# Patient Record
Sex: MALE | Race: WHITE | NOT HISPANIC OR LATINO | Employment: OTHER | URBAN - METROPOLITAN AREA
[De-identification: names, ages, dates, MRNs, and addresses within clinical notes are randomized per-mention and may not be internally consistent; named-entity substitution may affect disease eponyms.]

---

## 2017-01-03 ENCOUNTER — TELEPHONE (OUTPATIENT)
Dept: NEUROSURGERY | Facility: CLINIC | Age: 47
End: 2017-01-03

## 2017-01-03 NOTE — TELEPHONE ENCOUNTER
----- Message from [place] sent at 12/30/2016  2:13 PM EST -----  12-30-16 @ 210 pm -- spoke with patient and let him know that Amberly said with him being this far out from surgery there is no reason to be excused from jury duty

## 2017-01-23 ENCOUNTER — TELEPHONE (OUTPATIENT)
Dept: NEUROSURGERY | Facility: CLINIC | Age: 47
End: 2017-01-23

## 2017-01-23 DIAGNOSIS — M54.2 NECK PAIN: Primary | ICD-10-CM

## 2017-01-23 NOTE — TELEPHONE ENCOUNTER
Patient wife called said pt had a fall and wanted to know if he needed xrays before appt .     Per brynn AP lateral x ray order in chart.

## 2017-01-24 ENCOUNTER — OFFICE VISIT (OUTPATIENT)
Dept: NEUROSURGERY | Facility: CLINIC | Age: 47
End: 2017-01-24

## 2017-01-24 ENCOUNTER — HOSPITAL ENCOUNTER (OUTPATIENT)
Dept: GENERAL RADIOLOGY | Facility: HOSPITAL | Age: 47
Discharge: HOME | End: 2017-01-24
Admitting: NURSE PRACTITIONER

## 2017-01-24 VITALS
HEIGHT: 69 IN | BODY MASS INDEX: 31.81 KG/M2 | WEIGHT: 214.8 LBS | SYSTOLIC BLOOD PRESSURE: 125 MMHG | HEART RATE: 85 BPM | DIASTOLIC BLOOD PRESSURE: 85 MMHG

## 2017-01-24 DIAGNOSIS — M51.37 DEGENERATION OF LUMBAR OR LUMBOSACRAL INTERVERTEBRAL DISC: Primary | ICD-10-CM

## 2017-01-24 DIAGNOSIS — M50.120 CERVICAL DISC DISORDER WITH RADICULOPATHY OF MID-CERVICAL REGION: ICD-10-CM

## 2017-01-24 DIAGNOSIS — M54.2 NECK PAIN: ICD-10-CM

## 2017-01-24 PROBLEM — M51.379 DEGENERATION OF LUMBAR OR LUMBOSACRAL INTERVERTEBRAL DISC: Status: ACTIVE | Noted: 2017-01-24

## 2017-01-24 PROCEDURE — 99213 OFFICE O/P EST LOW 20 MIN: CPT | Performed by: PHYSICIAN ASSISTANT

## 2017-01-24 PROCEDURE — 72040 X-RAY EXAM NECK SPINE 2-3 VW: CPT

## 2017-01-24 NOTE — MR AVS SNAPSHOT
Rafael Kat   1/24/2017 10:30 AM   Office Visit    Dept Phone:  [phone]   Encounter #:  [id number]    Provider:  Amberly Camarillo PA-C   Department:  [facility] NEUROSURGERY                Your Full Care Plan              Today's Medication Changes          These changes are accurate as of: 1/24/17 11:16 AM.  If you have any questions, ask your nurse or doctor.               Medication(s)that have changed:     cyclobenzaprine 10 MG tablet   Commonly known as:  FLEXERIL   Take 1 tablet by mouth 3 (three) times a day as needed for muscle spasms.   What changed:  Another medication with the same name was removed. Continue taking this medication, and follow the directions you see here.   Changed by:  Bert Collado MD         Stop taking medication(s)listed here:     HYDROcodone-acetaminophen 7.5-325 MG per tablet   Commonly known as:  NORCO   Stopped by:  Amberly Camarillo PA-C                      Your Updated Medication List          This list is accurate as of: 1/24/17 11:16 AM.  Always use your most recent med list.                cyclobenzaprine 10 MG tablet   Commonly known as:  FLEXERIL   Take 1 tablet by mouth 3 (three) times a day as needed for muscle spasms.       gabapentin 300 MG capsule   Commonly known as:  NEURONTIN       naproxen 500 MG tablet   Commonly known as:  NAPROSYN   Take 1 tablet by mouth 2 (two) times a day with meals.       pantoprazole 40 MG EC tablet   Commonly known as:  PROTONIX       zolpidem 10 MG tablet   Commonly known as:  AMBIEN               You Were Diagnosed With        Codes Comments    Degeneration of lumbar or lumbosacral intervertebral disc    -  Primary ICD-10-CM: M51.37  ICD-9-CM: 722.52     Cervical disc disorder with radiculopathy of mid-cervical region     ICD-10-CM: M50.120  ICD-9-CM: 723.4       Instructions    Steps to Quit Smoking   Smoking tobacco can be harmful to your health and can affect almost every organ  in your body. Smoking puts you, and those around you, at risk for developing many serious chronic diseases. Quitting smoking is difficult, but it is one of the best things that you can do for your health. It is never too late to quit.  WHAT ARE THE BENEFITS OF QUITTING SMOKING?  When you quit smoking, you lower your risk of developing serious diseases and conditions, such as:  · Lung cancer or lung disease, such as COPD.  · Heart disease.  · Stroke.  · Heart attack.  · Infertility.  · Osteoporosis and bone fractures.  Additionally, symptoms such as coughing, wheezing, and shortness of breath may get better when you quit. You may also find that you get sick less often because your body is stronger at fighting off colds and infections. If you are pregnant, quitting smoking can help to reduce your chances of having a baby of low birth weight.  HOW DO I GET READY TO QUIT?  When you decide to quit smoking, create a plan to make sure that you are successful. Before you quit:  · Pick a date to quit. Set a date within the next two weeks to give you time to prepare.  · Write down the reasons why you are quitting. Keep this list in places where you will see it often, such as on your bathroom mirror or in your car or wallet.  · Identify the people, places, things, and activities that make you want to smoke (triggers) and avoid them. Make sure to take these actions:    Throw away all cigarettes at home, at work, and in your car.    Throw away smoking accessories, such as ashtrays and lighters.    Clean your car and make sure to empty the ashtray.    Clean your home, including curtains and carpets.  · Tell your family, friends, and coworkers that you are quitting. Support from your loved ones can make quitting easier.  · Talk with your health care provider about your options for quitting smoking.  · Find out what treatment options are covered by your health insurance.  WHAT STRATEGIES CAN I USE TO QUIT SMOKING?   Talk with your  "healthcare provider about different strategies to quit smoking. Some strategies include:  · Quitting smoking altogether instead of gradually lessening how much you smoke over a period of time. Research shows that quitting \"cold turkey\" is more successful than gradually quitting.  · Attending in-person counseling to help you build problem-solving skills. You are more likely to have success in quitting if you attend several counseling sessions. Even short sessions of 10 minutes can be effective.  · Finding resources and support systems that can help you to quit smoking and remain smoke-free after you quit. These resources are most helpful when you use them often. They can include:    Online chats with a counselor.    Telephone quitlines.    Printed self-help materials.    Support groups or group counseling.    Text messaging programs.    Mobile phone applications.  · Taking medicines to help you quit smoking. (If you are pregnant or breastfeeding, talk with your health care provider first.) Some medicines contain nicotine and some do not. Both types of medicines help with cravings, but the medicines that include nicotine help to relieve withdrawal symptoms. Your health care provider may recommend:    Nicotine patches, gum, or lozenges.    Nicotine inhalers or sprays.    Non-nicotine medicine that is taken by mouth.  Talk with your health care provider about combining strategies, such as taking medicines while you are also receiving in-person counseling. Using these two strategies together makes you more likely to succeed in quitting than if you used either strategy on its own.  If you are pregnant or breastfeeding, talk with your health care provider about finding counseling or other support strategies to quit smoking. Do not take medicine to help you quit smoking unless told to do so by your health care provider.  WHAT THINGS CAN I DO TO MAKE IT EASIER TO QUIT?  Quitting smoking might feel overwhelming at first, but " there is a lot that you can do to make it easier. Take these important actions:  · Reach out to your family and friends and ask that they support and encourage you during this time. Call telephone quitlines, reach out to support groups, or work with a counselor for support.  · Ask people who smoke to avoid smoking around you.  · Avoid places that trigger you to smoke, such as bars, parties, or smoke-break areas at work.  · Spend time around people who do not smoke.  · Lessen stress in your life, because stress can be a smoking trigger for some people. To lessen stress, try:    Exercising regularly.    Deep-breathing exercises.    Yoga.    Meditating.    Performing a body scan. This involves closing your eyes, scanning your body from head to toe, and noticing which parts of your body are particularly tense. Purposefully relax the muscles in those areas.  · Download or purchase mobile phone or tablet apps (applications) that can help you stick to your quit plan by providing reminders, tips, and encouragement. There are many free apps, such as QuitGuide from the CDC (Centers for Disease Control and Prevention). You can find other support for quitting smoking (smoking cessation) through smokefree.gov and other websites.  HOW WILL I FEEL WHEN I QUIT SMOKING?  Within the first 24 hours of quitting smoking, you may start to feel some withdrawal symptoms. These symptoms are usually most noticeable 2-3 days after quitting, but they usually do not last beyond 2-3 weeks. Changes or symptoms that you might experience include:  · Mood swings.  · Restlessness, anxiety, or irritation.  · Difficulty concentrating.  · Dizziness.  · Strong cravings for sugary foods in addition to nicotine.  · Mild weight gain.  · Constipation.  · Nausea.  · Coughing or a sore throat.  · Changes in how your medicines work in your body.  · A depressed mood.  · Difficulty sleeping (insomnia).  After the first 2-3 weeks of quitting, you may start to  "notice more positive results, such as:  · Improved sense of smell and taste.  · Decreased coughing and sore throat.  · Slower heart rate.  · Lower blood pressure.  · Clearer skin.  · The ability to breathe more easily.  · Fewer sick days.  Quitting smoking is very challenging for most people. Do not get discouraged if you are not successful the first time. Some people need to make many attempts to quit before they achieve long-term success. Do your best to stick to your quit plan, and talk with your health care provider if you have any questions or concerns.     This information is not intended to replace advice given to you by your health care provider. Make sure you discuss any questions you have with your health care provider.     Document Released: 12/12/2002 Document Revised: 05/03/2016 Document Reviewed: 05/03/2016  [employer] Interactive Patient Education ©2016 [employer]       Patient Instructions History      Upcoming Appointments     Visit Type Date Time Department    FOLLOW UP 1/24/2017 10:30 AM [facility] NEUROSURGERY [facility]    XR [facility] SPINE CERVICAL 2 VW 1/24/2017  9:15 AM  [facility] XRAY    MYELOGRAM RECOVERY 3/1/2017 10:30 AM [facility] NEUROSURGERY [facility]      MyChart Signup     Our records indicate that your [facility] account has been deactivated. If you would like to reactivate your account, please email [email or web address] or call [phone] to talk to our [facility] staff.             Other Info from Your Visit           Your Appointments     Mar 01, 2017 10:30 AM EST   Myelogram Recovery with Bert Collado MD   [facility] (--)    [address] [facility] [address] [facility] [address] [address] [phone]              Other Notes About Your Plan     NO LONGER PCP        Allergies     Neomycin-bacitracin Zn-polymyx        Reason for Visit     Neck Pain     Arm Pain           Vital Signs     Blood Pressure Pulse Height Weight Body Mass Index Smoking Status    125/85 85 69\" " (175.3 cm) 214 lb 12.8 oz (97.4 kg) 31.72 kg/m2 Former Smoker      Problems and Diagnoses Noted     Disc disease of the neck    Degeneration of lumbar or lumbosacral intervertebral disc      No Longer an Issue     Cervical pain    Encounter for examination following surgery

## 2017-01-24 NOTE — PATIENT INSTRUCTIONS
Steps to Quit Smoking   Smoking tobacco can be harmful to your health and can affect almost every organ in your body. Smoking puts you, and those around you, at risk for developing many serious chronic diseases. Quitting smoking is difficult, but it is one of the best things that you can do for your health. It is never too late to quit.  WHAT ARE THE BENEFITS OF QUITTING SMOKING?  When you quit smoking, you lower your risk of developing serious diseases and conditions, such as:  · Lung cancer or lung disease, such as COPD.  · Heart disease.  · Stroke.  · Heart attack.  · Infertility.  · Osteoporosis and bone fractures.  Additionally, symptoms such as coughing, wheezing, and shortness of breath may get better when you quit. You may also find that you get sick less often because your body is stronger at fighting off colds and infections. If you are pregnant, quitting smoking can help to reduce your chances of having a baby of low birth weight.  HOW DO I GET READY TO QUIT?  When you decide to quit smoking, create a plan to make sure that you are successful. Before you quit:  · Pick a date to quit. Set a date within the next two weeks to give you time to prepare.  · Write down the reasons why you are quitting. Keep this list in places where you will see it often, such as on your bathroom mirror or in your car or wallet.  · Identify the people, places, things, and activities that make you want to smoke (triggers) and avoid them. Make sure to take these actions:    Throw away all cigarettes at home, at work, and in your car.    Throw away smoking accessories, such as ashtrays and lighters.    Clean your car and make sure to empty the ashtray.    Clean your home, including curtains and carpets.  · Tell your family, friends, and coworkers that you are quitting. Support from your loved ones can make quitting easier.  · Talk with your health care provider about your options for quitting smoking.  · Find out what treatment  "options are covered by your health insurance.  WHAT STRATEGIES CAN I USE TO QUIT SMOKING?   Talk with your healthcare provider about different strategies to quit smoking. Some strategies include:  · Quitting smoking altogether instead of gradually lessening how much you smoke over a period of time. Research shows that quitting \"cold turkey\" is more successful than gradually quitting.  · Attending in-person counseling to help you build problem-solving skills. You are more likely to have success in quitting if you attend several counseling sessions. Even short sessions of 10 minutes can be effective.  · Finding resources and support systems that can help you to quit smoking and remain smoke-free after you quit. These resources are most helpful when you use them often. They can include:    Online chats with a counselor.    Telephone quitlines.    Printed self-help materials.    Support groups or group counseling.    Text messaging programs.    Mobile phone applications.  · Taking medicines to help you quit smoking. (If you are pregnant or breastfeeding, talk with your health care provider first.) Some medicines contain nicotine and some do not. Both types of medicines help with cravings, but the medicines that include nicotine help to relieve withdrawal symptoms. Your health care provider may recommend:    Nicotine patches, gum, or lozenges.    Nicotine inhalers or sprays.    Non-nicotine medicine that is taken by mouth.  Talk with your health care provider about combining strategies, such as taking medicines while you are also receiving in-person counseling. Using these two strategies together makes you more likely to succeed in quitting than if you used either strategy on its own.  If you are pregnant or breastfeeding, talk with your health care provider about finding counseling or other support strategies to quit smoking. Do not take medicine to help you quit smoking unless told to do so by your health care " provider.  WHAT THINGS CAN I DO TO MAKE IT EASIER TO QUIT?  Quitting smoking might feel overwhelming at first, but there is a lot that you can do to make it easier. Take these important actions:  · Reach out to your family and friends and ask that they support and encourage you during this time. Call telephone quitlines, reach out to support groups, or work with a counselor for support.  · Ask people who smoke to avoid smoking around you.  · Avoid places that trigger you to smoke, such as bars, parties, or smoke-break areas at work.  · Spend time around people who do not smoke.  · Lessen stress in your life, because stress can be a smoking trigger for some people. To lessen stress, try:    Exercising regularly.    Deep-breathing exercises.    Yoga.    Meditating.    Performing a body scan. This involves closing your eyes, scanning your body from head to toe, and noticing which parts of your body are particularly tense. Purposefully relax the muscles in those areas.  · Download or purchase mobile phone or tablet apps (applications) that can help you stick to your quit plan by providing reminders, tips, and encouragement. There are many free apps, such as QuitGuide from the CDC (Centers for Disease Control and Prevention). You can find other support for quitting smoking (smoking cessation) through smokefree.gov and other websites.  HOW WILL I FEEL WHEN I QUIT SMOKING?  Within the first 24 hours of quitting smoking, you may start to feel some withdrawal symptoms. These symptoms are usually most noticeable 2-3 days after quitting, but they usually do not last beyond 2-3 weeks. Changes or symptoms that you might experience include:  · Mood swings.  · Restlessness, anxiety, or irritation.  · Difficulty concentrating.  · Dizziness.  · Strong cravings for sugary foods in addition to nicotine.  · Mild weight gain.  · Constipation.  · Nausea.  · Coughing or a sore throat.  · Changes in how your medicines work in your  body.  · A depressed mood.  · Difficulty sleeping (insomnia).  After the first 2-3 weeks of quitting, you may start to notice more positive results, such as:  · Improved sense of smell and taste.  · Decreased coughing and sore throat.  · Slower heart rate.  · Lower blood pressure.  · Clearer skin.  · The ability to breathe more easily.  · Fewer sick days.  Quitting smoking is very challenging for most people. Do not get discouraged if you are not successful the first time. Some people need to make many attempts to quit before they achieve long-term success. Do your best to stick to your quit plan, and talk with your health care provider if you have any questions or concerns.     This information is not intended to replace advice given to you by your health care provider. Make sure you discuss any questions you have with your health care provider.     Document Released: 12/12/2002 Document Revised: 05/03/2016 Document Reviewed: 05/03/2016  [employer] Interactive Patient Education ©2016 [employer]

## 2017-01-24 NOTE — PROGRESS NOTES
Subjective   Patient ID: Rafael Kat is a 46 y.o. male is here today for follow-up. He is 10 months out from a C4-6 anterior corpectomy by Dr. Collado 3/29/16.  He returns to the office with a new x ray after a fall.  He complains of intermittent neck pain and N/T in right arm. He takes Naproxen 500 mg BID, Gabapentin 300 mg TID and Cyclobenzaprine TID for pain.      Neck Pain    This is a chronic problem. The current episode started more than 1 year ago. The problem occurs intermittently. The problem has been gradually worsening. The pain is associated with a fall. The quality of the pain is described as aching, stabbing and shooting. Associated symptoms include numbness, tingling and weakness. Pertinent negatives include no fever. He has tried muscle relaxants and heat for the symptoms. The treatment provided mild relief.   Arm Pain    The injury mechanism was a fall. The pain is present in the right hand, upper right arm and right shoulder. The quality of the pain is described as stabbing and shooting. The pain radiates to the right arm. The pain is at a severity of 5/10. The pain is moderate. The pain has been intermittent since the incident. Associated symptoms include muscle weakness, numbness and tingling. He has tried heat for the symptoms. The treatment provided mild relief.   Back Pain   This is a chronic problem. The current episode started more than 1 year ago. The problem occurs constantly. The problem has been gradually worsening since onset. The pain is present in the lumbar spine. The pain does not radiate. The pain is at a severity of 8/10. The pain is severe. Associated symptoms include numbness, tingling and weakness. Pertinent negatives include no bladder incontinence, bowel incontinence, fever or perianal numbness. The treatment provided no relief.       The following portions of the patient's history were reviewed and updated as appropriate: allergies, current medications, past family  history, past medical history, past social history, past surgical history and problem list.    Review of Systems   Constitutional: Negative for fever.   Gastrointestinal: Negative for bowel incontinence.   Genitourinary: Negative for bladder incontinence and difficulty urinating.   Musculoskeletal: Positive for back pain and neck pain (right arm pain). Negative for gait problem.   Neurological: Positive for tingling, weakness and numbness.   All other systems reviewed and are negative.      Objective   Physical Exam   Constitutional: He is oriented to person, place, and time. He appears well-developed and well-nourished.   HENT:   Head: Normocephalic and atraumatic.   Right Ear: External ear normal.   Left Ear: External ear normal.   Eyes: Conjunctivae and EOM are normal. Pupils are equal, round, and reactive to light. Right eye exhibits no discharge. Left eye exhibits no discharge.   Neck: Normal range of motion. Neck supple. No tracheal deviation present.   Cardiovascular: Normal rate and regular rhythm.    Pulmonary/Chest: Effort normal and breath sounds normal. No stridor. No respiratory distress.   Abdominal: Soft. Bowel sounds are normal.   Musculoskeletal: Normal range of motion. He exhibits no edema, tenderness or deformity.   Neurological: He is alert and oriented to person, place, and time. He has normal strength and normal reflexes. He displays no atrophy, no tremor and normal reflexes. No cranial nerve deficit or sensory deficit. He exhibits normal muscle tone. He displays a negative Romberg sign. He displays no seizure activity. Coordination and gait normal.   No long tract signs   Skin: Skin is warm and dry.   Psychiatric: He has a normal mood and affect. His behavior is normal. Judgment and thought content normal.   Nursing note and vitals reviewed.    Neurologic Exam     Mental Status   Oriented to person, place, and time.     Cranial Nerves     CN III, IV, VI   Pupils are equal, round, and reactive  to light.  Extraocular motions are normal.     Motor Exam     Strength   Strength 5/5 throughout.       Assessment/Plan   Independent Review of Radiographic Studies:    Did review the cervical spine x-rays done this morning.  They show his previous fusion with no acute changes.  Medical Decision Making:    Mr. Kat is 10 months out from an anterior cervical corpectomy and fusion at C4, C5 and C6 with cage and plate.  The patient was last here in July and at that point Dr. Collado referred him to pain management.  He went once but because he admitted that he does use marijuana intermittently to help control his pain they referred him for a neuropsych eval.  Mr. Kat was offended and did not return for any treatment.  He continues to complain of neck pain as well as interscapular pain.  He fell on January 16 after falling down several steps on a stair broke.  Since then he has had increased neck pain interscapular pain and bilateral arm pain as well as numbness and tingling.  The arm symptoms are much worse since the fall.  He states that the arm and hand numbness and tingling is most limiting.  He went to the ER at [facility] and had x-rays there which looked fine.  He also had x-rays done this morning which again are unremarkable for any change.    Does also report chronic low back pain that is been getting worse over the past year.  No leg pain.    He had an EMG 1 year ago prior to surgery but did show mild bilateral carpal tunnel syndrome but has not had one since.    We talked about the pain management referral and I explained that the referral to the neuro psychiatrist was standard of care.  I explained that marijuana is an illegal substances the [facility] and is long as he is using any illicit drugs his pain management physician is not going to be able to prescribe any chronic narcotics.  He is going to talk to his wife and consider returning to pain management.  However he did want to  proceed with new imaging to determine if there is anything surgical for Dr. Collado to offer.  I told him that we could order a C/L myelogram.  He is aware of the risks of bleeding, infection and headache and does wish to proceed. He will f/u thereafter.   Rafael was seen today for neck pain and arm pain.    Diagnoses and all orders for this visit:    Degeneration of lumbar or lumbosacral intervertebral disc  -     IR Myelogram 2 or More Areas; Future  -     CT Cervical Spine Without Contrast; Future  -     CT Lumbar Spine Without Contrast; Future  -     XR Spine Lumbar Complete With Flex & Ext; Future  -     XR spine cervical complete w flex ext; Future    Cervical disc disorder with radiculopathy of mid-cervical region  -     IR Myelogram 2 or More Areas; Future  -     CT Cervical Spine Without Contrast; Future  -     CT Lumbar Spine Without Contrast; Future  -     XR Spine Lumbar Complete With Flex & Ext; Future  -     XR spine cervical complete w flex ext; Future    Return for follow up after radiology test with Dr. Collado.

## 2017-02-14 ENCOUNTER — HOSPITAL ENCOUNTER (OUTPATIENT)
Dept: GENERAL RADIOLOGY | Facility: HOSPITAL | Age: 47
Discharge: HOME | End: 2017-02-14

## 2017-02-14 ENCOUNTER — HOSPITAL ENCOUNTER (OUTPATIENT)
Dept: CT IMAGING | Facility: HOSPITAL | Age: 47
Discharge: HOME | End: 2017-02-14
Admitting: PHYSICIAN ASSISTANT

## 2017-02-14 ENCOUNTER — HOSPITAL ENCOUNTER (OUTPATIENT)
Dept: CT IMAGING | Facility: HOSPITAL | Age: 47
Discharge: HOME | End: 2017-02-14

## 2017-02-14 VITALS
BODY MASS INDEX: 31.1 KG/M2 | HEART RATE: 78 BPM | SYSTOLIC BLOOD PRESSURE: 148 MMHG | HEIGHT: 69 IN | WEIGHT: 210 LBS | DIASTOLIC BLOOD PRESSURE: 90 MMHG | OXYGEN SATURATION: 96 % | RESPIRATION RATE: 16 BRPM | TEMPERATURE: 97.8 F

## 2017-02-14 DIAGNOSIS — M50.120 CERVICAL DISC DISORDER WITH RADICULOPATHY OF MID-CERVICAL REGION: ICD-10-CM

## 2017-02-14 DIAGNOSIS — M51.37 DEGENERATION OF LUMBAR OR LUMBOSACRAL INTERVERTEBRAL DISC: ICD-10-CM

## 2017-02-14 PROCEDURE — 0 IOPAMIDOL 61 % SOLUTION: Performed by: RADIOLOGY

## 2017-02-14 PROCEDURE — 72131 CT LUMBAR SPINE W/O DYE: CPT

## 2017-02-14 PROCEDURE — 72270 MYELOGPHY 2/> SPINE REGIONS: CPT

## 2017-02-14 PROCEDURE — 72125 CT NECK SPINE W/O DYE: CPT

## 2017-02-14 PROCEDURE — 72114 X-RAY EXAM L-S SPINE BENDING: CPT

## 2017-02-14 PROCEDURE — 72052 X-RAY EXAM NECK SPINE 6/>VWS: CPT

## 2017-02-14 RX ORDER — LIDOCAINE WITH 8.4% SOD BICARB 0.9%(10ML)
20 SYRINGE (ML) INJECTION ONCE
Status: COMPLETED | OUTPATIENT
Start: 2017-02-14 | End: 2017-02-14

## 2017-02-14 RX ADMIN — Medication 4 ML: at 08:37

## 2017-02-14 RX ADMIN — IOPAMIDOL 15 ML: 612 INJECTION, SOLUTION INTRATHECAL at 08:38

## 2017-02-14 NOTE — NURSING NOTE
Returned to triage. Dressing to low back dry and intact. No complaint of headache. No distress. Fluids encouraged. Drinking coffee. Report called to [facility] pain management. No distress. To PM with wife.

## 2017-02-15 ENCOUNTER — TELEPHONE (OUTPATIENT)
Dept: INTERVENTIONAL RADIOLOGY/VASCULAR | Facility: HOSPITAL | Age: 47
End: 2017-02-15

## 2017-03-01 ENCOUNTER — OFFICE VISIT (OUTPATIENT)
Dept: NEUROSURGERY | Facility: CLINIC | Age: 47
End: 2017-03-01

## 2017-03-01 VITALS
HEART RATE: 84 BPM | WEIGHT: 210 LBS | DIASTOLIC BLOOD PRESSURE: 94 MMHG | BODY MASS INDEX: 31.1 KG/M2 | HEIGHT: 69 IN | SYSTOLIC BLOOD PRESSURE: 139 MMHG

## 2017-03-01 DIAGNOSIS — M51.37 DEGENERATION OF LUMBAR OR LUMBOSACRAL INTERVERTEBRAL DISC: ICD-10-CM

## 2017-03-01 DIAGNOSIS — M50.31 OTHER CERVICAL DISC DEGENERATION, HIGH CERVICAL REGION: ICD-10-CM

## 2017-03-01 DIAGNOSIS — M54.2 NECK PAIN: Primary | ICD-10-CM

## 2017-03-01 PROCEDURE — 99213 OFFICE O/P EST LOW 20 MIN: CPT | Performed by: NEUROLOGICAL SURGERY

## 2017-03-01 NOTE — PATIENT INSTRUCTIONS
Obesity  Obesity is defined as having too much total body fat and a body mass index (BMI) of 30 or more. BMI is an estimate of body fat and is calculated from your height and weight. BMI is typically calculated by your health care provider during regular wellness visits. Obesity happens when you consume more calories than you can burn by exercising or performing daily physical tasks. Prolonged obesity can cause major illnesses or emergencies, such as:  · Stroke.  · Heart disease.  · Diabetes.  · Cancer.  · Arthritis.  · High blood pressure (hypertension).  · High cholesterol.  · Sleep apnea.  · Erectile dysfunction.  · Infertility problems.  CAUSES   · Regularly eating unhealthy foods.  · Physical inactivity.  · Certain disorders, such as an underactive thyroid (hypothyroidism), Cushing's syndrome, and polycystic ovarian syndrome.  · Certain medicines, such as steroids, some depression medicines, and antipsychotics.  · Genetics.  · Lack of sleep.  DIAGNOSIS  A health care provider can diagnose obesity after calculating your BMI. Obesity will be diagnosed if your BMI is 30 or higher.  There are other methods of measuring obesity levels. Some other methods include measuring your skinfold thickness, your waist circumference, and comparing your hip circumference to your waist circumference.  TREATMENT   A healthy treatment program includes some or all of the following:  · Long-term dietary changes.  · Exercise and physical activity.  · Behavioral and lifestyle changes.  · Medicine only under the supervision of your health care provider. Medicines may help, but only if they are used with diet and exercise programs.  If your BMI is 40 or higher, your health care provider may recommend specialized surgery or programs to help with weight loss.  An unhealthy treatment program includes:  · Fasting.  · Fad diets.  · Supplements and drugs.  These choices do not succeed in long-term weight control.  HOME CARE  INSTRUCTIONS  · Exercise and perform physical activity as directed by your health care provider. To increase physical activity, try the following:    Use stairs instead of elevators.    Park farther away from store entrances.    Garden, bike, or walk instead of watching television or using the computer.  · Eat healthy, low-calorie foods and drinks on a regular basis. Eat more fruits and vegetables. Use low-calorie cookbooks or take healthy cooking classes.  · Limit fast food, sweets, and processed snack foods.  · Eat smaller portions.  · Keep a daily journal of everything you eat. There are many free websites to help you with this. It may be helpful to measure your foods so you can determine if you are eating the correct portion sizes.  · Avoid drinking alcohol. Drink more water and drinks without calories.  · Take vitamins and supplements only as recommended by your health care provider.  · Weight-loss support groups, registered dietitians, counselors, and stress reduction education can also be very helpful.  SEEK IMMEDIATE MEDICAL CARE IF:  · You have chest pain or tightness.  · You have trouble breathing or feel short of breath.  · You have weakness or leg numbness.  · You feel confused or have trouble talking.  · You have sudden changes in your vision.     This information is not intended to replace advice given to you by your health care provider. Make sure you discuss any questions you have with your health care provider.     Document Released: 01/25/2006 Document Revised: 01/08/2016 Document Reviewed: 10/05/2016  [employer] Interactive Patient Education ©2016 [employer]

## 2017-03-01 NOTE — PROGRESS NOTES
Subjective   Patient ID: Rafael Kat is a 46 y.o. male is here today for follow-up of neck pain with new CT myelogram of the cervical and lumbar spine.    The patient denies any changes to their symptoms since their last visit.  The patient denies any issues following their myelogram.    Neck Pain    This is a chronic problem. The current episode started more than 1 month ago. The problem occurs daily. The problem has been unchanged. Pertinent negatives include no fever.       The following portions of the patient's history were reviewed and updated as appropriate: allergies, current medications, past family history, past medical history, past social history, past surgical history and problem list.    Review of Systems   Constitutional: Negative for fever.   Musculoskeletal: Positive for neck pain.   Neurological: Negative for syncope.   All other systems reviewed and are negative.    The patient is with his wife.  About a year ago he underwent an anterior cervical corpectomy at C4, C5, and C6 with a cage and plate.  He had done reasonably well until about 2 months ago when he fell and began having some numbness and tingling in his right arm and pain in his trapezius region.  I reviewed the cervical myelogram and neck surgery results.  The hardware looks fine.  There is no evidence of any significant impingement.  We discussed the lumbar MRI as well.  He has low back pain but not a lot of leg pain.  He decided not to go to pain management.  He reports having taken marijuana for pain management on his own.  I reassured him that he doesn't need any additional surgery.,  At this point he is a year out from surgery, we can keep it open-ended.  He is aware of the risk of recurrence and radiculopathy in the legs.  At that happens, he can certainly come back to see me.      Objective   Physical Exam   Constitutional: He is oriented to person, place, and time. He appears well-developed and well-nourished.   HENT:    Head: Normocephalic and atraumatic.   Eyes: Conjunctivae and EOM are normal. Pupils are equal, round, and reactive to light.   Fundoscopic exam:       The right eye shows no papilledema. The right eye shows venous pulsations.        The left eye shows no papilledema. The left eye shows venous pulsations.   Neck: Carotid bruit is not present.   Neurological: He is oriented to person, place, and time. He has a normal Finger-Nose-Finger Test and a normal Heel to Shin Test. Gait normal.   Reflex Scores:       Tricep reflexes are 2+ on the right side and 2+ on the left side.       Bicep reflexes are 2+ on the right side and 2+ on the left side.       Brachioradialis reflexes are 2+ on the right side and 2+ on the left side.       Patellar reflexes are 2+ on the right side and 2+ on the left side.       Achilles reflexes are 2+ on the right side and 2+ on the left side.  Psychiatric: His speech is normal.     Neurologic Exam     Mental Status   Oriented to person, place, and time.   Registration of memory: Good recent and remote memory.   Attention: normal. Concentration: normal.   Speech: speech is normal   Level of consciousness: alert  Knowledge: consistent with education.     Cranial Nerves     CN II   Visual fields full to confrontation.   Visual acuity: normal    CN III, IV, VI   Pupils are equal, round, and reactive to light.  Extraocular motions are normal.     CN V   Facial sensation intact.   Right corneal reflex: normal  Left corneal reflex: normal    CN VII   Facial expression full, symmetric.   Right facial weakness: none  Left facial weakness: none    CN VIII   Hearing: intact    CN IX, X   Palate: symmetric    CN XI   Right sternocleidomastoid strength: normal  Left sternocleidomastoid strength: normal    CN XII   Tongue: not atrophic  Tongue deviation: none    Motor Exam   Muscle bulk: normal  Right arm tone: normal  Left arm tone: normal  Right leg tone: normal  Left leg tone: normal    Strength    Strength 5/5 except as noted.     Sensory Exam   Light touch normal.     Gait, Coordination, and Reflexes     Gait  Gait: normal    Coordination   Finger to nose coordination: normal  Heel to shin coordination: normal    Reflexes   Right brachioradialis: 2+  Left brachioradialis: 2+  Right biceps: 2+  Left biceps: 2+  Right triceps: 2+  Left triceps: 2+  Right patellar: 2+  Left patellar: 2+  Right achilles: 2+  Left achilles: 2+  Right : 2+  Left : 2+      Assessment/Plan   Independent Review of Radiographic Studies:    The cervical myelogram was reviewed.  It was done on 2/14/70.  The cage and the plate and the screws are well positioned.  There doesn't seem to be any significant nerve impingement.  The lumbar myelogram was reviewed.  There is multilevel degenerative disc disease and some disc bulging at L1-L2 and L2-L3 but nothing very dramatic.      Medical Decision Making:    He was reassured about the fact that the hardware looks fine in the neck.  No further surgical indications for the neck or the low back.  He is not going to return to pain management.  He will just manage his own symptoms at this point.  We can keep it open-ended.  If he develops radiculopathy in the legs or recurrent significant pain in the arms or weakness, he can come back to see me.      Rafael was seen today for neck pain.    Diagnoses and all orders for this visit:    Neck pain    Degeneration of lumbar or lumbosacral intervertebral disc    Other cervical disc degeneration, high cervical region    Return if symptoms worsen or fail to improve.      Body mass index is 31.01 kg/(m^2).

## 2019-03-27 ENCOUNTER — HOSPITAL ENCOUNTER (EMERGENCY)
Facility: HOSPITAL | Age: 49
Discharge: HOME | End: 2019-03-27
Attending: EMERGENCY MEDICINE | Admitting: EMERGENCY MEDICINE

## 2019-03-27 ENCOUNTER — APPOINTMENT (OUTPATIENT)
Dept: GENERAL RADIOLOGY | Facility: HOSPITAL | Age: 49
End: 2019-03-27

## 2019-03-27 VITALS
WEIGHT: 214 LBS | RESPIRATION RATE: 18 BRPM | OXYGEN SATURATION: 98 % | SYSTOLIC BLOOD PRESSURE: 134 MMHG | TEMPERATURE: 96.4 F | HEIGHT: 69 IN | DIASTOLIC BLOOD PRESSURE: 96 MMHG | BODY MASS INDEX: 31.7 KG/M2 | HEART RATE: 98 BPM

## 2019-03-27 DIAGNOSIS — S29.019A THORACIC MYOFASCIAL STRAIN, INITIAL ENCOUNTER: Primary | ICD-10-CM

## 2019-03-27 PROCEDURE — 99283 EMERGENCY DEPT VISIT LOW MDM: CPT

## 2019-03-27 PROCEDURE — 72072 X-RAY EXAM THORAC SPINE 3VWS: CPT

## 2019-03-27 RX ORDER — METHYLPREDNISOLONE 4 MG/1
TABLET ORAL
Qty: 21 TABLET | Refills: 0 | Status: SHIPPED | OUTPATIENT
Start: 2019-03-27 | End: 2019-05-15

## 2019-05-03 NOTE — PROGRESS NOTES
Subjective   Patient ID: Rafael Kat is a 48 y.o. male is here today for follow-up to visit at [facility] ER in March 2019 for right scapular pain, intermittent right buttock pain. He had plain films and he was given MDP and told to follow up here.  Patient states that the MDP helped while he was taking it.    Patient had surgery in 2016, C4, C5 and C6 ACCF with cage and plate    Patient was last seen 3.1.17 for follow up after lumbar and cervical myelogram for neck pain and low back pain.    Patient is currently having neck stiffness, but no neck pain.  He denies low back pain, but he does have intermittent moderate to severe thoracic spine pain.  No arm or leg pain.  He is unsure of upper or lower extremity weakness.  He has intermittent N/T left hand.  He states that heat does help.     He is taking Flexeril 10 BID,Gabapentin 300 mg BID, sometimes he gets the third dose in when he can as prescribed by his PCP.  He is not taking any meds for pain.    He is with his wife. It has been about 3 years since he underwent a 3 level corpectomy in the neck for myeloradiculopathy. He was seen in the emergency room about 6 weeks ago because he was lifting a heavy object and had some strain in his right scapular region. X-rays were done which were review by me today. They were unremarkable. He was reassured that he did not harm the hardware. There is no evidence of any fracture. I think most of his pain was musculoskeletal and it seems to have improved. A steroid pack was given and it helped him. He has some mild symptoms but they are getting better. I told him most of all he has to readjust his work activities to accommodate this issue and he has done that. He is still self-employed and continues to work. He was reassured and will keep it open-ended at this point.         Back Pain   The current episode started more than 1 month ago. The problem occurs intermittently. The problem has been waxing and waning since onset. The  pain is present in the thoracic spine. The pain does not radiate. The pain is at a severity of 8/10. The pain is moderate. The symptoms are aggravated by sitting, standing, twisting and bending. Associated symptoms include numbness and tingling. Pertinent negatives include no weakness.       The following portions of the patient's history were reviewed and updated as appropriate: allergies, current medications, past family history, past medical history, past social history, past surgical history and problem list.    Review of Systems   Musculoskeletal: Positive for back pain. Negative for arthralgias and gait problem.   Neurological: Positive for tingling and numbness. Negative for weakness.   All other systems reviewed and are negative.      Objective   Physical Exam   Constitutional: He is oriented to person, place, and time. He appears well-developed and well-nourished.   HENT:   Head: Normocephalic and atraumatic.   Eyes: Conjunctivae and EOM are normal. Pupils are equal, round, and reactive to light.   Fundoscopic exam:       The right eye shows no papilledema. The right eye shows venous pulsations.        The left eye shows no papilledema. The left eye shows venous pulsations.   Neck: Carotid bruit is not present.   Neurological: He is oriented to person, place, and time. He has a normal Finger-Nose-Finger Test and a normal Heel to Shin Test. Gait normal.   Reflex Scores:       Tricep reflexes are 2+ on the right side and 2+ on the left side.       Bicep reflexes are 2+ on the right side and 2+ on the left side.       Brachioradialis reflexes are 2+ on the right side and 2+ on the left side.       Patellar reflexes are 2+ on the right side and 2+ on the left side.       Achilles reflexes are 2+ on the right side and 2+ on the left side.  Psychiatric: His speech is normal.     Neurologic Exam     Mental Status   Oriented to person, place, and time.   Registration of memory: Good recent and remote memory.    Attention: normal. Concentration: normal.   Speech: speech is normal   Level of consciousness: alert  Knowledge: consistent with education.     Cranial Nerves     CN II   Visual fields full to confrontation.   Visual acuity: normal    CN III, IV, VI   Pupils are equal, round, and reactive to light.  Extraocular motions are normal.     CN V   Facial sensation intact.   Right corneal reflex: normal  Left corneal reflex: normal    CN VII   Facial expression full, symmetric.   Right facial weakness: none  Left facial weakness: none    CN VIII   Hearing: intact    CN IX, X   Palate: symmetric    CN XI   Right sternocleidomastoid strength: normal  Left sternocleidomastoid strength: normal    CN XII   Tongue: not atrophic  Tongue deviation: none    Motor Exam   Muscle bulk: normal  Right arm tone: normal  Left arm tone: normal  Right leg tone: normal  Left leg tone: normal    Strength   Strength 5/5 except as noted.     Sensory Exam   Light touch normal.     Gait, Coordination, and Reflexes     Gait  Gait: normal    Coordination   Finger to nose coordination: normal  Heel to shin coordination: normal    Reflexes   Right brachioradialis: 2+  Left brachioradialis: 2+  Right biceps: 2+  Left biceps: 2+  Right triceps: 2+  Left triceps: 2+  Right patellar: 2+  Left patellar: 2+  Right achilles: 2+  Left achilles: 2+  Right : 2+  Left : 2+      Assessment/Plan   Independent Review of Radiographic Studies:    I reviewed the x-rays done on 3/27/2019 which show no problems with the inferior portion of the hardware and no evidence of any fracture in the thoracic spine just disc degeneration.  Agree with the report.      Medical Decision Making:    He was reassured that nothing severe is going on.  We can keep it open ended.  If symptoms recur in the future he can return to the office.      Rafael was seen today for back pain and numbness.    Diagnoses and all orders for this visit:    Pain in thoracic spine    Muscle  spasm of right shoulder    History of spinal fusion      Return if symptoms worsen or fail to improve.

## 2019-05-15 ENCOUNTER — OFFICE VISIT (OUTPATIENT)
Dept: NEUROSURGERY | Facility: CLINIC | Age: 49
End: 2019-05-15

## 2019-05-15 VITALS
BODY MASS INDEX: 33.33 KG/M2 | WEIGHT: 225 LBS | HEART RATE: 72 BPM | SYSTOLIC BLOOD PRESSURE: 134 MMHG | HEIGHT: 69 IN | DIASTOLIC BLOOD PRESSURE: 80 MMHG

## 2019-05-15 DIAGNOSIS — M62.838 MUSCLE SPASM OF RIGHT SHOULDER: ICD-10-CM

## 2019-05-15 DIAGNOSIS — M54.6 PAIN IN THORACIC SPINE: Primary | ICD-10-CM

## 2019-05-15 DIAGNOSIS — Z98.1 HISTORY OF SPINAL FUSION: ICD-10-CM

## 2019-05-15 PROCEDURE — 99213 OFFICE O/P EST LOW 20 MIN: CPT | Performed by: NEUROLOGICAL SURGERY

## 2021-05-27 ENCOUNTER — TELEPHONE (OUTPATIENT)
Dept: GASTROENTEROLOGY | Facility: CLINIC | Age: 51
End: 2021-05-27

## 2021-05-27 ENCOUNTER — OFFICE VISIT (OUTPATIENT)
Dept: GASTROENTEROLOGY | Facility: CLINIC | Age: 51
End: 2021-05-27

## 2021-05-27 VITALS — WEIGHT: 202 LBS | BODY MASS INDEX: 29.92 KG/M2 | HEIGHT: 69 IN | TEMPERATURE: 98.4 F

## 2021-05-27 DIAGNOSIS — K25.9 GASTRIC ULCER, UNSPECIFIED CHRONICITY, UNSPECIFIED WHETHER GASTRIC ULCER HEMORRHAGE OR PERFORATION PRESENT: Primary | ICD-10-CM

## 2021-05-27 DIAGNOSIS — R10.13 EPIGASTRIC PAIN: ICD-10-CM

## 2021-05-27 PROCEDURE — 99203 OFFICE O/P NEW LOW 30 MIN: CPT | Performed by: INTERNAL MEDICINE

## 2021-05-27 RX ORDER — SODIUM CHLORIDE, SODIUM LACTATE, POTASSIUM CHLORIDE, CALCIUM CHLORIDE 600; 310; 30; 20 MG/100ML; MG/100ML; MG/100ML; MG/100ML
30 INJECTION, SOLUTION INTRAVENOUS CONTINUOUS
Status: CANCELLED | OUTPATIENT
Start: 2021-05-27

## 2021-05-27 RX ORDER — MELATONIN
1000 DAILY
COMMUNITY
Start: 2021-02-05

## 2021-05-27 RX ORDER — FERROUS SULFATE 325(65) MG
TABLET ORAL
COMMUNITY
Start: 2021-05-06

## 2021-05-27 RX ORDER — GABAPENTIN 300 MG/1
300 CAPSULE ORAL 3 TIMES DAILY
COMMUNITY
Start: 2021-02-23

## 2021-05-27 RX ORDER — ACETAMINOPHEN 500 MG/1
TABLET, COATED ORAL
COMMUNITY
Start: 2021-04-29

## 2021-05-27 RX ORDER — ACETAMINOPHEN 500 MG
500-1000 TABLET ORAL
COMMUNITY
Start: 2021-04-29 | End: 2021-05-29

## 2021-05-27 RX ORDER — CETIRIZINE HYDROCHLORIDE 10 MG/1
TABLET ORAL
COMMUNITY
Start: 2021-05-14

## 2021-05-27 RX ORDER — CLOTRIMAZOLE AND BETAMETHASONE DIPROPIONATE 10; .64 MG/G; MG/G
CREAM TOPICAL
COMMUNITY
Start: 2021-04-08

## 2021-05-27 RX ORDER — BACLOFEN 10 MG/1
TABLET ORAL
COMMUNITY
Start: 2021-05-18

## 2021-05-27 RX ORDER — PANTOPRAZOLE SODIUM 40 MG/1
40 TABLET, DELAYED RELEASE ORAL DAILY
COMMUNITY
Start: 2021-03-02

## 2021-05-27 RX ORDER — SUCRALFATE 1 G/1
TABLET ORAL
COMMUNITY
Start: 2021-04-19

## 2021-05-27 RX ORDER — PSEUDOEPHEDRINE HCL 30 MG
100 TABLET ORAL
COMMUNITY
Start: 2021-01-20

## 2021-05-27 NOTE — PROGRESS NOTES
Chief Complaint   Patient presents with   • Abdominal Pain     burning        Rafael Kat is a  50 y.o. male here for an initial visit for epigastric pain, history of peptic ulcer disease    HPI this 50-year-old white male patient of Eun SHELBY presents with a history of peptic ulcer disease diagnosed by Dr. Kevon Nelson in March 2021.  Was treated with a proton pump inhibitor as well as mucosal defense agent but continues to have some epigastric discomfort.  No reported melena or bright red blood per rectum.  He does note transient improvement with p.o. intake.  He recalls being tested for Helicobacter pylori.  Those records have not yet been reviewed.  He has also not had previous screening colonoscopy and we discussed this but he wishes to defer this for the present.    Past Medical History:   Diagnosis Date   • Arthritis    • DDD (degenerative disc disease), cervical    • DDD (degenerative disc disease), lumbar    • GERD (gastroesophageal reflux disease)    • Neck pain    • Psoriasis    • Skin cancer        Current Outpatient Medications   Medication Sig Dispense Refill   • acetaminophen (TYLENOL) 500 MG tablet Take 500-1,000 mg by mouth.     • baclofen (LIORESAL) 10 MG tablet      • cetirizine (zyrTEC) 10 MG tablet      • cholecalciferol (Cholecalciferol) 25 MCG (1000 UT) tablet Take 1,000 Units by mouth Daily.     • clotrimazole-betamethasone (LOTRISONE) 1-0.05 % cream      • Diclofenac Sodium (VOLTAREN) 1 % gel gel      • docusate sodium 100 MG capsule Take 100 mg by mouth.     • esomeprazole (nexIUM) 20 MG capsule Take 20 mg by mouth Every Morning Before Breakfast.     • FeroSul 325 (65 Fe) MG tablet      • gabapentin (NEURONTIN) 300 MG capsule Take 300 mg by mouth 3 (Three) Times a Day.     • GoodSense Pain Relief Extra St 500 MG tablet      • oxaprozin (DAYPRO) 600 MG tablet      • sucralfate (CARAFATE) 1 g tablet      • zolpidem (AMBIEN) 10 MG tablet At Night As Needed.  2   •  cyclobenzaprine (FLEXERIL) 10 MG tablet Take 1 tablet by mouth 3 (three) times a day as needed for muscle spasms. 90 tablet 3   • gabapentin (NEURONTIN) 300 MG capsule 300 mg 3 (three) times a day.  3   • naproxen (NAPROSYN) 500 MG tablet Take 1 tablet by mouth 2 (two) times a day with meals. 60 tablet 1   • pantoprazole (PROTONIX) 40 MG EC tablet 40 mg every morning.  11   • pantoprazole (PROTONIX) 40 MG EC tablet Take 40 mg by mouth Daily.       No current facility-administered medications for this visit.       PRN Meds:.    Allergies   Allergen Reactions   • Neomycin-Bacitracin Zn-Polymyx        Social History     Socioeconomic History   • Marital status:      Spouse name: Not on file   • Number of children: Not on file   • Years of education: Not on file   • Highest education level: Not on file   Tobacco Use   • Smoking status: Former Smoker     Packs/day: 2.00     Years: 20.00     Pack years: 40.00     Quit date:      Years since quittin.4   • Smokeless tobacco: Never Used   Substance and Sexual Activity   • Alcohol use: No   • Drug use: Yes     Frequency: 14.0 times per week     Types: Marijuana     Comment: 1-2 PER DAY   • Sexual activity: Defer       History reviewed. No pertinent family history.    Review of Systems   Constitutional: Negative for activity change, appetite change, fatigue and unexpected weight change.   HENT: Negative for congestion, facial swelling, sore throat, trouble swallowing and voice change.    Eyes: Negative for photophobia and visual disturbance.   Respiratory: Negative for cough and choking.    Cardiovascular: Negative for chest pain.   Gastrointestinal: Positive for abdominal pain. Negative for abdominal distention, anal bleeding, blood in stool, constipation, diarrhea, nausea, rectal pain and vomiting.   Endocrine: Negative for polyphagia.   Musculoskeletal: Negative for arthralgias, gait problem and joint swelling.   Skin: Negative for color change, pallor and  rash.   Allergic/Immunologic: Negative for food allergies.   Neurological: Negative for speech difficulty and headaches.   Hematological: Does not bruise/bleed easily.   Psychiatric/Behavioral: Negative for agitation, confusion and sleep disturbance.       Vitals:    05/27/21 1425   Temp: 98.4 °F (36.9 °C)       Physical Exam  Vitals reviewed.   Constitutional:       General: He is not in acute distress.     Appearance: He is well-developed. He is not diaphoretic.   HENT:      Head: Normocephalic.      Mouth/Throat:      Pharynx: No oropharyngeal exudate.   Eyes:      General: No scleral icterus.     Conjunctiva/sclera: Conjunctivae normal.   Neck:      Thyroid: No thyromegaly.   Cardiovascular:      Rate and Rhythm: Normal rate and regular rhythm.      Heart sounds: No murmur heard.     Pulmonary:      Effort: No respiratory distress.      Breath sounds: Normal breath sounds. No wheezing or rales.   Abdominal:      General: Bowel sounds are normal. There is no distension.      Palpations: Abdomen is soft. There is no mass.      Tenderness: There is no abdominal tenderness.   Musculoskeletal:         General: No tenderness. Normal range of motion.      Cervical back: Normal range of motion.   Lymphadenopathy:      Cervical: No cervical adenopathy.   Skin:     General: Skin is warm and dry.      Findings: No erythema or rash.   Neurological:      Mental Status: He is alert and oriented to person, place, and time.   Psychiatric:         Behavior: Behavior normal.         ASSESSMENT   #1 epigastric pain  #2 history of peptic ulcer disease  #3 screening for colorectal cancer      PLAN  Schedule EGD  Consider adjustment of medications once endoscopy completed  Eventual colonoscopic assessment      ICD-10-CM ICD-9-CM   1. Gastric ulcer, unspecified chronicity, unspecified whether gastric ulcer hemorrhage or perforation present  K25.9 531.90   2. Epigastric pain  R10.13 789.06

## 2021-06-20 ENCOUNTER — HOSPITAL ENCOUNTER (EMERGENCY)
Facility: HOSPITAL | Age: 51
Discharge: HOME | End: 2021-06-21
Attending: EMERGENCY MEDICINE

## 2021-06-20 DIAGNOSIS — S60.221A CONTUSION OF RIGHT HAND, INITIAL ENCOUNTER: Primary | ICD-10-CM

## 2021-06-20 PROCEDURE — 99282 EMERGENCY DEPT VISIT SF MDM: CPT

## 2021-06-21 ENCOUNTER — APPOINTMENT (OUTPATIENT)
Dept: GENERAL RADIOLOGY | Facility: HOSPITAL | Age: 51
End: 2021-06-21

## 2021-06-21 VITALS
HEART RATE: 75 BPM | DIASTOLIC BLOOD PRESSURE: 84 MMHG | OXYGEN SATURATION: 95 % | TEMPERATURE: 97.3 F | RESPIRATION RATE: 16 BRPM | SYSTOLIC BLOOD PRESSURE: 116 MMHG

## 2021-06-21 PROCEDURE — 73130 X-RAY EXAM OF HAND: CPT
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
## 2021-06-21 NOTE — DISCHARGE INSTRUCTIONS
Return to the ER with any further concerns, should your condition change/worsen, or should you develop a fever.      If pain persist beyond 7 to 10 days recommend following up with a hand specialist above
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
## 2021-06-28 ENCOUNTER — OUTSIDE FACILITY SERVICE (OUTPATIENT)
Dept: GASTROENTEROLOGY | Facility: CLINIC | Age: 51
End: 2021-06-28

## 2021-06-28 PROCEDURE — 43239 EGD BIOPSY SINGLE/MULTIPLE: CPT | Performed by: INTERNAL MEDICINE

## 2021-07-19 ENCOUNTER — TELEPHONE (OUTPATIENT)
Dept: GASTROENTEROLOGY | Facility: CLINIC | Age: 51
End: 2021-07-19

## 2021-07-19 DIAGNOSIS — R19.8 ABNORMAL FINDINGS ON ESOPHAGOGASTRODUODENOSCOPY (EGD): Primary | ICD-10-CM

## 2021-07-19 NOTE — TELEPHONE ENCOUNTER
----- Message from Gonzalo PACK MD sent at 7/5/2021 11:36 AM EDT -----  Regarding: Biopsy results  Okay to call results, would continue PPI plus or minus Carafate.  Can offer celiac panel if this has not already been obtained.  ----- Message -----  From: Interface, Scans Incoming  Sent: 7/2/2021   9:05 AM EDT  To: Gonzalo PACK MD

## 2021-07-19 NOTE — TELEPHONE ENCOUNTER
Call to pt.  Advise per path report that duodenal bx with changes suggestive of celiac disease.  Stomach body with nonspecific gastritis.  GE junction with esophagitis.     Advise per Dr Rodriguez note.  Verb understanding.  On nexium.     Pt prefers to go to [facility] for celiac panel - advise may go Mon-Fri 7a-5pm.     Order placed - message to Dr Rodriguez.

## 2021-09-16 ENCOUNTER — TELEPHONE (OUTPATIENT)
Dept: GASTROENTEROLOGY | Facility: CLINIC | Age: 51
End: 2021-09-16

## 2021-09-16 NOTE — TELEPHONE ENCOUNTER
----- Message from Gonzalo PACK MD sent at 9/15/2021  4:25 PM EDT -----  Regarding: Celiac panel results  Okay to notify patient celiac panel was normal.  ----- Message -----  From: Jeffery Ren Incoming  Sent: 9/15/2021   7:30 AM EDT  To: Gonzalo PACK MD

## 2021-10-07 ENCOUNTER — TELEPHONE (OUTPATIENT)
Dept: GASTROENTEROLOGY | Facility: CLINIC | Age: 51
End: 2021-10-07

## 2021-10-07 NOTE — TELEPHONE ENCOUNTER
Call from SHALOM Nieto with [facility] (pt's PCP).  Requesting egd and path report of 6/28/21 be faxed to [phone].  Did so - receipt confirmed.

## 2023-03-23 ENCOUNTER — OFFICE VISIT (OUTPATIENT)
Dept: NEUROSURGERY | Facility: CLINIC | Age: 53
End: 2023-03-23
Payer: COMMERCIAL

## 2023-03-23 VITALS
SYSTOLIC BLOOD PRESSURE: 118 MMHG | OXYGEN SATURATION: 97 % | HEART RATE: 87 BPM | DIASTOLIC BLOOD PRESSURE: 82 MMHG | TEMPERATURE: 97.3 F

## 2023-03-23 DIAGNOSIS — M51.36 DDD (DEGENERATIVE DISC DISEASE), LUMBAR: ICD-10-CM

## 2023-03-23 DIAGNOSIS — S73.191A TEAR OF RIGHT ACETABULAR LABRUM, INITIAL ENCOUNTER: ICD-10-CM

## 2023-03-23 DIAGNOSIS — M48.062 SPINAL STENOSIS OF LUMBAR REGION WITH NEUROGENIC CLAUDICATION: Primary | ICD-10-CM

## 2023-03-23 DIAGNOSIS — Z98.1 HISTORY OF FUSION OF CERVICAL SPINE: ICD-10-CM

## 2023-03-23 PROBLEM — M51.369 DDD (DEGENERATIVE DISC DISEASE), LUMBAR: Status: ACTIVE | Noted: 2017-01-24

## 2023-03-23 PROCEDURE — 99244 OFF/OP CNSLTJ NEW/EST MOD 40: CPT | Performed by: NEUROLOGICAL SURGERY

## 2023-03-23 PROCEDURE — 1159F MED LIST DOCD IN RCRD: CPT | Performed by: NEUROLOGICAL SURGERY

## 2023-03-23 PROCEDURE — 1160F RVW MEDS BY RX/DR IN RCRD: CPT | Performed by: NEUROLOGICAL SURGERY

## 2023-03-23 RX ORDER — BUSPIRONE HYDROCHLORIDE 10 MG/1
TABLET ORAL
COMMUNITY
Start: 2023-03-14

## 2023-03-23 RX ORDER — CHLORAL HYDRATE 500 MG
1 CAPSULE ORAL DAILY
COMMUNITY

## 2023-03-23 RX ORDER — MOMETASONE FUROATE 1 MG/G
CREAM TOPICAL DAILY
COMMUNITY
Start: 2022-10-11

## 2023-03-23 RX ORDER — BACLOFEN 20 MG/1
TABLET ORAL
COMMUNITY
Start: 2023-03-14

## 2023-03-23 RX ORDER — MELOXICAM 15 MG/1
TABLET ORAL
COMMUNITY
Start: 2023-03-14

## 2023-03-23 NOTE — PROGRESS NOTES
Subjective   Patient ID: Rafael Kat is a 52 y.o. male is being seen for consultation today at the request of Eun Lara, APRN     Is very nice man is with his wife.  He is well-known to me.  About 7 years ago he underwent an anterior cervical corpectomy at C4, C5, and C6 with a cage and plate for myeloradiculopathy.  He actually recovered reasonably well and I have not seen him for several years.  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  He says it is no longer quite as physical as it used to be.  But last fall he began having some problems standing and walking.  He has had some pain in his right groin and occasionally some clicking sensation in the groin but probably the most significant thing is back pain and bilateral buttock pain.  The buttock pain is almost like a lightening bolt.  It does not go all the way down the legs.  He does not experience weakness.  He has no bowel or bladder incontinence.  But when he stands up straight or when he coughs and sneezes he feels his bolt of lightening into his buttocks and into his private areas.  He ended up getting an MRI of the lumbar spine and the hip and he does have a labral tear with some mild arthritis in the right hip but the main finding is severe spinal stenosis particular at L3-L4 and to a certain extent at L2-L3.  Lesser degrees at L1-L2 and L4-L5.  He was sent to physical therapy which did not help him.  He is on gabapentin from his nurse practitioner as well as some anti-inflammatories.  He has no motor deficits.  I told him he will probably need surgery for this at some point but I think transforaminal epidural steroid blocks at L3-L4 were probably be a starting point.  He is hoping he can get through to October when there is the off season for his business.  Hopefully we can get him there but I told him I cannot guarantee that.  We will see him in 2 months.    Back Pain  The current episode started more than 1 year ago. The problem occurs  daily. The problem has been rapidly worsening since onset. The pain is present in the lumbar spine. The quality of the pain is described as shooting, stabbing, cramping, burning and aching. The pain radiates to the right thigh. The pain is at a severity of 10/10. The pain is severe. The pain is the same all the time. The symptoms are aggravated by sitting, standing and bending. Stiffness is present all day. Associated symptoms include leg pain, numbness, tingling and weakness. Pertinent negatives include no fever or headaches. He has tried ice, heat and chiropractic manipulation for the symptoms. The treatment provided no relief.       The following portions of the patient's history were reviewed and updated as appropriate: allergies, current medications, past family history, past medical history, past social history, past surgical history and problem list.    Review of Systems   Constitutional: Negative for chills and fever.   HENT: Negative for congestion.    Eyes: Negative for visual disturbance.   Musculoskeletal: Positive for back pain, gait problem and myalgias.   Neurological: Positive for dizziness, tingling, weakness, light-headedness and numbness. Negative for headaches.   All other systems reviewed and are negative.          Objective     Vitals:    03/23/23 1413   BP: 118/82   Pulse: 87   Temp: 97.3 °F (36.3 °C)   SpO2: 97%     There is no height or weight on file to calculate BMI.    Tobacco Use: Medium Risk   • Smoking Tobacco Use: Former   • Smokeless Tobacco Use: Never   • Passive Exposure: Not on file          Physical Exam  Constitutional:       Appearance: He is well-developed.   HENT:      Head: Normocephalic and atraumatic.   Eyes:      Extraocular Movements: EOM normal.      Conjunctiva/sclera: Conjunctivae normal.      Pupils: Pupils are equal, round, and reactive to light.   Neck:      Vascular: No carotid bruit.   Neurological:      Mental Status: He is oriented to person, place, and time.       Coordination: Finger-Nose-Finger Test and Heel to Shin Test normal.      Gait: Gait is intact.      Deep Tendon Reflexes:      Reflex Scores:       Tricep reflexes are 2+ on the right side and 2+ on the left side.       Bicep reflexes are 2+ on the right side and 2+ on the left side.       Brachioradialis reflexes are 2+ on the right side and 2+ on the left side.       Patellar reflexes are 2+ on the right side and 2+ on the left side.       Achilles reflexes are 2+ on the right side and 2+ on the left side.  Psychiatric:         Speech: Speech normal.       Neurologic Exam     Mental Status   Oriented to person, place, and time.   Registration of memory: Good recent and remote memory.   Attention: normal. Concentration: normal.   Speech: speech is normal   Level of consciousness: alert  Knowledge: consistent with education.     Cranial Nerves     CN II   Visual fields full to confrontation.   Visual acuity: normal    CN III, IV, VI   Pupils are equal, round, and reactive to light.  Extraocular motions are normal.     CN V   Facial sensation intact.   Right corneal reflex: normal  Left corneal reflex: normal    CN VII   Facial expression full, symmetric.   Right facial weakness: none  Left facial weakness: none    CN VIII   Hearing: intact    CN IX, X   Palate: symmetric    CN XI   Right sternocleidomastoid strength: normal  Left sternocleidomastoid strength: normal    CN XII   Tongue: not atrophic  Tongue deviation: none    Motor Exam   Muscle bulk: normal  Right arm tone: normal  Left arm tone: normal  Right leg tone: normal  Left leg tone: normal    Strength   Strength 5/5 except as noted.     Sensory Exam   Light touch normal.     Gait, Coordination, and Reflexes     Gait  Gait: normal    Coordination   Finger to nose coordination: normal  Heel to shin coordination: normal    Reflexes   Right brachioradialis: 2+  Left brachioradialis: 2+  Right biceps: 2+  Left biceps: 2+  Right triceps: 2+  Left triceps:  2+  Right patellar: 2+  Left patellar: 2+  Right achilles: 2+  Left achilles: 2+  Right : 2+  Left : 2+          Assessment & Plan   Independent Review of Radiographic Studies:      I personally reviewed the images from the following studies.    The MRI of the lumbar spine and of the right hip done on 2/27/2023 show a labral tear on the right hip as well as some mild osteoarthritis.  Pelvic no significant finding is severe spinal stenosis at L3-L4 and L2-L3.  Lesser degrees of stenosis at L1-L2 and L4-L5.  Agree with the report.      Medical Decision Making:      We will try some bilateral L3-L4 transforaminal epidural steroid blocks and have him come back in 2 months.  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  But ultimately he is probably going to have to undergo a decompression and fusion at L3-L4 at least and possibly at other levels.  We will try and hold off as long as possible.  But if he develops some urinary issues, he needs to let me know.      Diagnoses and all orders for this visit:    1. Spinal stenosis of lumbar region with neurogenic claudication (Primary)  -     Epidural Block    2. DDD (degenerative disc disease), lumbar  -     Epidural Block    3. Tear of right acetabular labrum, initial encounter    4. History of fusion of cervical spine      Return in about 2 months (around 5/23/2023) for face to face.

## 2023-04-06 ENCOUNTER — HOSPITAL ENCOUNTER (OUTPATIENT)
Dept: PAIN MEDICINE | Facility: HOSPITAL | Age: 53
Discharge: HOME | End: 2023-04-06
Payer: COMMERCIAL

## 2023-04-06 ENCOUNTER — ANESTHESIA (OUTPATIENT)
Dept: PAIN MEDICINE | Facility: HOSPITAL | Age: 53
End: 2023-04-06
Payer: COMMERCIAL

## 2023-04-06 ENCOUNTER — ANESTHESIA EVENT (OUTPATIENT)
Dept: PAIN MEDICINE | Facility: HOSPITAL | Age: 53
End: 2023-04-06
Payer: COMMERCIAL

## 2023-04-06 ENCOUNTER — HOSPITAL ENCOUNTER (OUTPATIENT)
Dept: GENERAL RADIOLOGY | Facility: HOSPITAL | Age: 53
Discharge: HOME | End: 2023-04-06
Payer: COMMERCIAL

## 2023-04-06 VITALS
HEART RATE: 64 BPM | HEIGHT: 69 IN | DIASTOLIC BLOOD PRESSURE: 87 MMHG | RESPIRATION RATE: 16 BRPM | BODY MASS INDEX: 31.4 KG/M2 | WEIGHT: 212 LBS | TEMPERATURE: 98.6 F | OXYGEN SATURATION: 93 % | SYSTOLIC BLOOD PRESSURE: 120 MMHG

## 2023-04-06 DIAGNOSIS — R52 PAIN: ICD-10-CM

## 2023-04-06 DIAGNOSIS — M48.062 SPINAL STENOSIS OF LUMBAR REGION WITH NEUROGENIC CLAUDICATION: Primary | ICD-10-CM

## 2023-04-06 PROCEDURE — 77003 FLUOROGUIDE FOR SPINE INJECT: CPT

## 2023-04-06 PROCEDURE — 25510000001 IOPAMIDOL 41 % SOLUTION: Performed by: ANESTHESIOLOGY

## 2023-04-06 PROCEDURE — 25010000002 DEXAMETHASONE SODIUM PHOSPHATE 10 MG/ML SOLUTION: Performed by: ANESTHESIOLOGY

## 2023-04-06 PROCEDURE — 25010000002 MIDAZOLAM PER 1 MG: Performed by: ANESTHESIOLOGY

## 2023-04-06 PROCEDURE — 25010000002 DEXAMETHASONE PER 1 MG: Performed by: ANESTHESIOLOGY

## 2023-04-06 RX ORDER — MIDAZOLAM HYDROCHLORIDE 1 MG/ML
1 INJECTION INTRAMUSCULAR; INTRAVENOUS ONCE AS NEEDED
Status: COMPLETED | OUTPATIENT
Start: 2023-04-06 | End: 2023-04-06

## 2023-04-06 RX ORDER — FENTANYL CITRATE 50 UG/ML
50 INJECTION, SOLUTION INTRAMUSCULAR; INTRAVENOUS ONCE
Status: DISCONTINUED | OUTPATIENT
Start: 2023-04-06 | End: 2023-04-07 | Stop reason: HOSPADM

## 2023-04-06 RX ORDER — SODIUM CHLORIDE 0.9 % (FLUSH) 0.9 %
3 SYRINGE (ML) INJECTION EVERY 12 HOURS SCHEDULED
Status: DISCONTINUED | OUTPATIENT
Start: 2023-04-06 | End: 2023-04-07 | Stop reason: HOSPADM

## 2023-04-06 RX ORDER — LIDOCAINE HYDROCHLORIDE 10 MG/ML
1 INJECTION, SOLUTION INFILTRATION; PERINEURAL ONCE
Status: DISCONTINUED | OUTPATIENT
Start: 2023-04-06 | End: 2023-04-07 | Stop reason: HOSPADM

## 2023-04-06 RX ORDER — BUPIVACAINE HYDROCHLORIDE 2.5 MG/ML
10 INJECTION, SOLUTION EPIDURAL; INFILTRATION; INTRACAUDAL ONCE
Status: COMPLETED | OUTPATIENT
Start: 2023-04-06 | End: 2023-04-06

## 2023-04-06 RX ORDER — DEXAMETHASONE SODIUM PHOSPHATE 4 MG/ML
INJECTION, SOLUTION INTRA-ARTICULAR; INTRALESIONAL; INTRAMUSCULAR; INTRAVENOUS; SOFT TISSUE
Status: COMPLETED | OUTPATIENT
Start: 2023-04-06 | End: 2023-04-06

## 2023-04-06 RX ORDER — DEXAMETHASONE SODIUM PHOSPHATE 10 MG/ML
10 INJECTION, SOLUTION INTRAMUSCULAR; INTRAVENOUS ONCE
Status: COMPLETED | OUTPATIENT
Start: 2023-04-06 | End: 2023-04-06

## 2023-04-06 RX ORDER — SODIUM CHLORIDE 0.9 % (FLUSH) 0.9 %
10 SYRINGE (ML) INJECTION AS NEEDED
Status: DISCONTINUED | OUTPATIENT
Start: 2023-04-06 | End: 2023-04-07 | Stop reason: HOSPADM

## 2023-04-06 RX ADMIN — IOPAMIDOL 10 ML: 408 INJECTION, SOLUTION INTRATHECAL at 09:15

## 2023-04-06 RX ADMIN — BUPIVACAINE HYDROCHLORIDE 10 ML: 2.5 INJECTION, SOLUTION EPIDURAL; INFILTRATION; INTRACAUDAL; PERINEURAL at 09:15

## 2023-04-06 RX ADMIN — DEXAMETHASONE SODIUM PHOSPHATE 10 MG: 10 INJECTION, SOLUTION INTRAMUSCULAR; INTRAVENOUS at 09:15

## 2023-04-06 RX ADMIN — MIDAZOLAM 2 MG: 1 INJECTION INTRAMUSCULAR; INTRAVENOUS at 09:11

## 2023-04-06 RX ADMIN — DEXAMETHASONE SODIUM PHOSPHATE 10 MG: 4 INJECTION, SOLUTION INTRA-ARTICULAR; INTRALESIONAL; INTRAMUSCULAR; INTRAVENOUS; SOFT TISSUE at 08:56

## 2023-04-06 NOTE — ANESTHESIA PROCEDURE NOTES
Bilateral L3-4 transforaminal epidural steroid injection    Pre-sedation assessment completed: 4/6/2023 9:10 AM    Patient reassessed immediately prior to procedure    Patient location during procedure: pain clinic  Start Time: 4/6/2023 9:12 AM  Stop Time: 4/6/2023 9:25 AM  Indication:at surgeon's request and procedure for pain  Performed By  Anesthesiologist: Kingston Jiménez MD  Preanesthetic Checklist  Completed: patient identified, risks and benefits discussed, surgical consent, monitors and equipment checked, pre-op evaluation and timeout performed  Additional Notes  Post-Op Diagnosis Codes:     * Spinal stenosis, lumbar region with neurogenic claudication (M48.062)     * Lumbar radiculopathy (M54.16)    Prep:  Pt Position:prone  Sterile Tech:sterile barrier, mask and gloves  Prep:chlorhexidine gluconate and isopropyl alcohol  Monitoring:blood pressure monitoring, continuous pulse oximetry and EKG  Procedure:Sedation: yes     Approach: Bilateral transforaminal.  Guidance: fluoroscopy  Location:lumbar  Level:3-4  Needle Type:Tuohy  Needle Gauge:20 G  Aspiration:negative  Test Dose:negative  Medications:  Comments:Bilateral transforaminal epidural steroid injection was performed under fluoroscopic guidance in both AP and lateral planes.  The needle tip was placed into the posterior margin of the neuroforamen and 1/2 mL of 0.25% bupivacaine and 5mg of dexamethasone were injected in each of the L3-4 neuroforamen.  Contrast was noted to be filling both the nerve root sleeve in the epidural space bilaterally.    Sedation time was 911-925  Post Assessment:  Pt Tolerance:patient tolerated the procedure well with no apparent complications  Complications:no

## 2023-04-06 NOTE — DISCHARGE INSTRUCTIONS

## 2023-04-06 NOTE — H&P
[facility]    History and Physical    Patient Name: Rafael Kat  :  1970  MRN:  [id number]  Date of Admission: 2023    Subjective     Patient is a 52 y.o. male presents with chief complaint of chronic low back and Groin pain.  Onset of symptoms was gradual starting several months ago.  Symptoms are associated/aggravated by nothing in particular. Symptoms improve with nothing    The following portions of the patients history were reviewed and updated as appropriate: current medications, allergies, past medical history, past surgical history, past family history, past social history and problem list      Mr. Kat has been seen by Dr. Collado from neurosurgery.  He is specifically requested bilateral lumbar 3 lumbar 4 transforaminal epidural steroid injections.    His MRI shows disc degeneration with left posterior lateral disc bulge and facet osteoarthritis at the L3-4 level as well as moderate to severe canal stenosis.  There are also degenerative changes at L4-5 and L5-S1 with some foraminal stenosis        Objective     Past Medical History:   Past Medical History:   Diagnosis Date   • Arthritis    • DDD (degenerative disc disease), cervical    • DDD (degenerative disc disease), lumbar    • GERD (gastroesophageal reflux disease)    • Neck pain    • Psoriasis    • Skin cancer      Past Surgical History:   Past Surgical History:   Procedure Laterality Date   • ANTERIOR CHANNEL VERTEBRECTOMY/CORPECTOMY Bilateral 3/29/2016    Procedure: C4-6 ANTERIOR CHANNEL VERTEBRECTOMY/CORPECTOMY WITH INSTRUMENTATION;  Surgeon: Bert Collado MD;  Location: [facility]  Service:    • MENISCECTOMY     • UPPER GASTROINTESTINAL ENDOSCOPY  2021     Family History: History reviewed. No pertinent family history.  Social History:   Social History     Socioeconomic History   • Marital status:    Tobacco Use   • Smoking status: Former     Packs/day: 2.00     Years: 20.00     Pack years: 40.00      Types: Cigarettes     Quit date:      Years since quittin.2   • Smokeless tobacco: Never   Substance and Sexual Activity   • Alcohol use: No   • Drug use: Yes     Frequency: 14.0 times per week     Types: Marijuana     Comment: 1-2 PER DAY   • Sexual activity: Defer       Vital Signs Range for the last 24 hours  Temperature:     Temp Source:     BP:     Pulse:     Respirations:     SPO2:     O2 Amount (l/min):     O2 Devices     Weight:           --------------------------------------------------------------------------------    Current Outpatient Medications   Medication Sig Dispense Refill   • baclofen (LIORESAL) 10 MG tablet      • baclofen (LIORESAL) 20 MG tablet Take 1/2-1 tablet by mouth 2 times daily as needed (muscle spasm).     • busPIRone (BUSPAR) 10 MG tablet Take 1 tablet by mouth 2 (two) times daily.     • cetirizine (zyrTEC) 10 MG tablet      • cholecalciferol (VITAMIN D3) 25 MCG (1000 UT) tablet Take 1 tablet by mouth Daily.     • clotrimazole-betamethasone (LOTRISONE) 1-0.05 % cream      • cyclobenzaprine (FLEXERIL) 10 MG tablet Take 1 tablet by mouth 3 (three) times a day as needed for muscle spasms. 90 tablet 3   • diclofenac (VOLTAREN) 50 MG EC tablet Take 1 tablet by mouth 3 (Three) Times a Day. 21 tablet 0   • Diclofenac Sodium (VOLTAREN) 1 % gel gel      • docusate sodium 100 MG capsule Take 1 capsule by mouth.     • esomeprazole (nexIUM) 20 MG capsule Take 1 capsule by mouth Every Morning Before Breakfast.     • FeroSul 325 (65 Fe) MG tablet      • gabapentin (NEURONTIN) 300 MG capsule 1 capsule 3 (Three) Times a Day.  3   • gabapentin (NEURONTIN) 300 MG capsule Take 1 capsule by mouth 3 (Three) Times a Day.     • GoodSense Pain Relief Extra St 500 MG tablet      • meloxicam (MOBIC) 15 MG tablet take 1 tablet by mouth once daily as needed for pain     • mometasone (ELOCON) 0.1 % cream Apply  topically to the appropriate area as directed Daily.     • naproxen (NAPROSYN) 500 MG tablet  Take 1 tablet by mouth 2 (two) times a day with meals. 60 tablet 1   • Omega-3 Fatty Acids (fish oil) 1000 MG capsule capsule Take 1 capsule by mouth Daily.     • oxaprozin (DAYPRO) 600 MG tablet      • pantoprazole (PROTONIX) 40 MG EC tablet 1 tablet Every Morning.  11   • pantoprazole (PROTONIX) 40 MG EC tablet Take 1 tablet by mouth Daily.     • Potassium 75 MG tablet Take  by mouth.     • sucralfate (CARAFATE) 1 g tablet      • zolpidem (AMBIEN) 10 MG tablet At Night As Needed.  2     Current Facility-Administered Medications   Medication Dose Route Frequency Provider Last Rate Last Admin   • bupivacaine (PF) (MARCAINE) 0.25 % injection 10 mL  10 mL Infiltration Once Render, Kingston Mata MD       • dexamethasone sodium phosphate injection 10 mg  10 mg Epidural Once Render, Kingston Mata MD       • fentaNYL citrate (PF) (SUBLIMAZE) injection 50 mcg  50 mcg Intravenous Once Render, Kingston Mata MD       • iopamidol (ISOVUE-M 200) injection 41%  10 mL Epidural Once in imaging Render, Kingston Mata MD       • lidocaine (XYLOCAINE) 1 % injection 1 mL  1 mL Intradermal Once Render, Kingston Mata MD       • midazolam (VERSED) injection 1 mg  1 mg Intravenous Once PRN Render, Kingston Mata MD       • sodium chloride 0.9 % flush 3 mL  3 mL Intravenous Q12H Render, Kingston Mata MD        And   • sodium chloride 0.9 % flush 10 mL  10 mL Intravenous PRN Kingston Jiménez MD           --------------------------------------------------------------------------------  Assessment & Plan      Anesthesia Evaluation           Pain impairs ability to perform ADLs: Ambulation and Exercise/Activity  Modalities previously tried to control pain with limited effectiveness within the last 4-6 weeks: Physical therapy     Airway   Mallampati: II  TM distance: >3 FB  Neck ROM: full  Dental    (+) upper dentures and lower dentures    Pulmonary    (-) wheezes  Cardiovascular     Rhythm: regular    (-) murmur      Neuro/Psych  (-) normal sensory deficit, left  "straight leg raise test, right straight leg raise test  GI/Hepatic/Renal/Endo      Musculoskeletal     (+) neck pain,       PE comment: Station and gait mildly antalgic  Abdominal    Substance History      OB/GYN          Other                   Diagnosis and Plan    Treatment Plan  ASA 2      Procedures: With fluoroscopy,       Anesthetic plan and risks discussed with patient.      Discussed with patient risk and benefits of MARILEE including but not limited to : Bleeding, infection, PDPH, inadvertant spinal anesthetic, worsening pain, hyperglycemia, hypertension, CHF, nerve damage, steroid \"toxicity\" and AVN of hips.  Pt agrees to proceed.    Diagnosis     * Spinal stenosis, lumbar region with neurogenic claudication [M48.062]     * Lumbar radiculopathy [M54.16]                    "

## 2023-04-25 ENCOUNTER — TELEPHONE (OUTPATIENT)
Dept: PAIN MEDICINE | Facility: HOSPITAL | Age: 53
End: 2023-04-25
Payer: COMMERCIAL

## 2023-04-25 NOTE — TELEPHONE ENCOUNTER
..Following your procedure what exact percentage of pain relief would you say you've had? (0% to 100%) 75-80    What would you rate your pain on a 1-10 scale?  Prior to your last procedure:  7-8  For the first 2-3 weeks following your last procedure:  2  Currently: 5    Has your function improved?  Tell us how.    (Can sit/stand longer or walk further?  Complete daily activities more easily?  Have been able to go back to work?) I can walk longer and farther, I can sit longer and I can stand up longer at a time    Have you tried/continued to try any other treatments? (Chiropractor, PT, massage, yoga, back exercises, heat/ice) I use heat every night      What medications are you currently taking to help with your pain and how often?  Tylenol, Gabapentin, Baclofen, Meloxicam      Is there anything else you'd like to tell us that has improved since your last procedure? Endurance is better

## 2023-05-08 ENCOUNTER — ANESTHESIA (OUTPATIENT)
Dept: PAIN MEDICINE | Facility: HOSPITAL | Age: 53
End: 2023-05-08
Payer: COMMERCIAL

## 2023-05-08 ENCOUNTER — ANESTHESIA EVENT (OUTPATIENT)
Dept: PAIN MEDICINE | Facility: HOSPITAL | Age: 53
End: 2023-05-08
Payer: COMMERCIAL

## 2023-05-08 ENCOUNTER — HOSPITAL ENCOUNTER (OUTPATIENT)
Dept: GENERAL RADIOLOGY | Facility: HOSPITAL | Age: 53
Discharge: HOME | End: 2023-05-08
Payer: COMMERCIAL

## 2023-05-08 ENCOUNTER — HOSPITAL ENCOUNTER (OUTPATIENT)
Dept: PAIN MEDICINE | Facility: HOSPITAL | Age: 53
Discharge: HOME | End: 2023-05-08
Payer: COMMERCIAL

## 2023-05-08 VITALS
DIASTOLIC BLOOD PRESSURE: 89 MMHG | HEART RATE: 67 BPM | RESPIRATION RATE: 12 BRPM | SYSTOLIC BLOOD PRESSURE: 117 MMHG | TEMPERATURE: 98 F | OXYGEN SATURATION: 93 %

## 2023-05-08 DIAGNOSIS — M48.062 SPINAL STENOSIS OF LUMBAR REGION WITH NEUROGENIC CLAUDICATION: ICD-10-CM

## 2023-05-08 DIAGNOSIS — R52 PAIN: ICD-10-CM

## 2023-05-08 PROCEDURE — 25010000002 MIDAZOLAM PER 1 MG: Performed by: ANESTHESIOLOGY

## 2023-05-08 PROCEDURE — 25510000001 IOPAMIDOL 41 % SOLUTION: Performed by: ANESTHESIOLOGY

## 2023-05-08 PROCEDURE — 77003 FLUOROGUIDE FOR SPINE INJECT: CPT

## 2023-05-08 PROCEDURE — 25010000002 DEXAMETHASONE SODIUM PHOSPHATE 10 MG/ML SOLUTION: Performed by: ANESTHESIOLOGY

## 2023-05-08 RX ORDER — BUPIVACAINE HYDROCHLORIDE 2.5 MG/ML
10 INJECTION, SOLUTION EPIDURAL; INFILTRATION; INTRACAUDAL ONCE
Status: COMPLETED | OUTPATIENT
Start: 2023-05-08 | End: 2023-05-08

## 2023-05-08 RX ORDER — GABAPENTIN 800 MG/1
800 TABLET ORAL 3 TIMES DAILY
COMMUNITY

## 2023-05-08 RX ORDER — SODIUM CHLORIDE 0.9 % (FLUSH) 0.9 %
3 SYRINGE (ML) INJECTION EVERY 12 HOURS SCHEDULED
Status: DISCONTINUED | OUTPATIENT
Start: 2023-05-08 | End: 2023-05-09 | Stop reason: HOSPADM

## 2023-05-08 RX ORDER — SODIUM CHLORIDE 0.9 % (FLUSH) 0.9 %
10 SYRINGE (ML) INJECTION EVERY 12 HOURS SCHEDULED
Status: DISCONTINUED | OUTPATIENT
Start: 2023-05-08 | End: 2023-05-09 | Stop reason: HOSPADM

## 2023-05-08 RX ORDER — MIDAZOLAM HYDROCHLORIDE 1 MG/ML
1 INJECTION INTRAMUSCULAR; INTRAVENOUS ONCE AS NEEDED
Status: COMPLETED | OUTPATIENT
Start: 2023-05-08 | End: 2023-05-08

## 2023-05-08 RX ORDER — SODIUM CHLORIDE 0.9 % (FLUSH) 0.9 %
10 SYRINGE (ML) INJECTION AS NEEDED
Status: DISCONTINUED | OUTPATIENT
Start: 2023-05-08 | End: 2023-05-09 | Stop reason: HOSPADM

## 2023-05-08 RX ORDER — FENTANYL CITRATE 50 UG/ML
50 INJECTION, SOLUTION INTRAMUSCULAR; INTRAVENOUS ONCE
Status: DISCONTINUED | OUTPATIENT
Start: 2023-05-08 | End: 2023-05-09 | Stop reason: HOSPADM

## 2023-05-08 RX ORDER — DEXAMETHASONE SODIUM PHOSPHATE 10 MG/ML
10 INJECTION, SOLUTION INTRAMUSCULAR; INTRAVENOUS ONCE
Status: COMPLETED | OUTPATIENT
Start: 2023-05-08 | End: 2023-05-08

## 2023-05-08 RX ORDER — LIDOCAINE HYDROCHLORIDE 10 MG/ML
1 INJECTION, SOLUTION INFILTRATION; PERINEURAL ONCE
Status: DISCONTINUED | OUTPATIENT
Start: 2023-05-08 | End: 2023-05-09 | Stop reason: HOSPADM

## 2023-05-08 RX ORDER — SODIUM CHLORIDE 9 MG/ML
40 INJECTION, SOLUTION INTRAVENOUS AS NEEDED
Status: DISCONTINUED | OUTPATIENT
Start: 2023-05-08 | End: 2023-05-09 | Stop reason: HOSPADM

## 2023-05-08 RX ADMIN — IOPAMIDOL 10 ML: 408 INJECTION, SOLUTION INTRATHECAL at 08:18

## 2023-05-08 RX ADMIN — MIDAZOLAM 2 MG: 1 INJECTION INTRAMUSCULAR; INTRAVENOUS at 08:18

## 2023-05-08 RX ADMIN — BUPIVACAINE HYDROCHLORIDE 10 ML: 2.5 INJECTION, SOLUTION EPIDURAL; INFILTRATION; INTRACAUDAL; PERINEURAL at 08:18

## 2023-05-08 RX ADMIN — DEXAMETHASONE SODIUM PHOSPHATE 10 MG: 10 INJECTION, SOLUTION INTRAMUSCULAR; INTRAVENOUS at 08:18

## 2023-05-08 NOTE — H&P
INTERVAL HISTORY:    The patient returns for another bilateral transforaminal lumbar epidural steroid injection today.  They have received at least 60% improvement since their last injection with a pain level of 4/10 at its worst recently.    Conservative measures tried in the interim rest baclofen meloxicam gabapentin.  He is planning for a fusion this fall and these injections are meant to control his pain prior to then.    Current Outpatient Medications on File Prior to Encounter   Medication Sig Dispense Refill   • baclofen (LIORESAL) 10 MG tablet      • baclofen (LIORESAL) 20 MG tablet Take 1/2-1 tablet by mouth 2 times daily as needed (muscle spasm).     • busPIRone (BUSPAR) 10 MG tablet Take 1 tablet by mouth 2 (two) times daily.     • cetirizine (zyrTEC) 10 MG tablet      • cholecalciferol (VITAMIN D3) 25 MCG (1000 UT) tablet Take 1 tablet by mouth Daily.     • clotrimazole-betamethasone (LOTRISONE) 1-0.05 % cream      • cyclobenzaprine (FLEXERIL) 10 MG tablet Take 1 tablet by mouth 3 (three) times a day as needed for muscle spasms. 90 tablet 3   • diclofenac (VOLTAREN) 50 MG EC tablet Take 1 tablet by mouth 3 (Three) Times a Day. 21 tablet 0   • Diclofenac Sodium (VOLTAREN) 1 % gel gel      • docusate sodium 100 MG capsule Take 1 capsule by mouth.     • esomeprazole (nexIUM) 20 MG capsule Take 1 capsule by mouth Every Morning Before Breakfast.     • FeroSul 325 (65 Fe) MG tablet      • gabapentin (NEURONTIN) 300 MG capsule 1 capsule 3 (Three) Times a Day.  3   • gabapentin (NEURONTIN) 300 MG capsule Take 1 capsule by mouth 3 (Three) Times a Day.     • GoodSense Pain Relief Extra St 500 MG tablet      • meloxicam (MOBIC) 15 MG tablet take 1 tablet by mouth once daily as needed for pain     • mometasone (ELOCON) 0.1 % cream Apply  topically to the appropriate area as directed Daily.     • naproxen (NAPROSYN) 500 MG tablet Take 1 tablet by mouth 2 (two) times a day with meals. 60 tablet 1   • Omega-3 Fatty  Acids (fish oil) 1000 MG capsule capsule Take 1 capsule by mouth Daily.     • oxaprozin (DAYPRO) 600 MG tablet      • pantoprazole (PROTONIX) 40 MG EC tablet 1 tablet Every Morning.  11   • pantoprazole (PROTONIX) 40 MG EC tablet Take 1 tablet by mouth Daily.     • Potassium 75 MG tablet Take  by mouth.     • sucralfate (CARAFATE) 1 g tablet      • zolpidem (AMBIEN) 10 MG tablet At Night As Needed.  2     No current facility-administered medications on file prior to encounter.       Past Medical History:   Diagnosis Date   • Arthritis    • DDD (degenerative disc disease), cervical    • DDD (degenerative disc disease), lumbar    • GERD (gastroesophageal reflux disease)    • Neck pain    • Psoriasis    • Skin cancer        No hematologic infectious or constitutional symptoms  Negative screen for ANDREINA      Exam:  There were no vitals taken for this visit.  Airway Mallampatti 2  Alert and oriented      Diagnosis:  Post-Op Diagnosis Codes:     * Spinal stenosis, lumbar region with neurogenic claudication [M48.062]    Plan: Bilateral transforaminal lumbar 3 epidural steroid injection under fluoroscopic guidance    I have encouraged them to continue:  1.  Physical therapy exercises at home as prescribed by physical therapy or from the pain clinic handout.  Continuation of these exercises every day, or multiple times per week, even when the patient has good pain relief, was stressed to the patient as a preventative measure to decrease the frequency and severity of future pain episodes.  2.  Continue pain medicines as already prescribed.  If patient not currently taking any, it is recommended to begin Acetaminophen 1000 mg po q 8 hours.  If other medicines containing Acetaminophen are currently prescribed, maintain daily dose at 3000mg.    3.  If they can tolerate NSAIDS, it is recommended to take Ibuprofen 600 mg po q 6 hours for 7 days during pain exacerbations.   Alternatively, they may substitute an NSAID of their choice  (e.g. Aleve)  4.  Heat and ice to the affected area as tolerated for pain control.   5.  Low impact exercise such as walking or water exercise was recommended to maintain overall health and aid in weight control.   6.  Follow up as needed for subsequent injections.

## 2023-05-08 NOTE — ANESTHESIA PROCEDURE NOTES
Bilateral transforaminal lumbar 3 epidural steroid injection      Patient reassessed immediately prior to procedure    Patient location during procedure: pain clinic  Indication:procedure for pain  Performed By  Anesthesiologist: Clinton Borrero MD  Preanesthetic Checklist  Completed: patient identified and risks and benefits discussed  Additional Notes  Diagnosis:     Post-Op Diagnosis Codes:     * Spinal stenosis, lumbar region with neurogenic claudication (M48.062)    Sedation: Versed 2 mg    Sedation time: 8:23 AM to 8:39 AM    A lumbar epidural steroid injection with fluoroscopic guidance and an Isovue dye epidurogram was performed.  Under fluoroscopic guidance, the epidural space was identified and accessed, confirmed by loss of resistance to saline followed by injection of Isovue dye, which shows a good epidurogram.  The above medications were injected uneventfully.    Prep:  Pt Position:prone  Sterile Tech:cap, gloves, mask and sterile barrier  Prep:chlorhexidine gluconate and isopropyl alcohol  Monitoring:blood pressure monitoring, continuous pulse oximetry and EKG  Procedure:Sedation: yes     Guidance: fluoroscopy  Location:lumbar  Level:3-4  Epidural needle type: Sarah.  Needle Gauge:22 G  Aspiration:negative  Medications:  Preservative Free Saline:1mL  Isovue:1mL  Comments:Decadron 10 mg preservative-free and 5 cc of 0.25% bupivacaine divided equally  Post Assessment:  Pt Tolerance:patient tolerated the procedure well with no apparent complications  Complications:no

## 2023-06-23 ENCOUNTER — TELEPHONE (OUTPATIENT)
Dept: NEUROSURGERY | Facility: CLINIC | Age: 53
End: 2023-06-23

## 2023-06-23 NOTE — TELEPHONE ENCOUNTER
Caller:MARY KAY CHEN    Relationship to patient: PTS WIFE    Best call back number: [phone]    Chief complaint:NEEDS TO RESCHEDULE    Type of visit: FOLLOW UP 2 MO    Requested date: ASAP     If rescheduling, when is the original appointment: 06/19/23    Additional notes:PTS WIFE CALLED AND STATES THAT PATIENT MISSED HIS APPT. THIS WEEK WITH -PTS WIFE STATES SHE HAD SURGERY THIS WEEK AND THEY FORGOT ABOUT THE APPT. THEY ARE ASKING FOR A RESCHEDULE-HUB UNABLE TO SCHEDULE UNTIL November-SENDING TO OFFICE FOR SCHEDULING THANK YOU!

## 2023-07-26 ENCOUNTER — OFFICE VISIT (OUTPATIENT)
Dept: NEUROSURGERY | Facility: CLINIC | Age: 53
End: 2023-07-26
Payer: COMMERCIAL

## 2023-07-26 VITALS
WEIGHT: 212 LBS | HEIGHT: 69 IN | DIASTOLIC BLOOD PRESSURE: 74 MMHG | BODY MASS INDEX: 31.4 KG/M2 | SYSTOLIC BLOOD PRESSURE: 132 MMHG

## 2023-07-26 DIAGNOSIS — Z98.1 HISTORY OF FUSION OF CERVICAL SPINE: ICD-10-CM

## 2023-07-26 DIAGNOSIS — M51.36 DDD (DEGENERATIVE DISC DISEASE), LUMBAR: ICD-10-CM

## 2023-07-26 DIAGNOSIS — M43.16 SPONDYLOLISTHESIS AT L3-L4 LEVEL: ICD-10-CM

## 2023-07-26 DIAGNOSIS — M48.062 SPINAL STENOSIS OF LUMBAR REGION WITH NEUROGENIC CLAUDICATION: Primary | ICD-10-CM

## 2023-07-26 PROCEDURE — 99214 OFFICE O/P EST MOD 30 MIN: CPT | Performed by: NEUROLOGICAL SURGERY

## 2023-07-26 NOTE — PROGRESS NOTES
"Subjective   Patient ID: Rafael Kat is a 52 y.o. male is here today for follow-up.    Patient reports shots did not help he first 2 did the others have not. Pain level is a 9 at the worse.    The patient is with his wife.  He has a previous fusion surgery in the neck.  He has spinal stenosis at L3-L4 and to a certain extent at L2-L3.  There is likely a spondylolisthesis at L3-L4.  3 blocks were tried.  The benefit is transient.  He thinks he wants to move forward with surgery later on in the year.  He does have some neurogenic claudication.  We will need to get a myelogram and talk about doing surgery sometime in December as per his request.  The pain is in the low back and on both legs.    History of Present Illness    The following portions of the patient's history were reviewed and updated as appropriate: allergies, current medications, past family history, past medical history, past social history, past surgical history, and problem list.    Review of Systems   Musculoskeletal:  Positive for back pain.   Neurological:  Positive for weakness. Negative for numbness.   All other systems reviewed and are negative.        Objective     Vitals:    07/26/23 1435   BP: 132/74   Weight: 96.2 kg (212 lb)   Height: 175.3 cm (69.02\")   PainSc:   9   PainLoc: Back     Body mass index is 31.29 kg/m².    Tobacco Use: Medium Risk    Smoking Tobacco Use: Former    Smokeless Tobacco Use: Never    Passive Exposure: Not on file          Physical Exam  Constitutional:       Appearance: He is well-developed.   HENT:      Head: Normocephalic and atraumatic.   Eyes:      Extraocular Movements: EOM normal.      Conjunctiva/sclera: Conjunctivae normal.      Pupils: Pupils are equal, round, and reactive to light.   Neck:      Vascular: No carotid bruit.   Neurological:      Mental Status: He is oriented to person, place, and time.      Coordination: Finger-Nose-Finger Test and Heel to Shin Test normal.      Gait: Gait is intact.    "   Deep Tendon Reflexes:      Reflex Scores:       Tricep reflexes are 2+ on the right side and 2+ on the left side.       Bicep reflexes are 2+ on the right side and 2+ on the left side.       Brachioradialis reflexes are 2+ on the right side and 2+ on the left side.       Patellar reflexes are 2+ on the right side and 2+ on the left side.       Achilles reflexes are 2+ on the right side and 2+ on the left side.  Psychiatric:         Speech: Speech normal.     Neurologic Exam     Mental Status   Oriented to person, place, and time.   Registration of memory: Good recent and remote memory.   Attention: normal. Concentration: normal.   Speech: speech is normal   Level of consciousness: alert  Knowledge: consistent with education.     Cranial Nerves     CN II   Visual fields full to confrontation.   Visual acuity: normal    CN III, IV, VI   Pupils are equal, round, and reactive to light.  Extraocular motions are normal.     CN V   Facial sensation intact.   Right corneal reflex: normal  Left corneal reflex: normal    CN VII   Facial expression full, symmetric.   Right facial weakness: none  Left facial weakness: none    CN VIII   Hearing: intact    CN IX, X   Palate: symmetric    CN XI   Right sternocleidomastoid strength: normal  Left sternocleidomastoid strength: normal    CN XII   Tongue: not atrophic  Tongue deviation: none    Motor Exam   Muscle bulk: normal  Right arm tone: normal  Left arm tone: normal  Right leg tone: normal  Left leg tone: normal    Strength   Strength 5/5 except as noted.     Sensory Exam   Light touch normal.     Gait, Coordination, and Reflexes     Gait  Gait: normal    Coordination   Finger to nose coordination: normal  Heel to shin coordination: normal    Reflexes   Right brachioradialis: 2+  Left brachioradialis: 2+  Right biceps: 2+  Left biceps: 2+  Right triceps: 2+  Left triceps: 2+  Right patellar: 2+  Left patellar: 2+  Right achilles: 2+  Left achilles: 2+  Right : 2+  Left  : 2+        Assessment & Plan   Independent Review of Radiographic Studies:      I personally reviewed the images from the following studies.    The MRI of the lumbar spine and of the right hip done on 2/27/2023 show a labral tear on the right hip as well as some mild osteoarthritis.  Pelvic no significant finding is severe spinal stenosis at L3-L4 and L2-L3.  Lesser degrees of stenosis at L1-L2 and L4-L5.  Agree with the report.       Medical Decision Making:      We will go ahead and get a lumbar myelogram and discuss moving forward with surgery probably both a decompression and fusion.  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  He is aware of the risk of a spinal headache and the potential need for blood patch.        Diagnoses and all orders for this visit:    1. Spinal stenosis of lumbar region with neurogenic claudication (Primary)  -     IR Myelogram Lumbar Spine; Future  -     CT Lumbar Spine With Intrathecal Contrast; Future  -     XR Spine Lumbar Complete With Flex & Ext; Future    2. DDD (degenerative disc disease), lumbar  -     IR Myelogram Lumbar Spine; Future  -     CT Lumbar Spine With Intrathecal Contrast; Future  -     XR Spine Lumbar Complete With Flex & Ext; Future    3. History of fusion of cervical spine    4. Spondylolisthesis at L3-L4 level  -     IR Myelogram Lumbar Spine; Future  -     CT Lumbar Spine With Intrathecal Contrast; Future  -     XR Spine Lumbar Complete With Flex & Ext; Future    Other orders  -     No Lab Testing Needed; Standing      Return in about 4 weeks (around 8/23/2023) for After myelogram.

## 2023-08-29 NOTE — PROGRESS NOTES
09/11/23 0001   Pre-Procedure Phone Call   Procedure Time Verified Yes   Arrival Time 0800   Procedure Location Verified Yes   Medical History Reviewed No   NPO Status Reinforced Yes   Ride and Caregiver Arranged Yes   Patient Knows to Bring Current Medications No   Bring Outside Films Requested No

## 2023-09-08 ENCOUNTER — TELEPHONE (OUTPATIENT)
Dept: NEUROSURGERY | Facility: CLINIC | Age: 53
End: 2023-09-08
Payer: COMMERCIAL

## 2023-09-10 NOTE — PROGRESS NOTES
09/25/23 0001   Pre-Procedure Phone Call   Procedure Time Verified Yes   Arrival Time 0800   Procedure Location Verified Yes   Medical History Reviewed No   NPO Status Reinforced Yes   Ride and Caregiver Arranged Yes   Patient Knows to Bring Current Medications   (No changes in medications since last reviewed)   Bring Outside Films Requested   (MRI L-Spine 2/25/23 in [facility])

## 2023-09-11 ENCOUNTER — HOSPITAL ENCOUNTER (OUTPATIENT)
Dept: GENERAL RADIOLOGY | Facility: HOSPITAL | Age: 53
Discharge: HOME | End: 2023-09-11
Payer: COMMERCIAL

## 2023-09-13 ENCOUNTER — TELEPHONE (OUTPATIENT)
Dept: NEUROSURGERY | Facility: CLINIC | Age: 53
End: 2023-09-13
Payer: COMMERCIAL

## 2023-09-13 NOTE — TELEPHONE ENCOUNTER
Provider: JOSE    Caller: PATIENT    Pharmacy: [facility] [place] - [place] - [address] [place] [address] - [phone]  - [phone]  [phone]     Phone Number: [phone]    Reason for Call: PATIENT CALLED, STATES THE LAST THREE DAYS HIS PAIN HAS INCREASED SEVERELY. PATIENT STATES HE IS UNABLE TO WALK OR BEND OVER.    When was the patient last seen: 07/26/23    When did it start: 2-3 DAYS AGO    Where is it located: LOW BACK     Characteristics of symptom/severity: 14/10    Timing- Is it constant or intermittent: CONSTANT     What makes it worse: MOVING/ WALKING / SITTING     What makes it better: NOTHING, PATIENT STATES HE TOOK A DOUBLE DOSE OF HIS MUSCLE RELAXER AND IT HELPED JUST A LITTLE    What therapies/medications have you tried: MUSCLE RELAXER    PATIENT WOULD LIKE TO KNOW IF HIS MYELOGRAM CAN BE MOVED UP OR WHAT HE NEEDS TO DO. PATIENT WAS WT TO OFFICE FOR FURTHER ADVICE.

## 2023-09-14 RX ORDER — METHYLPREDNISOLONE 4 MG/1
TABLET ORAL
Qty: 21 TABLET | Refills: 0 | Status: SHIPPED | OUTPATIENT
Start: 2023-09-14

## 2023-09-14 NOTE — TELEPHONE ENCOUNTER
Rx Refill Note  Requested Prescriptions     Pending Prescriptions Disp Refills    methylPREDNISolone (MEDROL) 4 MG dose pack 21 tablet 0     Sig: Take as directed on package instructions.      Last office visit with prescribing clinician: 7/26/2023   Last telemedicine visit with prescribing clinician: Visit date not found   Next office visit with prescribing clinician: 10/23/2023                         Would you like a call back once the refill request has been completed: [] Yes [x] No    If the office needs to give you a call back, can they leave a voicemail: [] Yes [x] No    Antonella De Los Santos MA  09/14/23, 15:20 EDT

## 2023-09-25 ENCOUNTER — HOSPITAL ENCOUNTER (OUTPATIENT)
Dept: GENERAL RADIOLOGY | Facility: HOSPITAL | Age: 53
Discharge: HOME | End: 2023-09-25
Payer: COMMERCIAL

## 2023-09-25 ENCOUNTER — HOSPITAL ENCOUNTER (OUTPATIENT)
Dept: CT IMAGING | Facility: HOSPITAL | Age: 53
Discharge: HOME | End: 2023-09-25
Payer: COMMERCIAL

## 2023-09-25 ENCOUNTER — APPOINTMENT (OUTPATIENT)
Dept: OTHER | Facility: HOSPITAL | Age: 53
End: 2023-09-25
Payer: COMMERCIAL

## 2023-09-25 VITALS
RESPIRATION RATE: 16 BRPM | WEIGHT: 200 LBS | OXYGEN SATURATION: 96 % | SYSTOLIC BLOOD PRESSURE: 116 MMHG | BODY MASS INDEX: 29.62 KG/M2 | HEART RATE: 50 BPM | TEMPERATURE: 98.2 F | HEIGHT: 69 IN | DIASTOLIC BLOOD PRESSURE: 74 MMHG

## 2023-09-25 DIAGNOSIS — M48.062 SPINAL STENOSIS OF LUMBAR REGION WITH NEUROGENIC CLAUDICATION: ICD-10-CM

## 2023-09-25 DIAGNOSIS — M51.36 DDD (DEGENERATIVE DISC DISEASE), LUMBAR: ICD-10-CM

## 2023-09-25 DIAGNOSIS — M43.16 SPONDYLOLISTHESIS AT L3-L4 LEVEL: ICD-10-CM

## 2023-09-25 DIAGNOSIS — Z09 FOLLOW-UP EXAM: ICD-10-CM

## 2023-09-25 PROCEDURE — 72132 CT LUMBAR SPINE W/DYE: CPT

## 2023-09-25 PROCEDURE — 62304 MYELOGRAPHY LUMBAR INJECTION: CPT

## 2023-09-25 PROCEDURE — 72114 X-RAY EXAM L-S SPINE BENDING: CPT

## 2023-09-25 PROCEDURE — 72240 MYELOGRAPHY NECK SPINE: CPT

## 2023-09-25 PROCEDURE — 25510000001 IOPAMIDOL 41 % SOLUTION: Performed by: RADIOLOGY

## 2023-09-25 PROCEDURE — 0 LIDOCAINE 1 % SOLUTION: Performed by: RADIOLOGY

## 2023-09-25 RX ORDER — SODIUM CHLORIDE 9 MG/ML
40 INJECTION, SOLUTION INTRAVENOUS AS NEEDED
Status: CANCELLED | OUTPATIENT
Start: 2023-09-25

## 2023-09-25 RX ORDER — SODIUM CHLORIDE 0.9 % (FLUSH) 0.9 %
3 SYRINGE (ML) INJECTION EVERY 12 HOURS SCHEDULED
Status: CANCELLED | OUTPATIENT
Start: 2023-09-25

## 2023-09-25 RX ORDER — LIDOCAINE HYDROCHLORIDE 10 MG/ML
10 INJECTION, SOLUTION INFILTRATION; PERINEURAL ONCE
Status: COMPLETED | OUTPATIENT
Start: 2023-09-25 | End: 2023-09-25

## 2023-09-25 RX ORDER — IOPAMIDOL 408 MG/ML
20 INJECTION, SOLUTION INTRATHECAL
Status: COMPLETED | OUTPATIENT
Start: 2023-09-25 | End: 2023-09-25

## 2023-09-25 RX ORDER — SODIUM CHLORIDE 0.9 % (FLUSH) 0.9 %
10 SYRINGE (ML) INJECTION AS NEEDED
Status: CANCELLED | OUTPATIENT
Start: 2023-09-25

## 2023-09-25 RX ADMIN — LIDOCAINE HYDROCHLORIDE 2 ML: 10 INJECTION, SOLUTION INFILTRATION; PERINEURAL at 10:12

## 2023-09-25 RX ADMIN — IOPAMIDOL 16 ML: 408 INJECTION, SOLUTION INTRATHECAL at 10:18

## 2023-09-25 NOTE — H&P
Name: Rafael Kat ADMIT: 2023   : 1970  PCP: Eun Lara APRN    MRN: [id number] LOS: 0 days   AGE/SEX: 52 y.o. male  ROOM: Room/bed info not found     Chief complaint   Patient is a 52 y.o. male presents with LBP.     History of Present Illness: LBP    Past Surgical History:  Past Surgical History:   Procedure Laterality Date    ANTERIOR CHANNEL VERTEBRECTOMY/CORPECTOMY Bilateral 3/29/2016    Procedure: C4-6 ANTERIOR CHANNEL VERTEBRECTOMY/CORPECTOMY WITH INSTRUMENTATION;  Surgeon: Bert Collado MD;  Location: [facility] OR;  Service:     MENISCECTOMY      UPPER GASTROINTESTINAL ENDOSCOPY  2021       Past Medical History:  Past Medical History:   Diagnosis Date    Arthritis     DDD (degenerative disc disease), cervical     DDD (degenerative disc disease), lumbar     GERD (gastroesophageal reflux disease)     Neck pain     Psoriasis     Skin cancer        Home Medications:  (Not in a hospital admission)      Allergies:  Bacitracin-polymyxin b, Neomycin-bacitracin zn-polymyx, and Wsrne-xrsfy-htehjmw-pramoxine    Family History:  No family history on file.    Social History:  Social History     Tobacco Use    Smoking status: Former     Packs/day: 2.00     Years: 20.00     Pack years: 40.00     Types: Cigarettes     Quit date:      Years since quittin.7    Smokeless tobacco: Never   Substance Use Topics    Alcohol use: No    Drug use: Yes     Frequency: 14.0 times per week     Types: Marijuana     Comment: 1-2 PER DAY        Objective     Physical Exam:   Unremark.for intend.proc.    Vital Signs  Temp:  [98.2 °F (36.8 °C)] 98.2 °F (36.8 °C)  Heart Rate:  [59] 59  Resp:  [16] 16  BP: (119)/(75) 119/75    Anticipated Surgical Procedure:  myelogram    The risks, benefits and alternatives of this procedure have been discussed with the patient or responsible party: Yes      Wander Beckman MD  23  08:55 EDT

## 2023-09-25 NOTE — NURSING NOTE
Patient arrived to radiology triage/ bay 3 for myelogram along with his wife.   Patient's mother returned call.      Please call patient  Ok to leave detailed message.

## 2023-09-25 NOTE — DISCHARGE INSTRUCTIONS
EDUCATION /DISCHARGE INSTRUCTIONS:  A myelogram is a special radiology procedure of the spinal cord, spinal nerves and other related structures.  You will be awake during the examination.  An area of your lower back will be cleansed with an antiseptic solution.  The physician will inject a numbing medication in your lower back.  While your back is numb, a needle will be placed in the lower back area.  A small amount of spinal fluid may be withdrawn and sent to the lab if ordered by your physician. While the needle is in the back, an injection of a contrast material (xray dye) will be given through the needle.  The contrast material will allow the physician to see the spinal cord and spinal nerves.  Once injected, the needle will be removed and a band aid will be placed over the injection site.  The table will be tilted during the process to allow the contrast material to flow to particular areas in the spine.  Following the injection and xrays, you will be taken to the CT scan where more pictures will be taken. After the procedure is finished, the contrast material will be absorbed by your body and eliminated through your kidneys.  The radiologist will study and interpret your myelogram and send the results to your physician.  Procedure risks of a myelogram include, but are not limited to:  *  Bleeding   *  Seizure  *  Infection   *  Headache, possibly severe requiring a blood patch  *  Contrast reaction  *  Nerve or cord injury  *  Paralysis and death    Benefits of the procedure:    Best examination for delineating pathology related to spinal cord compression from a disc and/or nerve root compression  Alternatives to the procedure:MRI - a non invasive procedure requiring intravenous contrast injection. Cannot be done on patients with certain pacemakers or metal in the body.  MRI risks include possible reaction to the contrast material, movement of metal located in the body.   Benefit to MRI:  Non-invasive and  usually painless procedure.  THIS EDUCATION INFORMATION WAS REVIEWED PRIOR TO THE PROCEDURE AND CONSENT. Patient initials __________________Time_________________  Important information following your myelogram:    *  When you get home, lie down with no more than 2 pillows under your head.  *  Sit up in the car going home. At home, sit up to eat and use the restroom only, then lie back down.   *  24 HOURS COMPLETE AT _____09/26/2023  @  1100________________   *  Tomorrow, after 24 hours completed, take it easy and rest the next 2-3 days.  *  Do not drive for 24 hours following a myelogram  *  You may remove the bandage and shower in the morning  *  Increase your fluids for the next 24 hours.  Caffeinated drinks are encouraged.Increase your intake of fluids the next 2-3 days  ResUme taking your blood thinner or Aspirin on ___N/A_________________  Resume taking your (Glucophage/Metformin) in 48 hrs. Your next dose will be on:_____________N/A__________  CALL YOUR PHYSICIAN FOR THE FOLLOWING:  * Pain at the injection site  * Redness, swelling, bruising or drainage at the injection site  * A fever by mouth of 101.0  * Any new symptoms  If you have problems with a headache that is not relieved with rest and medication, please call the Radiology Triage Nurse desk [phone]

## 2023-09-26 ENCOUNTER — TELEPHONE (OUTPATIENT)
Dept: INTERVENTIONAL RADIOLOGY/VASCULAR | Facility: HOSPITAL | Age: 53
End: 2023-09-26
Payer: COMMERCIAL

## 2023-10-03 ENCOUNTER — OFFICE VISIT (OUTPATIENT)
Dept: NEUROSURGERY | Facility: CLINIC | Age: 53
End: 2023-10-03
Payer: COMMERCIAL

## 2023-10-03 VITALS
WEIGHT: 200 LBS | HEIGHT: 69 IN | DIASTOLIC BLOOD PRESSURE: 76 MMHG | SYSTOLIC BLOOD PRESSURE: 122 MMHG | BODY MASS INDEX: 29.62 KG/M2

## 2023-10-03 DIAGNOSIS — M48.062 SPINAL STENOSIS OF LUMBAR REGION WITH NEUROGENIC CLAUDICATION: Primary | ICD-10-CM

## 2023-10-03 DIAGNOSIS — M51.36 DDD (DEGENERATIVE DISC DISEASE), LUMBAR: ICD-10-CM

## 2023-10-03 DIAGNOSIS — Z98.1 HISTORY OF FUSION OF CERVICAL SPINE: ICD-10-CM

## 2023-10-03 PROCEDURE — 1160F RVW MEDS BY RX/DR IN RCRD: CPT | Performed by: NEUROLOGICAL SURGERY

## 2023-10-03 PROCEDURE — 1159F MED LIST DOCD IN RCRD: CPT | Performed by: NEUROLOGICAL SURGERY

## 2023-10-03 PROCEDURE — 99214 OFFICE O/P EST MOD 30 MIN: CPT | Performed by: NEUROLOGICAL SURGERY

## 2023-10-03 RX ORDER — CELECOXIB 200 MG/1
200 CAPSULE ORAL DAILY
Qty: 30 CAPSULE | Refills: 11 | COMMUNITY
Start: 2023-09-25 | End: 2024-09-24

## 2023-10-03 NOTE — LETTER
October 3, 2023       No Recipients    Patient: Rafael Kat   YOB: 1970   Date of Visit: 10/3/2023     Dear HERON Willett:       Thank you for referring Rafael Kat to me for evaluation. Below are the relevant portions of my assessment and plan of care.    If you have questions, please do not hesitate to call me. I look forward to following Rafael along with you.         Sincerely,        Bert Collado MD        CC:   No Recipients    Bert Collado MD  10/03/23 0955  Sign when Signing Visit  Subjective   Patient ID: Rafael Kat is a 52 y.o. male is here today for follow-up on myelogram.     He is here with his wife.  He tolerated the myelogram.  He has a history of a cervical fusion for myeloradiculopathy.  He recovered from that reasonably well.  He has severe bilateral buttock and severe right leg pain.  This is from his stenosis seen on the myelogram at L1-L2, L2-L3, L3-L4.  Surprisingly he actually has very little low back pain.  And he has no spondylolisthesis.  So he does not need to be fused.  I thought that he might but I really do not think he needs it now although I did tell him I needed in the future.  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  We will get him a brace prior to surgery.  He has a smaller Velcro brace that helps him and I think he only to use that later but I think it would also be helpful for him to have some kind of therapy afterwards in preparation for work return in April.  The goals risks and benefits of a lumbar decompression are described below.  We will move forward this as soon as possible.    History of Present Illness    The following portions of the patient's history were reviewed and updated as appropriate: allergies, current medications, past family history, past medical history, past social  "history, past surgical history, and problem list.    Review of Systems   Constitutional:  Negative for fever.   Musculoskeletal:  Positive for back pain.   Neurological:  Positive for weakness and numbness.   All other systems reviewed and are negative.        Objective     Vitals:    10/03/23 0851   BP: 122/76   BP Location: Left arm   Patient Position: Sitting   Cuff Size: Adult   Weight: 90.7 kg (200 lb)   Height: 175.3 cm (69.02\")   PainSc:   3   PainLoc: Back     Body mass index is 29.52 kg/m².    Tobacco Use: Medium Risk   • Smoking Tobacco Use: Former   • Smokeless Tobacco Use: Never   • Passive Exposure: Not on file          Physical Exam  Constitutional:       Appearance: He is well-developed.   HENT:      Head: Normocephalic and atraumatic.   Eyes:      Extraocular Movements: EOM normal.      Conjunctiva/sclera: Conjunctivae normal.      Pupils: Pupils are equal, round, and reactive to light.   Neck:      Vascular: No carotid bruit.   Neurological:      Mental Status: He is oriented to person, place, and time.      Coordination: Finger-Nose-Finger Test and Heel to Shin Test normal.      Gait: Gait is intact.      Deep Tendon Reflexes:      Reflex Scores:       Tricep reflexes are 2+ on the right side and 2+ on the left side.       Bicep reflexes are 2+ on the right side and 2+ on the left side.       Brachioradialis reflexes are 2+ on the right side and 2+ on the left side.       Patellar reflexes are 2+ on the right side and 2+ on the left side.       Achilles reflexes are 2+ on the right side and 2+ on the left side.  Psychiatric:         Speech: Speech normal.     Neurologic Exam     Mental Status   Oriented to person, place, and time.   Registration of memory: Good recent and remote memory.   Attention: normal. Concentration: normal.   Speech: speech is normal   Level of consciousness: alert  Knowledge: consistent with education.     Cranial Nerves     CN II   Visual fields full to confrontation. "   Visual acuity: normal    CN III, IV, VI   Pupils are equal, round, and reactive to light.  Extraocular motions are normal.     CN V   Facial sensation intact.   Right corneal reflex: normal  Left corneal reflex: normal    CN VII   Facial expression full, symmetric.   Right facial weakness: none  Left facial weakness: none    CN VIII   Hearing: intact    CN IX, X   Palate: symmetric    CN XI   Right sternocleidomastoid strength: normal  Left sternocleidomastoid strength: normal    CN XII   Tongue: not atrophic  Tongue deviation: none    Motor Exam   Muscle bulk: normal  Right arm tone: normal  Left arm tone: normal  Right leg tone: normal  Left leg tone: normal    Strength   Strength 5/5 except as noted.     Sensory Exam   Light touch normal.     Gait, Coordination, and Reflexes     Gait  Gait: normal    Coordination   Finger to nose coordination: normal  Heel to shin coordination: normal    Reflexes   Right brachioradialis: 2+  Left brachioradialis: 2+  Right biceps: 2+  Left biceps: 2+  Right triceps: 2+  Left triceps: 2+  Right patellar: 2+  Left patellar: 2+  Right achilles: 2+  Left achilles: 2+  Right : 2+  Left : 2+        Assessment & Plan   Independent Review of Radiographic Studies:      I personally reviewed the images from the following studies.    The myelogram done on 9/25/2023 surprisingly does not really show a spondylolisthesis but there is a tight stenosis at L1-L2, L2-L3 and L3-L4 mostly from stenosis and some superimposed disc protrusion.  Agree with the report.        Medical Decision Making:      I described and recommended an open bilateral lumbar decompression at L1-L2, L2-L3, L3-L4 without fusion.  It is possible I might need to do some discectomies at some of the levels.  The goal of surgery is relief of radiating leg pain with improvement of numbness, tingling, walking, and quality of life.  This will not help or hinder low back pain.  The risks include, but are not limited to,  infection, hemorrhage on transfusion or reoperation, CSF leak requiring reoperation, incomplete relief of symptoms, potential need for additional surgeries in the future including a fusion, stroke, paralysis, coma, and death.  The patient agrees to proceed.  We will get him a brace before surgery.    He knows that he will follow-up with their APCs after surgery but I would like to see him at the 3 to 4-month joaquina with flexion-extension x-rays.  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        Diagnoses and all orders for this visit:    1. Spinal stenosis of lumbar region with neurogenic claudication (Primary)  -     Case Request; Standing  -     ceFAZolin (ANCEF) 2 g in sodium chloride 0.9 % 100 mL IVPB  -     Case Request    2. DDD (degenerative disc disease), lumbar  -     Case Request; Standing  -     ceFAZolin (ANCEF) 2 g in sodium chloride 0.9 % 100 mL IVPB  -     Case Request    3. History of fusion of cervical spine    Other orders  -     Follow Anesthesia Guidelines / Protocol; Future  -     Follow Anesthesia Guidelines / Protocol; Standing  -     SCD (sequential compression device)- to be placed on patient in Pre-op; Standing  -     Verify / Perform Chlorhexidine Skin Prep; Standing  -     Obtain informed consent  -     Provide NPO Instructions to Patient; Future  -     Chlorhexidine Skin Prep; Future      Return for 2 weeks after surgery with an APC.

## 2023-10-03 NOTE — PROGRESS NOTES
"Subjective   Patient ID: Rafael Kat is a 52 y.o. male is here today for follow-up on myelogram.     He is here with his wife.  He tolerated the myelogram.  He has a history of a cervical fusion for myeloradiculopathy.  He recovered from that reasonably well.  He has severe bilateral buttock and severe right leg pain.  This is from his stenosis seen on the myelogram at L1-L2, L2-L3, L3-L4.  Surprisingly he actually has very little low back pain.  And he has no spondylolisthesis.  So he does not need to be fused.  I thought that he might but I really do not think he needs it now although I did tell him I needed in the future.  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  We will get him a brace prior to surgery.  He has a smaller Velcro brace that helps him and I think he only to use that later but I think it would also be helpful for him to have some kind of therapy afterwards in preparation for work return in April.  The goals risks and benefits of a lumbar decompression are described below.  We will move forward this as soon as possible.  The patient will require an LSO brace for mobility restriction and pain control after surgery.    History of Present Illness    The following portions of the patient's history were reviewed and updated as appropriate: allergies, current medications, past family history, past medical history, past social history, past surgical history, and problem list.    Review of Systems   Constitutional:  Negative for fever.   Musculoskeletal:  Positive for back pain.   Neurological:  Positive for weakness and numbness.   All other systems reviewed and are negative.        Objective     Vitals:    10/03/23 0851   BP: 122/76   BP Location: Left arm   Patient Position: Sitting   Cuff Size: Adult   Weight: 90.7 kg (200 lb)   Height: 175.3 cm (69.02\") "   PainSc:   3   PainLoc: Back     Body mass index is 29.52 kg/m².    Tobacco Use: Medium Risk    Smoking Tobacco Use: Former    Smokeless Tobacco Use: Never    Passive Exposure: Not on file          Physical Exam  Constitutional:       Appearance: He is well-developed.   HENT:      Head: Normocephalic and atraumatic.   Eyes:      Extraocular Movements: EOM normal.      Conjunctiva/sclera: Conjunctivae normal.      Pupils: Pupils are equal, round, and reactive to light.   Neck:      Vascular: No carotid bruit.   Neurological:      Mental Status: He is oriented to person, place, and time.      Coordination: Finger-Nose-Finger Test and Heel to Shin Test normal.      Gait: Gait is intact.      Deep Tendon Reflexes:      Reflex Scores:       Tricep reflexes are 2+ on the right side and 2+ on the left side.       Bicep reflexes are 2+ on the right side and 2+ on the left side.       Brachioradialis reflexes are 2+ on the right side and 2+ on the left side.       Patellar reflexes are 2+ on the right side and 2+ on the left side.       Achilles reflexes are 2+ on the right side and 2+ on the left side.  Psychiatric:         Speech: Speech normal.     Neurologic Exam     Mental Status   Oriented to person, place, and time.   Registration of memory: Good recent and remote memory.   Attention: normal. Concentration: normal.   Speech: speech is normal   Level of consciousness: alert  Knowledge: consistent with education.     Cranial Nerves     CN II   Visual fields full to confrontation.   Visual acuity: normal    CN III, IV, VI   Pupils are equal, round, and reactive to light.  Extraocular motions are normal.     CN V   Facial sensation intact.   Right corneal reflex: normal  Left corneal reflex: normal    CN VII   Facial expression full, symmetric.   Right facial weakness: none  Left facial weakness: none    CN VIII   Hearing: intact    CN IX, X   Palate: symmetric    CN XI   Right sternocleidomastoid strength: normal  Left  sternocleidomastoid strength: normal    CN XII   Tongue: not atrophic  Tongue deviation: none    Motor Exam   Muscle bulk: normal  Right arm tone: normal  Left arm tone: normal  Right leg tone: normal  Left leg tone: normal    Strength   Strength 5/5 except as noted.     Sensory Exam   Light touch normal.     Gait, Coordination, and Reflexes     Gait  Gait: normal    Coordination   Finger to nose coordination: normal  Heel to shin coordination: normal    Reflexes   Right brachioradialis: 2+  Left brachioradialis: 2+  Right biceps: 2+  Left biceps: 2+  Right triceps: 2+  Left triceps: 2+  Right patellar: 2+  Left patellar: 2+  Right achilles: 2+  Left achilles: 2+  Right : 2+  Left : 2+        Assessment & Plan   Independent Review of Radiographic Studies:      I personally reviewed the images from the following studies.    The myelogram done on 9/25/2023 surprisingly does not really show a spondylolisthesis but there is a tight stenosis at L1-L2, L2-L3 and L3-L4 mostly from stenosis and some superimposed disc protrusion.  Agree with the report.        Medical Decision Making:      I described and recommended an open bilateral lumbar decompression at L1-L2, L2-L3, L3-L4 without fusion.  It is possible I might need to do some discectomies at some of the levels.  The goal of surgery is relief of radiating leg pain with improvement of numbness, tingling, walking, and quality of life.  This will not help or hinder low back pain.  The risks include, but are not limited to, infection, hemorrhage on transfusion or reoperation, CSF leak requiring reoperation, incomplete relief of symptoms, potential need for additional surgeries in the future including a fusion, stroke, paralysis, coma, and death.  The patient agrees to proceed.  We will get him a brace before surgery.    He knows that he will follow-up with their APCs after surgery but I would like to see him at the 3 to 4-month joaquina with flexion-extension x-rays.   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        Diagnoses and all orders for this visit:    1. Spinal stenosis of lumbar region with neurogenic claudication (Primary)  -     Case Request; Standing  -     ceFAZolin (ANCEF) 2 g in sodium chloride 0.9 % 100 mL IVPB  -     Case Request    2. DDD (degenerative disc disease), lumbar  -     Case Request; Standing  -     ceFAZolin (ANCEF) 2 g in sodium chloride 0.9 % 100 mL IVPB  -     Case Request    3. History of fusion of cervical spine    Other orders  -     Follow Anesthesia Guidelines / Protocol; Future  -     Follow Anesthesia Guidelines / Protocol; Standing  -     SCD (sequential compression device)- to be placed on patient in Pre-op; Standing  -     Verify / Perform Chlorhexidine Skin Prep; Standing  -     Obtain informed consent  -     Provide NPO Instructions to Patient; Future  -     Chlorhexidine Skin Prep; Future      Return for 2 weeks after surgery with an APC.

## 2023-10-06 ENCOUNTER — TELEPHONE (OUTPATIENT)
Dept: NEUROSURGERY | Facility: CLINIC | Age: 53
End: 2023-10-06

## 2023-10-06 NOTE — TELEPHONE ENCOUNTER
LVM: Need to inform dates/time for surgery. PAT is 11/20 @ 8 AM. Surgery is 11/28 with an arrival time of 6 AM. Post op is 12/12 @ 2:15 PM with Susannah Dobson. Will send this info by mail as well.     [facility] can read/inform.

## 2023-11-20 ENCOUNTER — PRE-ADMISSION TESTING (OUTPATIENT)
Dept: PREADMISSION TESTING | Facility: HOSPITAL | Age: 53
End: 2023-11-20
Payer: COMMERCIAL

## 2023-11-20 VITALS
OXYGEN SATURATION: 94 % | WEIGHT: 198.9 LBS | RESPIRATION RATE: 20 BRPM | TEMPERATURE: 98 F | HEART RATE: 74 BPM | SYSTOLIC BLOOD PRESSURE: 135 MMHG | BODY MASS INDEX: 29.46 KG/M2 | HEIGHT: 69 IN | DIASTOLIC BLOOD PRESSURE: 84 MMHG

## 2023-11-20 LAB
ANION GAP SERPL CALCULATED.3IONS-SCNC: 10.1 MMOL/L (ref 5–15)
BUN SERPL-MCNC: 13 MG/DL (ref 6–20)
BUN/CREAT SERPL: 12.6 (ref 7–25)
CALCIUM SPEC-SCNC: 8.8 MG/DL (ref 8.6–10.5)
CHLORIDE SERPL-SCNC: 107 MMOL/L (ref 98–107)
CO2 SERPL-SCNC: 23.9 MMOL/L (ref 22–29)
CREAT SERPL-MCNC: 1.03 MG/DL (ref 0.76–1.27)
DEPRECATED RDW RBC AUTO: 40.6 FL (ref 37–54)
EGFRCR SERPLBLD CKD-EPI 2021: 86.3 ML/MIN/1.73
ERYTHROCYTE [DISTWIDTH] IN BLOOD BY AUTOMATED COUNT: 12.8 % (ref 12.3–15.4)
GLUCOSE SERPL-MCNC: 130 MG/DL (ref 65–99)
HCT VFR BLD AUTO: 39.1 % (ref 37.5–51)
HGB BLD-MCNC: 13.2 G/DL (ref 13–17.7)
MCH RBC QN AUTO: 29.5 PG (ref 26.6–33)
MCHC RBC AUTO-ENTMCNC: 33.8 G/DL (ref 31.5–35.7)
MCV RBC AUTO: 87.3 FL (ref 79–97)
PLATELET # BLD AUTO: 296 10*3/MM3 (ref 140–450)
PMV BLD AUTO: 10.6 FL (ref 6–12)
POTASSIUM SERPL-SCNC: 3.2 MMOL/L (ref 3.5–5.2)
QT INTERVAL: 399 MS
QTC INTERVAL: 409 MS
RBC # BLD AUTO: 4.48 10*6/MM3 (ref 4.14–5.8)
SODIUM SERPL-SCNC: 141 MMOL/L (ref 136–145)
WBC NRBC COR # BLD AUTO: 7.71 10*3/MM3 (ref 3.4–10.8)

## 2023-11-20 PROCEDURE — 93005 ELECTROCARDIOGRAM TRACING: CPT

## 2023-11-20 PROCEDURE — 36415 COLL VENOUS BLD VENIPUNCTURE: CPT

## 2023-11-20 PROCEDURE — 85027 COMPLETE CBC AUTOMATED: CPT

## 2023-11-20 PROCEDURE — 80048 BASIC METABOLIC PNL TOTAL CA: CPT

## 2023-11-20 RX ORDER — AMOXICILLIN AND CLAVULANATE POTASSIUM 875; 125 MG/1; MG/1
1 TABLET, FILM COATED ORAL 2 TIMES DAILY
COMMUNITY
Start: 2023-11-14 | End: 2023-11-21

## 2023-11-20 RX ORDER — ACETAMINOPHEN 500 MG
1000 TABLET ORAL 2 TIMES DAILY
COMMUNITY

## 2023-11-20 NOTE — DISCHARGE INSTRUCTIONS
Take the following medications the morning of surgery:    BUSPAR, GABAPENTIN, PROTONIX      ARRIVE TO [facility] OR DESK 57500-05 AT 6:00AM    If you are on prescription narcotic pain medication to control your pain you may also take that medication the morning of surgery.    General Instructions:  Do not eat solid food after midnight the night before surgery.  You may drink clear liquids day of surgery but must stop at least one hour before your hospital arrival time.  It is beneficial for you to have a clear drink that contains carbohydrates the day of surgery.  We suggest a 12 to 20 ounce bottle of Gatorade or Powerade for non-diabetic patients or a 12 to 20 ounce bottle of G2 or Powerade Zero for diabetic patients. (Pediatric patients, are not advised to drink a 12 to 20 ounce carbohydrate drink)    Clear liquids are liquids you can see through.  Nothing red in color.     Plain water                               Sports drinks  Sodas                                   Gelatin (Jell-O)  Fruit juices without pulp such as white grape juice and apple juice  Popsicles that contain no fruit or yogurt  Tea or coffee (no cream or milk added)  Gatorade / Powerade  G2 / Powerade Zero    Infants may have breast milk up to four hours before surgery.  Infants drinking formula may drink formula up to six hours before surgery.   Patients who avoid smoking, chewing tobacco and alcohol for 4 weeks prior to surgery have a reduced risk of post-operative complications.  Quit smoking as many days before surgery as you can.  Do not smoke, use chewing tobacco or drink alcohol the day of surgery.   If applicable bring your C-PAP/ BI-PAP machine in with you to preop day of surgery.  Bring any papers given to you in the doctor’s office.  Wear clean comfortable clothes.  Do not wear contact lenses, false eyelashes or make-up.  Bring a case for your glasses.   Bring crutches or walker if applicable.  Remove all piercings.  Leave jewelry and any  other valuables at home.  Hair extensions with metal clips must be removed prior to surgery.  The Pre-Admission Testing nurse will instruct you to bring medications if unable to obtain an accurate list in Pre-Admission Testing.      Preventing a Surgical Site Infection:  For 2 to 3 days before surgery, avoid shaving with a razor because the razor can irritate skin and make it easier to develop an infection.    Any areas of open skin can increase the risk of a post-operative wound infection by allowing bacteria to enter and travel throughout the body.  Notify your surgeon if you have any skin wounds / rashes even if it is not near the expected surgical site.  The area will need assessed to determine if surgery should be delayed until it is healed.  The night prior to surgery shower using a fresh bar of anti-bacterial soap (such as Dial) and clean washcloth.  Sleep in a clean bed with clean clothing.  Do not allow pets to sleep with you.  Shower on the morning of surgery using a fresh bar of anti-bacterial soap (such as Dial) and clean washcloth.  Dry with a clean towel and dress in clean clothing.  Ask your surgeon if you will be receiving antibiotics prior to surgery.  Make sure you, your family, and all healthcare providers clean their hands with soap and water or an alcohol based hand  before caring for you or your wound.    Day of surgery:  Your arrival time is approximately two hours before your scheduled surgery time.  Upon arrival, a Pre-op nurse and Anesthesiologist will review your health history, obtain vital signs, and answer questions you may have.  The only belongings needed at this time will be a list of your home medications and if applicable your C-PAP/BI-PAP machine.  A Pre-op nurse will start an IV and you may receive medication in preparation for surgery, including something to help you relax.     Please be aware that surgery does come with discomfort.  We want to make every effort to  control your discomfort so please discuss any uncontrolled symptoms with your nurse.   Your doctor will most likely have prescribed pain medications.      If you are going home after surgery you will receive individualized written care instructions before being discharged.  A responsible adult must drive you to and from the hospital on the day of your surgery and stay with you for 24 hours.  Discharge prescriptions can be filled by the hospital pharmacy during regular pharmacy hours.  If you are having surgery late in the day/evening your prescription may be e-prescribed to your pharmacy.  Please verify your pharmacy hours or chose a 24 hour pharmacy to avoid not having access to your prescription because your pharmacy has closed for the day.    If you are staying overnight following surgery, you will be transported to your hospital room following the recovery period.  [facility] has all private rooms.    If you have any questions please call Pre-Admission Testing at [phone].  Deductibles and co-payments are collected on the day of service. Please be prepared to pay the required co-pay, deductible or deposit on the day of service as defined by your plan.    Call your surgeon immediately if you experience any of the following symptoms:  Sore Throat  Shortness of Breath or difficulty breathing  Cough  Chills  Body soreness or muscle pain  Headache  Fever  New loss of taste or smell  Do not arrive for your surgery ill.  Your procedure will need to be rescheduled to another time.  You will need to call your physician before the day of surgery to avoid any unnecessary exposure to hospital staff as well as other patients.      CHLORHEXIDINE CLOTH INSTRUCTIONS  The morning of surgery follow these instructions using the Chlorhexidine cloths you've been given.  These steps reduce bacteria on the body.  Do not use the cloths near your eyes, ears mouth, genitalia or on open wounds.  Throw the cloths away  after use but do not try to flush them down a toilet.      Open and remove one cloth at a time from the package.    Leave the cloth unfolded and begin the bathing.  Massage the skin with the cloths using gentle pressure to remove bacteria.  Do not scrub harshly.   Follow the steps below with one 2% CHG cloth per area (6 total cloths).  One cloth for neck, shoulders and chest.  One cloth for both arms, hands, fingers and underarms (do underarms last).  One cloth for the abdomen followed by groin.  One cloth for right leg and foot including between the toes.  One cloth for left leg and foot including between the toes.  The last cloth is to be used for the back of the neck, back and buttocks.    Allow the CHG to air dry 3 minutes on the skin which will give it time to work and decrease the chance of irritation.  The skin may feel sticky until it is dry.  Do not rinse with water or any other liquid or you will lose the beneficial effects of the CHG.  If mild skin irritation occurs, do rinse the skin to remove the CHG.  Report this to the nurse at time of admission.  Do not apply lotions, creams, ointments, deodorants or perfumes after using the clothes. Dress in clean clothes before coming to the hospital.

## 2023-11-28 ENCOUNTER — HOSPITAL ENCOUNTER (OUTPATIENT)
Facility: HOSPITAL | Age: 53
Discharge: HOME HEALTH | End: 2023-11-30
Attending: NEUROLOGICAL SURGERY | Admitting: NEUROLOGICAL SURGERY
Payer: COMMERCIAL

## 2023-11-28 ENCOUNTER — ANESTHESIA (OUTPATIENT)
Dept: PERIOP | Facility: HOSPITAL | Age: 53
End: 2023-11-28
Payer: COMMERCIAL

## 2023-11-28 ENCOUNTER — APPOINTMENT (OUTPATIENT)
Dept: GENERAL RADIOLOGY | Facility: HOSPITAL | Age: 53
End: 2023-11-28
Payer: COMMERCIAL

## 2023-11-28 ENCOUNTER — ANESTHESIA EVENT (OUTPATIENT)
Dept: PERIOP | Facility: HOSPITAL | Age: 53
End: 2023-11-28
Payer: COMMERCIAL

## 2023-11-28 DIAGNOSIS — M51.36 DDD (DEGENERATIVE DISC DISEASE), LUMBAR: ICD-10-CM

## 2023-11-28 DIAGNOSIS — M48.062 SPINAL STENOSIS OF LUMBAR REGION WITH NEUROGENIC CLAUDICATION: ICD-10-CM

## 2023-11-28 PROBLEM — M53.2X6 SPINAL INSTABILITY OF LUMBAR REGION: Status: ACTIVE | Noted: 2023-11-28

## 2023-11-28 PROCEDURE — 25010000002 PROPOFOL 200 MG/20ML EMULSION: Performed by: NURSE ANESTHETIST, CERTIFIED REGISTERED

## 2023-11-28 PROCEDURE — 25010000002 CEFAZOLIN IN DEXTROSE 2-4 GM/100ML-% SOLUTION: Performed by: NEUROLOGICAL SURGERY

## 2023-11-28 PROCEDURE — P9041 ALBUMIN (HUMAN),5%, 50ML: HCPCS | Performed by: NURSE ANESTHETIST, CERTIFIED REGISTERED

## 2023-11-28 PROCEDURE — 25010000002 FENTANYL CITRATE (PF) 50 MCG/ML SOLUTION: Performed by: NURSE ANESTHETIST, CERTIFIED REGISTERED

## 2023-11-28 PROCEDURE — 63047 LAM FACETEC & FORAMOT LUMBAR: CPT | Performed by: NEUROLOGICAL SURGERY

## 2023-11-28 PROCEDURE — 76000 FLUOROSCOPY <1 HR PHYS/QHP: CPT

## 2023-11-28 PROCEDURE — 25810000003 LACTATED RINGERS PER 1000 ML: Performed by: STUDENT IN AN ORGANIZED HEALTH CARE EDUCATION/TRAINING PROGRAM

## 2023-11-28 PROCEDURE — 25010000002 ALBUMIN HUMAN 5% PER 50 ML: Performed by: NURSE ANESTHETIST, CERTIFIED REGISTERED

## 2023-11-28 PROCEDURE — 25010000002 MIDAZOLAM PER 1 MG: Performed by: STUDENT IN AN ORGANIZED HEALTH CARE EDUCATION/TRAINING PROGRAM

## 2023-11-28 PROCEDURE — 25010000002 DEXAMETHASONE PER 1 MG: Performed by: NURSE ANESTHETIST, CERTIFIED REGISTERED

## 2023-11-28 PROCEDURE — 63048 LAM FACETEC &FORAMOT EA ADDL: CPT | Performed by: NEUROLOGICAL SURGERY

## 2023-11-28 PROCEDURE — 25010000002 DROPERIDOL PER 5 MG: Performed by: NURSE ANESTHETIST, CERTIFIED REGISTERED

## 2023-11-28 PROCEDURE — 25010000002 HYDROMORPHONE HCL PF 50 MG/5ML SOLUTION: Performed by: NEUROLOGICAL SURGERY

## 2023-11-28 PROCEDURE — 25010000002 PHENYLEPHRINE 10 MG/ML SOLUTION: Performed by: NURSE ANESTHETIST, CERTIFIED REGISTERED

## 2023-11-28 PROCEDURE — 25010000002 SUCCINYLCHOLINE PER 20 MG: Performed by: NURSE ANESTHETIST, CERTIFIED REGISTERED

## 2023-11-28 PROCEDURE — 25010000002 CEFAZOLIN PER 500 MG: Performed by: NEUROLOGICAL SURGERY

## 2023-11-28 PROCEDURE — 25010000002 SUGAMMADEX 200 MG/2ML SOLUTION: Performed by: NURSE ANESTHETIST, CERTIFIED REGISTERED

## 2023-11-28 PROCEDURE — 25010000002 MAGNESIUM SULFATE PER 500 MG OF MAGNESIUM: Performed by: NURSE ANESTHETIST, CERTIFIED REGISTERED

## 2023-11-28 PROCEDURE — 25010000002 METHOCARBAMOL 1000 MG/10ML SOLUTION: Performed by: NEUROLOGICAL SURGERY

## 2023-11-28 PROCEDURE — 25810000003 SODIUM CHLORIDE 0.9 % SOLUTION: Performed by: NEUROLOGICAL SURGERY

## 2023-11-28 DEVICE — FLOSEAL WITH RECOTHROM - 10ML.
Type: IMPLANTABLE DEVICE | Site: SPINE LUMBAR | Status: FUNCTIONAL
Brand: FLOSEAL HEMOSTATIC MATRIX

## 2023-11-28 DEVICE — IMPLANTABLE DEVICE
Type: IMPLANTABLE DEVICE | Site: SPINE LUMBAR | Status: FUNCTIONAL
Brand: SURGIFOAM® ABSORBABLE GELATIN SPONGE, U.S.P.

## 2023-11-28 RX ORDER — SUCCINYLCHOLINE CHLORIDE 20 MG/ML
INJECTION INTRAMUSCULAR; INTRAVENOUS AS NEEDED
Status: DISCONTINUED | OUTPATIENT
Start: 2023-11-28 | End: 2023-11-28 | Stop reason: SURG

## 2023-11-28 RX ORDER — SODIUM CHLORIDE, SODIUM LACTATE, POTASSIUM CHLORIDE, CALCIUM CHLORIDE 600; 310; 30; 20 MG/100ML; MG/100ML; MG/100ML; MG/100ML
100 INJECTION, SOLUTION INTRAVENOUS CONTINUOUS
Status: DISCONTINUED | OUTPATIENT
Start: 2023-11-28 | End: 2023-11-28

## 2023-11-28 RX ORDER — GLYCOPYRROLATE 0.2 MG/ML
INJECTION INTRAMUSCULAR; INTRAVENOUS AS NEEDED
Status: DISCONTINUED | OUTPATIENT
Start: 2023-11-28 | End: 2023-11-28 | Stop reason: SURG

## 2023-11-28 RX ORDER — DOCUSATE SODIUM 100 MG/1
200 CAPSULE, LIQUID FILLED ORAL 2 TIMES DAILY
Status: DISCONTINUED | OUTPATIENT
Start: 2023-11-28 | End: 2023-11-30

## 2023-11-28 RX ORDER — OXYCODONE AND ACETAMINOPHEN 7.5; 325 MG/1; MG/1
1 TABLET ORAL EVERY 4 HOURS PRN
Status: DISCONTINUED | OUTPATIENT
Start: 2023-11-28 | End: 2023-11-28 | Stop reason: HOSPADM

## 2023-11-28 RX ORDER — ONDANSETRON 2 MG/ML
4 INJECTION INTRAMUSCULAR; INTRAVENOUS EVERY 6 HOURS PRN
Status: DISCONTINUED | OUTPATIENT
Start: 2023-11-28 | End: 2023-11-30 | Stop reason: HOSPADM

## 2023-11-28 RX ORDER — BUPIVACAINE HYDROCHLORIDE AND EPINEPHRINE 2.5; 5 MG/ML; UG/ML
INJECTION, SOLUTION EPIDURAL; INFILTRATION; INTRACAUDAL; PERINEURAL AS NEEDED
Status: DISCONTINUED | OUTPATIENT
Start: 2023-11-28 | End: 2023-11-28 | Stop reason: HOSPADM

## 2023-11-28 RX ORDER — LABETALOL HYDROCHLORIDE 5 MG/ML
5 INJECTION, SOLUTION INTRAVENOUS
Status: DISCONTINUED | OUTPATIENT
Start: 2023-11-28 | End: 2023-11-28 | Stop reason: HOSPADM

## 2023-11-28 RX ORDER — KETAMINE HCL IN NACL, ISO-OSM 100MG/10ML
SYRINGE (ML) INJECTION AS NEEDED
Status: DISCONTINUED | OUTPATIENT
Start: 2023-11-28 | End: 2023-11-28 | Stop reason: SURG

## 2023-11-28 RX ORDER — HYDROMORPHONE HYDROCHLORIDE 1 MG/ML
0.5 INJECTION, SOLUTION INTRAMUSCULAR; INTRAVENOUS; SUBCUTANEOUS
Status: DISCONTINUED | OUTPATIENT
Start: 2023-11-28 | End: 2023-11-28 | Stop reason: HOSPADM

## 2023-11-28 RX ORDER — SODIUM CHLORIDE 0.9 % (FLUSH) 0.9 %
3-10 SYRINGE (ML) INJECTION AS NEEDED
Status: DISCONTINUED | OUTPATIENT
Start: 2023-11-28 | End: 2023-11-28 | Stop reason: HOSPADM

## 2023-11-28 RX ORDER — DROPERIDOL 2.5 MG/ML
0.62 INJECTION, SOLUTION INTRAMUSCULAR; INTRAVENOUS
Status: DISCONTINUED | OUTPATIENT
Start: 2023-11-28 | End: 2023-11-28 | Stop reason: HOSPADM

## 2023-11-28 RX ORDER — FAMOTIDINE 10 MG/ML
20 INJECTION, SOLUTION INTRAVENOUS ONCE
Status: COMPLETED | OUTPATIENT
Start: 2023-11-28 | End: 2023-11-28

## 2023-11-28 RX ORDER — DEXAMETHASONE SODIUM PHOSPHATE 4 MG/ML
INJECTION, SOLUTION INTRA-ARTICULAR; INTRALESIONAL; INTRAMUSCULAR; INTRAVENOUS; SOFT TISSUE AS NEEDED
Status: DISCONTINUED | OUTPATIENT
Start: 2023-11-28 | End: 2023-11-28 | Stop reason: SURG

## 2023-11-28 RX ORDER — SODIUM CHLORIDE, SODIUM LACTATE, POTASSIUM CHLORIDE, CALCIUM CHLORIDE 600; 310; 30; 20 MG/100ML; MG/100ML; MG/100ML; MG/100ML
100 INJECTION, SOLUTION INTRAVENOUS CONTINUOUS
Status: DISCONTINUED | OUTPATIENT
Start: 2023-11-28 | End: 2023-11-30

## 2023-11-28 RX ORDER — CEFAZOLIN SODIUM 2 G/100ML
2 INJECTION, SOLUTION INTRAVENOUS ONCE
Status: COMPLETED | OUTPATIENT
Start: 2023-11-28 | End: 2023-11-28

## 2023-11-28 RX ORDER — METHOCARBAMOL 750 MG/1
750 TABLET, FILM COATED ORAL 4 TIMES DAILY PRN
Status: DISCONTINUED | OUTPATIENT
Start: 2023-11-28 | End: 2023-11-29

## 2023-11-28 RX ORDER — SODIUM CHLORIDE 0.9 % (FLUSH) 0.9 %
3 SYRINGE (ML) INJECTION EVERY 12 HOURS SCHEDULED
Status: DISCONTINUED | OUTPATIENT
Start: 2023-11-28 | End: 2023-11-28 | Stop reason: HOSPADM

## 2023-11-28 RX ORDER — HYDROCODONE BITARTRATE AND ACETAMINOPHEN 5; 325 MG/1; MG/1
1 TABLET ORAL ONCE AS NEEDED
Status: DISCONTINUED | OUTPATIENT
Start: 2023-11-28 | End: 2023-11-28 | Stop reason: HOSPADM

## 2023-11-28 RX ORDER — LIDOCAINE HYDROCHLORIDE 10 MG/ML
0.5 INJECTION, SOLUTION INFILTRATION; PERINEURAL ONCE AS NEEDED
Status: DISCONTINUED | OUTPATIENT
Start: 2023-11-28 | End: 2023-11-28 | Stop reason: HOSPADM

## 2023-11-28 RX ORDER — SODIUM CHLORIDE, SODIUM LACTATE, POTASSIUM CHLORIDE, CALCIUM CHLORIDE 600; 310; 30; 20 MG/100ML; MG/100ML; MG/100ML; MG/100ML
9 INJECTION, SOLUTION INTRAVENOUS CONTINUOUS
Status: DISCONTINUED | OUTPATIENT
Start: 2023-11-28 | End: 2023-11-28

## 2023-11-28 RX ORDER — ALBUMIN, HUMAN INJ 5% 5 %
SOLUTION INTRAVENOUS CONTINUOUS PRN
Status: DISCONTINUED | OUTPATIENT
Start: 2023-11-28 | End: 2023-11-28 | Stop reason: SURG

## 2023-11-28 RX ORDER — SODIUM CHLORIDE 0.9 % (FLUSH) 0.9 %
10 SYRINGE (ML) INJECTION EVERY 12 HOURS SCHEDULED
Status: DISCONTINUED | OUTPATIENT
Start: 2023-11-28 | End: 2023-11-30 | Stop reason: HOSPADM

## 2023-11-28 RX ORDER — PANTOPRAZOLE SODIUM 40 MG/1
40 TABLET, DELAYED RELEASE ORAL 2 TIMES DAILY
Status: DISCONTINUED | OUTPATIENT
Start: 2023-11-28 | End: 2023-11-30 | Stop reason: HOSPADM

## 2023-11-28 RX ORDER — NALOXONE HCL 0.4 MG/ML
0.1 VIAL (ML) INJECTION
Status: DISCONTINUED | OUTPATIENT
Start: 2023-11-28 | End: 2023-11-30 | Stop reason: HOSPADM

## 2023-11-28 RX ORDER — SODIUM CHLORIDE 0.9 % (FLUSH) 0.9 %
10 SYRINGE (ML) INJECTION AS NEEDED
Status: DISCONTINUED | OUTPATIENT
Start: 2023-11-28 | End: 2023-11-30 | Stop reason: HOSPADM

## 2023-11-28 RX ORDER — ONDANSETRON 4 MG/1
4 TABLET, FILM COATED ORAL EVERY 6 HOURS PRN
Status: DISCONTINUED | OUTPATIENT
Start: 2023-11-28 | End: 2023-11-30 | Stop reason: HOSPADM

## 2023-11-28 RX ORDER — PROMETHAZINE HYDROCHLORIDE 25 MG/1
25 TABLET ORAL ONCE AS NEEDED
Status: DISCONTINUED | OUTPATIENT
Start: 2023-11-28 | End: 2023-11-28 | Stop reason: HOSPADM

## 2023-11-28 RX ORDER — PROMETHAZINE HYDROCHLORIDE 25 MG/1
25 SUPPOSITORY RECTAL ONCE AS NEEDED
Status: DISCONTINUED | OUTPATIENT
Start: 2023-11-28 | End: 2023-11-28 | Stop reason: HOSPADM

## 2023-11-28 RX ORDER — PROPOFOL 10 MG/ML
INJECTION, EMULSION INTRAVENOUS AS NEEDED
Status: DISCONTINUED | OUTPATIENT
Start: 2023-11-28 | End: 2023-11-28 | Stop reason: SURG

## 2023-11-28 RX ORDER — EPHEDRINE SULFATE 50 MG/ML
INJECTION INTRAVENOUS AS NEEDED
Status: DISCONTINUED | OUTPATIENT
Start: 2023-11-28 | End: 2023-11-28 | Stop reason: SURG

## 2023-11-28 RX ORDER — MAGNESIUM SULFATE HEPTAHYDRATE 500 MG/ML
INJECTION, SOLUTION INTRAMUSCULAR; INTRAVENOUS AS NEEDED
Status: DISCONTINUED | OUTPATIENT
Start: 2023-11-28 | End: 2023-11-28 | Stop reason: SURG

## 2023-11-28 RX ORDER — FENTANYL CITRATE 50 UG/ML
50 INJECTION, SOLUTION INTRAMUSCULAR; INTRAVENOUS ONCE AS NEEDED
Status: DISCONTINUED | OUTPATIENT
Start: 2023-11-28 | End: 2023-11-28 | Stop reason: HOSPADM

## 2023-11-28 RX ORDER — SODIUM CHLORIDE 9 MG/ML
40 INJECTION, SOLUTION INTRAVENOUS AS NEEDED
Status: DISCONTINUED | OUTPATIENT
Start: 2023-11-28 | End: 2023-11-30 | Stop reason: HOSPADM

## 2023-11-28 RX ORDER — FLUMAZENIL 0.1 MG/ML
0.2 INJECTION INTRAVENOUS AS NEEDED
Status: DISCONTINUED | OUTPATIENT
Start: 2023-11-28 | End: 2023-11-28 | Stop reason: HOSPADM

## 2023-11-28 RX ORDER — METHOCARBAMOL 100 MG/ML
1000 INJECTION, SOLUTION INTRAMUSCULAR; INTRAVENOUS ONCE
Status: COMPLETED | OUTPATIENT
Start: 2023-11-28 | End: 2023-11-28

## 2023-11-28 RX ORDER — ACETAMINOPHEN 325 MG/1
650 TABLET ORAL EVERY 4 HOURS PRN
Status: DISCONTINUED | OUTPATIENT
Start: 2023-11-28 | End: 2023-11-30 | Stop reason: HOSPADM

## 2023-11-28 RX ORDER — ONDANSETRON 2 MG/ML
4 INJECTION INTRAMUSCULAR; INTRAVENOUS ONCE AS NEEDED
Status: DISCONTINUED | OUTPATIENT
Start: 2023-11-28 | End: 2023-11-28 | Stop reason: HOSPADM

## 2023-11-28 RX ORDER — BUSPIRONE HYDROCHLORIDE 10 MG/1
10 TABLET ORAL 2 TIMES DAILY
Status: DISCONTINUED | OUTPATIENT
Start: 2023-11-28 | End: 2023-11-30 | Stop reason: HOSPADM

## 2023-11-28 RX ORDER — LIDOCAINE HYDROCHLORIDE 20 MG/ML
INJECTION, SOLUTION INFILTRATION; PERINEURAL AS NEEDED
Status: DISCONTINUED | OUTPATIENT
Start: 2023-11-28 | End: 2023-11-28 | Stop reason: SURG

## 2023-11-28 RX ORDER — TRIAMCINOLONE ACETONIDE 1 MG/G
CREAM TOPICAL EVERY 12 HOURS SCHEDULED
Status: DISCONTINUED | OUTPATIENT
Start: 2023-11-28 | End: 2023-11-30 | Stop reason: HOSPADM

## 2023-11-28 RX ORDER — CETIRIZINE HYDROCHLORIDE 10 MG/1
10 TABLET ORAL DAILY
Status: DISCONTINUED | OUTPATIENT
Start: 2023-11-28 | End: 2023-11-30 | Stop reason: HOSPADM

## 2023-11-28 RX ORDER — HYDROMORPHONE HCL IN 0.9% NACL 10 MG/50ML
PATIENT CONTROLLED ANALGESIA SYRINGE INTRAVENOUS CONTINUOUS
Status: DISCONTINUED | OUTPATIENT
Start: 2023-11-28 | End: 2023-11-29

## 2023-11-28 RX ORDER — SODIUM CHLORIDE 9 MG/ML
30 INJECTION, SOLUTION INTRAVENOUS CONTINUOUS PRN
Status: DISCONTINUED | OUTPATIENT
Start: 2023-11-28 | End: 2023-11-30

## 2023-11-28 RX ORDER — OXYCODONE HYDROCHLORIDE 5 MG/1
10 TABLET ORAL EVERY 4 HOURS PRN
Status: DISCONTINUED | OUTPATIENT
Start: 2023-11-28 | End: 2023-11-30

## 2023-11-28 RX ORDER — ROCURONIUM BROMIDE 10 MG/ML
INJECTION, SOLUTION INTRAVENOUS AS NEEDED
Status: DISCONTINUED | OUTPATIENT
Start: 2023-11-28 | End: 2023-11-28 | Stop reason: SURG

## 2023-11-28 RX ORDER — MIDAZOLAM HYDROCHLORIDE 1 MG/ML
1 INJECTION INTRAMUSCULAR; INTRAVENOUS
Status: DISCONTINUED | OUTPATIENT
Start: 2023-11-28 | End: 2023-11-28 | Stop reason: HOSPADM

## 2023-11-28 RX ORDER — PHENYLEPHRINE HYDROCHLORIDE 10 MG/ML
INJECTION INTRAVENOUS AS NEEDED
Status: DISCONTINUED | OUTPATIENT
Start: 2023-11-28 | End: 2023-11-28 | Stop reason: SURG

## 2023-11-28 RX ORDER — HYDRALAZINE HYDROCHLORIDE 20 MG/ML
5 INJECTION INTRAMUSCULAR; INTRAVENOUS
Status: DISCONTINUED | OUTPATIENT
Start: 2023-11-28 | End: 2023-11-28 | Stop reason: HOSPADM

## 2023-11-28 RX ORDER — IPRATROPIUM BROMIDE AND ALBUTEROL SULFATE 2.5; .5 MG/3ML; MG/3ML
3 SOLUTION RESPIRATORY (INHALATION) ONCE AS NEEDED
Status: DISCONTINUED | OUTPATIENT
Start: 2023-11-28 | End: 2023-11-28 | Stop reason: HOSPADM

## 2023-11-28 RX ORDER — FENTANYL CITRATE 50 UG/ML
INJECTION, SOLUTION INTRAMUSCULAR; INTRAVENOUS AS NEEDED
Status: DISCONTINUED | OUTPATIENT
Start: 2023-11-28 | End: 2023-11-28 | Stop reason: SURG

## 2023-11-28 RX ORDER — NALOXONE HCL 0.4 MG/ML
0.2 VIAL (ML) INJECTION AS NEEDED
Status: DISCONTINUED | OUTPATIENT
Start: 2023-11-28 | End: 2023-11-28 | Stop reason: HOSPADM

## 2023-11-28 RX ORDER — EPHEDRINE SULFATE 50 MG/ML
5 INJECTION, SOLUTION INTRAVENOUS ONCE AS NEEDED
Status: COMPLETED | OUTPATIENT
Start: 2023-11-28 | End: 2023-11-28

## 2023-11-28 RX ORDER — DIPHENHYDRAMINE HYDROCHLORIDE 50 MG/ML
12.5 INJECTION INTRAMUSCULAR; INTRAVENOUS
Status: DISCONTINUED | OUTPATIENT
Start: 2023-11-28 | End: 2023-11-28 | Stop reason: HOSPADM

## 2023-11-28 RX ORDER — CLOTRIMAZOLE AND BETAMETHASONE DIPROPIONATE 10; .64 MG/G; MG/G
1 CREAM TOPICAL AS NEEDED
Status: DISCONTINUED | OUTPATIENT
Start: 2023-11-28 | End: 2023-11-30 | Stop reason: HOSPADM

## 2023-11-28 RX ORDER — FENTANYL CITRATE 50 UG/ML
50 INJECTION, SOLUTION INTRAMUSCULAR; INTRAVENOUS
Status: DISCONTINUED | OUTPATIENT
Start: 2023-11-28 | End: 2023-11-28 | Stop reason: HOSPADM

## 2023-11-28 RX ADMIN — EPHEDRINE SULFATE 10 MG: 50 INJECTION INTRAVENOUS at 09:01

## 2023-11-28 RX ADMIN — GABAPENTIN 800 MG: 300 CAPSULE ORAL at 21:59

## 2023-11-28 RX ADMIN — SUCCINYLCHOLINE CHLORIDE 180 MG: 20 INJECTION, SOLUTION INTRAMUSCULAR; INTRAVENOUS; PARENTERAL at 08:08

## 2023-11-28 RX ADMIN — PROPOFOL 150 MG: 10 INJECTION, EMULSION INTRAVENOUS at 08:07

## 2023-11-28 RX ADMIN — FENTANYL CITRATE 50 MCG: 50 INJECTION, SOLUTION INTRAMUSCULAR; INTRAVENOUS at 12:30

## 2023-11-28 RX ADMIN — Medication 10 MG: at 10:38

## 2023-11-28 RX ADMIN — EPHEDRINE SULFATE 5 MG: 50 INJECTION INTRAVENOUS at 13:06

## 2023-11-28 RX ADMIN — PHENYLEPHRINE HYDROCHLORIDE 100 MCG: 10 INJECTION INTRAVENOUS at 09:41

## 2023-11-28 RX ADMIN — LIDOCAINE HYDROCHLORIDE 80 MG: 20 INJECTION, SOLUTION INFILTRATION; PERINEURAL at 08:06

## 2023-11-28 RX ADMIN — Medication 25 MG: at 08:16

## 2023-11-28 RX ADMIN — SUGAMMADEX 200 MG: 100 INJECTION, SOLUTION INTRAVENOUS at 10:44

## 2023-11-28 RX ADMIN — PHENYLEPHRINE HYDROCHLORIDE 100 MCG: 10 INJECTION INTRAVENOUS at 09:36

## 2023-11-28 RX ADMIN — METHOCARBAMOL 1000 MG: 1000 INJECTION, SOLUTION INTRAMUSCULAR; INTRAVENOUS at 12:34

## 2023-11-28 RX ADMIN — MAGNESIUM SULFATE HEPTAHYDRATE 2 G: 500 INJECTION, SOLUTION INTRAMUSCULAR; INTRAVENOUS at 08:21

## 2023-11-28 RX ADMIN — CEFAZOLIN SODIUM 2 G: 2 INJECTION, SOLUTION INTRAVENOUS at 07:53

## 2023-11-28 RX ADMIN — PROPOFOL 50 MG: 10 INJECTION, EMULSION INTRAVENOUS at 08:10

## 2023-11-28 RX ADMIN — PROPOFOL 50 MG: 10 INJECTION, EMULSION INTRAVENOUS at 08:46

## 2023-11-28 RX ADMIN — MIDAZOLAM 1 MG: 1 INJECTION INTRAMUSCULAR; INTRAVENOUS at 07:56

## 2023-11-28 RX ADMIN — DOCUSATE SODIUM 200 MG: 100 CAPSULE, LIQUID FILLED ORAL at 22:00

## 2023-11-28 RX ADMIN — HYDROMORPHONE HYDROCHLORIDE: 10 INJECTION, SOLUTION INTRAMUSCULAR; INTRAVENOUS; SUBCUTANEOUS at 12:59

## 2023-11-28 RX ADMIN — EPHEDRINE SULFATE 15 MG: 50 INJECTION INTRAVENOUS at 09:06

## 2023-11-28 RX ADMIN — DROPERIDOL 0.62 MG: 2.5 INJECTION, SOLUTION INTRAMUSCULAR; INTRAVENOUS at 12:34

## 2023-11-28 RX ADMIN — SODIUM CHLORIDE, POTASSIUM CHLORIDE, SODIUM LACTATE AND CALCIUM CHLORIDE: 600; 310; 30; 20 INJECTION, SOLUTION INTRAVENOUS at 09:39

## 2023-11-28 RX ADMIN — SODIUM CHLORIDE, POTASSIUM CHLORIDE, SODIUM LACTATE AND CALCIUM CHLORIDE 9 ML/HR: 600; 310; 30; 20 INJECTION, SOLUTION INTRAVENOUS at 07:48

## 2023-11-28 RX ADMIN — FAMOTIDINE 20 MG: 10 INJECTION INTRAVENOUS at 07:48

## 2023-11-28 RX ADMIN — ALBUMIN (HUMAN): 12.5 INJECTION, SOLUTION INTRAVENOUS at 09:39

## 2023-11-28 RX ADMIN — METHOCARBAMOL TABLETS 750 MG: 750 TABLET, COATED ORAL at 18:47

## 2023-11-28 RX ADMIN — Medication 10 ML: at 22:04

## 2023-11-28 RX ADMIN — ROCURONIUM BROMIDE 10 MG: 10 INJECTION, SOLUTION INTRAVENOUS at 09:06

## 2023-11-28 RX ADMIN — PANTOPRAZOLE SODIUM 40 MG: 40 TABLET, DELAYED RELEASE ORAL at 22:00

## 2023-11-28 RX ADMIN — ROCURONIUM BROMIDE 5 MG: 10 INJECTION, SOLUTION INTRAVENOUS at 08:06

## 2023-11-28 RX ADMIN — DEXAMETHASONE SODIUM PHOSPHATE 10 MG: 4 INJECTION, SOLUTION INTRA-ARTICULAR; INTRALESIONAL; INTRAMUSCULAR; INTRAVENOUS; SOFT TISSUE at 08:21

## 2023-11-28 RX ADMIN — FENTANYL CITRATE 50 MCG: 50 INJECTION, SOLUTION INTRAMUSCULAR; INTRAVENOUS at 10:32

## 2023-11-28 RX ADMIN — BUSPIRONE HYDROCHLORIDE 10 MG: 10 TABLET ORAL at 22:00

## 2023-11-28 RX ADMIN — GLYCOPYRROLATE 0.2 MCG: 0.2 INJECTION INTRAMUSCULAR; INTRAVENOUS at 08:41

## 2023-11-28 RX ADMIN — Medication 15 MG: at 09:55

## 2023-11-28 NOTE — CASE MANAGEMENT/SOCIAL WORK
Discharge Planning Assessment  [facility]     Patient Name: Rafael Kat  MRN: [id number]  Today's Date: 11/28/2023    Admit Date: 11/28/2023    Plan: Home with family support & [facility] .   Discharge Needs Assessment       Row Name 11/28/23 1518       Living Environment    People in Home spouse    Name(s) of People in Home Wife/Monika.    Current Living Arrangements home    Primary Care Provided by self    Provides Primary Care For no one    Family Caregiver if Needed spouse    Quality of Family Relationships helpful;involved;supportive    Able to Return to Prior Arrangements yes       Resource/Environmental Concerns    Transportation Concerns none       Transition Planning    Patient/Family Anticipates Transition to home with family;home with help/services    Patient/Family Anticipated Services at Transition     Transportation Anticipated family or friend will provide       Discharge Needs Assessment    Readmission Within the Last 30 Days no previous admission in last 30 days    Equipment Currently Used at Home none    Discharge Facility/Level of Care Needs home with home health    Provided Post Acute Provider List? N/A    N/A Provider List Comment Requests [facility] .    Patient's Choice of Community Agency(s) [facility] .                   Discharge Plan       Row Name 11/28/23 1524       Plan    Plan Home with family support & [facility] .    Patient/Family in Agreement with Plan yes    Plan Comments Spoke with the patient and his wife/Monika, verified current information and explained the role of the CCP. Patient lives with Monika and has family support. He's IADL and has no history with DME/RH/HH. Patient plans to d/c home with family support. CCP consult placed by [facility] for HH setup noted. Discussed with Monika who requests a referral to [facility] . Referral sent in [facility]. Monika plans to transport the patient home at d/c. No other needs identified. CCP will follow.                  Continued Care and  Services - Admitted Since 11/28/2023       Home Medical Care       Service Provider Request Status Selected Services Address Phone Fax Patient Preferred    [facility] Accepted N/A [address]  [place] [id number] [phone] --                     Demographic Summary       Row Name 11/28/23 1513       General Information    Admission Type observation    Reason for Consult discharge planning    Preferred Language English       Contact Information    Permission Granted to Share Info With ;family/designee                   Functional Status       Row Name 11/28/23 1513       Functional Status    Usual Activity Tolerance good       Functional Status, IADL    Medications independent    Meal Preparation independent    Housekeeping independent    Laundry independent    Shopping independent       Mental Status    General Appearance WDL WDL       Mental Status Summary    Recent Changes in Mental Status/Cognitive Functioning no changes                   Psychosocial       Row Name 11/28/23 1514       Intellectual Performance WDL    Level of Consciousness Alert       Coping/Stress    Patient Personal Strengths able to adapt    Sources of Support spouse    Reaction to Health Status accepting    Understanding of Condition and Treatment adequate understanding of medical condition;adequate understanding of treatment       Developmental Stage (Eriksson's)    Developmental Stage Stage 7 (35-65 years/Middle Adulthood) Generativity vs. Stagnation                    Criss ACOSTA, RN

## 2023-11-28 NOTE — ANESTHESIA PROCEDURE NOTES
Airway  Urgency: elective    Date/Time: 11/28/2023 8:10 AM  Airway not difficult    General Information and Staff    Patient location during procedure: OR  Anesthesiologist: Kevon Guevara MD CRNA/CAA: Nicole Rothman CRNA    Indications and Patient Condition  Indications for airway management: airway protection    Preoxygenated: yes  MILS maintained throughout  Mask difficulty assessment: 0 - not attempted    Final Airway Details  Final airway type: endotracheal airway      Successful airway: ETT  Cuffed: yes   Successful intubation technique: direct laryngoscopy  Facilitating devices/methods: intubating stylet  Endotracheal tube insertion site: oral  Blade: CMAC  Blade size: D  ETT size (mm): 7.5  Cormack-Lehane Classification: grade I - full view of glottis  Placement verified by: chest auscultation and capnometry   Cuff volume (mL): 10  Measured from: lips  ETT/EBT  to lips (cm): 20  Number of attempts at approach: 1  Assessment: lips, teeth, and gum same as pre-op and atraumatic intubation    Additional Comments  Atraumatic ET Tube placement.  Teeth as pre-op. BLEBS.  -ABD sounds.  +ET CO2.  Secured to face

## 2023-11-28 NOTE — DISCHARGE PLACEMENT REQUEST
"Ricardo Kat (52 y.o. Male)       Date of Birth   1970    Social Security Number       Address   [address] [place] [address]    Home Phone   [phone]    MRN   [id number]       Hoahaoism   None    Marital Status                               Admission Date   11/28/23    Admission Type   Elective    Admitting Provider   Bert Collado MD    Attending Provider   Bert Collado MD    Department, Room/Bed   [address], P876/1       Discharge Date       Discharge Disposition       Discharge Destination                                 Attending Provider: Bert Collado MD    Allergies: Esdtd-fztwa-[id number]-pramoxine    Isolation: None   Infection: None   Code Status: CPR    Ht: 175.3 cm (69.02\")   Wt: 90.7 kg (200 lb)    Admission Cmt: None   Principal Problem: Spinal instability of lumbar region [M53.2X6]                   Active Insurance as of 11/28/2023       Primary Coverage       Payor Plan Insurance Group Employer/Plan Group    [facility] [facility] BY CEZAR [facility] BY CEZAR [id number]       Payor Plan Address Payor Plan Phone Number Payor Plan Fax Number Effective Dates    [address] [address]   1/1/2021 - None Entered    [facility] [id number]         Subscriber Name Subscriber Birth Date Member ID       RICARDO KAT 1970 [id number]                     Emergency Contacts        (Rel.) Home Phone Work Phone Mobile Phone    NorthMonika (Spouse) [phone] -- [phone]              "

## 2023-11-28 NOTE — OP NOTE
Preoperative diagnosis: Lumbar spinal stenosis L1/2, L2/3, L3/4 with bilateral neurogenic claudication and central herniated disc L1/2    Postoperative diagnosis: Same as above    Procedures performed: Open bilateral lumbar decompression L1/2, L2/3, L3/4 with right sided L1/2 microdiscectomy    Spinal Surgery Levels Completed: 3 Levels     Surgeon: Tobias    First Assistant: Viji Mak  (She greatly assisted in the exposure, visualization of neural structures, control of bleeding, retraction, and closure of the incision. Her skilled assistance was necessary for the success of this case.)     Anesthesia: GET    EBL: 25 cc    Complications: none    Specimen sent: none    Drains: 7 mm flat YAMILET epidural drain    Findings: Severe stenosis at all levels bilaterally, superimposed right-sided L1-L2 nerve compression    Postoperative condition: Good    Indications for the operation: The patient is a 82-year-old gentleman whose had a previous cervical fusion for myelopathy by me.  He has bilateral buttock and radiating right leg pain due to severe spinal stenosis at L1-L2, L2-L3, L3-L4.  The central herniated disc at L1-L2 contributes as well.  He failed all conservative treatment including blocks, medical therapy, physical therapy and time itself.  I did not think he needed to be fused.  He did not have a spondylolisthesis and frankly did not have much back pain.  So he was brought to the operating room for a decompression and discectomy today.    Informed consent: He understood that the goal of surgery is relief of radiating leg pain with improvement of numbness, tingling, walking, and quality of life.  This will not help or hinder low back pain.  The risks include, but are not limited to, infection, hemorrhage on transfusion or reoperation, CSF leak requiring reoperation, incomplete relief of symptoms, potential need for additional surgeries in the future including a fusion, stroke, paralysis, coma, and death.  The  patient agrees to proceed.     Details of the operation: The patient was taken to the operating room and remained in his gurney in the supine position.  After induction and endotracheal intubation, he got 2 g of Kefzol as per the SCIP protocol.  No Diaz was needed.  SCDs were placed.  Venous access was secured.  He was rolled over the prone position on a radiolucent Rodger spinal table.  All pressure points were padded including the brachial plexus.  We brought in the C arm and marked out the incision from L1-L4 in the midline.  The lumbar region was then prepped and draped in the usual sterile fashion as was the C arm.  We did a surgical timeout.  I injected 30 cc of quarter sent Marcaine with epinephrine in the paraspinous musculature bilaterally at L1-L4.  A linear incision was made from L1-L4 in the midline with a #10 skin knife.  Hemostasis was obtained with monopolar cautery.  Dissection was taken all the way down to the lumbar fascia which was divided to the left and to the right from L1-L4.  Used a Alonso periosteal strip the paraspinous muscular laterally.  Self-retaining retractors were placed.  We exposed the lateral facets at L1-L2, L2-L3, L3-L4 bilaterally.  We got an x-ray which showed the towel clips above L1-L2 and below L4-L5.  Eventually we got another picture with the Penfill probe at L1-L2.  The spinous processes were removed at L1-L2-L3 and L4.  The microscope was draped and brought into the field.  Its use was essential to performance of microneurosurgical technique.  Using a 5 mm round bur I did a central laminectomy all the way down to the ligamentum flavum at L1-L2, L2-L3, L3-L4.  I switched over to 3 mm matchstick and did a medial facetectomy and foraminotomies bilaterally at L1-L2, L2-L3, L3-L4 decompressing the L2-L3 and L4 roots respectively on both sides.  There was a central to right-sided disc herniation at L1-L2 and I incised the disc space on the right side only at L1-L2 and did an  internal decompression removing disc material using pituitaries, reverse-angle curettes, punches and even the drill itself decompressing further the L2 root on the right.  I did not think a discectomy was necessary on the left at L1-L2.  The other levels did not really have significant disc herniations.  Hemostasis was obtained.  Antibiotic irrigation was used.  Floseal was used.  A 7 Good 5 Rodger-Brady drain was left in the epidural space and exit through separate stab incision secured the skin with 2-0 silk.  The fascia was reapproximated with 0 Vicryl interrupted suture.  The subcutaneous layer was closed with 2-0 interrupted Vicryl suture.  The skin was closed with running 4-0 Vicryl subcuticular.  Steri-Strips and a clean dry dressing were placed.  He was rolled over the supine position extubated taken recovery in satisfactory condition.

## 2023-11-28 NOTE — BRIEF OP NOTE
LUMBAR LAMINECTOMY DISCECTOMY DECOMPRESSION POSTERIOR 3-4 LEVELS  Progress Note    Rafael Kat  11/28/2023    Pre-op Diagnosis:   Spinal stenosis of lumbar region with neurogenic claudication [M48.062]  DDD (degenerative disc disease), lumbar [M51.36]       Post-Op Diagnosis Codes:     * Spinal stenosis of lumbar region with neurogenic claudication [M48.062]     * DDD (degenerative disc disease), lumbar [M51.36]    Procedure/CPT® Codes:        Procedure(s):  Open bilateral lumbar decompression lumbar one/two, lumbar two/three, lumbar three/four, right L1/2 discectomy              Surgeon(s):  Bert Collado MD    Anesthesia: General    Staff:   Circulator: Giulia Ibarra RN  Scrub Person: Marlin Ramos  Vendor Representative: Luiz Larry  Assistant: Viji Mak CSA  Assistant: Viji Mak CSA      Estimated Blood Loss: 25 cc    Urine Voided: none    Specimens:                None          Drains:   Closed/Suction Drain Inferior Back Bulb 7 Fr. (Active)   Site Description Unable to view 11/28/23 1526   Dressing Status Clean;Dry;Intact 11/28/23 1526   Drainage Appearance Bloody 11/28/23 1526   Status To bulb suction 11/28/23 1526   Output (mL) 40 mL 11/28/23 1353       Findings: severe stenosis and right sided herniated disc L1/2        Complications: none    Assistant: Viji Mak CSA  was responsible for performing the following activities: Retraction, Suction, Irrigation, Suturing, Closing, and Placing Dressing and their skilled assistance was necessary for the success of this case.    Bert Collado MD     Date: 11/28/2023  Time: 17:23 EST

## 2023-11-28 NOTE — ANESTHESIA POSTPROCEDURE EVALUATION
"Patient: Rafael Kat    Procedure Summary       Date: 11/28/23 Room / Location: [facility] OR [address] [place] [address] MAIN OR    Anesthesia Start: 0800 Anesthesia Stop: 1119    Procedure: Open bilateral lumbar decompression lumbar one/two, lumbar two/three, lumbar three/four (Bilateral: Spine Lumbar) Diagnosis:       Spinal stenosis of lumbar region with neurogenic claudication      DDD (degenerative disc disease), lumbar      (Spinal stenosis of lumbar region with neurogenic claudication [M48.062])      (DDD (degenerative disc disease), lumbar [M51.36])    Surgeons: Bert Collado MD Provider: Kevon Guevraa MD    Anesthesia Type: general ASA Status: 3            Anesthesia Type: general    Vitals  Vitals Value Taken Time   /90 11/28/23 1345   Temp 36.5 °C (97.7 °F) 11/28/23 1345   Pulse 63 11/28/23 1353   Resp 16 11/28/23 1345   SpO2 94 % 11/28/23 1353   Vitals shown include unfiled device data.        Post Anesthesia Care and Evaluation    Patient location during evaluation: PACU  Patient participation: complete - patient participated  Level of consciousness: awake and alert  Pain management: adequate    Airway patency: patent  Anesthetic complications: No anesthetic complications  PONV Status: controlled  Cardiovascular status: acceptable  Respiratory status: acceptable  Hydration status: acceptable    Comments: /62 (BP Location: Left arm, Patient Position: Lying)   Pulse 68   Temp 36.2 °C (97.2 °F) (Oral)   Resp 16   Ht 175.3 cm (69.02\")   Wt 90.7 kg (200 lb)   SpO2 96%   BMI 29.52 kg/m²       "

## 2023-11-28 NOTE — PLAN OF CARE
Goal Outcome Evaluation:  Plan of Care Reviewed With: patient        Progress: improving  Outcome Evaluation: vss, nvi, dressing c/d/i, YAMILET in place, ambulating assist of 1 with walker, pain controlled with PCA,voiding per BRP, plan to DC home in a few days, educated on O2 monitoring for COPD

## 2023-11-28 NOTE — ANESTHESIA PREPROCEDURE EVALUATION
Anesthesia Evaluation     Patient summary reviewed and Nursing notes reviewed   no history of anesthetic complications:                Airway   Mallampati: II  TM distance: >3 FB  Neck ROM: limited  Dental - normal exam     Pulmonary    (+) a smoker Former, COPD,recent URI (RSV) resolved  Cardiovascular - negative cardio ROS        Neuro/Psych    ROS Comment: Prior ACDF  GI/Hepatic/Renal/Endo    (+) GERD well controlled    Musculoskeletal     Abdominal    Substance History      OB/GYN          Other   arthritis,                 Anesthesia Plan    ASA 3     general     (I have reviewed the patient's history with the patient and the chart, including all pertinent laboratory results and imaging. I have explained the risks of anesthesia including but not limited to dental damage, corneal abrasion, nerve injury, MI, stroke, and death. Questions asked and answered. Anesthetic plan discussed with patient and team as indicated. Patient expressed understanding of the above.    Consider CMAC)  intravenous induction     Anesthetic plan, risks, benefits, and alternatives have been provided, discussed and informed consent has been obtained with: patient.    CODE STATUS:

## 2023-11-28 NOTE — H&P
Office Visit    10/3/2023  [facility] NEUROSURGERY       Bert Collado MD  Neurosurgery Spinal stenosis of lumbar region with neurogenic claudication +2 more  Dx     Progress Notes  Bert Collado MD (Physician)  Neurosurgery  Expand All Collapse All[]Expand All by Default     Subjective   Patient ID: Rafael Kat is a 52 y.o. male is here today for follow-up on myelogram.      He is here with his wife.  He tolerated the myelogram.  He has a history of a cervical fusion for myeloradiculopathy.  He recovered from that reasonably well.  He has severe bilateral buttock and severe right leg pain.  This is from his stenosis seen on the myelogram at L1-L2, L2-L3, L3-L4.  Surprisingly he actually has very little low back pain.  And he has no spondylolisthesis.  So he does not need to be fused.  I thought that he might but I really do not think he needs it now although I did tell him I needed in the future.  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  We will get him a brace prior to surgery.  He has a smaller Velcro brace that helps him and I think he only to use that later but I think it would also be helpful for him to have some kind of therapy afterwards in preparation for work return in April.  The goals risks and benefits of a lumbar decompression are described below.  We will move forward this as soon as possible.  The patient will require an LSO brace for mobility restriction and pain control after surgery.     History of Present Illness     The following portions of the patient's history were reviewed and updated as appropriate: allergies, current medications, past family history, past medical history, past social history, past surgical history, and problem list.     Review of Systems   Constitutional:  Negative for fever.   Musculoskeletal:   "Positive for back pain.   Neurological:  Positive for weakness and numbness.   All other systems reviewed and are negative.                 Objective   Vitals       Vitals:     10/03/23 0851   BP: 122/76   BP Location: Left arm   Patient Position: Sitting   Cuff Size: Adult   Weight: 90.7 kg (200 lb)   Height: 175.3 cm (69.02\")   PainSc:   3   PainLoc: Back         Body mass index is 29.52 kg/m².         Tobacco Use: Medium Risk    Smoking Tobacco Use: Former    Smokeless Tobacco Use: Never    Passive Exposure: Not on file            Physical Exam  Constitutional:       Appearance: He is well-developed.   HENT:      Head: Normocephalic and atraumatic.   Eyes:      Extraocular Movements: EOM normal.      Conjunctiva/sclera: Conjunctivae normal.      Pupils: Pupils are equal, round, and reactive to light.   Neck:      Vascular: No carotid bruit.   Neurological:      Mental Status: He is oriented to person, place, and time.      Coordination: Finger-Nose-Finger Test and Heel to Shin Test normal.      Gait: Gait is intact.      Deep Tendon Reflexes:      Reflex Scores:       Tricep reflexes are 2+ on the right side and 2+ on the left side.       Bicep reflexes are 2+ on the right side and 2+ on the left side.       Brachioradialis reflexes are 2+ on the right side and 2+ on the left side.       Patellar reflexes are 2+ on the right side and 2+ on the left side.       Achilles reflexes are 2+ on the right side and 2+ on the left side.  Psychiatric:         Speech: Speech normal.      Neurologic Exam      Mental Status   Oriented to person, place, and time.   Registration of memory: Good recent and remote memory.   Attention: normal. Concentration: normal.   Speech: speech is normal   Level of consciousness: alert  Knowledge: consistent with education.      Cranial Nerves      CN II   Visual fields full to confrontation.   Visual acuity: normal     CN III, IV, VI   Pupils are equal, round, and reactive to " light.  Extraocular motions are normal.      CN V   Facial sensation intact.   Right corneal reflex: normal  Left corneal reflex: normal     CN VII   Facial expression full, symmetric.   Right facial weakness: none  Left facial weakness: none     CN VIII   Hearing: intact     CN IX, X   Palate: symmetric     CN XI   Right sternocleidomastoid strength: normal  Left sternocleidomastoid strength: normal     CN XII   Tongue: not atrophic  Tongue deviation: none     Motor Exam   Muscle bulk: normal  Right arm tone: normal  Left arm tone: normal  Right leg tone: normal  Left leg tone: normal     Strength   Strength 5/5 except as noted.      Sensory Exam   Light touch normal.      Gait, Coordination, and Reflexes      Gait  Gait: normal     Coordination   Finger to nose coordination: normal  Heel to shin coordination: normal     Reflexes   Right brachioradialis: 2+  Left brachioradialis: 2+  Right biceps: 2+  Left biceps: 2+  Right triceps: 2+  Left triceps: 2+  Right patellar: 2+  Left patellar: 2+  Right achilles: 2+  Left achilles: 2+  Right : 2+  Left : 2+                 Assessment & Plan  Independent Review of Radiographic Studies:      I personally reviewed the images from the following studies.     The myelogram done on 9/25/2023 surprisingly does not really show a spondylolisthesis but there is a tight stenosis at L1-L2, L2-L3 and L3-L4 mostly from stenosis and some superimposed disc protrusion.  Agree with the report.           Medical Decision Making:       I described and recommended an open bilateral lumbar decompression at L1-L2, L2-L3, L3-L4 without fusion.  It is possible I might need to do some discectomies at some of the levels.  The goal of surgery is relief of radiating leg pain with improvement of numbness, tingling, walking, and quality of life.  This will not help or hinder low back pain.  The risks include, but are not limited to, infection, hemorrhage on transfusion or reoperation, CSF leak  "requiring reoperation, incomplete relief of symptoms, potential need for additional surgeries in the future including a fusion, stroke, paralysis, coma, and death.  The patient agrees to proceed.  We will get him a brace before surgery.     He knows that he will follow-up with their APCs after surgery but I would like to see him at the 3 to 4-month joaquina with flexion-extension x-rays.  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           Diagnoses and all orders for this visit:     1. Spinal stenosis of lumbar region with neurogenic claudication (Primary)  -     Case Request; Standing  -     ceFAZolin (ANCEF) 2 g in sodium chloride 0.9 % 100 mL IVPB  -     Case Request     2. DDD (degenerative disc disease), lumbar  -     Case Request; Standing  -     ceFAZolin (ANCEF) 2 g in sodium chloride 0.9 % 100 mL IVPB  -     Case Request     3. History of fusion of cervical spine     Other orders  -     Follow Anesthesia Guidelines / Protocol; Future  -     Follow Anesthesia Guidelines / Protocol; Standing  -     SCD (sequential compression device)- to be placed on patient in Pre-op; Standing  -     Verify / Perform Chlorhexidine Skin Prep; Standing  -     Obtain informed consent  -     Provide NPO Instructions to Patient; Future  -     Chlorhexidine Skin Prep; Future        Return for 2 weeks after surgery with an APC.                    Instructions      Return for 2 weeks after surgery with an APC.  Patient declined After Visit Summary  Additional Documentation    Vitals: /76 (BP Location: Left arm, Patient Position: Sitting, Cuff Size: Adult)     Ht 175.3 cm (69.02\")     Wt 90.7 kg (200 lb)     BMI 29.52 kg/m²     BSA 2.07 m²     Pain Sc   3 (Loc: Back)          More Vitals   Flowsheets: Outbreak Screen,     Admission / Procedure / Surgery Location   Encounter Info: Billing Info,     History,     Allergies,     ...(9 more)     Communications    View All Conversations " on this Encounter    Letter sent to HERON Willett  Patient declined AVS at 10/3/2023 10:59 AM    Encounter Information    Encounter Information   Provider Department Encounter #   10/3/2023 8:45 AM Bert Garcia MD [facility] [id number]     Primary Visit Coverage    Payer Plan Sponsor Code Group Number Group Name   [employer]  [id number]      Primary Visit Coverage Subscriber    Subscriber ID Subscriber Name Subscriber SSN Subscriber Address   [id number] RICARDO CHEN  [id number] [email or web address] [address]     Orders Placed    Case Request Once  Chlorhexidine Skin Prep  Follow Anesthesia Guidelines / Protocol  Obtain informed consent  Provide NPO Instructions to Patient  Medication Changes      None  Medication List  Visit Diagnoses      Spinal stenosis of lumbar region with neurogenic claudication    DDD (degenerative disc disease), lumbar    History of fusion of cervical spine  Problem List   Current Medications     Disp Start End   acetaminophen (TYLENOL) 500 MG tablet --  --   Sig - Route: Take 2 tablets by mouth 2 (Two) Times a Day. - Oral   Class: Historical Med   baclofen (LIORESAL) 20 MG tablet -- 3/14/2023 --   Sig - Route: Take 1 tablet by mouth 2 (Two) Times a Day. - Oral   Class: Historical Med   busPIRone (BUSPAR) 10 MG tablet -- 3/14/2023 --   Sig - Route: Take 1 tablet by mouth 2 (Two) Times a Day. - Oral   Class: Historical Med   celecoxib (CeleBREX) 200 MG capsule 30 capsule 9/25/2023 9/24/2024   Sig - Route: Take 1 capsule by mouth Daily. INSTRUCTED TO CALL DR. GARCIA OFFICE TO SEE IF HE SHOULD HOLD THIS - Oral   Class: Historical Med   cetirizine (zyrTEC) 10 MG tablet -- 5/14/2021 --   Sig - Route: Take 1 tablet by mouth Daily. - Oral   Class: Historical Med   cholecalciferol (VITAMIN D3) 25 MCG (1000 UT) tablet -- 2/5/2021 --   Sig - Route: Take 1 tablet by mouth Daily. - Oral   Class: Historical Med    clotrimazole-betamethasone (LOTRISONE) 1-0.05 % cream -- 2021 --   Sig - Route: Apply 1 application  topically to the appropriate area as directed As Needed. - Topical   Class: Historical Med   Diclofenac Sodium (VOLTAREN) 1 % gel gel -- 2021 --   Sig - Route: Apply 4 g topically to the appropriate area as directed As Needed. - Topical   Class: Historical Med   FeroSul 325 (65 Fe) MG tablet -- 2021 --   Sig - Route: Take 1 tablet by mouth Daily With Breakfast. - Oral   Class: Historical Med   gabapentin (NEURONTIN) 800 MG tablet --  --   Sig - Route: Take 1 tablet by mouth 3 (Three) Times a Day. TYPICALLY TAKES TWICE DAILY - Oral   Class: Historical Med   mometasone (ELOCON) 0.1 % cream -- 10/11/2022 --   Si application  As Needed.   Class: Historical Med   NON FORMULARY --  --   Sig: Every Night. STATES DOES USE NIGHT INHALER:  INSTRUCTED TO BRING THIS DAY OF SURGEY   Class: Historical Med   pantoprazole (PROTONIX) 40 MG EC tablet -- 2016 --   Sig - Route: Take 1 tablet by mouth 2 (Two) Times a Day. - Oral   Class: Historical Med   Potassium 75 MG tablet --  --   Sig - Route: Take  by mouth As Needed. DURING THE SUMMER WHEN SWEATS MORE - Oral   Class: Historical Med   zolpidem (AMBIEN) 10 MG tablet -- 2016 --   Sig - Route: Take 1 tablet by mouth At Night As Needed. - Oral   Class: Historical Med   Hospital Medications     Dose Frequency Start End   ceFAZolin in dextrose (ANCEF) IVPB solution 2 g 2 g Once 2023 --   Admin Instructions: Administer within 1 hour of surgical incision. Redose 4 hours from pre-op dose if procedure ongoing or >1.5 L blood loss.  Caution: Look alike/sound alike drug alert   Route: Intravenous   famotidine (PEPCID) injection 20 mg 20 mg Once 2023 --   Admin Instructions: Give IV push over 2 minutes.   Route: Intravenous   fentaNYL citrate (PF) (SUBLIMAZE) injection 50 mcg 50 mcg Once As Needed 2023 --   Admin Instructions: Maximum total dose of  fentanyl is 50 mcg without additional orders from physician. May be used for preoperative pain or procedural sedation.  If given for pain, use the following pain scale:  Mild Pain = Pain Score of 1-3, CPOT 1-2  Moderate Pain = Pain Score of 4-6, CPOT 3-4  Severe Pain = Pain Score of 7-10, CPOT 5-8   Route: Intravenous   lactated ringers infusion 9 mL/hr Continuous 11/28/2023 --   Route: Intravenous   lidocaine (XYLOCAINE) 1 % injection 0.5 mL 0.5 mL Once As Needed 11/28/2023 --   Route: Intradermal   midazolam (VERSED) injection 1 mg 1 mg Every 5 Minutes PRN 11/28/2023 --   Admin Instructions: May repeat dose in 5 minutes one time then contact provider for additional orders.     Route: Intravenous   sodium chloride 0.9 % flush 3 mL 3 mL Every 12 Hours Scheduled 11/28/2023 --   Route: Intravenous   sodium chloride 0.9 % flush 3-10 mL 3-10 mL As Needed 11/28/2023 --   Route: Intravenous   sterile water 1,000 mL with ceFAZolin 2 g irrigation  As Needed 11/28/2023 --     Signed and Held Orders     Follow Anesthesia Guidelines / Protocol  Once,   Status:  Canceled        Electronically Signed By: Bert Collado MD Authorizing Provider: Bert Collado MD Phase of Care: Pre-Op Ordered At: 10/03/23 0948       SCD (sequential compression device)- to be placed on patient in Pre-op  Once,   Status:  Canceled        Electronically Signed By: Bert Collado MD Authorizing Provider: Bert Collado MD Phase of Care: Pre-Op Ordered At: 10/03/23 0948       Verify / Perform Chlorhexidine Skin Prep  Once,   Status:  Canceled        Comments: Chlorhexidine Skin Wipes and Instructions For All Patients Having A Procedure Requiring an Outward Incision if Not Allergic.  If Allergic, Give Antibacterial Skin Wipes and Instructions.  Do Not Use For Facial Cases or on Any Mucus Membranes.   Electronically Signed By: Bert Collado MD Authorizing Provider: Bert Collado MD Phase of Care: Pre-Op Ordered At: 10/03/23 0948        ceFAZolin (ANCEF) 2 g in sodium chloride 0.9 % 100 mL IVPB  Once,   Status:  Canceled        Electronically Signed By: Bert Collado MD Authorizing Provider: Bert Collado MD Phase of Care: Pre-Op Ordered At: 10/03/23 0949         No change in PE. Proceed as above.

## 2023-11-29 PROCEDURE — 99024 POSTOP FOLLOW-UP VISIT: CPT | Performed by: NURSE PRACTITIONER

## 2023-11-29 PROCEDURE — 25810000003 LACTATED RINGERS PER 1000 ML: Performed by: NEUROLOGICAL SURGERY

## 2023-11-29 PROCEDURE — 97530 THERAPEUTIC ACTIVITIES: CPT

## 2023-11-29 PROCEDURE — 97162 PT EVAL MOD COMPLEX 30 MIN: CPT

## 2023-11-29 RX ORDER — POLYETHYLENE GLYCOL 3350 17 G/17G
17 POWDER, FOR SOLUTION ORAL 2 TIMES DAILY PRN
Status: DISCONTINUED | OUTPATIENT
Start: 2023-11-29 | End: 2023-11-30 | Stop reason: HOSPADM

## 2023-11-29 RX ORDER — METHOCARBAMOL 750 MG/1
750 TABLET, FILM COATED ORAL EVERY 8 HOURS SCHEDULED
Status: DISCONTINUED | OUTPATIENT
Start: 2023-11-29 | End: 2023-11-30 | Stop reason: HOSPADM

## 2023-11-29 RX ADMIN — GABAPENTIN 800 MG: 300 CAPSULE ORAL at 13:37

## 2023-11-29 RX ADMIN — BUSPIRONE HYDROCHLORIDE 10 MG: 10 TABLET ORAL at 08:26

## 2023-11-29 RX ADMIN — Medication 10 ML: at 20:53

## 2023-11-29 RX ADMIN — BUSPIRONE HYDROCHLORIDE 10 MG: 10 TABLET ORAL at 20:52

## 2023-11-29 RX ADMIN — GABAPENTIN 800 MG: 300 CAPSULE ORAL at 05:33

## 2023-11-29 RX ADMIN — OXYCODONE HYDROCHLORIDE 10 MG: 5 TABLET ORAL at 17:45

## 2023-11-29 RX ADMIN — METHOCARBAMOL TABLETS 750 MG: 750 TABLET, COATED ORAL at 13:37

## 2023-11-29 RX ADMIN — METHOCARBAMOL TABLETS 750 MG: 750 TABLET, COATED ORAL at 05:33

## 2023-11-29 RX ADMIN — PANTOPRAZOLE SODIUM 40 MG: 40 TABLET, DELAYED RELEASE ORAL at 08:27

## 2023-11-29 RX ADMIN — PANTOPRAZOLE SODIUM 40 MG: 40 TABLET, DELAYED RELEASE ORAL at 20:53

## 2023-11-29 RX ADMIN — OXYCODONE HYDROCHLORIDE 10 MG: 5 TABLET ORAL at 22:02

## 2023-11-29 RX ADMIN — Medication 10 ML: at 08:27

## 2023-11-29 RX ADMIN — POLYETHYLENE GLYCOL 3350 17 G: 17 POWDER, FOR SOLUTION ORAL at 22:02

## 2023-11-29 RX ADMIN — SODIUM CHLORIDE, POTASSIUM CHLORIDE, SODIUM LACTATE AND CALCIUM CHLORIDE 100 ML/HR: 600; 310; 30; 20 INJECTION, SOLUTION INTRAVENOUS at 05:35

## 2023-11-29 RX ADMIN — METHOCARBAMOL TABLETS 750 MG: 750 TABLET, COATED ORAL at 20:52

## 2023-11-29 RX ADMIN — OXYCODONE HYDROCHLORIDE 10 MG: 5 TABLET ORAL at 13:37

## 2023-11-29 RX ADMIN — DOCUSATE SODIUM 200 MG: 100 CAPSULE, LIQUID FILLED ORAL at 20:52

## 2023-11-29 RX ADMIN — DOCUSATE SODIUM 200 MG: 100 CAPSULE, LIQUID FILLED ORAL at 08:27

## 2023-11-29 RX ADMIN — CETIRIZINE HYDROCHLORIDE 10 MG: 10 TABLET ORAL at 08:26

## 2023-11-29 RX ADMIN — GABAPENTIN 800 MG: 300 CAPSULE ORAL at 20:52

## 2023-11-29 NOTE — THERAPY TREATMENT NOTE
Patient Name: Rafael Kat  : 1970    MRN: [id number]                              Today's Date: 2023       Admit Date: 2023    Visit Dx:     ICD-10-CM ICD-9-CM   1. Spinal stenosis of lumbar region with neurogenic claudication  M48.062 724.03   2. DDD (degenerative disc disease), lumbar  M51.36 722.52     Patient Active Problem List   Diagnosis    Degeneration of intervertebral disc of mid-cervical region    Chronic pain    Cervical disc disorder with radiculopathy of mid-cervical region    DDD (degenerative disc disease), lumbar    Other cervical disc degeneration, high cervical region    Neck pain    Pain in thoracic spine    Muscle spasm of right shoulder    History of fusion of cervical spine    Spinal stenosis of lumbar region with neurogenic claudication    Tear of right acetabular labrum    Spinal instability of lumbar region     Past Medical History:   Diagnosis Date    Anesthesia complication     PT STATES HE IS SLOW TO WAKE UP    Arthritis     COPD (chronic obstructive pulmonary disease)     STATES WHEEZES AT NIGHT AND DOES USE NIGHTLY INHALER    DDD (degenerative disc disease), cervical     DDD (degenerative disc disease), lumbar     PAIN IN RIGHT BUTTOCK AND DOWN AT TIMES RIGHT LEG, WEAKNESS RIGHT LEG AT TIMES    GERD (gastroesophageal reflux disease)     Psoriasis     Skin cancer     BASAL/SQUAMOUS     Past Surgical History:   Procedure Laterality Date    ANTERIOR CHANNEL VERTEBRECTOMY/CORPECTOMY Bilateral 2016    Procedure: C4-6 ANTERIOR CHANNEL VERTEBRECTOMY/CORPECTOMY WITH INSTRUMENTATION;  Surgeon: Bert Collado MD;  Location: [facility];  Service:     COLONOSCOPY      ELBOW PROCEDURE Right     UNCLEAR:  RIGHT ELBOW AREA    FINGER SURGERY Left     INDEX FINGER    LUMBAR EPIDURAL INJECTION      STATES X3    LUMBAR LAMINECTOMY DISCECTOMY DECOMPRESSION Bilateral 2023    Procedure: Open bilateral lumbar decompression lumbar one/two, lumbar two/three, lumbar  three/four;  Surgeon: Bert Collado MD;  Location: [facility] MAIN OR;  Service: Neurosurgery;  Laterality: Bilateral;    MENISCECTOMY Right     UPPER GASTROINTESTINAL ENDOSCOPY  03/2021      General Information       Row Name 11/29/23 1052          Physical Therapy Time and Intention    Document Type evaluation  -LW     Mode of Treatment physical therapy;individual therapy  -       Row Name 11/29/23 1052          General Information    Patient Profile Reviewed yes  -LW     Prior Level of Function independent:  -LW     Existing Precautions/Restrictions spinal;brace worn when out of bed  -LW     Barriers to Rehab none identified  -       Row Name 11/29/23 1052          Living Environment    People in Home spouse  -       Row Name 11/29/23 1052          Home Main Entrance    Number of Stairs, Main Entrance two  -LW     Stair Railings, Main Entrance railings safe and in good condition  -       Row Name 11/29/23 1052          Stairs Within Home, Primary    Number of Stairs, Within Home, Primary none  -       Row Name 11/29/23 1052          Cognition    Orientation Status (Cognition) oriented x 4  -       Row Name 11/29/23 1052          Safety Issues, Functional Mobility    Impairments Affecting Function (Mobility) range of motion (ROM)  -               User Key  (r) = Recorded By, (t) = Taken By, (c) = Cosigned By      Initials Name Provider Type    LW Margie Head PT Physical Therapist                   Mobility       Row Name 11/29/23 1053          Bed Mobility    Bed Mobility bed mobility (all) activities  -     All Activities, [place] (Bed Mobility) standby assist  -       Row Name 11/29/23 1053          Sit-Stand Transfer    Sit-Stand [place] (Transfers) standby assist  -     Assistive Device (Sit-Stand Transfers) other (see comments)  no AD  -       Row Name 11/29/23 1053          Gait/Stairs (Locomotion)    [place] Level (Gait) standby assist  -     Assistive Device (Gait)  other (see comments)  no AD  -LW     Patient was able to Ambulate yes  -LW     Distance in Feet (Gait) 200'  -LW     Deviations/Abnormal Patterns (Gait) gait speed decreased  -LW     [place] Level (Stairs) stand by assist  -LW     Handrail Location (Stairs) both sides  -LW     Number of Steps (Stairs) 4  -LW     Ascending Technique (Stairs) step-over-step  -LW     Descending Technique (Stairs) step-over-step  -LW               User Key  (r) = Recorded By, (t) = Taken By, (c) = Cosigned By      Initials Name Provider Type    Margie Amaya PT Physical Therapist                   Obj/Interventions       Row Name 11/29/23 1055          Range of Motion Comprehensive    Comment, General Range of Motion limited ROM d/t spinal precautions  -[facility] Name 11/29/23 1055          Strength Comprehensive (MMT)    General Manual Muscle Testing (MMT) Assessment no strength deficits identified  -[facility] Name 11/29/23 1055          Balance    Balance Assessment sitting static balance;sitting dynamic balance;standing static balance;standing dynamic balance  -LW     Static Sitting Balance standby assist  -LW     Dynamic Sitting Balance standby assist  -LW     Position, Sitting Balance sitting edge of bed;unsupported  -LW     Static Standing Balance standby assist  -LW     Dynamic Standing Balance standby assist  -LW     Position/Device Used, Standing Balance unsupported  -LW       Row Name 11/29/23 1055          Sensory Assessment (Somatosensory)    Sensory Assessment (Somatosensory) sensation intact  -LW               User Key  (r) = Recorded By, (t) = Taken By, (c) = Cosigned By      Initials Name Provider Type    Margie Amaya PT Physical Therapist                   Goals/Plan    No documentation.                  Clinical Impression       Row Name 11/29/23 1058          Pain    Pretreatment Pain Rating 0/10 - no pain  -LW     Posttreatment Pain Rating 0/10 - no pain  -LW     Pre/Posttreatment Pain Comment c/o  discomfort from drain  -       Row Name 11/29/23 1055          Plan of Care Review    Plan of Care Reviewed With patient  -LW     Outcome Evaluation Patient is a 53 y/o male POD1 open lumbar decompression L1-L4. He currently lives at home with his wife and has 2 BUFFY. Priot to admission he was indep with mobility. Pt donned LSO while sitting EOB. He ambualted 200' SBA and 4 steps SBA with bilat HR. His gait was steady and did not use an AD. There are no current needs for skilled acute PT and no concern for anticipated d/c home tomorrow. Patient was educated on ambulating throughout the day with staff. D/c recommendations include HHPT.  -       Row Name 11/29/23 1055          Therapy Assessment/Plan (PT)    Criteria for Skilled Interventions Met (PT) no problems identified which require skilled intervention  -       Row Name 11/29/23 1055          Positioning and Restraints    Pre-Treatment Position in bed  -LW     Post Treatment Position bed  -LW     In Bed supine;call light within reach;encouraged to call for assist;exit alarm on;with family/caregiver  -               User Key  (r) = Recorded By, (t) = Taken By, (c) = Cosigned By      Initials Name Provider Type    LW Margie Head, PT Physical Therapist                   Outcome Measures       Row Name 11/29/23 1059 11/29/23 0829       How much help from another person do you currently need...    Turning from your back to your side while in flat bed without using bedrails? 4  -LW 3  -TITA    Moving from lying on back to sitting on the side of a flat bed without bedrails? 4  -LW 3  -TITA    Moving to and from a bed to a chair (including a wheelchair)? 4  -LW 3  -TITA    Standing up from a chair using your arms (e.g., wheelchair, bedside chair)? 4  -LW 3  -TITA    Climbing 3-5 steps with a railing? 4  -LW 2  -TITA    To walk in hospital room? 4  -LW 3  -TITA    AM-PAC 6 Clicks Score (PT) 24  -LW 17  -TITA    Highest Level of Mobility Goal 8 --> Walked 250 feet or more  -LW  5 --> Static standing  -TITA              User Key  (r) = Recorded By, (t) = Taken By, (c) = Cosigned By      Initials Name Provider Type    Thelma Talamantes, RN Registered Nurse    Margie Amaya, EMILY Physical Therapist                                 Physical Therapy Education       Title: PT OT SLP Therapies (Done)       Topic: Physical Therapy (Done)       Point: Mobility training (Done)       Learning Progress Summary             Patient Acceptance, E,D, VU by  at 11/29/2023 1100                         Point: Home exercise program (Done)       Learning Progress Summary             Patient Acceptance, E,D, VU by  at 11/29/2023 1100                         Point: Body mechanics (Done)       Learning Progress Summary             Patient Acceptance, E,D, VU by  at 11/29/2023 1100                         Point: Precautions (Done)       Learning Progress Summary             Patient Acceptance, E,D, VU by  at 11/29/2023 1100                                         User Key       Initials Effective Dates Name Provider Type Discipline    GENE 05/08/23 -  Margie Head, EMILY Physical Therapist PT                  PT Recommendation and Plan     Plan of Care Reviewed With: patient  Outcome Evaluation: Patient is a 53 y/o male POD1 open lumbar decompression L1-L4. He currently lives at home with his wife and has 2 BUFFY. Priot to admission he was indep with mobility. Pt donned LSO while sitting EOB. He ambualted 200' SBA and 4 steps SBA with bilat HR. His gait was steady and did not use an AD. There are no current needs for skilled acute PT and no concern for anticipated d/c home tomorrow. Patient was educated on ambulating throughout the day with staff. D/c recommendations include HHPT.     Time Calculation:         PT Charges       Row Name 11/29/23 1100             Time Calculation    Start Time 1033  -LW      Stop Time 1047  -LW      Time Calculation (min) 14 min  -LW      PT Received On 11/29/23  -LW         Time  Calculation- PT    Total Timed Code Minutes- PT 12 minute(s)  -LW         Timed Charges    01552 - PT Therapeutic Activity Minutes 12  -LW         Total Minutes    Timed Charges Total Minutes 12  -LW       Total Minutes 12  -LW                User Key  (r) = Recorded By, (t) = Taken By, (c) = Cosigned By      Initials Name Provider Type    Margie Amaya, PT Physical Therapist                  Therapy Charges for Today       Code Description Service Date Service Provider Modifiers Qty    15160061099 HC PT THERAPEUTIC ACT EA 15 MIN 11/29/2023 Margie Head, PT GP 1    58077100825 HC PT EVAL MOD COMPLEXITY 3 11/29/2023 Margie Head, PT GP 1            PT G-Codes  AM-PAC 6 Clicks Score (PT): 24  PT Discharge Summary  Anticipated Discharge Disposition (PT): home with home health    Margie Head PT  11/29/2023

## 2023-11-29 NOTE — PLAN OF CARE
Goal Outcome Evaluation:  Plan of Care Reviewed With: patient           Outcome Evaluation: Patient is a 51 y/o male POD1 open lumbar decompression L1-L4. He currently lives at home with his wife and has 2 BUFFY. Priot to admission he was indep with mobility. Pt donned LSO while sitting EOB. He ambualted 200' SBA and 4 steps SBA with bilat HR. His gait was steady and did not use an AD. There are no current needs for skilled acute PT and no concern for anticipated d/c home tomorrow. Patient was educated on ambulating throughout the day with staff. D/c recommendations include HHPT.      Anticipated Discharge Disposition (PT): home with home health

## 2023-11-29 NOTE — PLAN OF CARE
Goal Outcome Evaluation:  Plan of Care Reviewed With: patient        Progress: improving  Outcome Evaluation: POD#1 of open lumbar decompression.L1/L2-L2/L3-L3/L4. Dressing to back intact. VSS. Dilaudid PCA helping with pain. Ambulating wit assistance. Voiding fine per bathroom . YAMILET drain in place. Education provided on reflux and safety . Patient verbalized understanding.

## 2023-11-29 NOTE — PLAN OF CARE
Goal Outcome Evaluation:      A/ox4, stand by assist, PO pain meds, l1-l2, l2-l3, l3-l4 decompression, brace at bedside.   YAMILET drain.       Problem: Adult Inpatient Plan of Care  Goal: Absence of Hospital-Acquired Illness or Injury  Intervention: Identify and Manage Fall Risk  Recent Flowsheet Documentation  Taken 11/29/2023 1608 by Thelma Ferguson RN  Safety Promotion/Fall Prevention:   assistive device/personal items within reach   safety round/check completed  Taken 11/29/2023 1407 by Thelma Ferguson RN  Safety Promotion/Fall Prevention:   assistive device/personal items within reach   safety round/check completed  Taken 11/29/2023 1200 by Thelma Ferguson RN  Safety Promotion/Fall Prevention:   assistive device/personal items within reach   safety round/check completed  Taken 11/29/2023 1014 by Thelma Ferguson RN  Safety Promotion/Fall Prevention:   assistive device/personal items within reach   safety round/check completed  Taken 11/29/2023 0829 by Thelma Ferguson RN  Safety Promotion/Fall Prevention:   activity supervised   assistive device/personal items within reach   clutter free environment maintained   fall prevention program maintained   gait belt   lighting adjusted   mobility aid in reach   nonskid shoes/slippers when out of bed   safety round/check completed  Intervention: Prevent Skin Injury  Recent Flowsheet Documentation  Taken 11/29/2023 1608 by Thelma Ferguson RN  Body Position: position changed independently  Taken 11/29/2023 1407 by Thelma Ferguson RN  Body Position: position changed independently  Taken 11/29/2023 1200 by Thelma Ferguson RN  Body Position: position changed independently  Taken 11/29/2023 1014 by Thelma Ferguson RN  Body Position: position changed independently  Taken 11/29/2023 0829 by Thelma Ferguson RN  Body Position:   position changed independently   neutral body alignment  Skin Protection:   adhesive use limited   incontinence pads utilized   protective footwear used  Intervention: Prevent and Manage  VTE (Venous Thromboembolism) Risk  Recent Flowsheet Documentation  Taken 11/29/2023 1608 by Thelma Ferguson RN  Activity Management: ambulated outside room  Taken 11/29/2023 1300 by Thelma Ferguson RN  Activity Management: patient refuses activity  Taken 11/29/2023 0829 by Thelma Ferguson RN  VTE Prevention/Management:   bilateral   sequential compression devices on  Range of Motion: active ROM (range of motion) encouraged  Goal: Optimal Comfort and Wellbeing  Intervention: Monitor Pain and Promote Comfort  Recent Flowsheet Documentation  Taken 11/29/2023 1745 by Thelma Ferguson RN  Pain Management Interventions: see MAR  Intervention: Provide Person-Centered Care  Recent Flowsheet Documentation  Taken 11/29/2023 0829 by Thelma Ferguson RN  Trust Relationship/Rapport:   care explained   thoughts/feelings acknowledged     Problem: COPD (Chronic Obstructive Pulmonary Disease) Comorbidity  Goal: Maintenance of COPD Symptom Control  Intervention: Maintain COPD-Symptom Control  Recent Flowsheet Documentation  Taken 11/29/2023 1608 by Thelma Ferguson RN  Medication Review/Management: medications reviewed  Taken 11/29/2023 1407 by Thelma Ferguson RN  Medication Review/Management: medications reviewed  Taken 11/29/2023 1200 by Thelma Ferguson RN  Medication Review/Management: medications reviewed  Taken 11/29/2023 1014 by Thelma Ferguson RN  Medication Review/Management: medications reviewed  Taken 11/29/2023 0829 by Thelma Ferguson RN  Supportive Measures:   active listening utilized   self-care encouraged  Medication Review/Management: medications reviewed     Problem: Fall Injury Risk  Goal: Absence of Fall and Fall-Related Injury  Intervention: Identify and Manage Contributors  Recent Flowsheet Documentation  Taken 11/29/2023 1608 by Thelma Ferguson RN  Medication Review/Management: medications reviewed  Taken 11/29/2023 1407 by Thelma Ferguson RN  Medication Review/Management: medications reviewed  Taken 11/29/2023 1200 by Thelma Ferguson RN  Medication  Review/Management: medications reviewed  Taken 11/29/2023 1014 by Thelma Ferguson RN  Medication Review/Management: medications reviewed  Taken 11/29/2023 0829 by Thelma Ferguson RN  Medication Review/Management: medications reviewed  Self-Care Promotion:   independence encouraged   BADL personal objects within reach  Intervention: Promote Injury-Free Environment  Recent Flowsheet Documentation  Taken 11/29/2023 1608 by Thelma Ferguson RN  Safety Promotion/Fall Prevention:   assistive device/personal items within reach   safety round/check completed  Taken 11/29/2023 1407 by Thelma Ferguson RN  Safety Promotion/Fall Prevention:   assistive device/personal items within reach   safety round/check completed  Taken 11/29/2023 1200 by Thelma Ferguson RN  Safety Promotion/Fall Prevention:   assistive device/personal items within reach   safety round/check completed  Taken 11/29/2023 1014 by Thelma Ferguson RN  Safety Promotion/Fall Prevention:   assistive device/personal items within reach   safety round/check completed  Taken 11/29/2023 0829 by Thelma Ferguson RN  Safety Promotion/Fall Prevention:   activity supervised   assistive device/personal items within reach   clutter free environment maintained   fall prevention program maintained   gait belt   lighting adjusted   mobility aid in reach   nonskid shoes/slippers when out of bed   safety round/check completed     Problem: Functional Ability Impaired (Spinal Surgery)  Goal: Optimal Functional Ability  Intervention: Optimize Functional Status  Recent Flowsheet Documentation  Taken 11/29/2023 1608 by Thelma Ferguson RN  Activity Management: ambulated outside room  Positioning/Transfer Devices: pillows  Taken 11/29/2023 1407 by Thelma Ferguson RN  Positioning/Transfer Devices: pillows  Taken 11/29/2023 1300 by Thelma Ferguson RN  Activity Management: patient refuses activity  Taken 11/29/2023 1200 by Thelma Ferguson RN  Positioning/Transfer Devices: pillows  Taken 11/29/2023 1014 by Thelma Ferguson  RN  Positioning/Transfer Devices: pillows  Taken 11/29/2023 0829 by Thelma Ferguson RN  Positioning/Transfer Devices: pillows  Self-Care Promotion:   independence encouraged   BADL personal objects within reach     Problem: Neurologic Impairment (Spinal Surgery)  Goal: Optimal Neurologic Function  Intervention: Optimize Neurologic Function  Recent Flowsheet Documentation  Taken 11/29/2023 1608 by Thelma Ferguson RN  Body Position: position changed independently  Taken 11/29/2023 1407 by Thelma Ferguson RN  Body Position: position changed independently  Taken 11/29/2023 1200 by Thelma Ferguson RN  Body Position: position changed independently  Taken 11/29/2023 1014 by Thelma Ferguson RN  Body Position: position changed independently  Taken 11/29/2023 0829 by Thelma Ferguson RN  Body Position:   position changed independently   neutral body alignment  Range of Motion: active ROM (range of motion) encouraged     Problem: Ongoing Anesthesia Effects (Spinal Surgery)  Goal: Anesthesia/Sedation Recovery  Intervention: Optimize Anesthesia Recovery  Recent Flowsheet Documentation  Taken 11/29/2023 1608 by Thelma Ferguson RN  Safety Promotion/Fall Prevention:   assistive device/personal items within reach   safety round/check completed  Administration (IS): self-administered  Taken 11/29/2023 1407 by Thelma Ferguson RN  Safety Promotion/Fall Prevention:   assistive device/personal items within reach   safety round/check completed  Administration (IS): self-administered  Taken 11/29/2023 1200 by Thelma Ferguson RN  Safety Promotion/Fall Prevention:   assistive device/personal items within reach   safety round/check completed  Administration (IS): unable to perform  Taken 11/29/2023 1014 by Thelma Ferguson RN  Safety Promotion/Fall Prevention:   assistive device/personal items within reach   safety round/check completed  Administration (IS): self-administered  Taken 11/29/2023 0829 by Thelma Ferguson RN  Safety Promotion/Fall Prevention:   activity  supervised   assistive device/personal items within reach   clutter free environment maintained   fall prevention program maintained   gait belt   lighting adjusted   mobility aid in reach   nonskid shoes/slippers when out of bed   safety round/check completed  Administration (IS): self-administered  Reorientation Measures: glasses use encouraged     Problem: Pain (Spinal Surgery)  Goal: Acceptable Pain Control  Intervention: Prevent or Manage Pain  Recent Flowsheet Documentation  Taken 11/29/2023 4445 by Thelma Ferguson, RN  Pain Management Interventions: see MAR  Taken 11/29/2023 0899 by Thelma Ferguson, RN  Diversional Activities:   television   smartphone

## 2023-11-29 NOTE — PROGRESS NOTES
[facility] THORACIC/LUMBAR NEUROSURGERY PROGRESS NOTE      CC:POD # 1 s/p Open bilateral lumbar decompression L1/2, L2/3, L3/4 with right sided L1/2 microdiscectomy       Subjective     Interval History: Patient doing very well this morning.  Pain is well under control.  Has been up ad tank. in the room.      Objective     Vital signs in last 24 hours:  Temp:  [97 °F (36.1 °C)-97.9 °F (36.6 °C)] 97 °F (36.1 °C)  Heart Rate:  [62-95] 76  Resp:  [16] 16  BP: ()/(54-90) 111/76    Intake/Output this shift:  No intake/output data recorded.  YAMILET- 140cc/24 hr  LABS:  No new labs to review    IMAGING STUDIES:  No new imaging    I personally viewed the patient's chart, it was also reviewed by and discussed with Dr Tobias Truong reviewed/changed: Yes  BuSpar 10 mg p.o. twice daily  Gabapentin 800 mg p.o. every 8 hours  Protonix 40 mg p.o. twice daily  Dilaudid PCA  Robaxin 750 mg p.o. 4 times daily as needed      Physical Exam:    General:  Awake & alert  Back:   Midline lumbar incision dressing is clean, dry and intact.  YAMILET drain with moderate amount of serosanguineous drainage.  Motor:  Normal muscle strength, bulk and tone in lower extremities.  No fasciculations, rigidity, spasticity, or abnormal movements  Reflexes: 2+ in bilateral lower extremities, no clonus  Sensation:  Normal to light touch bilateral LE's  Station and Gait: Patient reports being up ad tank. to the bathroom and did sit in the chair for his meals yesterday  Extremities:  Wearing SCD's      Assessment & Plan     ASSESSMENT:      Spinal instability of lumbar region    DDD (degenerative disc disease), lumbar    Spinal stenosis of lumbar region with neurogenic claudication    POD # 1 s/p Open bilateral lumbar decompression L1/2, L2/3, L3/4 with right sided L1/2 microdiscectomy.  Patient doing very well today.  Pain under control, up ad tank. will hopefully plan for discharge tomorrow.    PLAN:   -DC PCA  -Activity as tolerated, do not sit in a chair  "longer than 30 minutes at a time  -Plan for discharge 11/30/2023      I discussed the patient's findings and my recommendations with patient, family, and Dr. Collado    During patient visit, I utilized appropriate personal protective equipment including gloves.  Appropriate PPE was worn during the entire visit.  Hand hygiene was completed before and after.      LOS: 0 days       Rebecca Casiano, APRN  11/29/2023  08:50 EST    \"Dictated utilizing Dragon dictation\".      "

## 2023-11-30 VITALS
HEIGHT: 69 IN | SYSTOLIC BLOOD PRESSURE: 140 MMHG | BODY MASS INDEX: 29.62 KG/M2 | RESPIRATION RATE: 16 BRPM | HEART RATE: 75 BPM | DIASTOLIC BLOOD PRESSURE: 80 MMHG | WEIGHT: 200 LBS | TEMPERATURE: 98.8 F | OXYGEN SATURATION: 93 %

## 2023-11-30 PROCEDURE — 63710000001 ONDANSETRON PER 8 MG: Performed by: NEUROLOGICAL SURGERY

## 2023-11-30 RX ORDER — POLYETHYLENE GLYCOL 3350 17 G/17G
17 POWDER, FOR SOLUTION ORAL 2 TIMES DAILY PRN
Qty: 510 G | Refills: 0 | Status: SHIPPED | OUTPATIENT
Start: 2023-11-30

## 2023-11-30 RX ORDER — HYDROCODONE BITARTRATE AND ACETAMINOPHEN 7.5; 325 MG/1; MG/1
1 TABLET ORAL EVERY 4 HOURS PRN
Qty: 30 TABLET | Refills: 0 | Status: SHIPPED | OUTPATIENT
Start: 2023-11-30 | End: 2023-12-04 | Stop reason: SDUPTHER

## 2023-11-30 RX ORDER — AMOXICILLIN 250 MG
2 CAPSULE ORAL NIGHTLY
Status: DISCONTINUED | OUTPATIENT
Start: 2023-11-30 | End: 2023-11-30 | Stop reason: HOSPADM

## 2023-11-30 RX ORDER — HYDROCODONE BITARTRATE AND ACETAMINOPHEN 7.5; 325 MG/1; MG/1
1 TABLET ORAL EVERY 4 HOURS PRN
Status: DISCONTINUED | OUTPATIENT
Start: 2023-11-30 | End: 2023-11-30 | Stop reason: HOSPADM

## 2023-11-30 RX ORDER — AMOXICILLIN 250 MG
2 CAPSULE ORAL NIGHTLY
Qty: 20 TABLET | Refills: 0 | Status: SHIPPED | OUTPATIENT
Start: 2023-11-30

## 2023-11-30 RX ORDER — DOCUSATE SODIUM 100 MG/1
100 CAPSULE, LIQUID FILLED ORAL DAILY
Status: DISCONTINUED | OUTPATIENT
Start: 2023-12-01 | End: 2023-11-30 | Stop reason: HOSPADM

## 2023-11-30 RX ORDER — PSEUDOEPHEDRINE HCL 30 MG
100 TABLET ORAL DAILY
COMMUNITY
Start: 2023-12-01

## 2023-11-30 RX ORDER — ACETAMINOPHEN 325 MG/1
650 TABLET ORAL EVERY 4 HOURS PRN
COMMUNITY
Start: 2023-11-30

## 2023-11-30 RX ORDER — METHOCARBAMOL 750 MG/1
750 TABLET, FILM COATED ORAL EVERY 6 HOURS PRN
Qty: 40 TABLET | Refills: 0 | Status: SHIPPED | OUTPATIENT
Start: 2023-11-30

## 2023-11-30 RX ADMIN — HYDROCODONE BITARTRATE AND ACETAMINOPHEN 1 TABLET: 7.5; 325 TABLET ORAL at 10:03

## 2023-11-30 RX ADMIN — METHOCARBAMOL TABLETS 750 MG: 750 TABLET, COATED ORAL at 06:37

## 2023-11-30 RX ADMIN — BUSPIRONE HYDROCHLORIDE 10 MG: 10 TABLET ORAL at 08:20

## 2023-11-30 RX ADMIN — PANTOPRAZOLE SODIUM 40 MG: 40 TABLET, DELAYED RELEASE ORAL at 08:21

## 2023-11-30 RX ADMIN — GABAPENTIN 800 MG: 300 CAPSULE ORAL at 06:37

## 2023-11-30 RX ADMIN — OXYCODONE HYDROCHLORIDE 10 MG: 5 TABLET ORAL at 01:53

## 2023-11-30 RX ADMIN — CETIRIZINE HYDROCHLORIDE 10 MG: 10 TABLET ORAL at 08:20

## 2023-11-30 RX ADMIN — POLYETHYLENE GLYCOL 3350 17 G: 17 POWDER, FOR SOLUTION ORAL at 06:37

## 2023-11-30 RX ADMIN — Medication 10 ML: at 08:23

## 2023-11-30 RX ADMIN — ONDANSETRON HYDROCHLORIDE 4 MG: 4 TABLET, FILM COATED ORAL at 10:33

## 2023-11-30 RX ADMIN — OXYCODONE HYDROCHLORIDE 10 MG: 5 TABLET ORAL at 05:38

## 2023-11-30 RX ADMIN — DOCUSATE SODIUM 200 MG: 100 CAPSULE, LIQUID FILLED ORAL at 08:21

## 2023-11-30 NOTE — DISCHARGE INSTRUCTIONS
[facility]  [address], [address]  [place] [address]  [phone]    No lift, push, pull more than 5 pounds, no swimming, no bending or twisting. No exertional or impact activity- walking is OK. Wear brace when sitting higher than 45 degrees, standing, walking. OK to remove brace for showers. (until directed by neurosurgery provider.)     Lumbar Laminectomy, Discectomy or Decompression Post-Operative Instructions    1.   You should have a post-operative visit scheduled with the Physician Assistant/Nurse Practitioner for approximately 2 to 2 1/2 weeks from your date of surgery. If you do not have one, call the office when you return home to schedule a visit. You should also be off work at least until your first visit.    2.   Do not lift anything over five (5) pounds. No bending, twisting, or squatting should be done until the first visit. However, walking is allowed and encouraged. Walking should be done on a flat surface. The speed and distance should be chosen according to your comfort level and stamina. In general, short frequent walks are preferred. Climbing stairs is allowed but should be minimized. In general, activity restrictions will be lessened following the first visit.    3.   Sitting, while allowed, should be kept to a minimum until the first visit because it may increase your discomfort. No more than 15 to 20 minutes three times a day should be done - enough time to eat your meals and use the bathroom. Otherwise, if you are not walking or standing, you should be lying down on your back or side. A reclining chair is acceptable.    4.   Please do not drive until the first visit, although, you may be driven.    5.   Refrain from any sexual activity until after your first visit with the physician assistant.    6.   Take off your dressings and leave them off after the first day home. You may get the incisions wet during showering and pat them dry, but do not soak the incisions or  rub them vigorously with a towel. No tub baths or hot tubs are allowed.     7.   A prescription for pain medication will be provided at discharge. This medication is primarily for any pain related to the incision and should be used only on an as-needed basis. Sometimes, antibiotics and medications for spasm are also given. Take medications only as directed by the doctor. If a refill of your pain medication is required, due to changes in the Federal law, in order to have this medication refilled you must contact the office four days prior to the due date to make arrangements to pick-up the prescriptionin or office. The prescription refill cannot be called in to the pharmacy. Your prescription will be ready fro pick-up the day the refill is due.    8.   If there are any problems, such as excessive back or leg pain, persistent temperature of 100 degrees or greater despite Tylenol, chills excessive bloody discharge from the wound, redness and hot skin around the incision, clear or yellowish discharge from the wound, or severe headaches, particularly when sitting or standing, please call the office. A small amount of bleeding from the incision during the first few days is not unusual.    9.   We look forward to seeing you again for follow-up. Do not hesitate to call if any questions or concerns arise regarding your surgery. We would rather you communicate with us regarding such issues early.

## 2023-11-30 NOTE — DISCHARGE SUMMARY
Rafael Duranck  1970    Patient Care Team:  Eun Lara APRN as PCP - General (Family Medicine)  Bert Collado MD as Surgeon (Neurosurgery)    Date of Admit: 11/28/2023    Date of Discharge:  11/30/2023    Discharge Diagnosis:  Spinal instability of lumbar region    DDD (degenerative disc disease), lumbar    Spinal stenosis of lumbar region with neurogenic claudication      Procedures Performed  Procedure(s):  Open bilateral lumbar decompression lumbar one/two, lumbar two/three, lumbar three/four with right sided lumbar one/two microdiscectomy       Complications: None    Consultants:   Consults       No orders found from 10/30/2023 to 11/29/2023.            Condition on Discharge: stable    Discharge disposition: Home with home health      Brief HPI: Patient evaluated in office for complaints of bilateral buttock and right leg pain. Imaging revealed lumbar spinal stenosis at L1-2, L2-3, L3-4 with bilateral neurogenic claudication and central herniated disc at L1-2. RBAs of treatment were discussed including the above procedure. Patient consented to above procedure. See full H&P.    Hospital Course: Patient admitted for above procedure. The procedure itself was without complication. He reports his leg pain has resolved. The patient was transferred to [facility] following recovery. PCA pump was discontinued on POD1 and pain is well controlled with PRN oral narcotics and muscle relaxers. He has been evaluated by physical therapy and is ambulating well without assistance. Patient is voiding and passing gas. He does complain of feeling constipated; encouraged bowel agents, mobility, and fluids. He is tolerating his normal diet and denies nausea or vomiting. YAMILET drain to bulb suction with serosanguinous drainage; decreased output overnight. YAMILET drain removed prior to discharge. He denies any shortness of breath or chest pain. Feels ready for discharge today. Plans for home health PT. Educated on use of  TLSO brace.    Discharge Physical Exam:    Temp:  [98.2 °F (36.8 °C)-99.9 °F (37.7 °C)] 99.9 °F (37.7 °C)  Heart Rate:  [78-88] 84  Resp:  [16] 16  BP: (114-136)/(68-84) 114/71    Current labs:  Lab Results (last 24 hours)       ** No results found for the last 24 hours. **              General Appearance No acute distress   Neurological Awake, Alert, and oriented x 3   Motor Strength 5/5 in all extremities   Sensory Intact to light touch in bilateral lower extremities   Gait and station Ambulating without equipment   Abdomen Soft, non-tender. Hyperactive bowel sounds x4   Back Non-tender to palpation. Midline lumbar incision is approximated with steri-strips and without edema, drainage or redness. Dressing is clean, dry, and intact.   Extremities Moves all extremities well, no edema, no cyanosis, no redness     Discharge Medications  MCKAY has been reviewed and narcotic consent is on file in the patient's chart.     Your medication list        START taking these medications        Instructions Last Dose Given Next Dose Due   docusate sodium 100 MG capsule  Start taking on: December 1, 2023      Take 1 capsule by mouth Daily.       methocarbamol 750 MG tablet  Commonly known as: ROBAXIN      Take 1 tablet by mouth Every 6 (Six) Hours As Needed for Muscle Spasms.       polyethylene glycol 17 g packet  Commonly known as: MIRALAX      Take 17 g by mouth 2 (Two) Times a Day As Needed (constipation).       sennosides-docusate 8.6-50 MG per tablet  Commonly known as: PERICOLACE      Take 2 tablets by mouth Every Night.              CHANGE how you take these medications        Instructions Last Dose Given Next Dose Due   acetaminophen 325 MG tablet  Commonly known as: TYLENOL  What changed:   medication strength  how much to take  when to take this  reasons to take this      Take 2 tablets by mouth Every 4 (Four) Hours As Needed for Mild Pain.              CONTINUE taking these medications        Instructions Last Dose  Given Next Dose Due   busPIRone 10 MG tablet  Commonly known as: BUSPAR      Take 1 tablet by mouth 2 (Two) Times a Day.       celecoxib 200 MG capsule  Commonly known as: CeleBREX      Take 1 capsule by mouth Daily. INSTRUCTED TO CALL DR. GARCIA OFFICE TO SEE IF HE SHOULD HOLD THIS       cetirizine 10 MG tablet  Commonly known as: zyrTEC      Take 1 tablet by mouth Daily.       cholecalciferol 25 MCG (1000 UT) tablet  Commonly known as: VITAMIN D3      Take 1 tablet by mouth Daily.       clotrimazole-betamethasone 1-0.05 % cream  Commonly known as: LOTRISONE      Apply 1 application  topically to the appropriate area as directed As Needed.       Diclofenac Sodium 1 % gel gel  Commonly known as: VOLTAREN      Apply 4 g topically to the appropriate area as directed As Needed.       FeroSul 325 (65 Fe) MG tablet  Generic drug: ferrous sulfate      Take 1 tablet by mouth Daily With Breakfast.       gabapentin 800 MG tablet  Commonly known as: NEURONTIN      Take 1 tablet by mouth 3 (Three) Times a Day. TYPICALLY TAKES TWICE DAILY       mometasone 0.1 % cream  Commonly known as: ELOCON      1 application  As Needed.       NON FORMULARY      Every Night. STATES DOES USE NIGHT INHALER:  INSTRUCTED TO BRING THIS DAY OF SURGEY       pantoprazole 40 MG EC tablet  Commonly known as: PROTONIX      Take 1 tablet by mouth 2 (Two) Times a Day.       Potassium 75 MG tablet      Take  by mouth As Needed. DURING THE SUMMER WHEN SWEATS MORE       zolpidem 10 MG tablet  Commonly known as: AMBIEN      Take 1 tablet by mouth At Night As Needed.              STOP taking these medications      baclofen 20 MG tablet  Commonly known as: LIORESAL                  Where to Get Your Medications        These medications were sent to [facility] [place] [id number]      Hours: Monday to Friday 7 AM to 6 PM, Saturday & Sunday 8 AM to 4:30 PM (Closed 12 PM to 12:30 PM) Phone: [phone]    methocarbamol 750 MG tablet  polyethylene glycol 17 g packet  sennosides-docusate 8.6-50 MG per tablet       You can get these medications from any pharmacy    You don't need a prescription for these medications  acetaminophen 325 MG tablet  docusate sodium 100 MG capsule         Discharge Diet:   Diet Instructions       Diet: Regular/House Diet; Thin (IDDSI 0)      Discharge Diet: Regular/House Diet    Fluid Consistency: Thin (IDDSI 0)          Diet Order   Procedures    Diet: Regular/House Diet; Texture: Regular Texture (IDDSI 7); Fluid Consistency: Thin (IDDSI 0)       Activity at Discharge:   Activity Instructions       Discharge Activity      1) No driving until cleared by us and no longer taking narcotics.   2) Off work/school until cleared by us.  3) May shower but no submerging wound in tub, pool, etc.  4) Do not lift / push / pull more then 5 lbs.  5.) Wear brace when up  6.) No overhead activity/ No bending/twisting/impact activity    Pelvic Rest              Call for: questions or concerns.     Follow-up Appointments  Future Appointments   Date Time Provider Department Center   12/12/2023  2:15 PM Susannah Dobson APRN [facility]      Contact information for follow-up providers       Eun Lara APRN .    Specialty: Family Medicine  Contact information:  [address] [place] [id number] [phone]                       Contact information for after-discharge care       Home Medical Care       [facility] .    Service: Home Health Services  Contact information:  [facility] [address] [phone]                                 Additional Instructions for the Follow-ups that You Need to Schedule       Ambulatory Referral to Home Health (Hospital)   As directed      Face to Face Visit Date: 11/30/2023   Follow-up provider for Plan of Care?: I treated the patient in an acute care facility and will not continue treatment after discharge.   Follow-up  provider: JOSE MARTIN GARCIA [[id number]]   Reason/Clinical Findings: post-op lumbar surgery   Describe mobility limitations that make leaving home difficult: activity restrictions   Nursing/Therapeutic Services Requested: Physical Therapy   PT orders: Strengthening Therapeutic exercise Home safety assessment   Frequency: 1 Week 1        Notify Physician or Go To The ED For the Following Conditions   As directed      Including but not limited to fever >100.5, chills, wound concerns (redness, swelling, drainage), new symptoms of numbness, tingling, weakness; new or uncontrolled pain despite using prescribed medications    Order Comments: Including but not limited to fever >100.5, chills, wound concerns (redness, swelling, drainage), new symptoms of numbness, tingling, weakness; new or uncontrolled pain despite using prescribed medications                 Test Results Pending at Discharge     None    I discussed the discharge instructions with patient and family    HERON Stanley  11/30/23  10:03 EST

## 2023-11-30 NOTE — PLAN OF CARE
Goal Outcome Evaluation:  Plan of Care Reviewed With: patient        Progress: improving         Outcome Evaluation: VSS. Patient remains A&Ox4. Up ad tank. PRN medications given per MAR. Pain plan discussed with patient. Pt c/o of constipation despite colace and frequent ambulation. Miralax ordered. No positive results. No c/o nausea. Family at bedside. YAMILET drain remains. Dressing remains dry and intact. dry drainage marked. All questions answered with verbalized understanding, Care on going. Education provided.

## 2023-11-30 NOTE — CASE MANAGEMENT/SOCIAL WORK
Case Management Discharge Note      Final Note: dc home with [facility] PT    Provided Post Acute Provider List?: N/A  N/A Provider List Comment: Requests Levi SINGH.    Selected Continued Care - Discharged on 11/30/2023 Admission date: 11/28/2023 - Discharge disposition: Home-Health Care Svc      Destination    No services have been selected for the patient.                Durable Medical Equipment    No services have been selected for the patient.                Dialysis/Infusion    No services have been selected for the patient.                Home Medical Care Coordination complete.      Service Provider Selected Services Address Phone Fax Patient Preferred    [facility] [address] [place] [address] [phone] --              Therapy    No services have been selected for the patient.                Community Resources    No services have been selected for the patient.                Community & DME    No services have been selected for the patient.                    Transportation Services  Private: Car    Final Discharge Disposition Code: 01 - home or self-care

## 2023-12-04 DIAGNOSIS — M48.062 SPINAL STENOSIS OF LUMBAR REGION WITH NEUROGENIC CLAUDICATION: ICD-10-CM

## 2023-12-04 RX ORDER — HYDROCODONE BITARTRATE AND ACETAMINOPHEN 7.5; 325 MG/1; MG/1
1 TABLET ORAL EVERY 4 HOURS PRN
Qty: 40 TABLET | Refills: 0 | Status: SHIPPED | OUTPATIENT
Start: 2023-12-04 | End: 2023-12-11

## 2023-12-04 NOTE — TELEPHONE ENCOUNTER
Rx Refill Note  Requested Prescriptions     Pending Prescriptions Disp Refills    HYDROcodone-acetaminophen (NORCO) 7.5-325 MG per tablet 30 tablet 0     Sig: Take 1 tablet by mouth Every 4 (Four) Hours As Needed for Moderate Pain for up to 7 days.      Last office visit with prescribing clinician: 10/3/2023   Last telemedicine visit with prescribing clinician: Visit date not found   Next office visit with prescribing clinician: Visit date not found                         Would you like a call back once the refill request has been completed: [] Yes [] No    If the office needs to give you a call back, can they leave a voicemail: [] Yes [] No    Antonella De Los Santos MA  12/04/23, 10:55 EST

## 2023-12-04 NOTE — TELEPHONE ENCOUNTER
Patient called asking for refill on Meds hydrocodone. Would like to get switched to [facility] in [facility], [phone]

## 2023-12-06 NOTE — PROGRESS NOTES
"Subjective   Patient ID: Rafael Kat is a 53 y.o. male is here today for follow-up for Open bilateral lumbar decompression lumbar one/two, lumbar two/three, lumbar three/four done on 11/28/2023 by Dr. Collado. When getting out of the shower patient has severe chills. Needs a refill of methocarbamol. Has a lot of fluid around incision site.     History of Present Illness    He presents the office today for first postop visit status post above-noted procedure with Dr Collado.  Preop, he had complaints of bilateral buttock and right leg pain.    Today, he reports that he is doing really well postop.  Most of the leg pain that he had on the right side has completely resolved since surgery.  He will get some intermittent sensations of discomfort down the right leg, but this resolves with position change and sitting.  He has minimal discomfort in the low back.  He is taking hydrocodone at night to help him sleep, but otherwise does not have much discomfort throughout the day.  He does need a refill on the mass relaxers.  He has been up walking without any difficulty.  He has been working with physical therapy who is coming to his house.  The patient and his wife report that he has had a few episodes of \"fluid\" under the incision site that creates a \"bubble.\"  The patient denies any discomfort or pain around or under the incision site.  He denies any fluid under the incision site in the past few days.  He denies any new problems and has no concerns.  He has been compliant with the postoperative instructions, including wearing the LSO brace.  He presents with his wife            The following portions of the patient's history were reviewed and updated as appropriate: allergies, current medications, past family history, past medical history, past social history, past surgical history, and problem list.    Review of Systems   Constitutional:  Positive for chills. Negative for fever.   Cardiovascular:  Negative for " leg swelling.   Gastrointestinal:  Negative for constipation.   Genitourinary:  Negative for difficulty urinating and enuresis.   Musculoskeletal:  Negative for back pain.   Skin:  Negative for wound (redness, swelling, drainage).   Neurological:  Negative for weakness, numbness and headaches.   Psychiatric/Behavioral:  Positive for sleep disturbance.          /80   Pulse 79   SpO2 95%         Objective     Vitals:    12/12/23 1439   BP: 130/80   Pulse: 79   SpO2: 95%     There is no height or weight on file to calculate BMI.    Tobacco Use: Medium Risk (12/12/2023)    Patient History     Smoking Tobacco Use: Former     Smokeless Tobacco Use: Never     Passive Exposure: Not on file          Physical Exam  Vitals reviewed.   Constitutional:       Appearance: Normal appearance.   HENT:      Head: Atraumatic.   Pulmonary:      Effort: Pulmonary effort is normal.   Musculoskeletal:         General: Tenderness present.      Comments: Strength equal and intact bilateral lower extremities, normal muscle bulk and tone   Skin:     General: Skin is warm and dry.      Comments: Well-healing midline lumbar incision site, flat, intact, nontender normal surrounding skin.  Steri-Strips removed without any issue.  No evidence of any bulging under or around the incision site, no ballotable fluid under the incision site   Neurological:      Mental Status: He is alert and oriented to person, place, and time.      GCS: GCS eye subscore is 4. GCS verbal subscore is 5. GCS motor subscore is 6.      Sensory: No sensory deficit.      Motor: No weakness or tremor.      Gait: Gait normal.      Comments: Gait is stable and upright, nonantalgic   Psychiatric:         Mood and Affect: Mood normal.       Neurologic Exam     Mental Status   Oriented to person, place, and time.           Assessment & Plan   Independent Review of Radiographic Studies:      I personally reviewed the images from the following studies.    No new  imaging    Medical Decision Making:      He presents the office today for first postop visit status post above-noted procedure Dr Collado.  Exam as noted above, no red flags.  He is doing very well with almost complete resolution of his preoperative right leg pain.  He does get these intermittent pains and sensory changes in the right leg, which I explained is completely normal as the nerve is learning to function normally again.  Strength is intact and he has normal sensation.  He is encouraged to walk is much as possible.  He is compliant wearing the LSO brace at all times when he is not sitting down.  I have refilled the Robaxin, per his request.  He is weaning off the hydrocodone.  He continues to do in-home PT.  All questions from the patient and his wife are answered at length.  As mentioned above, there was some discussion regarding `some fluid buildup under the incision site.  There was definitely no evidence of this during today's exam.  However, if it should return that they will take a photo and send it to our office.  He will call with any additional questions or concerns, otherwise we will see him back in 4 to 6 weeks.    Plan: Follow-up as directed, take muscle relaxers    Diagnoses and all orders for this visit:    1. Spondylolisthesis at L3-L4 level (Primary)    2. DDD (degenerative disc disease), lumbar    Other orders  -     methocarbamol (ROBAXIN) 750 MG tablet; Take 1 tablet by mouth Every 6 (Six) Hours As Needed for Muscle Spasms.  Dispense: 40 tablet; Refill: 2      Return in about 4 weeks (around 1/9/2024).

## 2023-12-12 ENCOUNTER — OFFICE VISIT (OUTPATIENT)
Dept: NEUROSURGERY | Facility: CLINIC | Age: 53
End: 2023-12-12
Payer: COMMERCIAL

## 2023-12-12 VITALS — SYSTOLIC BLOOD PRESSURE: 130 MMHG | OXYGEN SATURATION: 95 % | HEART RATE: 79 BPM | DIASTOLIC BLOOD PRESSURE: 80 MMHG

## 2023-12-12 DIAGNOSIS — M43.16 SPONDYLOLISTHESIS AT L3-L4 LEVEL: Primary | ICD-10-CM

## 2023-12-12 DIAGNOSIS — M51.36 DDD (DEGENERATIVE DISC DISEASE), LUMBAR: ICD-10-CM

## 2023-12-12 PROCEDURE — 99024 POSTOP FOLLOW-UP VISIT: CPT | Performed by: NURSE PRACTITIONER

## 2023-12-12 RX ORDER — METHOCARBAMOL 750 MG/1
750 TABLET, FILM COATED ORAL EVERY 6 HOURS PRN
Qty: 40 TABLET | Refills: 2 | Status: SHIPPED | OUTPATIENT
Start: 2023-12-12

## 2023-12-21 ENCOUNTER — TELEPHONE (OUTPATIENT)
Dept: NEUROSURGERY | Facility: CLINIC | Age: 53
End: 2023-12-21
Payer: COMMERCIAL

## 2023-12-21 RX ORDER — HYDROCODONE BITARTRATE AND ACETAMINOPHEN 7.5; 325 MG/1; MG/1
1 TABLET ORAL EVERY 6 HOURS PRN
Qty: 40 TABLET | Refills: 0 | Status: SHIPPED | OUTPATIENT
Start: 2023-12-21

## 2023-12-21 NOTE — TELEPHONE ENCOUNTER
Any refills need to go through RX request from their pharmacy(by the patient calling their pharmacy and requesting the refill), unless they are calling to change dosage or change pharmacy.     Confirm pharmacy.[facility]     Which prescription needs to be filled?Hydrocodone      How many days' worth does have the patient currently have left?2

## 2023-12-21 NOTE — TELEPHONE ENCOUNTER
Rx Refill Note  Requested Prescriptions     Pending Prescriptions Disp Refills    HYDROcodone-acetaminophen (NORCO) 7.5-325 MG per tablet 40 tablet 0     Sig: Take 1 tablet by mouth Every 4 (Four) Hours As Needed for Moderate Pain.      Last office visit with prescribing clinician: 10/3/2023   Last telemedicine visit with prescribing clinician: Visit date not found   Next office visit with prescribing clinician: Visit date not found                         Would you like a call back once the refill request has been completed: [] Yes [] No    If the office needs to give you a call back, can they leave a voicemail: [] Yes [] No    Antonella De Los Santos MA  12/21/23, 09:44 EST

## 2023-12-21 NOTE — TELEPHONE ENCOUNTER
Caller: MARY KAY CHEN    Relationship: WIFE ([facility] VERBAL IN CHART)    Best call back number: [phone]    What orders are you requesting (i.e. lab or imaging): OUT PATIENT THERAPY    In what timeframe would the patient need to come in: PATIENT IS SCHEDULED FOR FIRST APPT 01/04/2024 @ 9AM    Where will you receive your lab/imaging services: [facility]    Additional notes: MARY KAY CALLED AND STATES THAT THE PATIENT HAS BEEN DOING IN HOME PHYSICAL THERAPY AND STATES THAT THE PATIENT STILL HAS 2 OF THOSE VISITS LEFT BUT THEY WERE SCHEDULING OUT PATIENT PHYSICAL THERAPY FOR THE PATIENT AND IT WAS GOING TO BE COMPLETED AT [facility] BUT THEY WERE BOOKED.  THEY STATE THAT THEY WERE ABLE TO GET PATIENT SCHEDULED AT [facility] AND STATES THAT APPT IS SCHEDULED FOR 01/04/2023.  THEY ARE REQUESTING THE REFERRAL IS SENT TO THEM ([facility]) TO FAX NUMBER [phone]      PLEASE CALL PATIENTS WIFE WITH AN UPDATE  THANK YOU

## 2023-12-22 DIAGNOSIS — M51.36 DDD (DEGENERATIVE DISC DISEASE), LUMBAR: Primary | ICD-10-CM

## 2023-12-22 DIAGNOSIS — M48.062 SPINAL STENOSIS OF LUMBAR REGION WITH NEUROGENIC CLAUDICATION: ICD-10-CM

## 2023-12-28 ENCOUNTER — TELEPHONE (OUTPATIENT)
Dept: NEUROSURGERY | Facility: CLINIC | Age: 53
End: 2023-12-28

## 2023-12-28 NOTE — TELEPHONE ENCOUNTER
[facility] received a fax that requires your attention. The document has been indexed to the patient’s chart for your review.      Reason for sending: SO PROVIDER IS AWARE OF DOCUMENT IN THE PATIENT'S CHART    Documents Description: RX PRIOR AUTH REQUEST 12/27/23    Name of Sender: [facility]    Date Indexed: 12/28/23

## 2024-01-02 NOTE — TELEPHONE ENCOUNTER
PT CALLED ABOUT HIS SCRIPTS FOR HYDROCODONE AND MUSCLE RELAXER. LOOKS LIKE PRIOR COMMUNICATION STATES IT NEEDS A PA. PLEASE ADVISE! THANKS!      PT CONTACT: [phone]  BEST TIME TO CALL: ANYTIME

## 2024-01-18 RX ORDER — METHOCARBAMOL 750 MG/1
750 TABLET, FILM COATED ORAL EVERY 6 HOURS PRN
Qty: 40 TABLET | Refills: 2 | Status: SHIPPED | OUTPATIENT
Start: 2024-01-18

## 2024-01-19 NOTE — PROGRESS NOTES
Subjective   Patient ID: Rafael Kat is a 53 y.o. male is here today for follow-up. Has had body soreness since Saturday, may have strained a muscle. Numbness of back and arm.     History of Present Illness    He returns to the office today for ongoing follow-up after undergoing open bilateral lumbar decompression L1 to, L2-3 and L3-4 with Dr Collado on November 28, 2023.  He has been wearing LSO brace.  He reported almost complete resolution of the preoperative right leg pain at his last office visit.  He remains on the muscle relaxers and gabapentin.    He is overall doing and feeling very well from a surgical standpoint.  Pain in the right leg has resolved.  He continues to have a lot of muscle tightness and discomfort in the low back around the incision site, he also reports intermittent muscle spasms in the mid and low back.  He remains on the Robaxin every 6-8 hours as needed.  He is off the narcotics.  He remains on gabapentin as well.  Over the weekend, one of his cows got stuck in a frozen pond and he had to do lots of maneuvering, including lots of pulling and pushing, to free the calf.  Ultimately, he did get her out but he is now having lots of muscle aches and tightness as result.  He is having significant muscle tightness in the left shoulder and parascapular region.  He is weaned out of the LSO brace.  He denies any other problems at this time.    He presents with his wife.      The following portions of the patient's history were reviewed and updated as appropriate: allergies, current medications, past family history, past medical history, past social history, past surgical history, and problem list.    Review of Systems   Gastrointestinal:  Negative for constipation.   Genitourinary:  Negative for difficulty urinating and enuresis.   Musculoskeletal:  Positive for back pain. Negative for gait problem.   Neurological:  Positive for weakness and numbness.   Psychiatric/Behavioral:  Positive  for sleep disturbance.        /80   Pulse 72   SpO2 96%       Objective     Vitals:    01/22/24 1019   BP: 130/80   Pulse: 72   SpO2: 96%     There is no height or weight on file to calculate BMI.    Tobacco Use: Medium Risk (1/22/2024)    Patient History     Smoking Tobacco Use: Former     Smokeless Tobacco Use: Never     Passive Exposure: Not on file          Physical Exam  Vitals reviewed.   Constitutional:       Appearance: Normal appearance.   HENT:      Head: Atraumatic.   Musculoskeletal:         General: Tenderness (Very tender left parascapular musculature) present. Normal range of motion.      Comments: Full lumbar range of motion without pain  Strength equal and intact bilateral lower extremities   Skin:     General: Skin is warm and dry.      Comments: Well-healed midline lumbar incision site, flat, normal surrounding skin   Neurological:      Mental Status: He is alert and oriented to person, place, and time.      GCS: GCS eye subscore is 4. GCS verbal subscore is 5. GCS motor subscore is 6.      Sensory: No sensory deficit.      Motor: No weakness or tremor.      Gait: Gait normal.      Comments: Gait is stable and upright, nonantalgic   Psychiatric:         Mood and Affect: Mood normal.       Neurologic Exam     Mental Status   Oriented to person, place, and time.           Assessment & Plan   Independent Review of Radiographic Studies:      I personally reviewed the images from the following studies.    No new imaging    Medical Decision Making:      He returns office today for second postop visit almost 2 months after undergoing the above-noted procedure with Dr Collado.  Exam as noted above, no red flags.  He is doing great with complete resolution of leg pain.  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  He reports that he tries to be very cognizant of not lifting too much, but he says he has had to lift a couple  jennifer of hay, and as mentioned above, he had a free account from a frozen pond.  As result he has lots of achiness throughout his body.  Have sent a Medrol Dosepak to his pharmacy in hopes of helping with this.  He will remain on the gabapentin and muscle relaxers as directed.  We will see him back in 2 months.  He is okay lifting upwards of 15 to 20 pounds.  He is okay to begin bending and twisting at the waist.  No need to continue wearing the LSO brace unless he has to do excessive bending and twisting.  He will call if any additional questions or concerns.  He is doing outpatient therapy and this is really helping with his overall low back discomfort.  He may benefit from therapy to help with the upper body discomfort if the steroids do not help.    Plan: Return to office in 2 months, take Medrol Dosepak as directed    Diagnoses and all orders for this visit:    1. DDD (degenerative disc disease), lumbar (Primary)    2. Spondylolisthesis at L3-L4 level    3. Muscle ache    Other orders  -     methylPREDNISolone (MEDROL) 4 MG dose pack; Take as directed on package instructions.  Dispense: 21 tablet; Refill: 0      Return in about 2 months (around 3/22/2024).

## 2024-01-22 ENCOUNTER — OFFICE VISIT (OUTPATIENT)
Dept: NEUROSURGERY | Facility: CLINIC | Age: 54
End: 2024-01-22
Payer: COMMERCIAL

## 2024-01-22 VITALS — SYSTOLIC BLOOD PRESSURE: 130 MMHG | HEART RATE: 72 BPM | OXYGEN SATURATION: 96 % | DIASTOLIC BLOOD PRESSURE: 80 MMHG

## 2024-01-22 DIAGNOSIS — M79.10 MUSCLE ACHE: ICD-10-CM

## 2024-01-22 DIAGNOSIS — M51.36 DDD (DEGENERATIVE DISC DISEASE), LUMBAR: Primary | ICD-10-CM

## 2024-01-22 DIAGNOSIS — M43.16 SPONDYLOLISTHESIS AT L3-L4 LEVEL: ICD-10-CM

## 2024-01-22 PROCEDURE — 1159F MED LIST DOCD IN RCRD: CPT | Performed by: NURSE PRACTITIONER

## 2024-01-22 PROCEDURE — 99024 POSTOP FOLLOW-UP VISIT: CPT | Performed by: NURSE PRACTITIONER

## 2024-01-22 PROCEDURE — 1160F RVW MEDS BY RX/DR IN RCRD: CPT | Performed by: NURSE PRACTITIONER

## 2024-01-22 RX ORDER — METHYLPREDNISOLONE 4 MG/1
TABLET ORAL
Qty: 21 TABLET | Refills: 0 | Status: SHIPPED | OUTPATIENT
Start: 2024-01-22

## 2024-01-24 RX ORDER — METHYLPREDNISOLONE 4 MG/1
TABLET ORAL
Qty: 21 TABLET | Refills: 0 | OUTPATIENT
Start: 2024-01-24

## 2024-03-14 NOTE — PROGRESS NOTES
"Subjective   Patient ID: Rafael Kat is a 53 y.o. male is here today for follow-up for arm and shoulder pain    History of Present Illness    He returns to the office today for ongoing follow-up after undergoing open bilateral lumbar decompression lumbar one/two, lumbar two/three, lumbar three/four with right sided lumbar one/two microdiscectomy with Dr. Collado on November 28, 2023. He originally had resolution of right leg pain post-op but states that he is now experiencing pain in this leg when walking long distances and he feels like it will give out. He has not fallen. He says that his right leg pain will quickly subside when he takes a break from walking.  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 Reports that PT helped his low back tightness but has stopped going as he stopped getting relief. Occasionally wears back brace when he feels like he needs extra support.      He still complains of left shoulder pain and states that the MDP helped but the pain returned as soon as he completed the pack. He has full range of motion and continues to work as normal but the pain has worsened since his last visit. Pain is described as \"stabbed with a knife\" and his left arm often goes numb when laying on his left side but quickly resolves with repositioning. He reports muscle tightness in his neck with continued shoulder use. He has received a trigger point injection in this area earlier this month but only experienced relief for 1 day. States that Voltaren gel provides some temporary relief. PT exacerbated his pain.    He still takes gabapentin and is back on baclofen for spasms.     He presents with his wife.    The following portions of the patient's history were reviewed " "and updated as appropriate: allergies, current medications, past family history, past medical history, past social history, past surgical history, and problem list.    Review of Systems   Constitutional:  Negative for fever.   Musculoskeletal:  Positive for neck pain.   Neurological:  Positive for numbness (shoulder).   All other systems reviewed and are negative.      /78 (BP Location: Left arm, Patient Position: Sitting, Cuff Size: Adult)   Resp 20   Ht 175.3 cm (69.02\")   Wt 90.7 kg (200 lb)   BMI 29.52 kg/m²      Objective     Vitals:    03/26/24 1024   BP: 142/78   BP Location: Left arm   Patient Position: Sitting   Cuff Size: Adult   Resp: 20   Weight: 90.7 kg (200 lb)   Height: 175.3 cm (69.02\")   PainSc:   4   PainLoc: Shoulder     Body mass index is 29.52 kg/m².    Tobacco Use: Medium Risk (3/26/2024)    Patient History     Smoking Tobacco Use: Former     Smokeless Tobacco Use: Never     Passive Exposure: Not on file          Physical Exam  Vitals reviewed.   Pulmonary:      Effort: Pulmonary effort is normal.   Musculoskeletal:      Left shoulder: Tenderness and bony tenderness present. Normal range of motion.      Cervical back: Normal range of motion.   Skin:     General: Skin is warm and dry.      Comments: Lumbar surgical incision is approximated without redness or drainage and well appearing   Neurological:      Mental Status: He is alert and oriented to person, place, and time.      GCS: GCS eye subscore is 4. GCS verbal subscore is 5. GCS motor subscore is 6.      Motor: Motor strength is normal.     Gait: Gait is intact.       Neurologic Exam     Mental Status   Oriented to person, place, and time.   Level of consciousness: alert    Motor Exam   Muscle bulk: normal  Overall muscle tone: normal    Strength   Strength 5/5 throughout.     Sensory Exam   Light touch normal.     Gait, Coordination, and Reflexes     Gait  Gait: normal    Reflexes   Right ankle clonus: absent  Left ankle clonus: " absent          Assessment & Plan   Independent Review of Radiographic Studies:        No new imaging    Medical Decision Making:      He returns the office today for third post-op visit almost 4 months after undergoing the above-noted procedure with Dr. Collado.  Exam is noted as above without any focal deficits or weakness.  Pain in his right leg is likely due to overexertion as it is not constant and resolves quickly with rest. His low back tightness is also likely due to overexertion. We discussed how he is still recovering from surgery and will need to limit bending/ twisting and refrain from lifting more than 20 lbs from here on out. 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 He has applied for disability but was told he does not apply due to his wife's income. He will remain on gabapentin and baclofen.    His left shoulder is his greatest concern and area of pain. Trigger point injections and MDP provided short term relief but PT was not beneficial. Will defer to ortho and provide lidocaine patches in the interim.     No need for scheduled follow-up at this time. Encouraged patient to reach out if new issues or if leg pain does not resolve with rest or worsens.      Diagnoses and all orders for this visit:    1. DDD (degenerative disc disease), lumbar (Primary)    2. Spondylolisthesis at L3-L4 level    3. Muscle ache  -     lidocaine (LIDODERM) 5 %; Place 1 patch on the skin as directed by provider Daily. Remove & Discard patch within 12 hours or as directed by MD  Dispense: 30 patch; Refill: 2  -     Ambulatory Referral to Orthopedic Surgery      No follow-ups on file.

## 2024-03-26 ENCOUNTER — OFFICE VISIT (OUTPATIENT)
Dept: NEUROSURGERY | Facility: CLINIC | Age: 54
End: 2024-03-26
Payer: COMMERCIAL

## 2024-03-26 VITALS
HEIGHT: 69 IN | RESPIRATION RATE: 20 BRPM | DIASTOLIC BLOOD PRESSURE: 78 MMHG | BODY MASS INDEX: 29.62 KG/M2 | WEIGHT: 200 LBS | SYSTOLIC BLOOD PRESSURE: 142 MMHG

## 2024-03-26 DIAGNOSIS — M51.36 DDD (DEGENERATIVE DISC DISEASE), LUMBAR: Primary | ICD-10-CM

## 2024-03-26 DIAGNOSIS — M79.10 MUSCLE ACHE: ICD-10-CM

## 2024-03-26 DIAGNOSIS — M43.16 SPONDYLOLISTHESIS AT L3-L4 LEVEL: ICD-10-CM

## 2024-03-26 PROCEDURE — 1159F MED LIST DOCD IN RCRD: CPT

## 2024-03-26 PROCEDURE — 99213 OFFICE O/P EST LOW 20 MIN: CPT

## 2024-03-26 PROCEDURE — 1160F RVW MEDS BY RX/DR IN RCRD: CPT

## 2024-03-26 RX ORDER — LIDOCAINE 50 MG/G
1 PATCH TOPICAL EVERY 24 HOURS
Qty: 30 PATCH | Refills: 2 | Status: SHIPPED | OUTPATIENT
Start: 2024-03-26

## 2024-03-27 NOTE — PROGRESS NOTES
[facility]  New Problem      Patient Name: Rafael Kat  : 1970  Primary Care Physician: Eun Lara APRN        Chief Complaint:  Left shoulder blade pain    HPI:   Rafael Kat is a 53 y.o. year old who presents today for evaluation. He complains of left sided ilda-scapular pain that has been worsening since around January of this year. His presentation is complicated by several events and illnesses all around the same period and a previous history of c-spine surgery.     He had previous c-spine fusion in  with Dr. Collado and also lumbar laminectomy and decompression in 2023. Did well post-operatively overall.     He had a fall about a year ago where his arm was forcefully lifted up over his head. A more recent fall in January- he was trying hard to protect his lumbar spine and this is when he started to notice the severe shoulder blade pain which ramped up once his right leg pain decreased after the fall (he was treated with oral steroids). He saw Dr. Collado and the APRN who sent him for some additional PT to address the shoulder and states that they did dry needling, tens, heat and therapeutic exercises. I asked him specifically about some scapular exercises and he states he did those.     History also complicated by a diagnosis of shingles in January. He said his rash was more anterior but he also may have had a rash around his shoulder blade around the same time. He attributed that posterior rash to using the topical RX cream and applying heat over the area. He says his skin blistered up and this persisted for a couple of weeks despite stopping the cream. Patient had preciously tolerated that cream and got relief with it on the area.  I reviewed prior notes, his PCP documented the shingles rash on c4 dermatome in the records.     On 3/6/2023 he had Trigger point(s) location(s): L cervical trapezius x1, L thoracic trapezius (x2), L Iliocostalis thoracis (x2), L  multifidi x1 with Dr. Negron which did not provide much lasting relief but he did have some immediate improvement for a few hours, the pain came right back.     His symptoms are worse with rest. Better while he is up and moving. Medial scapula. He is having a very hard time sleeping, specifically rolling over onto that side hurts and will wake him up- does better on his back. Holding the arm up to drive a vehicle is very painful- the vibrations very uncomfortable.     He is RHD. Previous R shoulder surgery. He is on the farm, ready to plant crops soon, worried about the harvest. He is very active.     He is on celebrex, ambien for sleeping, gabapentin 800mg. He did get some temporary relief with oral steroids in the past. No chest pain, no SOA. Does get some relief putting his arm behind his back sometimes.      Past Medical/Surgical, Social and Family History:  I have reviewed and/or updated pertinent history as noted in the medical record including:  Past Medical History:   Diagnosis Date    Anesthesia complication     PT STATES HE IS SLOW TO WAKE UP    Arthritis     COPD (chronic obstructive pulmonary disease)     STATES WHEEZES AT NIGHT AND DOES USE NIGHTLY INHALER    DDD (degenerative disc disease), cervical     DDD (degenerative disc disease), lumbar     PAIN IN RIGHT BUTTOCK AND DOWN AT TIMES RIGHT LEG, WEAKNESS RIGHT LEG AT TIMES    GERD (gastroesophageal reflux disease)     Psoriasis     Skin cancer     BASAL/SQUAMOUS     Past Surgical History:   Procedure Laterality Date    ANTERIOR CHANNEL VERTEBRECTOMY/CORPECTOMY Bilateral 03/29/2016    Procedure: C4-6 ANTERIOR CHANNEL VERTEBRECTOMY/CORPECTOMY WITH INSTRUMENTATION;  Surgeon: Bert Collado MD;  Location: [facility] OR;  Service:     COLONOSCOPY      ELBOW PROCEDURE Right     UNCLEAR:  RIGHT ELBOW AREA    FINGER SURGERY Left     INDEX FINGER    LUMBAR EPIDURAL INJECTION      STATES X3    LUMBAR LAMINECTOMY DISCECTOMY DECOMPRESSION Bilateral 11/28/2023     Procedure: Open bilateral lumbar decompression lumbar one/two, lumbar two/three, lumbar three/four;  Surgeon: Bert Garcia MD;  Location: [facility] MAIN OR;  Service: Neurosurgery;  Laterality: Bilateral;    MENISCECTOMY Right     UPPER GASTROINTESTINAL ENDOSCOPY  2021     Social History     Occupational History    Not on file   Tobacco Use    Smoking status: Former     Current packs/day: 0.00     Average packs/day: 2.0 packs/day for 20.0 years (40.0 ttl pk-yrs)     Types: Cigarettes     Start date:      Quit date:      Years since quittin.2    Smokeless tobacco: Never   Vaping Use    Vaping status: Never Used   Substance and Sexual Activity    Alcohol use: No    Drug use: Yes     Frequency: 14.0 times per week     Types: Marijuana     Comment: 1-2 PER DAY, smokes    Sexual activity: Defer          Allergies:   Allergies   Allergen Reactions    Vbzlp-Epqez-Kynusxw-Pramoxine Rash       Medications:   Home Medications:  Current Outpatient Medications on File Prior to Visit   Medication Sig    acetaminophen (TYLENOL) 325 MG tablet Take 2 tablets by mouth Every 4 (Four) Hours As Needed for Mild Pain.    busPIRone (BUSPAR) 10 MG tablet Take 1 tablet by mouth 2 (Two) Times a Day.    celecoxib (CeleBREX) 200 MG capsule Take 1 capsule by mouth Daily. INSTRUCTED TO CALL DR. GARCIA OFFICE TO SEE IF HE SHOULD HOLD THIS    cetirizine (zyrTEC) 10 MG tablet Take 1 tablet by mouth Daily.    cholecalciferol (VITAMIN D3) 25 MCG (1000 UT) tablet Take 1 tablet by mouth Daily.    clotrimazole-betamethasone (LOTRISONE) 1-0.05 % cream Apply 1 Application topically to the appropriate area as directed As Needed.    Diclofenac Sodium (VOLTAREN) 1 % gel gel Apply 4 g topically to the appropriate area as directed As Needed.    docusate sodium 100 MG capsule Take 1 capsule by mouth Daily.    FeroSul 325 (65 Fe) MG tablet Take 1 tablet by mouth Daily With Breakfast.    gabapentin (NEURONTIN) 800 MG tablet Take 1 tablet  by mouth 3 (Three) Times a Day. TYPICALLY TAKES TWICE DAILY    HYDROcodone-acetaminophen (NORCO) 7.5-325 MG per tablet Take 1 tablet by mouth Every 6 (Six) Hours As Needed for Moderate Pain.    lidocaine (LIDODERM) 5 % Place 1 patch on the skin as directed by provider Daily. Remove & Discard patch within 12 hours or as directed by MD    mometasone (ELOCON) 0.1 % cream 1 Application As Needed.    NON FORMULARY Every Night. STATES DOES USE NIGHT INHALER:  INSTRUCTED TO BRING THIS DAY OF SURGEY    pantoprazole (PROTONIX) 40 MG EC tablet Take 1 tablet by mouth 2 (Two) Times a Day.    polyethylene glycol (MIRALAX) 17 GM/SCOOP powder Dissolve 17 g (1 capful) in liquid and drink by mouth 2 (Two) Times a Day As Needed for constipation    Potassium 75 MG tablet Take  by mouth As Needed. DURING THE SUMMER WHEN SWEATS MORE    sennosides-docusate (PERICOLACE) 8.6-50 MG per tablet Take 2 tablets by mouth Every Night.    zolpidem (AMBIEN) 10 MG tablet Take 1 tablet by mouth At Night As Needed.    methocarbamol (ROBAXIN) 750 MG tablet Take 1 tablet by mouth Every 6 (Six) Hours As Needed for Muscle Spasms. (Patient not taking: Reported on 3/26/2024)    [DISCONTINUED] methylPREDNISolone (MEDROL) 4 MG dose pack Take as directed on package instructions. (Patient not taking: Reported on 3/28/2024)     No current facility-administered medications on file prior to visit.         ROS:  14 point review of systems was negative except as listed in the HPI.    Physical Exam:   53 y.o. male  Body mass index is 31.22 kg/m²., 95.9 kg (211 lb 6.4 oz)  Vitals:    03/28/24 1049   Temp: 97.6 °F (36.4 °C)     General: Alert, cooperative, appears well and in no observable distress. Appears stated age and BMI as listed above.  HEENT: Normocephalic, atraumatic on external visual inspection.  CV: No significant peripheral edema.  Respiratory: Normal respiratory effort.  Skin: Warm & well perfused; appropriate skin turgor.  Psych: Appropriate mood &  affect.  Neuro: Gross sensation and motor intact in affected extremity/extremities.  Vascular: Peripheral pulses palpable in affected extremity/extremities.     MSK Exam:  LEFT Shoulder  No obvious deformity or wounds, atrophy: absent, crepitus : moderate, scapulothoracic, tenderness:medial clavicular border, AROM:Flexion: 180; ER: 60; IR: lumbar spine, Cuff Strength: ER 4+; IR 5; Supraspinatus 5, uncomfortable with ER only- referred to ilda-scap area with isometric testing, drop arm rotator cuff test: negative, empty can supraspinatus test negative, cross body adduction test:negative, external rotation/infraspinatus test : equivocal, and [employer] Test: negative    RIGHT Shoulder  No deformity or wounds.  No tenderness to palpation.  Full, painless AROM.  Cuff Strength 5/5 with ER, IR & Supraspinatus testing.  Negative provocative testing.      Cervical ROM is limited by previous fusion surgery. No significant pain with neck ROM. Strength is grossly symmetric bilaterally. Elbow exam reveals no abnormality with normal painless ROM.       Radiology:    The following X-rays were ordered/reviewed today to evaluate the patient's symptoms: Shoulder: AP, Scapular Y and Axillary Lateral of left shoulder(s) show mild AC joint arthritis otherwise no obvious acute or chronic bony pathology that would account for symptoms. There are no prior films available for direct comparison.    Procedure:   N/A    Misc. Data/Labs: N/A    Assessment & Plan:    ICD-10-CM ICD-9-CM   1. Chronic periscapular pain on left side  M25.512 719.41    G89.29 338.29   2. Pain  R52 780.96     New Medications Ordered This Visit   Medications    methylPREDNISolone (MEDROL) 4 MG dose pack     Sig: Use as directed by package instructions     Dispense:  21 tablet     Refill:  0     Orders Placed This Encounter   Procedures    XR Shoulder 2+ View Left    Ambulatory Referral to Orthopedic Surgery     This is a 52 y/o male with chronic L shoulder blade pain. I  discussed with Dr. Ashford and I really cannot pinpoint exactly where this is coming from. His shoulder exam really isn't suggestive this is a true shoulder problem, SHARON does not feel like this is a cervical spine issue nor do I. Could be thoracic. Could be suprascapular nerve entrapment. I find his diagnosis of shingles suspicious as well and have to consider PHN in the diff although there seems to be a clear positional element to his symptoms suggesting this is more MSK related.     I would like him to try a patch test of the cream on his arm and if he tolerates that he could try applying that again since it helped in the past and he had never reacted to it before. Just would not add ice or heat over the cream. Kind of have to wonder if this wasn't part of a shingles outbreak or just a chemical reaction with the heat application. I am also going to give him another round of steroid as this is really the only other thing we might add to give him some extra relief and he is really quite miserable.     As discussed with Dr. Ashford we are going to have him see Dr. Fierro for an evaluation. In the interim we talked about some activity modifications he might try with pillows and positioning. I asked him to call me with any questions or concerns prior to his visit with Dr. Fierro.    Return for referral to Dr. Fierro.    Patient encouraged to call with questions or concerns prior to follow up.  Recommend ICE and/or HEAT PRN as discussed.  Will discuss with attending physician as needed.  Consider additional referrals, work up and/or advanced imaging as indicated or if patient fails to respond to conservative care.        Mode Grimm, APRN

## 2024-03-27 NOTE — TELEPHONE ENCOUNTER
Left detailed message in regards to rescheduling Myelogram on Sept 11,2023 due to auth with [facility] insurance. Spoke topatient he did voice understanding about auth and being rescheduled. Sent [facility] transfer to Venus STEWART in Radiology to reschedule. Faxed over all paperwork with office notes to [facility].    no

## 2024-03-28 ENCOUNTER — OFFICE VISIT (OUTPATIENT)
Dept: ORTHOPEDIC SURGERY | Facility: CLINIC | Age: 54
End: 2024-03-28
Payer: COMMERCIAL

## 2024-03-28 VITALS — BODY MASS INDEX: 31.31 KG/M2 | HEIGHT: 69 IN | WEIGHT: 211.4 LBS | TEMPERATURE: 97.6 F

## 2024-03-28 DIAGNOSIS — M25.512 CHRONIC PERISCAPULAR PAIN ON LEFT SIDE: Primary | ICD-10-CM

## 2024-03-28 DIAGNOSIS — R52 PAIN: ICD-10-CM

## 2024-03-28 DIAGNOSIS — G89.29 CHRONIC PERISCAPULAR PAIN ON LEFT SIDE: Primary | ICD-10-CM

## 2024-03-28 RX ORDER — METHYLPREDNISOLONE 4 MG/1
TABLET ORAL
Qty: 21 TABLET | Refills: 0 | Status: SHIPPED | OUTPATIENT
Start: 2024-03-28

## 2024-04-29 ENCOUNTER — OFFICE VISIT (OUTPATIENT)
Dept: ORTHOPEDIC SURGERY | Facility: CLINIC | Age: 54
End: 2024-04-29
Payer: COMMERCIAL

## 2024-04-29 VITALS — HEIGHT: 68 IN | BODY MASS INDEX: 31.77 KG/M2 | TEMPERATURE: 98.7 F | WEIGHT: 209.6 LBS

## 2024-04-29 DIAGNOSIS — G89.29 CHRONIC LEFT SHOULDER PAIN: Primary | ICD-10-CM

## 2024-04-29 DIAGNOSIS — M25.512 CHRONIC LEFT SHOULDER PAIN: Primary | ICD-10-CM

## 2024-04-29 PROCEDURE — 1160F RVW MEDS BY RX/DR IN RCRD: CPT | Performed by: ORTHOPAEDIC SURGERY

## 2024-04-29 PROCEDURE — 1159F MED LIST DOCD IN RCRD: CPT | Performed by: ORTHOPAEDIC SURGERY

## 2024-04-29 PROCEDURE — 20610 DRAIN/INJ JOINT/BURSA W/O US: CPT | Performed by: ORTHOPAEDIC SURGERY

## 2024-04-29 PROCEDURE — 99213 OFFICE O/P EST LOW 20 MIN: CPT | Performed by: ORTHOPAEDIC SURGERY

## 2024-04-29 RX ORDER — LIDOCAINE HYDROCHLORIDE 10 MG/ML
2 INJECTION, SOLUTION EPIDURAL; INFILTRATION; INTRACAUDAL; PERINEURAL
Status: COMPLETED | OUTPATIENT
Start: 2024-04-29 | End: 2024-04-29

## 2024-04-29 RX ORDER — METHYLPREDNISOLONE ACETATE 80 MG/ML
80 INJECTION, SUSPENSION INTRA-ARTICULAR; INTRALESIONAL; INTRAMUSCULAR; SOFT TISSUE
Status: COMPLETED | OUTPATIENT
Start: 2024-04-29 | End: 2024-04-29

## 2024-04-29 RX ADMIN — METHYLPREDNISOLONE ACETATE 80 MG: 80 INJECTION, SUSPENSION INTRA-ARTICULAR; INTRALESIONAL; INTRAMUSCULAR; SOFT TISSUE at 15:02

## 2024-04-29 RX ADMIN — LIDOCAINE HYDROCHLORIDE 2 ML: 10 INJECTION, SOLUTION EPIDURAL; INFILTRATION; INTRACAUDAL; PERINEURAL at 15:02

## 2024-04-29 NOTE — PROGRESS NOTES
Patient:Rafael Kat    YOB: 1970    Medical Record Number:[id number]    Chief Complaints: Referral for left shoulder pain    History of Present Illness:     53 y.o. male patient who presents for his left shoulder.  He was referred by [facility].  He tells me that he injured his shoulder about 3 or 4 months ago.  He was checking on a lamb when he slipped while climbing over a fence and caught himself with his left arm.  Ever since that incident, he has been having pain and popping in the back of his shoulder.  He has also been experiencing burning and tingling pain down the arm along with a constant itching sensation.  He tells me that he scratches and scratches and it does not seem to make any difference.  He was referred to PT.  It did not help.  He has a cream that he rubs on his back and arm that helps somewhat.  He has an extensive history of back and cervical spine surgery.  He saw Dr. Garcia and they thought it unlikely that his current problem is coming from the neck.  He has been referred for nerve testing but it is not scheduled until July.    Allergies:  Allergies   Allergen Reactions    Jkmpr-Jrrka-Tzrokqa-Pramoxine Rash       Home Medications:    Current Outpatient Medications:     acetaminophen (TYLENOL) 325 MG tablet, Take 2 tablets by mouth Every 4 (Four) Hours As Needed for Mild Pain., Disp: , Rfl:     busPIRone (BUSPAR) 10 MG tablet, Take 1 tablet by mouth 2 (Two) Times a Day., Disp: , Rfl:     celecoxib (CeleBREX) 200 MG capsule, Take 1 capsule by mouth Daily. INSTRUCTED TO CALL DR. GARCIA OFFICE TO SEE IF HE SHOULD HOLD THIS, Disp: 30 capsule, Rfl: 11    cetirizine (zyrTEC) 10 MG tablet, Take 1 tablet by mouth Daily., Disp: , Rfl:     cholecalciferol (VITAMIN D3) 25 MCG (1000 UT) tablet, Take 1 tablet by mouth Daily., Disp: , Rfl:     clotrimazole-betamethasone (LOTRISONE) 1-0.05 % cream, Apply 1 Application topically to the appropriate area as directed As Needed., Disp: ,  Rfl:     Diclofenac Sodium (VOLTAREN) 1 % gel gel, Apply 4 g topically to the appropriate area as directed As Needed., Disp: , Rfl:     docusate sodium 100 MG capsule, Take 1 capsule by mouth Daily., Disp: , Rfl:     FeroSul 325 (65 Fe) MG tablet, Take 1 tablet by mouth Daily With Breakfast., Disp: , Rfl:     gabapentin (NEURONTIN) 800 MG tablet, Take 1 tablet by mouth 3 (Three) Times a Day. TYPICALLY TAKES TWICE DAILY, Disp: , Rfl:     lidocaine (LIDODERM) 5 %, Place 1 patch on the skin as directed by provider Daily. Remove & Discard patch within 12 hours or as directed by MD, Disp: 30 patch, Rfl: 2    mometasone (ELOCON) 0.1 % cream, 1 Application As Needed., Disp: , Rfl:     NON FORMULARY, Every Night. STATES DOES USE NIGHT INHALER:  INSTRUCTED TO BRING THIS DAY OF SURGEY, Disp: , Rfl:     pantoprazole (PROTONIX) 40 MG EC tablet, Take 1 tablet by mouth 2 (Two) Times a Day., Disp: , Rfl: 11    Potassium 75 MG tablet, Take  by mouth As Needed. DURING THE SUMMER WHEN SWEATS MORE, Disp: , Rfl:     sennosides-docusate (PERICOLACE) 8.6-50 MG per tablet, Take 2 tablets by mouth Every Night., Disp: 20 tablet, Rfl: 0    zolpidem (AMBIEN) 10 MG tablet, Take 1 tablet by mouth At Night As Needed., Disp: , Rfl: 2    HYDROcodone-acetaminophen (NORCO) 7.5-325 MG per tablet, Take 1 tablet by mouth Every 6 (Six) Hours As Needed for Moderate Pain. (Patient not taking: Reported on 4/29/2024), Disp: 40 tablet, Rfl: 0    methocarbamol (ROBAXIN) 750 MG tablet, Take 1 tablet by mouth Every 6 (Six) Hours As Needed for Muscle Spasms. (Patient not taking: Reported on 3/26/2024), Disp: 40 tablet, Rfl: 2    polyethylene glycol (MIRALAX) 17 GM/SCOOP powder, Dissolve 17 g (1 capful) in liquid and drink by mouth 2 (Two) Times a Day As Needed for constipation (Patient not taking: Reported on 4/29/2024), Disp: 510 g, Rfl: 0    Past Medical History:   Diagnosis Date    Anesthesia complication     PT STATES HE IS SLOW TO WAKE UP    Arthritis      COPD (chronic obstructive pulmonary disease)     STATES WHEEZES AT NIGHT AND DOES USE NIGHTLY INHALER    DDD (degenerative disc disease), cervical     DDD (degenerative disc disease), lumbar     PAIN IN RIGHT BUTTOCK AND DOWN AT TIMES RIGHT LEG, WEAKNESS RIGHT LEG AT TIMES    GERD (gastroesophageal reflux disease)     Psoriasis     Skin cancer     BASAL/SQUAMOUS       Past Surgical History:   Procedure Laterality Date    ANTERIOR CHANNEL VERTEBRECTOMY/CORPECTOMY Bilateral 2016    Procedure: C4-6 ANTERIOR CHANNEL VERTEBRECTOMY/CORPECTOMY WITH INSTRUMENTATION;  Surgeon: Bert Collado MD;  Location: [facility];  Service:     COLONOSCOPY      ELBOW PROCEDURE Right     UNCLEAR:  RIGHT ELBOW AREA    FINGER SURGERY Left     INDEX FINGER    LUMBAR EPIDURAL INJECTION      STATES X3    LUMBAR LAMINECTOMY DISCECTOMY DECOMPRESSION Bilateral 2023    Procedure: Open bilateral lumbar decompression lumbar one/two, lumbar two/three, lumbar three/four;  Surgeon: Bert Collado MD;  Location: [facility];  Service: Neurosurgery;  Laterality: Bilateral;    MENISCECTOMY Right     UPPER GASTROINTESTINAL ENDOSCOPY  2021       Social History     Occupational History    Not on file   Tobacco Use    Smoking status: Former     Current packs/day: 0.00     Average packs/day: 2.0 packs/day for 20.0 years (40.0 ttl pk-yrs)     Types: Cigarettes     Start date:      Quit date:      Years since quittin.3     Passive exposure: Past    Smokeless tobacco: Never   Vaping Use    Vaping status: Never Used   Substance and Sexual Activity    Alcohol use: No    Drug use: Yes     Frequency: 14.0 times per week     Types: Marijuana     Comment: 1-2 PER DAY, smokes    Sexual activity: Defer      Social History     Social History Narrative    Not on file       Family History   Problem Relation Age of Onset    Malig Hyperthermia Neg Hx        Review of Systems:      Constitutional: Denies fever, shaking or chills  "  Eyes: Denies change in visual acuity   HEENT: Denies nasal congestion or sore throat   Respiratory: Denies cough or shortness of breath   Cardiovascular: Denies chest pain or edema  Endocrine: Denies tremors, palpitations, intolerance of heat or cold, polyuria, polydipsia.  GI: Denies abdominal pain, nausea, vomiting, bloody stools or diarrhea  : Denies frequency, urgency, incontinence, retention, or nocturia.  Musculoskeletal: Denies numbness, tingling or loss of motor function except as above  Integument: Denies rash, lesion or ulceration   Neurologic: Denies headache or focal weakness, deficits  Heme: Denies spontaneous or excessive bleeding, epistaxis, hematuria, melena, fatigue, enlarged or tender lymph nodes.      All other pertinent positives and negatives as noted above in HPI.    Physical Exam:53 y.o. male  Vitals:    04/29/24 1441   Temp: 98.7 °F (37.1 °C)   Weight: 95.1 kg (209 lb 9.6 oz)   Height: 172.7 cm (68\")       General:  Patient is awake and alert.  Appears in no acute distress or discomfort.    Psych:  Affect and demeanor are appropriate.    Extremities: Left shoulder is examined.  Skin is benign.  He has some excoriations on the back of his arm which she says is from the scratching.  Otherwise, no gross abnormalities on inspection.  No atrophy, swelling or masses.  He has audible and palpable crepitus beneath his left scapula.  No winging.  He is tender in the subscapular bursa.  No palpable masses.  He has full symmetric shoulder motion.  Negative Neer, Duckworth, speeds, Yergason's and Luzerne's maneuvers.  No significant weakness in his rotator cuff or deltoid.  Intact axillary nerve sensation.  He has a positive Tinel's over the cubital tunnel.  This reproduces the tingling into his hand.  Good  and pinch strength.  Normal sensation.  Palpable radial pulse.    Imaging: AP, scapular Y and axillary views left shoulder are reviewed on our system to evaluate his complaint.  These are from " March 28 when he saw [facility].  No concerning findings noted.    Assessment/Plan: 1.  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  Left arm tingling and pruritus of unclear etiology    I told him that I do not think that subscapular symptoms are related to the nerve symptoms.  He does examine like he has a component of cubital tunnel.  I have never seen itching be a component of cubital tunnel, particularly in the back of the arm.  I told him that in all honesty I am just not sure where that is coming from.  Hopefully the nerve test will give us some insight into that problem.  For his subscapular bursitis, I commend a trial of conservative treatment..  He feels like the PT is not helping but he is willing to try the injection.  The risk, benefits and alternatives were discussed.  He consented and the injection was performed as described below.  If this fails to alleviate his symptoms then I told him to let me know and I will be happy to refer him for further workup.    Miguel Fierro MD    04/29/2024         Large Joint Arthrocentesis  Date/Time: 4/29/2024 3:02 PM  Consent given by: patient  Site marked: site marked  Timeout: Immediately prior to procedure a time out was called to verify the correct patient, procedure, equipment, support staff and site/side marked as required   Supporting Documentation  Indications: pain   Procedure Details  Location: shoulder - Shoulder joint: Subscapular.  Preparation: Patient was prepped and draped in the usual sterile fashion  Needle size: 25 G  Approach: posterior  Medications administered: 2 mL lidocaine PF 1% 1 %; 80 mg methylPREDNISolone acetate 80 MG/ML  Patient tolerance: patient tolerated the procedure well with no immediate complications

## 2024-06-28 DIAGNOSIS — M79.10 MUSCLE ACHE: ICD-10-CM

## 2024-07-02 DIAGNOSIS — M79.10 MUSCLE ACHE: ICD-10-CM

## 2024-07-02 RX ORDER — LIDOCAINE 50 MG/G
1 PATCH TOPICAL EVERY 24 HOURS
Qty: 30 PATCH | Refills: 2 | Status: SHIPPED | OUTPATIENT
Start: 2024-07-02 | End: 2024-07-02

## 2024-07-02 RX ORDER — LIDOCAINE 50 MG/G
1 PATCH TOPICAL EVERY 24 HOURS
Qty: 30 PATCH | Refills: 2 | Status: SHIPPED | OUTPATIENT
Start: 2024-07-02

## 2024-08-23 NOTE — PROGRESS NOTES
Subjective   Patient ID: Rafael Kat is a 53 y.o. male is here today for follow-up for   -Degenerative disc disease, lumbar  MRI thoracic spine w/o contrast preformed on 7/29/2024.    MRI cervical spine w/o contrast preformed on 07/29/2024.      History of Present Illness    He was last seen in the office in March 2024 for continued surveillance after undergoing open bilateral lumbar decompression L1-2, L2-3 and L3-4 with right sided L1-2 microdiscectomy with Dr Collado on November 28, 2023.  He did very well following that procedure and reports complete resolution of his leg pain.  He does continue to have bilateral low back pain and wears a brace.      His biggest problem now is pain radiating down both arms into the hands and fingers with numbness and tingling.  Pain is constant but worsens anytime he does any overhead moving, lifting pushing or pulling.  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  As result of all the activity and work, the pain rating down both arms has progressively worsened to the point that is now severe.  He gets constant electric-like sensations into the hands and fingers.  Symptoms now are similar to when he had prior cervical fusion in 2016.  He denies any neck pain.  He does continue have some muscle tightness in his anterior chest.  He denies any walking, balance or gait issues.  Pain on average is a 6-7 out of 10.    He is on Celebrex daily, Robaxin, gabapentin 800 mg 3 times daily and hydrocodone 7.5 mg as needed every 6 hours.    He presents unaccompanied.    The following portions of the patient's history were reviewed and updated as appropriate: allergies, current medications, past family history, past medical history, past social history, past surgical history, and problem list.    Review of Systems   Constitutional:  Negative for fever.   HENT:  Positive for tinnitus.    Cardiovascular:  Positive for leg swelling.  "  Gastrointestinal:  Negative for nausea.   Genitourinary:  Negative for difficulty urinating.   Musculoskeletal:  Positive for back pain, neck pain and neck stiffness. Negative for gait problem.   Neurological:  Positive for weakness, numbness (bilateral hands) and headaches.   Psychiatric/Behavioral:  The patient is nervous/anxious.    All other systems reviewed and are negative.      /70 (BP Location: Left arm, Patient Position: Sitting, Cuff Size: Adult)   Pulse 79   Temp 99.3 °F (37.4 °C)   Resp 16   Ht 172.7 cm (67.99\")   Wt 88 kg (194 lb)   SpO2 94%   BMI 29.50 kg/m²       Objective     Vitals:    08/29/24 1323   BP: 132/70   BP Location: Left arm   Patient Position: Sitting   Cuff Size: Adult   Pulse: 79   Resp: 16   Temp: 99.3 °F (37.4 °C)   SpO2: 94%   Weight: 88 kg (194 lb)   Height: 172.7 cm (67.99\")   PainSc:   6   PainLoc: Back     Body mass index is 29.5 kg/m².    Tobacco Use: Medium Risk (8/29/2024)    Patient History     Smoking Tobacco Use: Former     Smokeless Tobacco Use: Never     Passive Exposure: Past          Physical Exam  Vitals reviewed.   Constitutional:       Appearance: Normal appearance.   HENT:      Head: Atraumatic.   Pulmonary:      Effort: Pulmonary effort is normal.   Musculoskeletal:         General: No tenderness. Normal range of motion.      Comments: Full cervical range of motion   strength equal and intact bilateral upper extremities with normal muscle bulk and tone     Skin:     General: Skin is warm and dry.   Neurological:      Mental Status: He is alert and oriented to person, place, and time.      GCS: GCS eye subscore is 4. GCS verbal subscore is 5. GCS motor subscore is 6.      Sensory: No sensory deficit.      Motor: Weakness (Trace thumb flexor weakness) present. No tremor.      Gait: Gait normal.      Deep Tendon Reflexes:      Reflex Scores:       Tricep reflexes are 2+ on the right side and 2+ on the left side.       Bicep reflexes are 2+ on the right " side and 2+ on the left side.       Brachioradialis reflexes are 2+ on the right side and 2+ on the left side.     Comments: Gait stable and upright, nonantalgic  Normal sensation to soft touch bilateral upper extremities   Psychiatric:         Mood and Affect: Mood normal.       Neurologic Exam     Mental Status   Oriented to person, place, and time.     Gait, Coordination, and Reflexes     Reflexes   Right brachioradialis: 2+  Left brachioradialis: 2+  Right biceps: 2+  Left biceps: 2+  Right triceps: 2+  Left triceps: 2+          Assessment & Plan   Independent Review of Radiographic Studies:      I personally reviewed the images from the following studies.    MRI thoracic spine without contrast from [facility] dated July 29, 2024 reveals left paracentric disc osteophyte complex at T6-7 and modest disc bulge at T9-10.  There is facet joint arthritis most prominent to the left at T10-11.  No sign of disc herniation or other cause of high-grade central neural impingement.    Cervical MRI from [facility] dated April 29, 2024 reveals postoperative changes from C3-C7 with anterior cervical fusion.  At C2-3 there is moderate left facet arthropathy but no central canal or foraminal stenosis.  C3-4 there is a mild posterior osteophyte and joint spurring with moderate left and mild right foraminal narrowing.  At C4-5 there is mild right spurring with facet arthropathy and moderate right greater than left neuroforaminal narrowing.  At C5-6 there is mild right greater than left spurring with facet arthropathy and mild to moderate bilateral neuroforaminal narrowing.  C6-7 there is severe bilateral foraminal stenosis but no central canal narrowing.  C7-T1 there is severe left and mild right foraminal stenosis only mild canal narrowing.    Medical Decision Making:      He presents office today for further evaluation of bilateral radiating arm pain into the hand and fingers with numbness and tingling.  Exam as  noted above, no red flags.  Cervical MRI from [facility] dated April 29 reveals severe bilateral foraminal stenosis at C6-7, moderate foraminal narrowing at C5-6 and severe left at C7-T1.  He is intact on exam with normal strength, sensation and reflexes.  I have referred him to pain management for cervical epidural injections.  He has no balance or gait issues and no significant level of canal stenosis.  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  We will have him follow-up in a couple of months for clinical checkup.  If his symptoms change or worsen in the interim he will let us know.      Plan: Referral to pain management for cervical epidurals, return to office in 3 months        Diagnoses and all orders for this visit:    1. Foraminal stenosis of cervical region (Primary)  -     Ambulatory Referral to Hospital Pain Management Department    2. Cervical radiculopathy at C7  -     Ambulatory Referral to Hospital Pain Management Department    3. Bilateral arm pain  -     Ambulatory Referral to Hospital Pain Management Department      No follow-ups on file.

## 2024-08-29 ENCOUNTER — OFFICE VISIT (OUTPATIENT)
Dept: NEUROSURGERY | Facility: CLINIC | Age: 54
End: 2024-08-29
Payer: COMMERCIAL

## 2024-08-29 VITALS
DIASTOLIC BLOOD PRESSURE: 70 MMHG | HEART RATE: 79 BPM | OXYGEN SATURATION: 94 % | WEIGHT: 194 LBS | BODY MASS INDEX: 29.4 KG/M2 | HEIGHT: 68 IN | RESPIRATION RATE: 16 BRPM | TEMPERATURE: 99.3 F | SYSTOLIC BLOOD PRESSURE: 132 MMHG

## 2024-08-29 DIAGNOSIS — M48.02 FORAMINAL STENOSIS OF CERVICAL REGION: Primary | ICD-10-CM

## 2024-08-29 DIAGNOSIS — M54.12 CERVICAL RADICULOPATHY AT C7: ICD-10-CM

## 2024-08-29 DIAGNOSIS — M79.601 BILATERAL ARM PAIN: ICD-10-CM

## 2024-08-29 DIAGNOSIS — M79.602 BILATERAL ARM PAIN: ICD-10-CM

## 2024-08-29 PROCEDURE — 1160F RVW MEDS BY RX/DR IN RCRD: CPT | Performed by: NURSE PRACTITIONER

## 2024-08-29 PROCEDURE — 99213 OFFICE O/P EST LOW 20 MIN: CPT | Performed by: NURSE PRACTITIONER

## 2024-08-29 PROCEDURE — 1159F MED LIST DOCD IN RCRD: CPT | Performed by: NURSE PRACTITIONER

## 2024-10-07 ENCOUNTER — ANESTHESIA EVENT (OUTPATIENT)
Dept: PAIN MEDICINE | Facility: HOSPITAL | Age: 54
End: 2024-10-07
Payer: COMMERCIAL

## 2024-10-07 ENCOUNTER — HOSPITAL ENCOUNTER (OUTPATIENT)
Dept: GENERAL RADIOLOGY | Facility: HOSPITAL | Age: 54
Discharge: HOME | End: 2024-10-07
Payer: COMMERCIAL

## 2024-10-07 ENCOUNTER — ANESTHESIA (OUTPATIENT)
Dept: PAIN MEDICINE | Facility: HOSPITAL | Age: 54
End: 2024-10-07
Payer: COMMERCIAL

## 2024-10-07 ENCOUNTER — HOSPITAL ENCOUNTER (OUTPATIENT)
Dept: PAIN MEDICINE | Facility: HOSPITAL | Age: 54
Discharge: HOME | End: 2024-10-07
Payer: COMMERCIAL

## 2024-10-07 VITALS
OXYGEN SATURATION: 94 % | SYSTOLIC BLOOD PRESSURE: 105 MMHG | DIASTOLIC BLOOD PRESSURE: 82 MMHG | RESPIRATION RATE: 16 BRPM | TEMPERATURE: 97.3 F | HEART RATE: 55 BPM

## 2024-10-07 DIAGNOSIS — M54.12 CERVICAL RADICULOPATHY: Primary | ICD-10-CM

## 2024-10-07 DIAGNOSIS — R52 PAIN: ICD-10-CM

## 2024-10-07 PROCEDURE — 25010000002 DEXAMETHASONE SODIUM PHOSPHATE 10 MG/ML SOLUTION: Performed by: STUDENT IN AN ORGANIZED HEALTH CARE EDUCATION/TRAINING PROGRAM

## 2024-10-07 PROCEDURE — 25010000002 MIDAZOLAM PER 1 MG: Performed by: STUDENT IN AN ORGANIZED HEALTH CARE EDUCATION/TRAINING PROGRAM

## 2024-10-07 PROCEDURE — 77003 FLUOROGUIDE FOR SPINE INJECT: CPT

## 2024-10-07 PROCEDURE — 25510000001 IOPAMIDOL 41 % SOLUTION: Performed by: STUDENT IN AN ORGANIZED HEALTH CARE EDUCATION/TRAINING PROGRAM

## 2024-10-07 RX ORDER — FENTANYL CITRATE 50 UG/ML
50 INJECTION, SOLUTION INTRAMUSCULAR; INTRAVENOUS AS NEEDED
Status: DISCONTINUED | OUTPATIENT
Start: 2024-10-07 | End: 2024-10-08 | Stop reason: HOSPADM

## 2024-10-07 RX ORDER — BACLOFEN 20 MG/1
1 TABLET ORAL 2 TIMES DAILY
COMMUNITY
Start: 2024-06-10

## 2024-10-07 RX ORDER — MIDAZOLAM HYDROCHLORIDE 1 MG/ML
2 INJECTION INTRAMUSCULAR; INTRAVENOUS AS NEEDED
Status: DISCONTINUED | OUTPATIENT
Start: 2024-10-07 | End: 2024-10-08 | Stop reason: HOSPADM

## 2024-10-07 RX ORDER — IOPAMIDOL 408 MG/ML
12 INJECTION, SOLUTION INTRATHECAL
Status: COMPLETED | OUTPATIENT
Start: 2024-10-07 | End: 2024-10-07

## 2024-10-07 RX ORDER — SODIUM CHLORIDE 0.9 % (FLUSH) 0.9 %
1-10 SYRINGE (ML) INJECTION AS NEEDED
Status: DISCONTINUED | OUTPATIENT
Start: 2024-10-07 | End: 2024-10-08 | Stop reason: HOSPADM

## 2024-10-07 RX ORDER — CELECOXIB 200 MG/1
200 CAPSULE ORAL DAILY
COMMUNITY
Start: 2024-10-03 | End: 2025-10-03

## 2024-10-07 RX ORDER — SODIUM CHLORIDE 0.9 % (FLUSH) 0.9 %
10 SYRINGE (ML) INJECTION EVERY 12 HOURS SCHEDULED
Status: CANCELLED | OUTPATIENT
Start: 2024-10-07

## 2024-10-07 RX ORDER — SODIUM CHLORIDE 9 MG/ML
40 INJECTION, SOLUTION INTRAVENOUS AS NEEDED
Status: CANCELLED | OUTPATIENT
Start: 2024-10-07

## 2024-10-07 RX ORDER — DEXAMETHASONE SODIUM PHOSPHATE 10 MG/ML
10 INJECTION, SOLUTION INTRAMUSCULAR; INTRAVENOUS ONCE
Status: COMPLETED | OUTPATIENT
Start: 2024-10-07 | End: 2024-10-07

## 2024-10-07 RX ORDER — SODIUM CHLORIDE 0.9 % (FLUSH) 0.9 %
10 SYRINGE (ML) INJECTION AS NEEDED
Status: CANCELLED | OUTPATIENT
Start: 2024-10-07

## 2024-10-07 RX ORDER — LIDOCAINE HYDROCHLORIDE 10 MG/ML
1 INJECTION, SOLUTION INFILTRATION; PERINEURAL ONCE AS NEEDED
Status: DISCONTINUED | OUTPATIENT
Start: 2024-10-07 | End: 2024-10-08 | Stop reason: HOSPADM

## 2024-10-07 RX ADMIN — IOPAMIDOL 10 ML: 408 INJECTION, SOLUTION INTRATHECAL at 12:13

## 2024-10-07 RX ADMIN — MIDAZOLAM 2 MG: 1 INJECTION INTRAMUSCULAR; INTRAVENOUS at 12:12

## 2024-10-07 RX ADMIN — DEXAMETHASONE SODIUM PHOSPHATE 10 MG: 10 INJECTION, SOLUTION INTRAMUSCULAR; INTRAVENOUS at 12:12

## 2024-10-07 NOTE — ANESTHESIA PROCEDURE NOTES
12:12 PM to 12:22 PM PAIN Epidural block      Patient reassessed immediately prior to procedure    Patient location during procedure: pain clinic  Indication:procedure for pain  Performed By  Anesthesiologist: Lynsey Carter MD  Preanesthetic Checklist  Completed: patient identified, risks and benefits discussed, surgical consent, monitors and equipment checked, pre-op evaluation and timeout performed  Additional Notes  Needle placement guided by fluoroscopy and confirmed with HEATH and contrast injection.     Diagnosis:  Post-Op Diagnosis Codes:      Post-Op Diagnosis Codes:     * Cervical radiculopathy [M54.12]    Sedation: Versed 2 mg  Sedation time: 12:12 PM to 12:22 PM  Prep:  Pt Position:prone  Sterile Tech:gloves, sterile barrier and mask  Prep:chlorhexidine gluconate and isopropyl alcohol  Monitoring:EKG, continuous pulse oximetry and blood pressure monitoring  Procedure:Sedation: yes     Approach:right paramedian  Guidance: fluoroscopy  Location:cervical  Level:C7-T1  Needle Type:Tuohy  Needle Gauge:20 G  Aspiration:negative  Medications:  Preservative Free Saline:3mL  Isovue:1mL  Comments:Dexamethasone 10 mg in epidural  Post Assessment:  Post-procedure: bandaid.  Pt Tolerance:patient tolerated the procedure well with no apparent complications  Complications:no

## 2024-10-07 NOTE — H&P
CHIEF COMPLAINT:   Chronic neck pain    HISTORY OF PRESENT ILLNESS:  Mr. Kat is a 53-year-old male who presents with a chief complaint of neck and bilateral upper extremity pain.  He was seen by Susannah Dobson in clinic who recommended trial of ZACH.  He has a history of ACDF 3-7 and a lumbar decompression L1-4.  His cervical MRI from 7/29/2024 was reviewed showing some postsurgical degenerative changes and neuroforaminal narrowing.  There is central canal and severe left neuroforaminal narrowing at C7-T1.  There is severe bilateral neuroforaminal narrowing at C6-7.     The patient's pain is located in the neck and radiates to the bilateral upper extremities.  Right shoulder is greater than left.  Their pain is a 1-8/10.  The symptom is described as constant aching, pressure, tightness.  The pain is stable in severity.  It is associated with numbness in his right arm.  He denies weakness  Their symptoms are exacerbated by movement, working, lifting and alleviated by rest.  The pain is severe enough it interferes with his activities of daily living including staying active, caring for himself, working.  Conservative treatment that the patient has attempted without significant relief of his symptoms include rest, heat, OTC medications, NSAIDs, lidocaine patch, muscle relaxer.            PAST MEDICAL HISTORY:  Current Outpatient Medications on File Prior to Encounter   Medication Sig Dispense Refill    baclofen (LIORESAL) 20 MG tablet Take 1 tablet by mouth 2 (Two) Times a Day.      busPIRone (BUSPAR) 10 MG tablet Take 1 tablet by mouth 2 (Two) Times a Day.      celecoxib (CeleBREX) 200 MG capsule Take 1 capsule by mouth Daily.      cetirizine (zyrTEC) 10 MG tablet Take 1 tablet by mouth Daily.      cholecalciferol (VITAMIN D3) 25 MCG (1000 UT) tablet Take 1 tablet by mouth Daily.      docusate sodium 100 MG capsule Take 1 capsule by mouth Daily.      FeroSul 325 (65 Fe) MG tablet Take 1 tablet by mouth Daily With  Breakfast.      gabapentin (NEURONTIN) 800 MG tablet Take 1 tablet by mouth 3 (Three) Times a Day. TYPICALLY TAKES TWICE DAILY      pantoprazole (PROTONIX) 40 MG EC tablet Take 1 tablet by mouth 2 (Two) Times a Day.  11    clotrimazole-betamethasone (LOTRISONE) 1-0.05 % cream Apply 1 Application topically to the appropriate area as directed As Needed.      Diclofenac Sodium (VOLTAREN) 1 % gel gel Apply 4 g topically to the appropriate area as directed As Needed.      lidocaine (LIDODERM) 5 % PLACE 1 PATCH ON THE SKIN AS DIRECTED BY PROVIDER DAILY. REMOVE & DISCARD PATCH WITHIN 12 HOURS OR AS DIRECTED BY MD 30 patch 2    mometasone (ELOCON) 0.1 % cream 1 Application As Needed.      NON FORMULARY Every Night. STATES DOES USE NIGHT INHALER:  INSTRUCTED TO BRING THIS DAY OF SURGEY      polyethylene glycol (MIRALAX) 17 GM/SCOOP powder Dissolve 17 g (1 capful) in liquid and drink by mouth 2 (Two) Times a Day As Needed for constipation 510 g 0    Potassium 75 MG tablet Take  by mouth As Needed. DURING THE SUMMER WHEN SWEATS MORE      sennosides-docusate (PERICOLACE) 8.6-50 MG per tablet Take 2 tablets by mouth Every Night. 20 tablet 0    zolpidem (AMBIEN) 10 MG tablet Take 1 tablet by mouth At Night As Needed.  2    [DISCONTINUED] acetaminophen (TYLENOL) 325 MG tablet Take 2 tablets by mouth Every 4 (Four) Hours As Needed for Mild Pain.      [DISCONTINUED] HYDROcodone-acetaminophen (NORCO) 7.5-325 MG per tablet Take 1 tablet by mouth Every 6 (Six) Hours As Needed for Moderate Pain. 40 tablet 0    [DISCONTINUED] methocarbamol (ROBAXIN) 750 MG tablet Take 1 tablet by mouth Every 6 (Six) Hours As Needed for Muscle Spasms. 40 tablet 2     No current facility-administered medications on file prior to encounter.       Past Medical History:   Diagnosis Date    Anesthesia complication     PT STATES HE IS SLOW TO WAKE UP    Arthritis     COPD (chronic obstructive pulmonary disease)     STATES WHEEZES AT NIGHT AND DOES USE NIGHTLY  INHALER    DDD (degenerative disc disease), cervical     DDD (degenerative disc disease), lumbar     PAIN IN RIGHT BUTTOCK AND DOWN AT TIMES RIGHT LEG, WEAKNESS RIGHT LEG AT TIMES    GERD (gastroesophageal reflux disease)     Psoriasis     Skin cancer     BASAL/SQUAMOUS         SOCIAL HISTORY:  Negative tobacco product use    REVIEW OF SYSTEMS:  No hematologic infectious or constitutional symptoms  Other review of systems non-contributory  Negative screen for ANDREINA      PHYSICAL EXAM:  /96 (BP Location: Left arm, Patient Position: Sitting)   Pulse 60   Temp 36.3 °C (97.3 °F) (Infrared)   Resp 14   SpO2 99%   Well-developed, well-nourished, no acute distress  Alert and oriented ×3  Extra ocular movements intact  Airway: Mallampati 2  Unlabored respirations  Extremities warm and well-perfused  5 out of 5 strength bilateral upper and lower extremities  Cervical spine without obvious deformities or ecchymoses  Cervical spine nontender to palpation      DIAGNOSIS:  Post-Op Diagnosis Codes:     * Cervical radiculopathy [M54.12]    PLAN:  1.  Cervical 7 epidural steroid injections, up to 3. If pain control is acceptable after 1 or 2 injections, it was discussed with the patient that they may return for the subsequent injections if and when their pain returns.  The risks were discussed with the patient including failure of relief, worsening pain, Headache (post dural puncture headache), bleeding (epidural hematoma) and infection (epidural abscess or skin infection).  2.  Physical therapy exercises at home as prescribed by physical therapy or from the pain clinic handout.  Continuation of these exercises every day, or multiple times per week, even when the patient has good pain relief, was stressed to the patient as a preventative measure to decrease the frequency and severity of future pain episodes.  3.  Continue pain medicines as already prescribed.  If patient not currently taking any, it is recommended to begin  Acetaminophen 1000 mg po q 8 hours.  If other medicines containing Acetaminophen are currently prescribed, maintain daily dose at 3000 mg.    4.  If they can tolerate NSAIDS, it is recommended to take Ibuprofen 600 mg po q 6 hours for 7 days during pain exacerbations.  Alternatively, they may substitute an NSAID of their choice (e.g. Aleve).  This may be taken at the same time as Acetaminophen.  5.  Heat and ice to the affected area as tolerated for pain control.    6.  Daily low impact exercise such as walking or water exercise was recommended to maintain overall health and aid in weight control.   7.  Follow up as needed for subsequent injections.

## 2024-11-25 ENCOUNTER — OFFICE VISIT (OUTPATIENT)
Dept: NEUROSURGERY | Facility: CLINIC | Age: 54
End: 2024-11-25
Payer: COMMERCIAL

## 2024-11-25 VITALS
HEIGHT: 68 IN | BODY MASS INDEX: 29.4 KG/M2 | SYSTOLIC BLOOD PRESSURE: 126 MMHG | WEIGHT: 194 LBS | DIASTOLIC BLOOD PRESSURE: 82 MMHG | RESPIRATION RATE: 20 BRPM

## 2024-11-25 DIAGNOSIS — Z98.1 HISTORY OF FUSION OF CERVICAL SPINE: ICD-10-CM

## 2024-11-25 DIAGNOSIS — M79.601 BILATERAL ARM PAIN: Primary | ICD-10-CM

## 2024-11-25 DIAGNOSIS — M54.12 CERVICAL RADICULOPATHY AT C7: ICD-10-CM

## 2024-11-25 DIAGNOSIS — M48.02 FORAMINAL STENOSIS OF CERVICAL REGION: ICD-10-CM

## 2024-11-25 DIAGNOSIS — M79.602 BILATERAL ARM PAIN: Primary | ICD-10-CM

## 2024-11-25 RX ORDER — DIAZEPAM 5 MG/1
5 TABLET ORAL 2 TIMES DAILY PRN
Qty: 2 TABLET | Refills: 0 | Status: SHIPPED | OUTPATIENT
Start: 2024-11-25

## 2024-11-25 RX ORDER — METHYLPREDNISOLONE 4 MG/1
TABLET ORAL
Qty: 21 TABLET | Refills: 0 | Status: SHIPPED | OUTPATIENT
Start: 2024-11-25

## 2024-11-25 NOTE — PROGRESS NOTES
Subjective   Patient ID: Rafael Kat is a 53 y.o. male is here today for 3 month follow-up post MARILEE  done on 10/07/2024. Patient was last seen on 8/29/2024 for foraminal stenosis of cervical region.    History of Present Illness    He was last seen in the office in August 2024 for further evaluation of bilateral rating arm pain into the hand and fingers with numbness and tingling.  He was referred to pain management for cervical epidural injections.  He underwent left carpal tunnel and cubital tunnel decompression with Dr. Reg Beltran at [facility] on September 24, 2024.    Today, the patient reports that he is not any better after having L carpal and cubital tunnel release surgery.  He continues report significant pain in the left hand with numbness and tingling.  He thinks that most of his problems are coming from his cervical spine.  He currently denies any problems with the right arm or hand.  He has pain that radiates down the left arm to the point that it becomes difficult to use.  This past weekend, he killed and cut up to hogs by himself.  This resulted in severe pain in his posterior neck and upper back, and worsen the discomfort in the left arm and hand.  He states that he took some hydrocodone from his prior cervical fusion, in addition to baclofen, but this did not help.  He is requesting a Medrol Dosepak.  He is ready to move forward with a surgical discussion.  He has had prior C3-C7 anterior cervical fusion with Dr. PACK in April 2016.    His recent cervical MRI was in July.  However, he does feel that his symptoms have worsened since then, specifically with the left arm and hand pain.  He feels weaker in the left hand specifically with his .  He has had  joint replacement in the metacarpal joint of the left index finger that has resulted in decreased mobility.    He did get some relief after the cervical epidural on October 7, but unfortunately the pain returned a week later.  He continues  "to take Celebrex once a day, 800 mg of gabapentin 3 times daily and muscle relaxers as needed.    Presents with his wife.      The following portions of the patient's history were reviewed and updated as appropriate: allergies, current medications, past family history, past medical history, past social history, past surgical history, and problem list.        /82 (BP Location: Left arm, Patient Position: Sitting, Cuff Size: Adult)   Resp 20   Ht 172.7 cm (67.99\")   Wt 88 kg (194 lb)   BMI 29.50 kg/m²       Objective     Vitals:    11/25/24 0953   BP: 126/82   BP Location: Left arm   Patient Position: Sitting   Cuff Size: Adult   Resp: 20   Weight: 88 kg (194 lb)   Height: 172.7 cm (67.99\")   PainSc:   4   PainLoc: Neck     Body mass index is 29.5 kg/m².    Tobacco Use: Medium Risk (11/25/2024)    Patient History     Smoking Tobacco Use: Former     Smokeless Tobacco Use: Never     Passive Exposure: Past          Physical Exam  Vitals reviewed.   Constitutional:       Appearance: Normal appearance.   HENT:      Head: Atraumatic.   Pulmonary:      Effort: Pulmonary effort is normal.   Musculoskeletal:         General: Tenderness (Tender in the bilateral trapezius muscles) and deformity (Left metacarpal joint deformity in the index finger, enlarged) present. Normal range of motion.      Comments: Full cervical range of motion, pain with left lateral rotation and flexion, as well as extension  Strength equal and intact bilateral upper extremities with normal muscle bulk and tone, with the exception of trace  weakness on the left   Skin:     General: Skin is warm and dry.      Comments: Well-healed left carpal tunnel surgical site on the left palm   Neurological:      Mental Status: He is alert and oriented to person, place, and time.      GCS: GCS eye subscore is 4. GCS verbal subscore is 5. GCS motor subscore is 6.      Sensory: No sensory deficit.      Motor: Weakness (Trace  weakness on the left) " present. No tremor.      Gait: Gait normal.      Deep Tendon Reflexes:      Reflex Scores:       Tricep reflexes are 2+ on the right side and 2+ on the left side.       Bicep reflexes are 2+ on the right side and 2+ on the left side.       Brachioradialis reflexes are 2+ on the right side and 2+ on the left side.     Comments: Stable upright gait, nonantalgic   Psychiatric:         Mood and Affect: Mood normal.         Neurological:      Mental Status: He is alert and oriented to person, place, and time.       Neurological Exam  Mental Status  Alert. Oriented to person, place, and time.    Reflexes                                            Right                      Left  Brachioradialis                    2+                         2+  Biceps                                 2+                         2+  Triceps                                2+                         2+    Coordination  No tremor    Gait   Normal gait.      Mental Status  Alert. Oriented to person, place, and time.            Assessment & Plan   Independent Review of Radiographic Studies:      I personally reviewed the images from the following studies.    Cervical MRI obtained in July 2024 revealed postoperative changes from C3-C7 with anterior fusion.  C5-6 he was found to have mild right greater than left spurring with facet arthropathy resulting in mild to moderate bilateral foraminal stenosis.  At C6-7 there is severe bilateral foraminal stenosis but no central canal narrowing.  At C7-T1 there is severe left and mild right foraminal stenosis, but only mild canal narrowing.    Medical Decision Making:      Office today for ongoing follow-up with history of foraminal stenosis in the cervical spine and resulting left-sided arm and hand pain with paresthesias. he was hoping for significant pain improvement in the left hand and fingers after undergoing carpal and cubital tunnel release surgery in September.  Unfortunately, he is now reporting  worsening pain in the left hand.  He is ready to move forward with surgical decompression.  He has already gone C3-C7 ACDF with Dr. PACK in 2016.  Recent cervical MRI in July revealed severe bilateral foraminal stenosis at C6-7 as well as severe left foraminal stenosis at C7-T1.  At these levels would certainly correlate with the hand and finger symptoms.  I have ordered new cervical MRI with and without contrast due to the fact that he feels that his symptoms have worsened since the last study was obtained.  Also want him to have the most up-to-date imaging before undergoing a surgical discussion with Dr. PACK.  I have sent the patient a Medrol Dosepak.  He will remain on the Celebrex, gabapentin and muscle relaxers in the interim.  He will call with any additional questions or concerns.  He is agreeable to see Dr. PACK at any time, including on a Saturday.    Plan: Cervical MRI with and without contrast, prescriptions for Valium and Medrol Dosepak sent to pharmacy to be taken as directed, follow-up as scheduled    Diagnoses and all orders for this visit:    1. Bilateral arm pain (Primary)  -     MRI Cervical Spine With & Without Contrast; Future  -     diazePAM (VALIUM) 5 MG tablet; Take 1 tablet by mouth 2 (Two) Times a Day As Needed for Anxiety.  Dispense: 2 tablet; Refill: 0    2. Cervical radiculopathy at C7  -     MRI Cervical Spine With & Without Contrast; Future  -     diazePAM (VALIUM) 5 MG tablet; Take 1 tablet by mouth 2 (Two) Times a Day As Needed for Anxiety.  Dispense: 2 tablet; Refill: 0    3. Foraminal stenosis of cervical region  -     MRI Cervical Spine With & Without Contrast; Future  -     diazePAM (VALIUM) 5 MG tablet; Take 1 tablet by mouth 2 (Two) Times a Day As Needed for Anxiety.  Dispense: 2 tablet; Refill: 0    4. History of fusion of cervical spine  -     MRI Cervical Spine With & Without Contrast; Future  -     diazePAM (VALIUM) 5 MG tablet; Take 1 tablet by mouth 2 (Two) Times a Day As Needed  for Anxiety.  Dispense: 2 tablet; Refill: 0    Other orders  -     methylPREDNISolone (MEDROL) 4 MG dose pack; Take as directed on package instructions.  Dispense: 21 tablet; Refill: 0      Return for Follow up as scheduled.

## 2024-12-18 NOTE — PROGRESS NOTES
Subjective   Patient ID: Rafael Kat is a 54 y.o. male is here today for follow-up  after MRI done at [facility]    He is with his wife.  It has been over a year since I did a lumbar decompression from L2-L5 without fusion.  Eventually came around although it did take a while for his sciatic pain to get better.  Earlier this year he began developing some increased neck pain and some numbness and tingling running down the arms and the hands, worse on the left and some progressive weakness in both hands.  He was found on EMG/NCS to have some left carpal tunnel syndrome and left cubital tunnel syndrome.  He underwent surgery by Dr. Beltran on both of those problems earlier.  The left carpal tunnel release was complicated by postoperative infection which has cleared up but he continues to have burning pain in his hand.  The elbow seems to have recovered better.  But he also was found along the way to have adjacent level cervical stenosis at C7-T1 which I think is a major contributing factor to, not the only factor however.  He does have a history of COPD and has been wheezing more.  He tells me that after his anterior surgery in 2016 he is always have some dysphagia but it has gotten worse.  We have not looked at that recently and I suggested that we get a swallowing study and have him see ENT.  Will have to have a pulmonologist look at his COPD and wheezing.  But as far as his hand function goes it is declining any is having more difficulty using his arms and hands.  He will eventually need a C7-T1 decompression and extension of his fusion from C3-T3 posteriorly.  I think though that we need to get the medical issues sorted out as well as a dysphagia    History of Present Illness    The following portions of the patient's history were reviewed and updated as appropriate: allergies, current medications, past family history, past medical history, past social history, past surgical history, and problem  list.    Review of Systems   Constitutional:  Negative for fever.   Musculoskeletal:  Positive for back pain. Negative for gait problem.   Neurological:  Positive for weakness and numbness.   All other systems reviewed and are negative.      Objective   Physical Exam  Constitutional:       General: He is awake.      Appearance: He is well-developed.   HENT:      Head: Normocephalic and atraumatic.   Eyes:      General: Lids are normal.      Extraocular Movements: Extraocular movements intact.      Conjunctiva/sclera: Conjunctivae normal.      Pupils: Pupils are equal, round, and reactive to light.   Neck:      Vascular: No carotid bruit.   Neurological:      Mental Status: He is alert.      Coordination: Coordination is intact.      Deep Tendon Reflexes:      Reflex Scores:       Tricep reflexes are 2+ on the right side and 2+ on the left side.       Bicep reflexes are 2+ on the right side and 2+ on the left side.       Brachioradialis reflexes are 2+ on the right side and 2+ on the left side.       Patellar reflexes are 2+ on the right side and 2+ on the left side.       Achilles reflexes are 2+ on the right side and 2+ on the left side.  Psychiatric:         Speech: Speech normal.       Neurological Exam  Mental Status  Awake and alert. Oriented only to person, place, time and situation. Recent and remote memory are intact. Speech is normal. Language is fluent with no aphasia. Attention and concentration are normal. Fund of knowledge is appropriate for level of education.    Cranial Nerves  CN II: Visual acuity is normal. Visual fields full to confrontation.  CN III, IV, VI: Extraocular movements intact bilaterally. Normal lids and orbits bilaterally. Pupils equal round and reactive to light bilaterally.  CN V: Facial sensation is normal.  CN VII: Full and symmetric facial movement.  CN IX, X: Palate elevates symmetrically. Normal gag reflex.  CN XI: Shoulder shrug strength is normal.  CN XII: Tongue midline  without atrophy or fasciculations.    Motor  Normal muscle bulk throughout. Normal muscle tone.                                               Right                     Left  Rhomboids                            5                          5  Infraspinatus                          5                          5  Supraspinatus                       5                          5  Deltoid                                   5                          5   Biceps                                   5                          5  Brachioradialis                      5                          5   Triceps                                  5                          5   Pronator                                5                          5   Supinator                              5                           5   Wrist flexor                            5                          5   Wrist extensor                       5                          5   Finger flexor                          5                          5   Finger extensor                     5                          5   Interossei                              4                          4   Abductor pollicis brevis         5                          5   Flexor pollicis brevis             5                          5   Opponens pollicis                 4                          4  Extensor digitorum               5                          5  Abductor digiti minimi           5                          5   Abdominal                            5                          5  Glutei                                    5                          5  Hip abductor                         5                          5  Hip adductor                         5                          5   Iliopsoas                               5                          5   Quadriceps                           5                          5   Hamstring                             5                          5    Gastrocnemius                     5                           5   Anterior tibialis                      5                          5   Posterior tibialis                    5                          5   Peroneal                               5                          5  Ankle dorsiflexor                   5                          5  Ankle plantar flexor              5                           5  Extensor hallucis longus      5                           5    Sensory  Light touch is normal in upper and lower extremities. Proprioception is normal in upper and lower extremities.     Reflexes                                            Right                      Left  Brachioradialis                    2+                         2+  Biceps                                 2+                         2+  Triceps                                2+                         2+  Finger flex                           2+                         2+  Hamstring                            2+                         2+  Patellar                                2+                         2+  Achilles                                2+                         2+    Coordination    Finger-to-nose, rapid alternating movements and heel-to-shin normal bilaterally without dysmetria.    Gait  Casual gait is normal including stance, stride, and arm swing.Normal toe walking. Normal heel walking. Normal tandem gait.       Assessment & Plan   Independent Review of Radiographic Studies:      The cervical MRI done on 12/9/2024 was reviewed.  He is fused solidly from C3-C7 but he does have C7-T1 adjacent level stenosis with root compression.  Agree with the report.    Medical Decision Making:      He will eventually need to have a C7-T1 decompression with a C3-T3 posterior fusion but I would like pulmonology to see him in regards to his COPD and his wheezing.  As far as the dysphagia goes, we will get a swallowing study and have him see ENT.   Will have him come back to see me after these issues have been looked at and we can decide when to move forward with the spinal decompression and fusion.      Diagnoses and all orders for this visit:    1. Chronic obstructive pulmonary disease, unspecified COPD type (Primary)  -     Ambulatory Referral to Pulmonology    2. Dysphagia, unspecified type  -     FL Video Swallow With Speech; Future  -     Ambulatory Referral to ENT (Otolaryngology)    3. History of fusion of cervical spine  -     XR spine cervical ap and lat w flex and ext; Future    4. Spinal stenosis of cervicothoracic region  -     XR spine cervical ap and lat w flex and ext; Future      Return in about 4 weeks (around 1/20/2025) for After consults and x-rays,.

## 2024-12-23 ENCOUNTER — OFFICE VISIT (OUTPATIENT)
Dept: NEUROSURGERY | Facility: CLINIC | Age: 54
End: 2024-12-23
Payer: COMMERCIAL

## 2024-12-23 VITALS
BODY MASS INDEX: 29.4 KG/M2 | WEIGHT: 194 LBS | HEIGHT: 68 IN | RESPIRATION RATE: 20 BRPM | SYSTOLIC BLOOD PRESSURE: 118 MMHG | DIASTOLIC BLOOD PRESSURE: 72 MMHG

## 2024-12-23 DIAGNOSIS — J44.9 CHRONIC OBSTRUCTIVE PULMONARY DISEASE, UNSPECIFIED COPD TYPE: Primary | ICD-10-CM

## 2024-12-23 DIAGNOSIS — R13.10 DYSPHAGIA, UNSPECIFIED TYPE: ICD-10-CM

## 2024-12-23 DIAGNOSIS — Z98.1 HISTORY OF FUSION OF CERVICAL SPINE: ICD-10-CM

## 2024-12-23 DIAGNOSIS — M48.03 SPINAL STENOSIS OF CERVICOTHORACIC REGION: ICD-10-CM

## 2024-12-23 PROCEDURE — 99214 OFFICE O/P EST MOD 30 MIN: CPT | Performed by: NEUROLOGICAL SURGERY

## 2024-12-23 PROCEDURE — 1159F MED LIST DOCD IN RCRD: CPT | Performed by: NEUROLOGICAL SURGERY

## 2024-12-23 PROCEDURE — 1160F RVW MEDS BY RX/DR IN RCRD: CPT | Performed by: NEUROLOGICAL SURGERY

## 2025-01-03 ENCOUNTER — TELEPHONE (OUTPATIENT)
Dept: NEUROSURGERY | Facility: CLINIC | Age: 55
End: 2025-01-03

## 2025-01-03 NOTE — TELEPHONE ENCOUNTER
Patient's spouse called and is confused about when the patient is going to do the FL Video Swallow With Speech. Please advise

## 2025-01-13 ENCOUNTER — TELEPHONE (OUTPATIENT)
Dept: PAIN MEDICINE | Facility: HOSPITAL | Age: 55
End: 2025-01-13
Payer: COMMERCIAL

## 2025-01-13 NOTE — TELEPHONE ENCOUNTER
At it's best, what percentage of pain relief did you receive after your last treatment and how long did it last? (0% to 100%)   60%    What would you rate your pain on a 0-10 scale?  Prior to your last procedure:  8-9  For the first 2-3 weeks following your last procedure:  3  Currently: 5    Was your pain improved by at least 50% for 3 months after your last procedure (yes/no):  YES    Describe the location of your pain and if it radiates:    NECK, DOWN SHOULDER (BOTH)     Has your function improved?  Tell us how.    (Can sit/stand longer or walk further?  Complete daily activities more easily?  Have been able to go back to work?)  YES    Are there things you would like or need to do that you can't because of your pain?  YES      Have you tried/continued to try any other treatments? (Chiropractor, PT, massage, yoga, back exercises, heat/ice)  HEAT  HOME EXERCISES    What medications are you currently taking to help with your pain and how often?    OTC AS NEEDED   CELEBREX      Is there anything else you'd like to tell us that has improved since your last procedure?  ANY ACTIVITY FLARES IT UP

## 2025-01-15 ENCOUNTER — HOSPITAL ENCOUNTER (OUTPATIENT)
Dept: GENERAL RADIOLOGY | Facility: HOSPITAL | Age: 55
Discharge: HOME | End: 2025-01-15
Payer: COMMERCIAL

## 2025-01-15 ENCOUNTER — TELEPHONE (OUTPATIENT)
Dept: NEUROSURGERY | Facility: CLINIC | Age: 55
End: 2025-01-15
Payer: COMMERCIAL

## 2025-01-15 DIAGNOSIS — R13.10 DYSPHAGIA, UNSPECIFIED TYPE: ICD-10-CM

## 2025-01-15 DIAGNOSIS — M48.03 SPINAL STENOSIS OF CERVICOTHORACIC REGION: ICD-10-CM

## 2025-01-15 DIAGNOSIS — Z98.1 HISTORY OF FUSION OF CERVICAL SPINE: ICD-10-CM

## 2025-01-15 PROCEDURE — 74230 X-RAY XM SWLNG FUNCJ C+: CPT

## 2025-01-15 PROCEDURE — 72050 X-RAY EXAM NECK SPINE 4/5VWS: CPT

## 2025-01-15 PROCEDURE — 92611 MOTION FLUOROSCOPY/SWALLOW: CPT

## 2025-01-15 RX ADMIN — BARIUM SULFATE 55 ML: 0.81 POWDER, FOR SUSPENSION ORAL at 10:42

## 2025-01-15 RX ADMIN — BARIUM SULFATE 4 ML: 980 POWDER, FOR SUSPENSION ORAL at 10:42

## 2025-01-15 RX ADMIN — BARIUM SULFATE 50 ML: 400 SUSPENSION ORAL at 10:42

## 2025-01-15 RX ADMIN — BARIUM SULFATE 5 ML: 400 PASTE ORAL at 10:42

## 2025-01-15 NOTE — TELEPHONE ENCOUNTER
Caller: MARY KAY CHEN    Relationship: WIFE    Best call back number: [phone]    What orders are you requesting (i.e. lab or imaging): ENT REFERRAL    In what timeframe would the patient need to come in: ASAP    Where will you receive your lab/imaging services: [facility], DR MELINA REGALADO    Additional notes: WIFE STATES PER THE ENT'S OFFICE, THEY DIDN'T RECEIVE THE REFERRAL.  IF WE COULD PLEASE SEND AGAIN.    [phone] FAX NUMBER FOR DR REGALADO

## 2025-01-15 NOTE — MBS/VFSS/FEES
Outpatient Speech Language Pathology   Adult Swallow Initial Evaluation  [facility]     Patient Name: Rafael Kat  : 1970  MRN: [id number]  Today's Date: 1/15/2025         Visit Date: 01/15/2025   Patient Active Problem List   Diagnosis    Degeneration of intervertebral disc of mid-cervical region    Chronic pain    Cervical disc disorder with radiculopathy of mid-cervical region    DDD (degenerative disc disease), lumbar    Other cervical disc degeneration, high cervical region    Neck pain    Pain in thoracic spine    Muscle spasm of right shoulder    History of fusion of cervical spine    Spinal stenosis of lumbar region with neurogenic claudication    Tear of right acetabular labrum    Spinal instability of lumbar region    Spondylolisthesis at L3-L4 level    Muscle ache    Foraminal stenosis of cervical region    Cervical radiculopathy at C7    Bilateral arm pain    Chronic obstructive pulmonary disease    Dysphagia    Spinal stenosis of cervicothoracic region        Past Medical History:   Diagnosis Date    Anesthesia complication     PT STATES HE IS SLOW TO WAKE UP    Arthritis     COPD (chronic obstructive pulmonary disease)     STATES WHEEZES AT NIGHT AND DOES USE NIGHTLY INHALER    DDD (degenerative disc disease), cervical     DDD (degenerative disc disease), lumbar     PAIN IN RIGHT BUTTOCK AND DOWN AT TIMES RIGHT LEG, WEAKNESS RIGHT LEG AT TIMES    GERD (gastroesophageal reflux disease)     Psoriasis     Skin cancer     BASAL/SQUAMOUS        Past Surgical History:   Procedure Laterality Date    ANTERIOR CHANNEL VERTEBRECTOMY/CORPECTOMY Bilateral 2016    Procedure: C4-6 ANTERIOR CHANNEL VERTEBRECTOMY/CORPECTOMY WITH INSTRUMENTATION;  Surgeon: Bert Collado MD;  Location: [facility] OR;  Service:     COLONOSCOPY      ELBOW PROCEDURE Right     UNCLEAR:  RIGHT ELBOW AREA    FINGER SURGERY Left     INDEX FINGER    LUMBAR EPIDURAL INJECTION      STATES X3    LUMBAR LAMINECTOMY  DISCECTOMY DECOMPRESSION Bilateral 11/28/2023    Procedure: Open bilateral lumbar decompression lumbar one/two, lumbar two/three, lumbar three/four;  Surgeon: Bert Collado MD;  Location: [facility] MAIN OR;  Service: Neurosurgery;  Laterality: Bilateral;    MENISCECTOMY Right     UPPER GASTROINTESTINAL ENDOSCOPY  03/2021         Visit Dx:     ICD-10-CM ICD-9-CM   1. Dysphagia, unspecified type  R13.10 787.20            OP SLP Assessment/Plan - 01/15/25 1746          SLP Assessment    Functional Problems Swallowing  -AW    Impact on Function: Swallowing Risk of aspiration  -AW    Clinical Impression: Swallowing Mild:;oropharyngeal phase dysphagia  -AW    SLP Diagnosis Mild oropharyngeal dysphagia  -AW    Prognosis Good (comment)  -AW    Patient would benefit from skilled therapy intervention Yes  -AW       SLP Plan    Plan Comments Pt declined outpatient ST at this time  -AW              User Key  (r) = Recorded By, (t) = Taken By, (c) = Cosigned By      Initials Name Provider Type    AW Kelsey Bolivar, SLP Speech and Language Pathologist                     SLP Adult Swallow Evaluation       Row Name 01/15/25 1100       Rehab Evaluation    Document Type evaluation  -AW    Subjective Information no complaints  -AW    Patient Observations alert;cooperative;agree to therapy  -AW    Patient Effort good  -AW    Symptoms Noted During/After Treatment none  -AW       General Information    Patient Profile Reviewed yes  -AW    Pertinent History Of Current Problem Pt referred for VFSS due to having difficulty swallowing, stating it feels like food/pills get stuck. PMH: ACDF C3-7 in 2016, GERD, COPD. Pt has plans for another spine fusion in near future.  -AW    Current Method of Nutrition regular textures;thin liquids  -AW    Precautions/Limitations, Vision WFL  -AW    Precautions/Limitations, Hearing WFL  -AW    Prior Level of Function-Communication WFL  -AW    Prior Level of Function-Swallowing no diet consistency  restrictions  -AW    Plans/Goals Discussed with patient;agreed upon  -AW    Barriers to Rehab none identified  -AW    Patient's Goals for Discharge eat/drink without coughing/choking  -AW       Pain    Pretreatment Pain Rating 2/10  -AW    Posttreatment Pain Rating 2/10  -AW    Pain Location neck;other (see comments)  chronic  -AW       Oral Motor Structure and Function    Dentition Assessment natural, present and adequate  -AW    Secretion Management WNL/WFL  -AW    Mucosal Quality moist, healthy  -AW       Oral Musculature and Cranial Nerve Assessment    Oral Motor General Assessment WFL  -AW    Vocal Impairment, Detail. Cranial Nerve X (Vagus) vocal quality abnormality (see comments);other (see comments)  raspy, vocal raines  -AW       General Eating/Swallowing Observations    Respiratory Support Currently in Use room air  -AW    Eating/Swallowing Skills self-fed  -AW    Positioning During Eating upright in chair  -AW       MBS/VFSS    Utensils Used spoon;cup;straw  -AW    Consistencies Trialed regular textures;soft to chew textures;mixed consistency;pureed;thin liquids;nectar/syrup-thick liquids;honey-thick liquids  -AW       MBS/VFSS Interpretation    VFSS Summary VFSS completed with Peyton SHELBY. Cervical hardware noted. Pt exhibited mild oropharyngeal dysphagia characterized by mistiming/delayed swallow, decreased hyolaryngeal excursion, and absent epiglottic deflection. Pt took trials of thin (cup/straw), nectar (cup), honey thick (cup), pureed, soft, mixed, and regular solids. Spillage to the pyriforms occurred with all consistencies. Pt had trace penetration accross all consistencies, usually transient, cleared with a subsequent swallow. The penetration was more with straw sips of thin and was deeper. No aspiration occurred during the study. Mild residuals (tongue base, valleculae, pyriforms) were noted. Pt swallowed spontaneously to clear. With solids, however, pt often had difficulty initiating the  swallow in a timely manner and throat cleared/coughed material back into oral cavity and reswallowed. Education completed with pt after the study. Discussed swallow strategies and provided a couple of exercises to complete. Also recommended EMST and outpatient ST. Pt declined at this time, but agreed to look at EMST website.  -AW       SLP Communication to Radiology    Summary Statement VFSS completed with Peyton SHELBY. Cervical hardware noted. Pt exhibited mild oropharyngeal dysphagia characterized by mistiming/delayed swallow, decreased hyolaryngeal excursion, and absent epiglottic deflection. Pt took trials of thin (cup/straw), nectar (cup), honey thick (cup), pureed, soft, mixed, and regular solids. Spillage to the pyriforms occurred with all consistencies. Pt had trace penetration accross all consistencies, usually transient, cleared with a subsequent swallow. The penetration was more with straw sips of thin and was deeper. No aspiration occurred during the study. Mild residuals (tongue base, valleculae, pyriforms) were noted. Pt swallowed spontaneously to clear. With solids, however, pt often had difficulty initiating the swallow in a timely manner and throat cleared/coughed material back into oral cavity and reswallowed.  -AW       SLP Evaluation Clinical Impression    SLP Swallowing Diagnosis mild;oral dysphagia;pharyngeal dysphagia  -AW    Functional Impact risk of aspiration/pneumonia  -AW    Swallow Criteria for Skilled Therapeutic Interventions Met patient/family declined skilled intervention at this time;other (see comments)  declined OP tx  -AW       Recommendations    Therapy Frequency (Swallow) evaluation only  -AW    SLP Diet Recommendation regular textures;thin liquids  -AW    Recommended Precautions and Strategies upright posture during/after eating;small bites of food and sips of liquid;no straw;multiple swallows per bite of food;multiple swallows per sip of liquid;alternate between small  bites of food and sips of liquid;volitional throat clear  -AW    Oral Care Recommendations Oral Care BID/PRN  -AW    SLP Rec. for Method of Medication Administration meds whole;with thin liquids;with puree;as tolerated  -AW    Monitor for Signs of Aspiration yes  -AW    Anticipated Discharge Disposition (SLP) home  -AW              User Key  (r) = Recorded By, (t) = Taken By, (c) = Cosigned By      Initials Name Provider Type    Kelsey Sanders SLP Speech and Language Pathologist                                   OP SLP Education       Row Name 01/15/25 3642       Education    Barriers to Learning No barriers identified  -AW    Education Provided Described results of evaluation;Patient expressed understanding of evaluation;Patient participated in establishing goals and treatment plan;Patient demonstrated recommended strategies  -AW    Assessed Learning needs;Learning motivation;Learning preferences;Learning readiness  -AW    Learning Motivation Strong  -AW    Learning Method Explanation;Demonstration;Teach back;Written materials  -AW    Teaching Response Verbalized understanding;Demonstrated understanding  -AW    Education Comments Education completed with pt after the study. Discussed swallow strategies and provided a couple of exercises to complete. Also recommended EMST and outpatient ST. Pt declined at this time, but agreed to look at EMST website.  -AW              User Key  (r) = Recorded By, (t) = Taken By, (c) = Cosigned By      Initials Name Effective Dates    Kelsey Sanders SLP 08/28/23 -                                Time Calculation:   SLP Start Time: 1000  SLP Stop Time: 1130  SLP Time Calculation (min): 90 min    Therapy Charges for Today       Code Description Service Date Service Provider Modifiers Qty    [id number] HC ST MOTION FLUORO EVAL SWALLOW 6 1/15/2025 Kelsey Bolivar SLP GN 1                     ANTONIETA Montemayor  1/15/2025

## 2025-01-16 ENCOUNTER — HOSPITAL ENCOUNTER (OUTPATIENT)
Dept: GENERAL RADIOLOGY | Facility: HOSPITAL | Age: 55
Discharge: HOME | End: 2025-01-16
Payer: COMMERCIAL

## 2025-01-16 ENCOUNTER — ANESTHESIA (OUTPATIENT)
Dept: PAIN MEDICINE | Facility: HOSPITAL | Age: 55
End: 2025-01-16
Payer: COMMERCIAL

## 2025-01-16 ENCOUNTER — HOSPITAL ENCOUNTER (OUTPATIENT)
Dept: PAIN MEDICINE | Facility: HOSPITAL | Age: 55
Discharge: HOME | End: 2025-01-16
Payer: COMMERCIAL

## 2025-01-16 ENCOUNTER — ANESTHESIA EVENT (OUTPATIENT)
Dept: PAIN MEDICINE | Facility: HOSPITAL | Age: 55
End: 2025-01-16
Payer: COMMERCIAL

## 2025-01-16 VITALS
HEART RATE: 63 BPM | RESPIRATION RATE: 14 BRPM | BODY MASS INDEX: 29.62 KG/M2 | DIASTOLIC BLOOD PRESSURE: 84 MMHG | WEIGHT: 200 LBS | TEMPERATURE: 97.8 F | SYSTOLIC BLOOD PRESSURE: 115 MMHG | HEIGHT: 69 IN | OXYGEN SATURATION: 95 %

## 2025-01-16 DIAGNOSIS — R52 PAIN: ICD-10-CM

## 2025-01-16 DIAGNOSIS — M54.12 CERVICAL RADICULOPATHY: ICD-10-CM

## 2025-01-16 PROCEDURE — 77003 FLUOROGUIDE FOR SPINE INJECT: CPT

## 2025-01-16 PROCEDURE — 25010000002 DEXAMETHASONE SODIUM PHOSPHATE 10 MG/ML SOLUTION: Performed by: STUDENT IN AN ORGANIZED HEALTH CARE EDUCATION/TRAINING PROGRAM

## 2025-01-16 PROCEDURE — 25510000001 IOPAMIDOL 41 % SOLUTION: Performed by: STUDENT IN AN ORGANIZED HEALTH CARE EDUCATION/TRAINING PROGRAM

## 2025-01-16 PROCEDURE — 25010000002 MIDAZOLAM PER 1 MG: Performed by: STUDENT IN AN ORGANIZED HEALTH CARE EDUCATION/TRAINING PROGRAM

## 2025-01-16 RX ORDER — SODIUM CHLORIDE 0.9 % (FLUSH) 0.9 %
10 SYRINGE (ML) INJECTION EVERY 12 HOURS SCHEDULED
Status: DISCONTINUED | OUTPATIENT
Start: 2025-01-16 | End: 2025-01-17 | Stop reason: HOSPADM

## 2025-01-16 RX ORDER — SODIUM CHLORIDE 0.9 % (FLUSH) 0.9 %
10 SYRINGE (ML) INJECTION AS NEEDED
Status: DISCONTINUED | OUTPATIENT
Start: 2025-01-16 | End: 2025-01-17 | Stop reason: HOSPADM

## 2025-01-16 RX ORDER — DEXAMETHASONE SODIUM PHOSPHATE 10 MG/ML
10 INJECTION, SOLUTION INTRAMUSCULAR; INTRAVENOUS ONCE
Status: COMPLETED | OUTPATIENT
Start: 2025-01-16 | End: 2025-01-16

## 2025-01-16 RX ORDER — SODIUM CHLORIDE 0.9 % (FLUSH) 0.9 %
1-10 SYRINGE (ML) INJECTION AS NEEDED
Status: DISCONTINUED | OUTPATIENT
Start: 2025-01-16 | End: 2025-01-17 | Stop reason: HOSPADM

## 2025-01-16 RX ORDER — MIDAZOLAM HYDROCHLORIDE 1 MG/ML
2 INJECTION, SOLUTION INTRAMUSCULAR; INTRAVENOUS AS NEEDED
Status: DISCONTINUED | OUTPATIENT
Start: 2025-01-16 | End: 2025-01-17 | Stop reason: HOSPADM

## 2025-01-16 RX ORDER — LIDOCAINE HYDROCHLORIDE 10 MG/ML
1 INJECTION, SOLUTION INFILTRATION; PERINEURAL ONCE AS NEEDED
Status: DISCONTINUED | OUTPATIENT
Start: 2025-01-16 | End: 2025-01-17 | Stop reason: HOSPADM

## 2025-01-16 RX ORDER — IOPAMIDOL 408 MG/ML
12 INJECTION, SOLUTION INTRATHECAL
Status: COMPLETED | OUTPATIENT
Start: 2025-01-16 | End: 2025-01-16

## 2025-01-16 RX ORDER — SODIUM CHLORIDE 9 MG/ML
40 INJECTION, SOLUTION INTRAVENOUS AS NEEDED
Status: DISCONTINUED | OUTPATIENT
Start: 2025-01-16 | End: 2025-01-17 | Stop reason: HOSPADM

## 2025-01-16 RX ADMIN — IOPAMIDOL 10 ML: 408 INJECTION, SOLUTION INTRATHECAL at 10:54

## 2025-01-16 RX ADMIN — MIDAZOLAM 2 MG: 1 INJECTION INTRAMUSCULAR; INTRAVENOUS at 10:53

## 2025-01-16 RX ADMIN — DEXAMETHASONE SODIUM PHOSPHATE 10 MG: 10 INJECTION, SOLUTION INTRAMUSCULAR; INTRAVENOUS at 10:55

## 2025-01-16 NOTE — DISCHARGE INSTRUCTIONS
EPIDURAL STEROID INJECTION          An epidural steroid injection is a shot of steroid medicine and numbing medicine that is given into the space between the spinal cord and the bones of the back (epidural space).  The injection helps relieve pain by an irritated or swollen nerve root.    TELL YOUR HEALTH CARE PROVIDER ABOUT:  Any allergies you have  All medicines you are taking including any over the counter medicines  Any blood disorders you have  Any surgeries you have had  Any medical conditions you have  Whether you are pregnant or may be pregnant    WHAT ARE THE RISK?  Generally, this is a safe procedure. However,problems may occur, including  Headache  Bleeding  Infection  Allergic Reaction  Nerve Damage    WHAT CAN I EXPECT AFTER THE PROCEDURE?    INJECTION SITE  Remove the Band-Aid/s after 24 hours  Check your injection site every day for signs of infection.  Check for:             Redness             Bleeding (small amt is normal)             Warmth             Pus or bad odor  Some numbness may be experienced for several hours following the procedure.  Avoid using heat on the injection site for 24 hours. You may use ice intermittently if needed by placing a         towel between your skin and the ice bag and using the ice for 20 minutes 2-3 times a day.  Do not take baths, swim or use a hot tub for 24 hours.    ACTIVITY  No strenuous activity for 24 hours then return to normal activity as tolerated.  If your leg is numb, no driving until full sensation and strength has returned.    GENERAL INSTRUCTIONS:  The injection site may feel numb, use ice with caution if numbness is present and no heat for 24 hours or until numbness is gone.   If you have numbness or weakness in your arm or leg, use those areas with caution until normal sensation returns.  It is not uncommon to notice an increase in discomfort or a change in the location of discomfort for 3-4 days after the procedure.  If discomfort is noticed  at the injection site, ice may be            applied to that area for 20 min 2-3 times a day.  Take the pain medicine your physician has prescribed or over the counter pain relievers as long as you do not have any contraindications.  If you are a diabetic, monitor your blood sugar closely.  The steroids used in your procedure may increase your blood sugar level up to 36 hours after the injection.  If your blood sugar is greater than 250, call the physician that helps you monitor your blood sugar.  Keep all follow-up visits as scheduled by your health care provider. This is important.    CONTACT OUR OFFICE IF:  You have any of these signs of infection            -Redness, swelling, or warmth around your injection site.            -Fluid or blood coming from your injection site (small amt of blood is normal)            -Pus or a bad odor from your injection site            -A fever  You develop a severe headache or a stiff neck  You lose control of your bladder or bowel movements      [facility]NA[facility]EMENT [facility] HOURS   Monday-Friday 7:30 am. - 4:00 pm.  For any problem related to your procedure we can be reached at [phone].  If you experience an emergency with your procedure, call [phone] or go to the emergency room.      What is [employer]?  [employer] is a fitness and wellness program offered at no additional cost to seniors 65+ on eligible Medicare plans that helps you get active, get fit, and connect with others.  The program is designed for all levels and abilities and provides access to online and in-person classes, over 15,000 fitness locations, and health & wellness discounts.  Before starting any exercise program, consult with your primary care provider.  For additional information and to check eligibility:  [email or web address]                   Guide To Relieving And Avoiding Neck Pain    Exercise is important to help prevent and treat neck pain.  Good posture, exercise and avoiding injury  "will help to keep your neck healthy.    When the neck is strained or over worked symptoms may include headache, upper back pain, shoulder pain or arm pain.  Numbness or tingling in the fingers, dizziness or nausea may also occur.    Posture:    Avoid slumping over a desk.  Raise your work (including computer) to eye level to avoid bending at the neck.      Change Position Often:  Changing position prevents overuse of particular muscles.    Sleep On One Pillow:  Using to many pillows or to large of a pillow causes a \"kink\" in you neck.      Move and Exercise:  Living an active lifestyle is an important part of staying healthy.  Be sure to include the exercise to follow specifically for your neck.                    Range of Motion Exercises:  Do these exercises three times a day.  If you experience increased pain stop and contact your physician.  All exercises can be performed sitting or standing.        1.    2.   3.    1.  Place both hands behind you neck.  Gently tilt your neck backward so that you are looking at the ceiling.  Hold for a count of 10.    2.  Look straight facing forward.  Slowly tip your ear toward your right shoulder.  Do not force the motion.  Hold for a count of 10.  Bring head back to starting position and repeat to left side.    3.  Look straight facing forward.  Gently turn your head to the right.  Do not force the motion.  Hold for a count of 10.  Bring head back to starting position and repeat to the left side.    Exercises To Strengthen Muscles:  1.  Look straight facing forward.  Relax your shoulders.  Raise both shoulders toward your ears.  Hold for 3 seconds.       1.       2.   3.      1.  Look straight facing forward.  Relax your shoulders.  Raise both shoulders toward     2.  Raise your ars to your side and bend your elbows.  Squeeze shoulder blades together as you rotate your arms outward.  Hold for 5 seconds.    3.  Look straight facing forward.  Pull your head straight back.  Do " not tip or move your jaw.  Hold for 5 seconds.

## 2025-01-16 NOTE — ANESTHESIA PROCEDURE NOTES
PAIN Epidural block      Patient reassessed immediately prior to procedure    Patient location during procedure: pain clinic  Indication:procedure for pain  Performed By  Anesthesiologist: Lynsey Carter MD  Preanesthetic Checklist  Completed: patient identified, risks and benefits discussed, surgical consent, monitors and equipment checked, pre-op evaluation and timeout performed  Additional Notes  Needle placement guided by fluoroscopy and confirmed with HEATH and contrast injection.     Diagnosis:  Post-Op Diagnosis Codes:      Post-Op Diagnosis Codes:     * Cervical radiculopathy [M54.12]    Sedation: Versed 2 mg  Sedation time: 10:53-11:06 AM  Prep:  Pt Position:prone  Sterile Tech:gloves, sterile barrier and mask  Prep:chlorhexidine gluconate and isopropyl alcohol  Monitoring:EKG, continuous pulse oximetry and blood pressure monitoring  Procedure:Sedation: yes     Approach:right paramedian  Guidance: fluoroscopy  Location:cervical  Level:C7-T1  Needle Type:Tuohy  Needle Gauge:20 G  Aspiration:negative  Medications:  Preservative Free Saline:3mL  Isovue:1mL  Comments:Dexamethasone 10 mg in epidural  Post Assessment:  Post-procedure: bandaid.  Pt Tolerance:patient tolerated the procedure well with no apparent complications  Complications:no

## 2025-01-16 NOTE — H&P
INTERVAL HISTORY:    The patient returns for another cervical epidural steroid injection #2 today.  He follows with Dr. Collado.  He has a history of ACDF 3-7 and now has cervical stenosis at C7-T1.  Dr. PACK is considering fusing this level as well.  They have received at least 50% improvement since their last injection that lasted more than a month.    Today their pain is a 5-9 out of 10 in the neck towards the right side.  It is described as a intermittent crushing pain tightness, pressure, and numb right arm.  Although the last epidural only gave temporary relief, he wishes to have another epidural to temporize his pain while he is being optimized for his neck surgery.   The pain limits the patient's activities of daily living including: Staying active caring for himself, caring for the home    Conservative measures tried in the interim include: rest, heat, muscle relaxer, NSAID, Dosepak    Current Outpatient Medications on File Prior to Encounter   Medication Sig Dispense Refill    baclofen (LIORESAL) 20 MG tablet Take 1 tablet by mouth 2 (Two) Times a Day.      busPIRone (BUSPAR) 10 MG tablet Take 1 tablet by mouth 2 (Two) Times a Day.      celecoxib (CeleBREX) 200 MG capsule Take 1 capsule by mouth Daily.      cetirizine (zyrTEC) 10 MG tablet Take 1 tablet by mouth Daily.      cholecalciferol (VITAMIN D3) 25 MCG (1000 UT) tablet Take 1 tablet by mouth Daily.      clotrimazole-betamethasone (LOTRISONE) 1-0.05 % cream Apply 1 Application topically to the appropriate area as directed As Needed.      diazePAM (VALIUM) 5 MG tablet Take 1 tablet by mouth 2 (Two) Times a Day As Needed for Anxiety. 2 tablet 0    Diclofenac Sodium (VOLTAREN) 1 % gel gel Apply 4 g topically to the appropriate area as directed As Needed.      docusate sodium 100 MG capsule Take 1 capsule by mouth Daily.      FeroSul 325 (65 Fe) MG tablet Take 1 tablet by mouth Daily With Breakfast.      gabapentin (NEURONTIN) 800 MG tablet Take 1 tablet  by mouth 3 (Three) Times a Day. TYPICALLY TAKES TWICE DAILY      lidocaine (LIDODERM) 5 % PLACE 1 PATCH ON THE SKIN AS DIRECTED BY PROVIDER DAILY. REMOVE & DISCARD PATCH WITHIN 12 HOURS OR AS DIRECTED BY MD 30 patch 2    methylPREDNISolone (MEDROL) 4 MG dose pack Take as directed on package instructions. 21 tablet 0    mometasone (ELOCON) 0.1 % cream 1 Application As Needed.      NON FORMULARY Every Night. STATES DOES USE NIGHT INHALER:  INSTRUCTED TO BRING THIS DAY OF SURGEY      pantoprazole (PROTONIX) 40 MG EC tablet Take 1 tablet by mouth 2 (Two) Times a Day.  11    polyethylene glycol (MIRALAX) 17 GM/SCOOP powder Dissolve 17 g (1 capful) in liquid and drink by mouth 2 (Two) Times a Day As Needed for constipation 510 g 0    Potassium 75 MG tablet Take  by mouth As Needed. DURING THE SUMMER WHEN SWEATS MORE      sennosides-docusate (PERICOLACE) 8.6-50 MG per tablet Take 2 tablets by mouth Every Night. 20 tablet 0    zolpidem (AMBIEN) 10 MG tablet Take 1 tablet by mouth At Night As Needed.  2     Current Facility-Administered Medications on File Prior to Encounter   Medication Dose Route Frequency Provider Last Rate Last Admin    [COMPLETED] barium sulfate (VARIBAR PUDDING) oral paste 5 mL  5 mL Oral Once Bert Collado MD   5 mL at 01/15/25 1042    [COMPLETED] barium sulfate (VARIBAR THIN LIQUID) oral suspension 55 mL  55 mL Oral Once in imaging Bert Collado MD   55 mL at 01/15/25 1042    [COMPLETED] barium sulfate oral suspension 4 mL  4 mL Oral Once in imaging Bert Collado MD   4 mL at 01/15/25 1042    [COMPLETED] barium sulfate oral suspension 50 mL  50 mL Oral Once in imaging Bert Collado MD   50 mL at 01/15/25 1042    [COMPLETED] barium sulfate oral suspension 50 mL  50 mL Oral Once in imaging Bert Collado MD   50 mL at 01/15/25 1042       Past Medical History:   Diagnosis Date    Anesthesia complication     PT STATES HE IS SLOW TO WAKE UP    Arthritis     COPD (chronic  obstructive pulmonary disease)     STATES WHEEZES AT NIGHT AND DOES USE NIGHTLY INHALER    DDD (degenerative disc disease), cervical     DDD (degenerative disc disease), lumbar     PAIN IN RIGHT BUTTOCK AND DOWN AT TIMES RIGHT LEG, WEAKNESS RIGHT LEG AT TIMES    GERD (gastroesophageal reflux disease)     Psoriasis     Skin cancer     BASAL/SQUAMOUS       No hematologic infectious or constitutional symptoms  Negative screen for ANDREINA      Exam:  There were no vitals taken for this visit.  Alert and oriented  NCAT  RRR  Airway: Mallampati 2  Unlabored respirations  Extremities WWP        Diagnosis:  Post-Op Diagnosis Codes:     * Cervical radiculopathy [M54.12]    Plan: Cervical 7 epidural steroid injection under fluoroscopic guidance    I have encouraged them to continue:  1.  Physical therapy exercises at home as prescribed by physical therapy or from the pain clinic handout.  Continuation of these exercises every day, or multiple times per week, even when the patient has good pain relief, was stressed to the patient as a preventative measure to decrease the frequency and severity of future pain episodes.  2.  Continue pain medicines as already prescribed.  If patient not currently taking any, it is recommended to begin Acetaminophen 1000 mg po q 8 hours.  If other medicines containing Acetaminophen are currently prescribed, maintain daily dose at 3000mg.    3.  If they can tolerate NSAIDS, it is recommended to take Ibuprofen 600 mg po q 6 hours for 7 days during pain exacerbations.   Alternatively, they may substitute an NSAID of their choice (e.g. Aleve)  4.  Heat and ice to the affected area as tolerated for pain control.   5.  Low impact exercise such as walking or water exercise was recommended to maintain overall health and aid in weight control.   6.  Follow up as needed for subsequent injections.

## 2025-01-17 NOTE — PROGRESS NOTES
Subjective   Patient ID: Rafael Kat is a 54 y.o. male is here today for follow-up regarding his XR spine cervical     He saw Dr. Powers of pulmonary who thought that he had more asthma than COPD.  He does not think that there is a contraindication to a general anesthetic.  Will ask Dr. Powers to see him postoperatively in the hospital.  He did get a swallowing study which I reviewed and I do not think shows much mechanical obstruction.  He has yet to see the ENT doctors.  We can arrange that after the surgery since we are taking a posterior approach.  Will move forward with a C7-T1 decompression and a C3-T3 fusion with hardware.  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  If we do nothing he will continue to decline.  The epidural block that he got to help with the pain but not the motor function.  He has to be realistic also about the expectation of what he is able to do and he fully knowledges that he knows that he will be able to do as much significant physical work after surgery.  The goal is for him not to lose the function in his arms and hands and still to be able to use them well.  I think we can accomplish that but he just needs to be realistic.    He had a previous corpectomy and fusion several years ago and he has done reasonably well with his low back decompression as well.        History of Present Illness    The following portions of the patient's history were reviewed and updated as appropriate: allergies, current medications, past family history, past medical history, past social history, past surgical history, and problem list.    Review of Systems   Constitutional:  Negative for fever.   Musculoskeletal:  Positive for back pain. Negative for gait problem.   Neurological:  Positive for weakness. Negative for numbness.   All other systems reviewed and are negative.      Objective   Physical Exam  Constitutional:        General: He is awake.      Appearance: He is well-developed.   HENT:      Head: Normocephalic and atraumatic.   Eyes:      General: Lids are normal.      Extraocular Movements: Extraocular movements intact.      Conjunctiva/sclera: Conjunctivae normal.      Pupils: Pupils are equal, round, and reactive to light.   Neck:      Vascular: No carotid bruit.   Neurological:      Mental Status: He is alert.      Coordination: Coordination is intact.      Deep Tendon Reflexes:      Reflex Scores:       Tricep reflexes are 2+ on the right side and 2+ on the left side.       Bicep reflexes are 2+ on the right side and 2+ on the left side.       Brachioradialis reflexes are 2+ on the right side and 2+ on the left side.       Patellar reflexes are 2+ on the right side and 2+ on the left side.       Achilles reflexes are 2+ on the right side and 2+ on the left side.  Psychiatric:         Speech: Speech normal.       Neurological Exam  Mental Status  Awake and alert. Oriented only to person, place, time and situation. Recent and remote memory are intact. Speech is normal. Language is fluent with no aphasia. Attention and concentration are normal. Fund of knowledge is appropriate for level of education.    Cranial Nerves  CN II: Visual acuity is normal. Visual fields full to confrontation.  CN III, IV, VI: Extraocular movements intact bilaterally. Normal lids and orbits bilaterally. Pupils equal round and reactive to light bilaterally.  CN V: Facial sensation is normal.  CN VII: Full and symmetric facial movement.  CN IX, X: Palate elevates symmetrically. Normal gag reflex.  CN XI: Shoulder shrug strength is normal.  CN XII: Tongue midline without atrophy or fasciculations.    Motor  Normal muscle bulk throughout. Normal muscle tone.                                               Right                     Left  Rhomboids                            5                          5  Infraspinatus                          5                           5  Supraspinatus                       5                          5  Deltoid                                   5                          5   Biceps                                   5                          5  Brachioradialis                      5                          5   Triceps                                  5                          5   Pronator                                5                          5   Supinator                              5                           5   Wrist flexor                            5                          5   Wrist extensor                       5                          5   Finger flexor                          4                          4   Finger extensor                     4                          4   Interossei                              4                          4   Abductor pollicis brevis         4                          4   Flexor pollicis brevis             4                          4   Opponens pollicis                 4                          4  Extensor digitorum               4                          4  Abductor digiti minimi           4                          4   Abdominal                            5                          5  Glutei                                    5                          5  Hip abductor                         5                          5  Hip adductor                         5                          5   Iliopsoas                               5                          5   Quadriceps                           5                          5   Hamstring                             5                          5   Gastrocnemius                     5                           5   Anterior tibialis                      5                          5   Posterior tibialis                    5                          5   Peroneal                               5                          5  Ankle dorsiflexor                    5                          5  Ankle plantar flexor              5                           5  Extensor hallucis longus      5                           5    Sensory  Light touch is normal in upper and lower extremities. Proprioception is normal in upper and lower extremities.     Reflexes                                            Right                      Left  Brachioradialis                    2+                         2+  Biceps                                 2+                         2+  Triceps                                2+                         2+  Finger flex                           2+                         2+  Hamstring                            2+                         2+  Patellar                                2+                         2+  Achilles                                2+                         2+    Coordination    Finger-to-nose, rapid alternating movements and heel-to-shin normal bilaterally without dysmetria.    Gait  Casual gait is normal including stance, stride, and arm swing.Normal toe walking. Normal heel walking. Normal tandem gait.       Assessment & Plan   Independent Review of Radiographic Studies:      The cervical MRI done on 12/9/2024 was reviewed.  He is fused solidly from C3-C7 but he does have C7-T1 adjacent level stenosis with root compression.  Agree with the report.     Medical Decision Making:      Pulmonary clearance has been obtained.  Will asked Dr. Powers to see him while he is in the hospital.  I described and recommended to C7-T1 decompression and at C3-T3 fusion with lateral mass screws, pedicle screws, bilateral rods and cross-links using Stealth spinal navigation.  The goal of surgery is to decompress the nerve so that he gets his arm and hand function back and it reduces his overall arm pain as well as reduction in his neck pain through stabilization from C3-T3.  The risks include, but are not limited to, infection, hemorrhage  requiring transfusion or reoperation, CSF leak requiring reoperation, incomplete relief of symptoms, hardware problems requiring revision or removal, potential need for additional surgeries because of adjacent level problems, stroke, paralysis, coma, and death.  He agrees to proceed    He knows he will be seen by the APCs after surgery.  I like to see him at the 3 to 4-month joaquina after surgery with cervical thoracic x-rays.    Diagnoses and all orders for this visit:    1. History of fusion of cervical spine (Primary)  -     Case Request; Standing  -     Type & Screen; Future  -     ceFAZolin (ANCEF) 2 g in sodium chloride 0.9 % 100 mL IVPB  -     Case Request    2. Spinal stenosis of cervicothoracic region  -     Case Request; Standing  -     Type & Screen; Future  -     ceFAZolin (ANCEF) 2 g in sodium chloride 0.9 % 100 mL IVPB  -     Case Request    3. Weakness of both hands  -     Case Request; Standing  -     Type & Screen; Future  -     ceFAZolin (ANCEF) 2 g in sodium chloride 0.9 % 100 mL IVPB  -     Case Request    4. Weakness of both arms  -     Case Request; Standing  -     Type & Screen; Future  -     ceFAZolin (ANCEF) 2 g in sodium chloride 0.9 % 100 mL IVPB  -     Case Request    5. History of lumbar surgery    Other orders  -     Follow Anesthesia Guidelines / Protocol; Future  -     Follow Anesthesia Guidelines / Protocol; Standing  -     SCD (Sequential Compression Device) - To Be Placed on Patient in Pre-Op; Standing  -     Verify / Perform Chlorhexidine Skin Prep; Standing  -     Provide Patient With Instructions on NPO Status; Future  -     Provide Chlorhexidine Skin Prep Wipes and Instructions; Future      Return for 2 weeks after surgery with an APC.

## 2025-01-22 ENCOUNTER — OFFICE VISIT (OUTPATIENT)
Dept: NEUROSURGERY | Facility: CLINIC | Age: 55
End: 2025-01-22
Payer: COMMERCIAL

## 2025-01-22 VITALS
HEIGHT: 69 IN | RESPIRATION RATE: 20 BRPM | SYSTOLIC BLOOD PRESSURE: 118 MMHG | DIASTOLIC BLOOD PRESSURE: 72 MMHG | WEIGHT: 200 LBS | BODY MASS INDEX: 29.62 KG/M2

## 2025-01-22 DIAGNOSIS — R29.898 WEAKNESS OF BOTH HANDS: ICD-10-CM

## 2025-01-22 DIAGNOSIS — M48.03 SPINAL STENOSIS OF CERVICOTHORACIC REGION: ICD-10-CM

## 2025-01-22 DIAGNOSIS — R29.898 WEAKNESS OF BOTH ARMS: ICD-10-CM

## 2025-01-22 DIAGNOSIS — Z98.890 HISTORY OF LUMBAR SURGERY: ICD-10-CM

## 2025-01-22 DIAGNOSIS — Z98.1 HISTORY OF FUSION OF CERVICAL SPINE: Primary | ICD-10-CM

## 2025-02-03 ENCOUNTER — OFFICE VISIT (OUTPATIENT)
Dept: GASTROENTEROLOGY | Facility: CLINIC | Age: 55
End: 2025-02-03
Payer: COMMERCIAL

## 2025-02-03 ENCOUNTER — TELEPHONE (OUTPATIENT)
Dept: GASTROENTEROLOGY | Facility: CLINIC | Age: 55
End: 2025-02-03
Payer: COMMERCIAL

## 2025-02-03 VITALS
HEART RATE: 70 BPM | SYSTOLIC BLOOD PRESSURE: 133 MMHG | TEMPERATURE: 98 F | WEIGHT: 209.1 LBS | OXYGEN SATURATION: 94 % | BODY MASS INDEX: 30.97 KG/M2 | DIASTOLIC BLOOD PRESSURE: 93 MMHG | HEIGHT: 69 IN

## 2025-02-03 DIAGNOSIS — R13.19 ESOPHAGEAL DYSPHAGIA: ICD-10-CM

## 2025-02-03 DIAGNOSIS — Z12.11 SCREENING FOR COLORECTAL CANCER: ICD-10-CM

## 2025-02-03 DIAGNOSIS — K21.9 GASTROESOPHAGEAL REFLUX DISEASE, UNSPECIFIED WHETHER ESOPHAGITIS PRESENT: ICD-10-CM

## 2025-02-03 DIAGNOSIS — Z12.12 SCREENING FOR COLORECTAL CANCER: ICD-10-CM

## 2025-02-03 DIAGNOSIS — R13.12 OROPHARYNGEAL DYSPHAGIA: Primary | ICD-10-CM

## 2025-02-03 PROCEDURE — 1159F MED LIST DOCD IN RCRD: CPT | Performed by: PHYSICIAN ASSISTANT

## 2025-02-03 PROCEDURE — 99204 OFFICE O/P NEW MOD 45 MIN: CPT | Performed by: PHYSICIAN ASSISTANT

## 2025-02-03 PROCEDURE — 1160F RVW MEDS BY RX/DR IN RCRD: CPT | Performed by: PHYSICIAN ASSISTANT

## 2025-02-03 RX ORDER — BUSPIRONE HYDROCHLORIDE 10 MG/1
1 TABLET ORAL 2 TIMES DAILY
COMMUNITY
Start: 2024-12-12

## 2025-02-03 RX ORDER — CHOLECALCIFEROL (VITAMIN D3) 25 MCG
TABLET ORAL
COMMUNITY
Start: 2025-01-29

## 2025-02-03 RX ORDER — ESOMEPRAZOLE MAGNESIUM 40 MG/1
1 CAPSULE, DELAYED RELEASE ORAL 2 TIMES DAILY
COMMUNITY
Start: 2024-12-31 | End: 2025-02-03

## 2025-02-03 RX ORDER — HYDROCODONE BITARTRATE AND ACETAMINOPHEN 5; 325 MG/1; MG/1
1 TABLET ORAL
COMMUNITY
Start: 2024-09-24 | End: 2025-02-03

## 2025-02-03 NOTE — TELEPHONE ENCOUNTER
JOHANA GLASGOW IN SCHEDULING PT SCHEDULED 02/14/2025 ARRIVING AT 1130AM EGD/CLS PREP INFO HANDED TO PT OK FOR HUB TO RELAY

## 2025-02-03 NOTE — PROGRESS NOTES
Chief Complaint  Difficulty Swallowing and Heartburn    Subjective          History Of Present Illness:    Rafael Kat is a  54 y.o. male patient of Dr. Rodriguez who presents as a new over 3-year patient for evaluation of dysphagia and GERD.    Patient reports she has been having issues with wheezing prompting him to see a pulmonologist. He was referred to us for evaluation of dysphagia.  Patient has undergone a VFSS study with evidence of oropharyngeal dysphagia as below.    Patient reports he has pill and solid food dysphagia. He feels like food and pills get stuck at his suprasternal notch. Patient has a history of reflux but is currently well controlled on pantoprazole 40 mg twice daily.  He denies any abdominal pain, chest pain.  He does endorse occasional constipation but denies diarrhea, hematochezia.  He does have dark stools but is on daily iron therapy.  No abnormal weight loss or poor appetite.    He is on Celebrex.  Planning to undergo cervical spinal fusion at the end of February.    Speech pathology summary 1/17/25:   VFSS completed with Peyton SHELBY. Cervical hardware noted. Pt  exhibited mild oropharyngeal dysphagia characterized by  mistiming/delayed swallow, decreased hyolaryngeal excursion, and absent  epiglottic deflection. Pt took trials of thin (cup/straw), nectar (cup),  honey thick (cup), pureed, soft, mixed, and regular solids. Spillage to  the pyriforms occurred with all consistencies. Pt had trace penetration  across all consistencies, usually transient, cleared with a subsequent  swallow. The penetration was more with straw sips of thin and was  deeper. No aspiration occurred during the study. Mild residuals (tongue  base, valleculae, pyriforms) were noted. Pt swallowed spontaneously to  clear. With solids, however, pt often had difficulty initiating the  swallow in a timely manner and throat cleared/coughed material back into  oral cavity and reswallowed.    Additional data  "reviewed:  Colonoscopy >10 years ago   EGD 6/8/21 - irregular z-line, gastritis, duodenitis     Objective   Vital Signs:   /93 (Patient Position: Sitting, Cuff Size: Adult)   Pulse 70   Temp 98 °F (36.7 °C)   Ht 175.3 cm (69.02\")   Wt 94.8 kg (209 lb 1.6 oz)   SpO2 94%   BMI 30.86 kg/m²       Physical Exam  Vitals reviewed.   Constitutional:       General: He is not in acute distress.     Appearance: Normal appearance. He is not ill-appearing.   HENT:      Head: Normocephalic and atraumatic.      Nose: Nose normal.      Mouth/Throat:      Pharynx: Oropharynx is clear.   Eyes:      Conjunctiva/sclera: Conjunctivae normal.   Pulmonary:      Effort: Pulmonary effort is normal.   Abdominal:      General: There is no distension.      Palpations: Abdomen is soft. There is no mass.      Tenderness: There is no abdominal tenderness.   Musculoskeletal:         General: No swelling. Normal range of motion.      Cervical back: Normal range of motion.   Skin:     General: Skin is warm and dry.      Findings: No bruising or rash.   Neurological:      General: No focal deficit present.      Mental Status: He is alert and oriented to person, place, and time.      Motor: No weakness.      Gait: Gait normal.   Psychiatric:         Mood and Affect: Mood normal.          Result Review :   The following data was reviewed by: Alana Harmon PA-C on 02/03/2025:            Assessment and Plan    Diagnoses and all orders for this visit:    1. Oropharyngeal dysphagia (Primary)  -     Case Request; Standing  -     Implement Anesthesia orders day of procedure.; Standing  -     Follow Anesthesia Guidelines / Protocol; Future  -     Verify bowel prep was successful; Standing  -     Give tap water enema if bowel prep was insufficient; Standing  -     Case Request    2. Esophageal dysphagia  -     Case Request; Standing  -     Implement Anesthesia orders day of procedure.; Standing  -     Follow Anesthesia Guidelines / " Protocol; Future  -     Verify bowel prep was successful; Standing  -     Give tap water enema if bowel prep was insufficient; Standing  -     Case Request    3. Gastroesophageal reflux disease, unspecified whether esophagitis present  -     Case Request; Standing  -     Implement Anesthesia orders day of procedure.; Standing  -     Follow Anesthesia Guidelines / Protocol; Future  -     Verify bowel prep was successful; Standing  -     Give tap water enema if bowel prep was insufficient; Standing  -     Case Request    4. Screening for colorectal cancer  -     Case Request; Standing  -     Implement Anesthesia orders day of procedure.; Standing  -     Follow Anesthesia Guidelines / Protocol; Future  -     Verify bowel prep was successful; Standing  -     Give tap water enema if bowel prep was insufficient; Standing  -     Case Request       I recommend proceeding with upper endoscopy to rule out gastritis, esophagitis, AVM, peptic ulcer disease, Hampton's esophagus, hiatal hernia, H. pylori infection, celiac disease, EOE or mass/malignancy. Risks (including, but not limited to perforation, bleeding, sedation-related complications, and death), benefits, and alternatives to the procedure were discussed with the patient and all questions were answered. Patient is agreeable to plan of care.   Colonoscopy at the same time for screening purposes.  Continue pantoprazole 40 mg twice daily  Antireflux measures and dietary modifications reviewed. Low acid diet reviewed. Keep head of bed elevated. Stop eating/drinking at least 3 hours prior to bedtime. Eliminate caffeine and carbonated beverages.  Weight loss encouraged if BMI over 25.  We did discuss that his VFSS study shows evidence of oropharyngeal dysphagia.  If no other abnormal findings are identified on his upper endoscopy to explain his dysphagia I would recommend returning to speech therapy for outpatient treatment.       Follow Up   Return for  EGD/Colonoscopy.    Dragon dictation used throughout this note.            Alana Lau PA-C   [facility]  [address] [place] [address]  Office: [phone]

## 2025-02-07 NOTE — TELEPHONE ENCOUNTER
I called pt to confirm  scope for 2/14/25.Pt has no questions. Pt is aware the arrival time is 1130 am.

## 2025-02-14 ENCOUNTER — ANESTHESIA (OUTPATIENT)
Dept: GASTROENTEROLOGY | Facility: HOSPITAL | Age: 55
End: 2025-02-14
Payer: COMMERCIAL

## 2025-02-14 ENCOUNTER — HOSPITAL ENCOUNTER (OUTPATIENT)
Facility: HOSPITAL | Age: 55
Setting detail: HOSPITAL OUTPATIENT SURGERY
Discharge: HOME | End: 2025-02-14
Attending: INTERNAL MEDICINE | Admitting: INTERNAL MEDICINE
Payer: COMMERCIAL

## 2025-02-14 ENCOUNTER — PRE-ADMISSION TESTING (OUTPATIENT)
Dept: PREADMISSION TESTING | Facility: HOSPITAL | Age: 55
End: 2025-02-14
Payer: COMMERCIAL

## 2025-02-14 ENCOUNTER — ANESTHESIA EVENT (OUTPATIENT)
Dept: GASTROENTEROLOGY | Facility: HOSPITAL | Age: 55
End: 2025-02-14
Payer: COMMERCIAL

## 2025-02-14 VITALS
WEIGHT: 210.5 LBS | DIASTOLIC BLOOD PRESSURE: 98 MMHG | OXYGEN SATURATION: 95 % | HEART RATE: 71 BPM | RESPIRATION RATE: 16 BRPM | HEIGHT: 69 IN | SYSTOLIC BLOOD PRESSURE: 137 MMHG | BODY MASS INDEX: 31.18 KG/M2

## 2025-02-14 VITALS
TEMPERATURE: 97.5 F | BODY MASS INDEX: 31.37 KG/M2 | HEART RATE: 70 BPM | WEIGHT: 211.8 LBS | OXYGEN SATURATION: 97 % | HEIGHT: 69 IN | SYSTOLIC BLOOD PRESSURE: 115 MMHG | DIASTOLIC BLOOD PRESSURE: 81 MMHG | RESPIRATION RATE: 16 BRPM

## 2025-02-14 DIAGNOSIS — Z12.12 SCREENING FOR COLORECTAL CANCER: ICD-10-CM

## 2025-02-14 DIAGNOSIS — M48.03 SPINAL STENOSIS OF CERVICOTHORACIC REGION: ICD-10-CM

## 2025-02-14 DIAGNOSIS — R29.898 WEAKNESS OF BOTH ARMS: ICD-10-CM

## 2025-02-14 DIAGNOSIS — Z12.11 SCREENING FOR COLORECTAL CANCER: ICD-10-CM

## 2025-02-14 DIAGNOSIS — R13.12 OROPHARYNGEAL DYSPHAGIA: ICD-10-CM

## 2025-02-14 DIAGNOSIS — R29.898 WEAKNESS OF BOTH HANDS: ICD-10-CM

## 2025-02-14 DIAGNOSIS — R13.19 ESOPHAGEAL DYSPHAGIA: ICD-10-CM

## 2025-02-14 DIAGNOSIS — K21.9 GASTROESOPHAGEAL REFLUX DISEASE, UNSPECIFIED WHETHER ESOPHAGITIS PRESENT: ICD-10-CM

## 2025-02-14 DIAGNOSIS — Z98.1 HISTORY OF FUSION OF CERVICAL SPINE: ICD-10-CM

## 2025-02-14 LAB
ABO GROUP BLD: NORMAL
ANION GAP SERPL CALCULATED.3IONS-SCNC: 9 MMOL/L (ref 5–15)
BLD GP AB SCN SERPL QL: NEGATIVE
BUN SERPL-MCNC: 12 MG/DL (ref 6–20)
BUN/CREAT SERPL: 10.7 (ref 7–25)
CALCIUM SPEC-SCNC: 9 MG/DL (ref 8.6–10.5)
CHLORIDE SERPL-SCNC: 106 MMOL/L (ref 98–107)
CO2 SERPL-SCNC: 28 MMOL/L (ref 22–29)
CREAT SERPL-MCNC: 1.12 MG/DL (ref 0.76–1.27)
DEPRECATED RDW RBC AUTO: 42.3 FL (ref 37–54)
EGFRCR SERPLBLD CKD-EPI 2021: 77.1 ML/MIN/1.73
ERYTHROCYTE [DISTWIDTH] IN BLOOD BY AUTOMATED COUNT: 13.2 % (ref 12.3–15.4)
GLUCOSE SERPL-MCNC: 107 MG/DL (ref 65–99)
HCT VFR BLD AUTO: 42.2 % (ref 37.5–51)
HGB BLD-MCNC: 14.1 G/DL (ref 13–17.7)
MCH RBC QN AUTO: 29.3 PG (ref 26.6–33)
MCHC RBC AUTO-ENTMCNC: 33.4 G/DL (ref 31.5–35.7)
MCV RBC AUTO: 87.6 FL (ref 79–97)
PLATELET # BLD AUTO: 266 10*3/MM3 (ref 140–450)
PMV BLD AUTO: 10.6 FL (ref 6–12)
POTASSIUM SERPL-SCNC: 4 MMOL/L (ref 3.5–5.2)
QT INTERVAL: 408 MS
QTC INTERVAL: 425 MS
RBC # BLD AUTO: 4.82 10*6/MM3 (ref 4.14–5.8)
RH BLD: POSITIVE
SODIUM SERPL-SCNC: 143 MMOL/L (ref 136–145)
T&S EXPIRATION DATE: NORMAL
WBC NRBC COR # BLD AUTO: 6.53 10*3/MM3 (ref 3.4–10.8)

## 2025-02-14 PROCEDURE — 85027 COMPLETE CBC AUTOMATED: CPT

## 2025-02-14 PROCEDURE — 93010 ELECTROCARDIOGRAM REPORT: CPT | Performed by: INTERNAL MEDICINE

## 2025-02-14 PROCEDURE — 86901 BLOOD TYPING SEROLOGIC RH(D): CPT

## 2025-02-14 PROCEDURE — 25810000003 LACTATED RINGERS PER 1000 ML: Performed by: INTERNAL MEDICINE

## 2025-02-14 PROCEDURE — 25010000002 PROPOFOL 10 MG/ML EMULSION: Performed by: NURSE ANESTHETIST, CERTIFIED REGISTERED

## 2025-02-14 PROCEDURE — 88305 TISSUE EXAM BY PATHOLOGIST: CPT | Performed by: INTERNAL MEDICINE

## 2025-02-14 PROCEDURE — 45385 COLONOSCOPY W/LESION REMOVAL: CPT | Performed by: INTERNAL MEDICINE

## 2025-02-14 PROCEDURE — S0260 H&P FOR SURGERY: HCPCS | Performed by: INTERNAL MEDICINE

## 2025-02-14 PROCEDURE — 80048 BASIC METABOLIC PNL TOTAL CA: CPT

## 2025-02-14 PROCEDURE — 36415 COLL VENOUS BLD VENIPUNCTURE: CPT

## 2025-02-14 PROCEDURE — 25010000002 LIDOCAINE 2% SOLUTION: Performed by: NURSE ANESTHETIST, CERTIFIED REGISTERED

## 2025-02-14 PROCEDURE — 93005 ELECTROCARDIOGRAM TRACING: CPT

## 2025-02-14 PROCEDURE — 45381 COLONOSCOPY SUBMUCOUS NJX: CPT | Performed by: INTERNAL MEDICINE

## 2025-02-14 PROCEDURE — 86900 BLOOD TYPING SEROLOGIC ABO: CPT

## 2025-02-14 PROCEDURE — 86850 RBC ANTIBODY SCREEN: CPT

## 2025-02-14 PROCEDURE — 43239 EGD BIOPSY SINGLE/MULTIPLE: CPT | Performed by: INTERNAL MEDICINE

## 2025-02-14 PROCEDURE — 25010000002 GLYCOPYRROLATE 0.2 MG/ML SOLUTION: Performed by: NURSE ANESTHETIST, CERTIFIED REGISTERED

## 2025-02-14 DEVICE — DEV CLIP ENDO RESOLUTION360 CONTRL ROT 235CM BX/20: Type: IMPLANTABLE DEVICE | Site: TRANSVERSE COLON | Status: FUNCTIONAL

## 2025-02-14 RX ORDER — SODIUM CHLORIDE, SODIUM LACTATE, POTASSIUM CHLORIDE, CALCIUM CHLORIDE 600; 310; 30; 20 MG/100ML; MG/100ML; MG/100ML; MG/100ML
30 INJECTION, SOLUTION INTRAVENOUS CONTINUOUS PRN
Status: DISCONTINUED | OUTPATIENT
Start: 2025-02-14 | End: 2025-02-14 | Stop reason: HOSPADM

## 2025-02-14 RX ORDER — SODIUM CHLORIDE 9 MG/ML
40 INJECTION, SOLUTION INTRAVENOUS AS NEEDED
Status: DISCONTINUED | OUTPATIENT
Start: 2025-02-14 | End: 2025-02-14 | Stop reason: HOSPADM

## 2025-02-14 RX ORDER — SODIUM CHLORIDE 0.9 % (FLUSH) 0.9 %
10 SYRINGE (ML) INJECTION EVERY 12 HOURS SCHEDULED
Status: DISCONTINUED | OUTPATIENT
Start: 2025-02-14 | End: 2025-02-14 | Stop reason: HOSPADM

## 2025-02-14 RX ORDER — SODIUM CHLORIDE 0.9 % (FLUSH) 0.9 %
10 SYRINGE (ML) INJECTION AS NEEDED
Status: DISCONTINUED | OUTPATIENT
Start: 2025-02-14 | End: 2025-02-14 | Stop reason: HOSPADM

## 2025-02-14 RX ORDER — PROPOFOL 10 MG/ML
VIAL (ML) INTRAVENOUS AS NEEDED
Status: DISCONTINUED | OUTPATIENT
Start: 2025-02-14 | End: 2025-02-14 | Stop reason: SURG

## 2025-02-14 RX ORDER — ALBUTEROL SULFATE 90 UG/1
2 AEROSOL, METERED RESPIRATORY (INHALATION) EVERY 4 HOURS PRN
COMMUNITY
Start: 2025-02-03

## 2025-02-14 RX ORDER — GLYCOPYRROLATE 0.2 MG/ML
INJECTION INTRAMUSCULAR; INTRAVENOUS AS NEEDED
Status: DISCONTINUED | OUTPATIENT
Start: 2025-02-14 | End: 2025-02-14 | Stop reason: SURG

## 2025-02-14 RX ORDER — LIDOCAINE HYDROCHLORIDE 20 MG/ML
INJECTION, SOLUTION INFILTRATION; PERINEURAL AS NEEDED
Status: DISCONTINUED | OUTPATIENT
Start: 2025-02-14 | End: 2025-02-14 | Stop reason: SURG

## 2025-02-14 RX ADMIN — PROPOFOL 180 MCG/KG/MIN: 10 INJECTION, EMULSION INTRAVENOUS at 13:31

## 2025-02-14 RX ADMIN — GLYCOPYRROLATE 0.2 MG: 0.2 INJECTION INTRAMUSCULAR; INTRAVENOUS at 13:17

## 2025-02-14 RX ADMIN — SODIUM CHLORIDE, POTASSIUM CHLORIDE, SODIUM LACTATE AND CALCIUM CHLORIDE 30 ML/HR: 600; 310; 30; 20 INJECTION, SOLUTION INTRAVENOUS at 12:32

## 2025-02-14 RX ADMIN — PROPOFOL 100 MG: 10 INJECTION, EMULSION INTRAVENOUS at 13:30

## 2025-02-14 RX ADMIN — LIDOCAINE HYDROCHLORIDE 60 MG: 20 INJECTION, SOLUTION INFILTRATION; PERINEURAL at 13:29

## 2025-02-14 NOTE — DISCHARGE INSTRUCTIONS
Take the following medications the morning of surgery:  USE INHALER    BRING RESCUE INHALER WITH YOU    TAKE GABAPENTIN,BUSPAR AND PANTOPRAZOLE    If you are on prescription narcotic pain medication to control your pain you may also take that medication the morning of surgery.      General Instructions:     Do not eat solid food after midnight the night before surgery.  Clear liquids day of surgery are allowed but must be stopped at least two hours before your hospital arrival time.       Allowed clear liquids      Water, sodas, and tea or coffee with no cream or milk added.       12 to 20 ounces of a clear liquid that contains carbohydrates is recommended.  If non-diabetic, have Gatorade or Powerade.  If diabetic, have G2 or Powerade Zero.     Do not have liquids red in color.  Do not consume chicken, beef, pork or vegetable broth or bouillon cubes of any variety as they are not considered clear liquids and are not allowed.      Infants may have breast milk up to four hours before surgery.  Infants drinking formula may drink formula up to six hours before surgery.   Patients who avoid smoking, chewing tobacco and alcohol for 4 weeks prior to surgery have a reduced risk of post-operative complications.  Quit smoking as many days before surgery as you can.  Do not smoke, use chewing tobacco or drink alcohol the day of surgery.   If applicable bring your C-PAP/ BI-PAP machine in with you to preop day of surgery.  Bring any papers given to you in the doctor’s office.  Wear clean comfortable clothes.  Do not wear contact lenses, false eyelashes or make-up.  Bring a case for your glasses.   Bring crutches or walker if applicable.  Remove all piercings.  Leave jewelry and any other valuables at home.  Hair extensions with metal clips must be removed prior to surgery.  The Pre-Admission Testing nurse will instruct you to bring medications if unable to obtain an accurate list in Pre-Admission Testing.        If you were  given a blood bank ID arm band remember to bring it with you the day of surgery.    Preventing a Surgical Site Infection:  For 2 to 3 days before surgery, avoid shaving with a razor because the razor can irritate skin and make it easier to develop an infection.    Any areas of open skin can increase the risk of a post-operative wound infection by allowing bacteria to enter and travel throughout the body.  Notify your surgeon if you have any skin wounds / rashes even if it is not near the expected surgical site.  The area will need assessed to determine if surgery should be delayed until it is healed.  The night prior to surgery shower using a fresh bar of anti-bacterial soap (such as Dial) and clean washcloth.  Sleep in a clean bed with clean clothing.  Do not allow pets to sleep with you.  Shower on the morning of surgery using a fresh bar of anti-bacterial soap (such as Dial) and clean washcloth.  Dry with a clean towel and dress in clean clothing.  Ask your surgeon if you will be receiving antibiotics prior to surgery.  Make sure you, your family, and all healthcare providers clean their hands with soap and water or an alcohol based hand  before caring for you or your wound.    Day of surgery:  Your arrival time is approximately two hours before your scheduled surgery time.  Please note if you have an early arrival time the surgery doors do not open before 5:00 AM.  Upon arrival, a Pre-op nurse and Anesthesiologist will review your health history, obtain vital signs, and answer questions you may have.  The only belongings needed at this time will be a list of your home medications and if applicable your C-PAP/BI-PAP machine.  A Pre-op nurse will start an IV and you may receive medication in preparation for surgery, including something to help you relax.     Please be aware that surgery does come with discomfort.  We want to make every effort to control your discomfort so please discuss any uncontrolled  symptoms with your nurse.   Your doctor will most likely have prescribed pain medications.      If you are going home after surgery you will receive individualized written care instructions before being discharged.  A responsible adult must drive you to and from the hospital on the day of your surgery and ideally stay with you through the night.   .  Discharge prescriptions can be filled by the hospital pharmacy during regular pharmacy hours.  If you are having surgery late in the day/evening your prescription may be e-prescribed to your pharmacy.  Please verify your pharmacy hours or chose a 24 hour pharmacy to avoid not having access to your prescription because your pharmacy has closed for the day.    If you are staying overnight following surgery, you will be transported to your hospital room following the recovery period.  [facility] has all private rooms.    If you have any questions please call Pre-Admission Testing at [phone].  Deductibles and co-payments are collected on the day of service. Please be prepared to pay the required co-pay, deductible or deposit on the day of service as defined by your plan.    Call your surgeon immediately if you experience any of the following symptoms:  Sore Throat  Shortness of Breath or difficulty breathing  Cough  Chills  Body soreness or muscle pain  Headache  Fever  New loss of taste or smell  Do not arrive for your surgery ill.  Your procedure will need to be rescheduled to another time.  You will need to call your physician before the day of surgery to avoid any unnecessary exposure to hospital staff as well as other patients.

## 2025-02-14 NOTE — DISCHARGE INSTRUCTIONS
For the next 24 hours patient needs to be with a responsible adult.    For 24 hours DO NOT drive, operate machinery, appliances, drink alcohol, make important decisions or sign legal documents.    Start with a light or bland diet if you are feeling sick to your stomach otherwise advance to regular diet as tolerated.    Follow recommendations on procedure report if provided by your doctor.    Call Dr Rodriguez for problems . Problems may include but not limited to: large amounts of bleeding, trouble breathing, repeated vomiting, severe unrelieved pain, fever or chills.

## 2025-02-14 NOTE — H&P
[facility]  Pre Procedure History & Physical    Chief Complaint:   GERD, dysphagia, screening colonoscopy    Subjective     HPI:   This 54-year-old male presents the endoscopy suite for upper and lower endoscopic evaluation.  He has issues with reflux and dysphagia to pills.  He has undergone previous video fluoroscopy swallow study.  He also is due for screening colonoscopy as his last colonoscopy was more than 10 years ago.    Past Medical History:   Past Medical History:   Diagnosis Date    Anesthesia complication     PT STATES HE IS SLOW TO WAKE UP    Arthritis     COPD (chronic obstructive pulmonary disease)     STATES WHEEZES AT NIGHT AND DOES USE NIGHTLY INHALER    DDD (degenerative disc disease), cervical     DDD (degenerative disc disease), lumbar     PAIN IN RIGHT BUTTOCK AND DOWN AT TIMES RIGHT LEG, WEAKNESS RIGHT LEG AT TIMES    GERD (gastroesophageal reflux disease)     Psoriasis     Skin cancer     BASAL/SQUAMOUS       Past Surgical History:  Past Surgical History:   Procedure Laterality Date    ANTERIOR CHANNEL VERTEBRECTOMY/CORPECTOMY Bilateral 03/29/2016    Procedure: C4-6 ANTERIOR CHANNEL VERTEBRECTOMY/CORPECTOMY WITH INSTRUMENTATION;  Surgeon: Bert Collado MD;  Location: [facility] OR;  Service:     COLONOSCOPY      ELBOW PROCEDURE Right     UNCLEAR:  RIGHT ELBOW AREA    FINGER SURGERY Left     INDEX FINGER    LUMBAR EPIDURAL INJECTION      STATES X3    LUMBAR LAMINECTOMY DISCECTOMY DECOMPRESSION Bilateral 11/28/2023    Procedure: Open bilateral lumbar decompression lumbar one/two, lumbar two/three, lumbar three/four;  Surgeon: Bert Collado MD;  Location: [facility] OR;  Service: Neurosurgery;  Laterality: Bilateral;    MENISCECTOMY Right     UPPER GASTROINTESTINAL ENDOSCOPY  03/2021       Family History:  Family History   Problem Relation Age of Onset    Malig Hyperthermia Neg Hx        Social History:   reports that he quit smoking about 15 years ago. His  smoking use included cigarettes. He started smoking about 35 years ago. He has a 40 pack-year smoking history. He has been exposed to tobacco smoke. He has never used smokeless tobacco. He reports current drug use. Frequency: 14.00 times per week. Drug: Marijuana. He reports that he does not drink alcohol.    Medications:   No medications prior to admission.       Allergies:  Bacitracin-polymyxin b, Chlorhexidine, and Wgnky-dryhg-iczpiqx-pramoxine    ROS:    Pertinent items are noted in HPI     Objective     There were no vitals taken for this visit.    Physical Exam   Constitutional: Pt is oriented to person, place, and time and well-developed, well-nourished, and in no distress.   Mouth/Throat: Oropharynx is clear and moist.   Neck: Normal range of motion.   Cardiovascular: Normal rate, regular rhythm and normal heart sounds.    Pulmonary/Chest: Effort normal and breath sounds normal.   Abdominal: Soft. Nontender  Skin: Skin is warm and dry.   Psychiatric: Mood, memory, affect and judgment normal.     Assessment & Plan     Diagnosis:  GERD  Dysphagia  Screening for colorectal cancer    Anticipated Surgical Procedure:  EGD, colonoscopy    The risks, benefits, and alternatives of this procedure have been discussed with the patient or the responsible party- the patient understands and agrees to proceed.

## 2025-02-14 NOTE — ANESTHESIA POSTPROCEDURE EVALUATION
Patient: Rafael Kat    Procedure Summary       Date: 02/14/25 Room / Location:  [facility]    Anesthesia Start: 1316 Anesthesia Stop: 1413    Procedures:       COLONOSCOPY into cecum with hot snare polypectomy, Resolutions clips x2, Tattoo of transverse polyp area      ESOPHAGOGASTRODUODENOSCOPY with biopsies (Esophagus) Diagnosis:       Esophagitis      Gastritis      Polyp of colon      Hemorrhoids      (Oropharyngeal dysphagia [R13.12])      (Esophageal dysphagia [R13.19])      (Gastroesophageal reflux disease, unspecified whether esophagitis present [K21.9])      (Screening for colorectal cancer [Z12.11, Z12.12])    Surgeons: Gonzalo Rodriguez MD Provider: Benjamin Willams MD    Anesthesia Type: MAC ASA Status: 2            Anesthesia Type: MAC    Vitals  Vitals Value Taken Time   /110 02/14/25 1451   Temp     Pulse 51 02/14/25 1453   Resp 16 02/14/25 1435   SpO2 95 % 02/14/25 1453   Vitals shown include unfiled device data.        Anesthesia Post Evaluation

## 2025-02-14 NOTE — ANESTHESIA PREPROCEDURE EVALUATION
Anesthesia Evaluation     Patient summary reviewed and Nursing notes reviewed   no history of anesthetic complications:   NPO Solid Status: > 8 hours  NPO Liquid Status: > 2 hours           Airway   Dental      Pulmonary    (+) COPD,  Cardiovascular     ECG reviewed      ROS comment: Normal EKG    Neuro/Psych  GI/Hepatic/Renal/Endo    (+) obesity, GERD    Musculoskeletal     Abdominal   (+) obese   Substance History      OB/GYN          Other                      Anesthesia Plan    ASA 2     MAC     intravenous induction     Anesthetic plan, risks, benefits, and alternatives have been provided, discussed and informed consent has been obtained with: patient.      CODE STATUS:

## 2025-02-17 LAB
CYTO UR: NORMAL
LAB AP CASE REPORT: NORMAL
PATH REPORT.FINAL DX SPEC: NORMAL
PATH REPORT.GROSS SPEC: NORMAL

## 2025-02-18 ENCOUNTER — TELEPHONE (OUTPATIENT)
Dept: GASTROENTEROLOGY | Facility: CLINIC | Age: 55
End: 2025-02-18
Payer: COMMERCIAL

## 2025-02-18 NOTE — TELEPHONE ENCOUNTER
----- Message from Gonzalo Rodriguez sent at 2/17/2025  2:17 PM EST -----  Regarding: Biopsy results  Okay to call results, would continue PPI.  If swallowing issues persist can offer video fluoroscopy swallow study or esophageal manometry.  Would offer follow-up colonoscopy in 1 year because of the adenomatous changes in the colon and the possibility of residual polyp.  Can follow-up to discuss in the office if he wishes.  ----- Message -----  From: Lab, Background User  Sent: 2/17/2025  12:44 PM EST  To: Gonzalo PACK MD    Final Diagnosis   1.   Duodenum, biopsy:         A.  Duodenal mucosa with normal villous architecture and no significant histopathologic changes.     2.  Stomach, biopsy:         A.  Gastric antral and oxyntic mucosa with mild chronic inactive gastritis.         B.  Negative for intestinal metaplasia.         C.  Negative for H. pylori-type organisms (H&E stain).     3.  Gastroesophageal junction, biopsy:         A.  Squamocolumnar mucosa with mild chronic inflammation.         B.  Negative for intestinal metaplasia.     4.  Mid esophagus, biopsy:         A.  Squamous mucosa with no significant histopathologic change.         B.  No intraepithelial eosinophils noted.     5.  Transverse colon, polypectomy:         A.  Tubular adenoma.

## 2025-02-18 NOTE — TELEPHONE ENCOUNTER
Patient notified of results per pathology and recommendations and verbalized understanding    Dr Collado ordered a VS with speech, please review and advise.      Hm and cs recall placed for 2/14/26

## 2025-02-19 ENCOUNTER — TELEPHONE (OUTPATIENT)
Dept: NEUROSURGERY | Facility: CLINIC | Age: 55
End: 2025-02-19
Payer: COMMERCIAL

## 2025-02-19 ENCOUNTER — TELEPHONE (OUTPATIENT)
Dept: GASTROENTEROLOGY | Facility: CLINIC | Age: 55
End: 2025-02-19
Payer: COMMERCIAL

## 2025-02-19 NOTE — TELEPHONE ENCOUNTER
DELETE AFTER REVIEWING: Send this encounter to the appropriate pool. See your Call Action Grid or Workflows for direction.        Hub staff attempted to follow warm transfer process and was unsuccessful     Caller: Rafael Kat    Relationship to patient: Self    Best call back number: [phone]    Patient is needing: PT RETURNED CALL TO [facility], ORIGINAL MESSAGE 2/18/25.  PLEASE REACH BACK OUT TO PT. THANK YOU.

## 2025-02-19 NOTE — TELEPHONE ENCOUNTER
I have called the pt and passed on Dr Rodriguez's recommendations and he verbalized understanding.

## 2025-02-19 NOTE — TELEPHONE ENCOUNTER
02/19/25 PT NEEDS TO CLARIFY IF HE NEEDS TO STOP HIS  TYLENOL AND CELEBREX FOR HIS UPCOMING SURGERY WITH DR PACK.   PREADMISSION TESTING TOLD HIM TO CALL OUR OFFICE TO CLARIFY.    PAT DATE WAS:  02/14/25    SURGERY DATE IS: 02/25/25    CALL PT BACK AT: [phone]

## 2025-02-19 NOTE — TELEPHONE ENCOUNTER
I have spoken tot he patient to let him know that I spoke with Dr. Joe and he stated he can keep taking the tylenol but needs to stop the CELEBREX  5 days before surgery

## 2025-02-25 ENCOUNTER — APPOINTMENT (OUTPATIENT)
Dept: GENERAL RADIOLOGY | Facility: HOSPITAL | Age: 55
DRG: 448 | End: 2025-02-25
Payer: COMMERCIAL

## 2025-02-25 ENCOUNTER — ANESTHESIA (OUTPATIENT)
Dept: PERIOP | Facility: HOSPITAL | Age: 55
End: 2025-02-25
Payer: COMMERCIAL

## 2025-02-25 ENCOUNTER — ANESTHESIA EVENT (OUTPATIENT)
Dept: PERIOP | Facility: HOSPITAL | Age: 55
End: 2025-02-25
Payer: COMMERCIAL

## 2025-02-25 ENCOUNTER — HOSPITAL ENCOUNTER (INPATIENT)
Facility: HOSPITAL | Age: 55
LOS: 7 days | Discharge: HOME | DRG: 448 | End: 2025-03-04
Attending: NEUROLOGICAL SURGERY | Admitting: NEUROLOGICAL SURGERY
Payer: COMMERCIAL

## 2025-02-25 DIAGNOSIS — Z98.1 HISTORY OF FUSION OF CERVICAL SPINE: ICD-10-CM

## 2025-02-25 DIAGNOSIS — R29.898 WEAKNESS OF BOTH ARMS: ICD-10-CM

## 2025-02-25 DIAGNOSIS — I80.8 SUPERFICIAL THROMBOPHLEBITIS OF LEFT UPPER EXTREMITY: ICD-10-CM

## 2025-02-25 DIAGNOSIS — R29.898 WEAKNESS OF BOTH HANDS: ICD-10-CM

## 2025-02-25 DIAGNOSIS — M48.03 SPINAL STENOSIS OF CERVICOTHORACIC REGION: ICD-10-CM

## 2025-02-25 DIAGNOSIS — M54.12 CERVICAL RADICULOPATHY AT C7: Primary | ICD-10-CM

## 2025-02-25 PROBLEM — M48.02 CERVICAL STENOSIS OF SPINE: Status: ACTIVE | Noted: 2025-02-25

## 2025-02-25 LAB
BASOPHILS # BLD AUTO: 0.03 10*3/MM3 (ref 0–0.2)
BASOPHILS NFR BLD AUTO: 0.1 % (ref 0–1.5)
DEPRECATED RDW RBC AUTO: 44.2 FL (ref 37–54)
EOSINOPHIL # BLD AUTO: 0 10*3/MM3 (ref 0–0.4)
EOSINOPHIL NFR BLD AUTO: 0 % (ref 0.3–6.2)
ERYTHROCYTE [DISTWIDTH] IN BLOOD BY AUTOMATED COUNT: 13.6 % (ref 12.3–15.4)
GLUCOSE BLDC GLUCOMTR-MCNC: 179 MG/DL (ref 70–130)
HCT VFR BLD AUTO: 40.7 % (ref 37.5–51)
HGB BLD-MCNC: 13.8 G/DL (ref 13–17.7)
IMM GRANULOCYTES # BLD AUTO: 0.12 10*3/MM3 (ref 0–0.05)
IMM GRANULOCYTES NFR BLD AUTO: 0.6 % (ref 0–0.5)
LYMPHOCYTES # BLD AUTO: 0.97 10*3/MM3 (ref 0.7–3.1)
LYMPHOCYTES NFR BLD AUTO: 4.8 % (ref 19.6–45.3)
MCH RBC QN AUTO: 30.3 PG (ref 26.6–33)
MCHC RBC AUTO-ENTMCNC: 33.9 G/DL (ref 31.5–35.7)
MCV RBC AUTO: 89.5 FL (ref 79–97)
MONOCYTES # BLD AUTO: 0.83 10*3/MM3 (ref 0.1–0.9)
MONOCYTES NFR BLD AUTO: 4.1 % (ref 5–12)
NEUTROPHILS NFR BLD AUTO: 18.24 10*3/MM3 (ref 1.7–7)
NEUTROPHILS NFR BLD AUTO: 90.4 % (ref 42.7–76)
NRBC BLD AUTO-RTO: 0 /100 WBC (ref 0–0.2)
PLATELET # BLD AUTO: 236 10*3/MM3 (ref 140–450)
PMV BLD AUTO: 11.2 FL (ref 6–12)
RBC # BLD AUTO: 4.55 10*6/MM3 (ref 4.14–5.8)
WBC NRBC COR # BLD AUTO: 20.19 10*3/MM3 (ref 3.4–10.8)

## 2025-02-25 PROCEDURE — 25010000002 BUPIVACAINE (PF) 0.25 % SOLUTION: Performed by: NEUROLOGICAL SURGERY

## 2025-02-25 PROCEDURE — 25010000002 METHOCARBAMOL 1000 MG/10ML SOLUTION: Performed by: NEUROLOGICAL SURGERY

## 2025-02-25 PROCEDURE — 25010000002 MIDAZOLAM PER 1 MG: Performed by: ANESTHESIOLOGY

## 2025-02-25 PROCEDURE — 0RG20K1 FUSION OF 2 OR MORE CERVICAL VERTEBRAL JOINTS WITH NONAUTOLOGOUS TISSUE SUBSTITUTE, POSTERIOR APPROACH, POSTERIOR COLUMN, OPEN APPROACH: ICD-10-PCS | Performed by: NEUROLOGICAL SURGERY

## 2025-02-25 PROCEDURE — 25810000003 LACTATED RINGERS PER 1000 ML: Performed by: NEUROLOGICAL SURGERY

## 2025-02-25 PROCEDURE — 82948 REAGENT STRIP/BLOOD GLUCOSE: CPT

## 2025-02-25 PROCEDURE — 25010000002 HYDROMORPHONE HCL PF 50 MG/5ML SOLUTION: Performed by: NEUROLOGICAL SURGERY

## 2025-02-25 PROCEDURE — 25010000002 LIDOCAINE 2% SOLUTION: Performed by: REGISTERED NURSE

## 2025-02-25 PROCEDURE — C1713 ANCHOR/SCREW BN/BN,TIS/BN: HCPCS | Performed by: NEUROLOGICAL SURGERY

## 2025-02-25 PROCEDURE — 25010000002 CEFAZOLIN PER 500 MG: Performed by: REGISTERED NURSE

## 2025-02-25 PROCEDURE — 25010000002 PROPOFOL 200 MG/20ML EMULSION: Performed by: REGISTERED NURSE

## 2025-02-25 PROCEDURE — 25010000002 DEXAMETHASONE SODIUM PHOSPHATE 20 MG/5ML SOLUTION: Performed by: REGISTERED NURSE

## 2025-02-25 PROCEDURE — 25010000002 ACETAMINOPHEN 10 MG/ML SOLUTION: Performed by: REGISTERED NURSE

## 2025-02-25 PROCEDURE — 25010000002 FENTANYL CITRATE (PF) 50 MCG/ML SOLUTION: Performed by: ANESTHESIOLOGY

## 2025-02-25 PROCEDURE — 25810000003 LACTATED RINGERS PER 1000 ML: Performed by: ANESTHESIOLOGY

## 2025-02-25 PROCEDURE — 25010000002 CEFAZOLIN PER 500 MG: Performed by: NEUROLOGICAL SURGERY

## 2025-02-25 PROCEDURE — 8E09XBZ COMPUTER ASSISTED PROCEDURE OF HEAD AND NECK REGION: ICD-10-PCS | Performed by: NEUROLOGICAL SURGERY

## 2025-02-25 PROCEDURE — 25010000002 MAGNESIUM SULFATE PER 500 MG OF MAGNESIUM: Performed by: REGISTERED NURSE

## 2025-02-25 PROCEDURE — 25810000003 SODIUM CHLORIDE 0.9 % SOLUTION: Performed by: NEUROLOGICAL SURGERY

## 2025-02-25 PROCEDURE — 25010000002 ONDANSETRON PER 1 MG: Performed by: REGISTERED NURSE

## 2025-02-25 PROCEDURE — 76000 FLUOROSCOPY <1 HR PHYS/QHP: CPT

## 2025-02-25 PROCEDURE — 22843 INSERT SPINE FIXATION DEVICE: CPT | Performed by: NEUROLOGICAL SURGERY

## 2025-02-25 PROCEDURE — 0RG40K1 FUSION OF CERVICOTHORACIC VERTEBRAL JOINT WITH NONAUTOLOGOUS TISSUE SUBSTITUTE, POSTERIOR APPROACH, POSTERIOR COLUMN, OPEN APPROACH: ICD-10-PCS | Performed by: NEUROLOGICAL SURGERY

## 2025-02-25 PROCEDURE — 25010000002 FENTANYL CITRATE (PF) 50 MCG/ML SOLUTION: Performed by: REGISTERED NURSE

## 2025-02-25 PROCEDURE — 25010000002 SUGAMMADEX 200 MG/2ML SOLUTION: Performed by: REGISTERED NURSE

## 2025-02-25 PROCEDURE — 94640 AIRWAY INHALATION TREATMENT: CPT

## 2025-02-25 PROCEDURE — 25010000002 HYDROMORPHONE 1 MG/ML SOLUTION: Performed by: REGISTERED NURSE

## 2025-02-25 PROCEDURE — 94799 UNLISTED PULMONARY SVC/PX: CPT

## 2025-02-25 PROCEDURE — 01N10ZZ RELEASE CERVICAL NERVE, OPEN APPROACH: ICD-10-PCS | Performed by: NEUROLOGICAL SURGERY

## 2025-02-25 PROCEDURE — 0RG70K1 FUSION OF 2 TO 7 THORACIC VERTEBRAL JOINTS WITH NONAUTOLOGOUS TISSUE SUBSTITUTE, POSTERIOR APPROACH, POSTERIOR COLUMN, OPEN APPROACH: ICD-10-PCS | Performed by: NEUROLOGICAL SURGERY

## 2025-02-25 PROCEDURE — 8E0WXBZ COMPUTER ASSISTED PROCEDURE OF TRUNK REGION: ICD-10-PCS | Performed by: NEUROLOGICAL SURGERY

## 2025-02-25 PROCEDURE — 25010000002 HYDROMORPHONE PER 4 MG: Performed by: REGISTERED NURSE

## 2025-02-25 PROCEDURE — 61783 SCAN PROC SPINAL: CPT | Performed by: NEUROLOGICAL SURGERY

## 2025-02-25 PROCEDURE — 63045 LAM FACETEC & FORAMOT CRV: CPT | Performed by: NEUROLOGICAL SURGERY

## 2025-02-25 PROCEDURE — 22600 ARTHRD PST TQ 1NTRSPC CRV: CPT | Performed by: NEUROLOGICAL SURGERY

## 2025-02-25 PROCEDURE — 85025 COMPLETE CBC W/AUTO DIFF WBC: CPT | Performed by: NEUROLOGICAL SURGERY

## 2025-02-25 PROCEDURE — 22614 ARTHRD PST TQ 1NTRSPC EA ADD: CPT | Performed by: NEUROLOGICAL SURGERY

## 2025-02-25 DEVICE — IMPLANTABLE DEVICE: Type: IMPLANTABLE DEVICE | Site: SPINE CERVICAL | Status: FUNCTIONAL

## 2025-02-25 DEVICE — CROSSLINK 3602536 ROD TO ROD M
Type: IMPLANTABLE DEVICE | Site: SPINE CERVICAL | Status: FUNCTIONAL
Brand: INFINITY™ OCCIPITOCERVICAL UPPER THORACIC SYSTEM

## 2025-02-25 DEVICE — SSC BONE WAX
Type: IMPLANTABLE DEVICE | Site: SPINE CERVICAL | Status: FUNCTIONAL
Brand: SSC BONE WAX

## 2025-02-25 DEVICE — ALLOGRFT DBM MAGNIFUSE 1X5CM: Type: IMPLANTABLE DEVICE | Site: SPINE CERVICAL | Status: FUNCTIONAL

## 2025-02-25 DEVICE — MAGNETOS FLEX MATRIX  10.08CC 0.25-1MM LARGE
Type: IMPLANTABLE DEVICE | Site: SPINE CERVICAL | Status: FUNCTIONAL
Brand: MAGNETOS

## 2025-02-25 DEVICE — FLOSEAL WITH RECOTHROM - 10ML.
Type: IMPLANTABLE DEVICE | Site: SPINE CERVICAL | Status: FUNCTIONAL
Brand: FLOSEAL HEMOSTATIC MATRIX

## 2025-02-25 RX ORDER — CETIRIZINE HYDROCHLORIDE 10 MG/1
10 TABLET ORAL DAILY
Status: DISCONTINUED | OUTPATIENT
Start: 2025-02-25 | End: 2025-03-04 | Stop reason: HOSPADM

## 2025-02-25 RX ORDER — FENTANYL CITRATE 50 UG/ML
INJECTION, SOLUTION INTRAMUSCULAR; INTRAVENOUS AS NEEDED
Status: DISCONTINUED | OUTPATIENT
Start: 2025-02-25 | End: 2025-02-25 | Stop reason: SURG

## 2025-02-25 RX ORDER — HYDROMORPHONE HCL/0.9% NACL/PF 10 MG/50ML
PATIENT CONTROLLED ANALGESIA SYRINGE INTRAVENOUS CONTINUOUS
Status: DISCONTINUED | OUTPATIENT
Start: 2025-02-25 | End: 2025-02-28

## 2025-02-25 RX ORDER — ACETAMINOPHEN 650 MG/1
650 SUPPOSITORY RECTAL EVERY 4 HOURS PRN
Status: DISCONTINUED | OUTPATIENT
Start: 2025-02-25 | End: 2025-03-04

## 2025-02-25 RX ORDER — PROMETHAZINE HYDROCHLORIDE 25 MG/1
25 TABLET ORAL ONCE AS NEEDED
Status: DISCONTINUED | OUTPATIENT
Start: 2025-02-25 | End: 2025-02-25 | Stop reason: HOSPADM

## 2025-02-25 RX ORDER — AMOXICILLIN 250 MG
2 CAPSULE ORAL 2 TIMES DAILY
Status: DISCONTINUED | OUTPATIENT
Start: 2025-02-25 | End: 2025-03-04 | Stop reason: HOSPADM

## 2025-02-25 RX ORDER — SODIUM CHLORIDE 0.9 % (FLUSH) 0.9 %
3-10 SYRINGE (ML) INJECTION AS NEEDED
Status: DISCONTINUED | OUTPATIENT
Start: 2025-02-25 | End: 2025-02-25 | Stop reason: HOSPADM

## 2025-02-25 RX ORDER — DIPHENHYDRAMINE HYDROCHLORIDE 50 MG/ML
12.5 INJECTION INTRAMUSCULAR; INTRAVENOUS
Status: DISCONTINUED | OUTPATIENT
Start: 2025-02-25 | End: 2025-02-25 | Stop reason: HOSPADM

## 2025-02-25 RX ORDER — FENTANYL CITRATE 50 UG/ML
50 INJECTION, SOLUTION INTRAMUSCULAR; INTRAVENOUS ONCE AS NEEDED
Status: DISCONTINUED | OUTPATIENT
Start: 2025-02-25 | End: 2025-02-25

## 2025-02-25 RX ORDER — MAGNESIUM SULFATE HEPTAHYDRATE 500 MG/ML
INJECTION, SOLUTION INTRAMUSCULAR; INTRAVENOUS AS NEEDED
Status: DISCONTINUED | OUTPATIENT
Start: 2025-02-25 | End: 2025-02-25 | Stop reason: SURG

## 2025-02-25 RX ORDER — METHOCARBAMOL 750 MG/1
750 TABLET, FILM COATED ORAL 4 TIMES DAILY PRN
Status: DISCONTINUED | OUTPATIENT
Start: 2025-02-25 | End: 2025-02-27

## 2025-02-25 RX ORDER — SODIUM CHLORIDE 0.9 % (FLUSH) 0.9 %
10 SYRINGE (ML) INJECTION AS NEEDED
Status: DISCONTINUED | OUTPATIENT
Start: 2025-02-25 | End: 2025-03-04 | Stop reason: HOSPADM

## 2025-02-25 RX ORDER — FENTANYL CITRATE 50 UG/ML
50 INJECTION, SOLUTION INTRAMUSCULAR; INTRAVENOUS ONCE AS NEEDED
Status: COMPLETED | OUTPATIENT
Start: 2025-02-25 | End: 2025-02-25

## 2025-02-25 RX ORDER — METHOCARBAMOL 100 MG/ML
1000 INJECTION, SOLUTION INTRAMUSCULAR; INTRAVENOUS ONCE
Status: COMPLETED | OUTPATIENT
Start: 2025-02-25 | End: 2025-02-25

## 2025-02-25 RX ORDER — PROMETHAZINE HYDROCHLORIDE 25 MG/1
25 SUPPOSITORY RECTAL ONCE AS NEEDED
Status: DISCONTINUED | OUTPATIENT
Start: 2025-02-25 | End: 2025-02-25 | Stop reason: HOSPADM

## 2025-02-25 RX ORDER — ALBUTEROL SULFATE 90 UG/1
2 INHALANT RESPIRATORY (INHALATION) EVERY 4 HOURS PRN
Status: DISCONTINUED | OUTPATIENT
Start: 2025-02-25 | End: 2025-03-04 | Stop reason: HOSPADM

## 2025-02-25 RX ORDER — BUPIVACAINE HYDROCHLORIDE 2.5 MG/ML
INJECTION, SOLUTION EPIDURAL; INFILTRATION; INTRACAUDAL AS NEEDED
Status: DISCONTINUED | OUTPATIENT
Start: 2025-02-25 | End: 2025-02-25 | Stop reason: HOSPADM

## 2025-02-25 RX ORDER — ACETAMINOPHEN 325 MG/1
650 TABLET ORAL EVERY 4 HOURS PRN
Status: DISCONTINUED | OUTPATIENT
Start: 2025-02-25 | End: 2025-03-04

## 2025-02-25 RX ORDER — ACETAMINOPHEN 10 MG/ML
INJECTION, SOLUTION INTRAVENOUS AS NEEDED
Status: DISCONTINUED | OUTPATIENT
Start: 2025-02-25 | End: 2025-02-25 | Stop reason: SURG

## 2025-02-25 RX ORDER — PANTOPRAZOLE SODIUM 40 MG/1
40 TABLET, DELAYED RELEASE ORAL 2 TIMES DAILY
Status: DISCONTINUED | OUTPATIENT
Start: 2025-02-25 | End: 2025-03-04 | Stop reason: HOSPADM

## 2025-02-25 RX ORDER — MIDAZOLAM HYDROCHLORIDE 1 MG/ML
1 INJECTION, SOLUTION INTRAMUSCULAR; INTRAVENOUS
Status: DISCONTINUED | OUTPATIENT
Start: 2025-02-25 | End: 2025-02-25

## 2025-02-25 RX ORDER — ONDANSETRON 2 MG/ML
4 INJECTION INTRAMUSCULAR; INTRAVENOUS EVERY 6 HOURS PRN
Status: DISCONTINUED | OUTPATIENT
Start: 2025-02-25 | End: 2025-03-04 | Stop reason: HOSPADM

## 2025-02-25 RX ORDER — BISACODYL 5 MG/1
5 TABLET, DELAYED RELEASE ORAL DAILY PRN
Status: DISCONTINUED | OUTPATIENT
Start: 2025-02-25 | End: 2025-03-04 | Stop reason: HOSPADM

## 2025-02-25 RX ORDER — SODIUM CHLORIDE 9 MG/ML
30 INJECTION, SOLUTION INTRAVENOUS CONTINUOUS PRN
Status: ACTIVE | OUTPATIENT
Start: 2025-02-25 | End: 2025-02-27

## 2025-02-25 RX ORDER — IPRATROPIUM BROMIDE AND ALBUTEROL SULFATE 2.5; .5 MG/3ML; MG/3ML
3 SOLUTION RESPIRATORY (INHALATION)
Status: DISCONTINUED | OUTPATIENT
Start: 2025-02-25 | End: 2025-02-27

## 2025-02-25 RX ORDER — CEFAZOLIN SODIUM 500 MG/2.2ML
INJECTION, POWDER, FOR SOLUTION INTRAMUSCULAR; INTRAVENOUS AS NEEDED
Status: DISCONTINUED | OUTPATIENT
Start: 2025-02-25 | End: 2025-02-25 | Stop reason: SURG

## 2025-02-25 RX ORDER — OXYCODONE AND ACETAMINOPHEN 7.5; 325 MG/1; MG/1
1 TABLET ORAL EVERY 4 HOURS PRN
Status: DISCONTINUED | OUTPATIENT
Start: 2025-02-25 | End: 2025-02-25 | Stop reason: HOSPADM

## 2025-02-25 RX ORDER — SODIUM CHLORIDE 9 MG/ML
INJECTION, SOLUTION INTRAVENOUS CONTINUOUS PRN
Status: DISCONTINUED | OUTPATIENT
Start: 2025-02-25 | End: 2025-02-25 | Stop reason: SURG

## 2025-02-25 RX ORDER — ACETAMINOPHEN 160 MG/5ML
650 SOLUTION ORAL EVERY 4 HOURS PRN
Status: DISCONTINUED | OUTPATIENT
Start: 2025-02-25 | End: 2025-03-04

## 2025-02-25 RX ORDER — HYDROCODONE BITARTRATE AND ACETAMINOPHEN 5; 325 MG/1; MG/1
1 TABLET ORAL ONCE AS NEEDED
Status: COMPLETED | OUTPATIENT
Start: 2025-02-25 | End: 2025-02-25

## 2025-02-25 RX ORDER — MIDAZOLAM HYDROCHLORIDE 1 MG/ML
1 INJECTION, SOLUTION INTRAMUSCULAR; INTRAVENOUS
Status: DISCONTINUED | OUTPATIENT
Start: 2025-02-25 | End: 2025-02-25 | Stop reason: HOSPADM

## 2025-02-25 RX ORDER — GABAPENTIN 400 MG/1
800 CAPSULE ORAL EVERY 12 HOURS SCHEDULED
Status: DISCONTINUED | OUTPATIENT
Start: 2025-02-25 | End: 2025-03-04 | Stop reason: HOSPADM

## 2025-02-25 RX ORDER — NALOXONE HCL 0.4 MG/ML
0.1 VIAL (ML) INJECTION
Status: DISCONTINUED | OUTPATIENT
Start: 2025-02-25 | End: 2025-03-04 | Stop reason: HOSPADM

## 2025-02-25 RX ORDER — SODIUM CHLORIDE 0.9 % (FLUSH) 0.9 %
10 SYRINGE (ML) INJECTION EVERY 12 HOURS SCHEDULED
Status: DISCONTINUED | OUTPATIENT
Start: 2025-02-25 | End: 2025-03-04 | Stop reason: HOSPADM

## 2025-02-25 RX ORDER — BISACODYL 10 MG
10 SUPPOSITORY, RECTAL RECTAL DAILY PRN
Status: DISCONTINUED | OUTPATIENT
Start: 2025-02-25 | End: 2025-03-04 | Stop reason: HOSPADM

## 2025-02-25 RX ORDER — SODIUM CHLORIDE 9 MG/ML
40 INJECTION, SOLUTION INTRAVENOUS AS NEEDED
Status: DISCONTINUED | OUTPATIENT
Start: 2025-02-25 | End: 2025-03-04 | Stop reason: HOSPADM

## 2025-02-25 RX ORDER — SODIUM CHLORIDE 0.9 % (FLUSH) 0.9 %
3 SYRINGE (ML) INJECTION EVERY 12 HOURS SCHEDULED
Status: DISCONTINUED | OUTPATIENT
Start: 2025-02-25 | End: 2025-02-25 | Stop reason: HOSPADM

## 2025-02-25 RX ORDER — LIDOCAINE HYDROCHLORIDE 40 MG/ML
SOLUTION TOPICAL AS NEEDED
Status: DISCONTINUED | OUTPATIENT
Start: 2025-02-25 | End: 2025-02-25 | Stop reason: SURG

## 2025-02-25 RX ORDER — LIDOCAINE HYDROCHLORIDE 10 MG/ML
0.5 INJECTION, SOLUTION INFILTRATION; PERINEURAL ONCE AS NEEDED
Status: DISCONTINUED | OUTPATIENT
Start: 2025-02-25 | End: 2025-02-25 | Stop reason: HOSPADM

## 2025-02-25 RX ORDER — SODIUM CHLORIDE, SODIUM LACTATE, POTASSIUM CHLORIDE, CALCIUM CHLORIDE 600; 310; 30; 20 MG/100ML; MG/100ML; MG/100ML; MG/100ML
75 INJECTION, SOLUTION INTRAVENOUS CONTINUOUS
Status: DISCONTINUED | OUTPATIENT
Start: 2025-02-25 | End: 2025-02-27

## 2025-02-25 RX ORDER — PHENYLEPHRINE HCL IN 0.9% NACL 1 MG/10 ML
SYRINGE (ML) INTRAVENOUS AS NEEDED
Status: DISCONTINUED | OUTPATIENT
Start: 2025-02-25 | End: 2025-02-25 | Stop reason: SURG

## 2025-02-25 RX ORDER — SODIUM CHLORIDE, SODIUM LACTATE, POTASSIUM CHLORIDE, CALCIUM CHLORIDE 600; 310; 30; 20 MG/100ML; MG/100ML; MG/100ML; MG/100ML
9 INJECTION, SOLUTION INTRAVENOUS CONTINUOUS
Status: ACTIVE | OUTPATIENT
Start: 2025-02-25 | End: 2025-02-26

## 2025-02-25 RX ORDER — BUPIVACAINE HYDROCHLORIDE AND EPINEPHRINE 2.5; 5 MG/ML; UG/ML
INJECTION, SOLUTION EPIDURAL; INFILTRATION; INTRACAUDAL; PERINEURAL AS NEEDED
Status: DISCONTINUED | OUTPATIENT
Start: 2025-02-25 | End: 2025-02-25 | Stop reason: HOSPADM

## 2025-02-25 RX ORDER — ATROPINE SULFATE 0.4 MG/ML
0.4 INJECTION, SOLUTION INTRAMUSCULAR; INTRAVENOUS; SUBCUTANEOUS ONCE AS NEEDED
Status: DISCONTINUED | OUTPATIENT
Start: 2025-02-25 | End: 2025-02-25 | Stop reason: HOSPADM

## 2025-02-25 RX ORDER — HYDRALAZINE HYDROCHLORIDE 20 MG/ML
5 INJECTION INTRAMUSCULAR; INTRAVENOUS
Status: DISCONTINUED | OUTPATIENT
Start: 2025-02-25 | End: 2025-02-25 | Stop reason: HOSPADM

## 2025-02-25 RX ORDER — EPHEDRINE SULFATE 50 MG/ML
INJECTION INTRAVENOUS AS NEEDED
Status: DISCONTINUED | OUTPATIENT
Start: 2025-02-25 | End: 2025-02-25 | Stop reason: SURG

## 2025-02-25 RX ORDER — DEXAMETHASONE SODIUM PHOSPHATE 4 MG/ML
INJECTION, SOLUTION INTRA-ARTICULAR; INTRALESIONAL; INTRAMUSCULAR; INTRAVENOUS; SOFT TISSUE AS NEEDED
Status: DISCONTINUED | OUTPATIENT
Start: 2025-02-25 | End: 2025-02-25 | Stop reason: SURG

## 2025-02-25 RX ORDER — FENTANYL CITRATE 50 UG/ML
50 INJECTION, SOLUTION INTRAMUSCULAR; INTRAVENOUS
Status: DISCONTINUED | OUTPATIENT
Start: 2025-02-25 | End: 2025-02-25 | Stop reason: HOSPADM

## 2025-02-25 RX ORDER — EPHEDRINE SULFATE 50 MG/ML
5 INJECTION, SOLUTION INTRAVENOUS ONCE AS NEEDED
Status: DISCONTINUED | OUTPATIENT
Start: 2025-02-25 | End: 2025-02-25 | Stop reason: HOSPADM

## 2025-02-25 RX ORDER — PROPOFOL 10 MG/ML
INJECTION, EMULSION INTRAVENOUS AS NEEDED
Status: DISCONTINUED | OUTPATIENT
Start: 2025-02-25 | End: 2025-02-25 | Stop reason: SURG

## 2025-02-25 RX ORDER — IPRATROPIUM BROMIDE AND ALBUTEROL SULFATE 2.5; .5 MG/3ML; MG/3ML
3 SOLUTION RESPIRATORY (INHALATION)
Status: DISCONTINUED | OUTPATIENT
Start: 2025-02-25 | End: 2025-02-25 | Stop reason: SDUPTHER

## 2025-02-25 RX ORDER — LABETALOL HYDROCHLORIDE 5 MG/ML
5 INJECTION, SOLUTION INTRAVENOUS
Status: DISCONTINUED | OUTPATIENT
Start: 2025-02-25 | End: 2025-02-25 | Stop reason: HOSPADM

## 2025-02-25 RX ORDER — SODIUM CHLORIDE, SODIUM LACTATE, POTASSIUM CHLORIDE, CALCIUM CHLORIDE 600; 310; 30; 20 MG/100ML; MG/100ML; MG/100ML; MG/100ML
9 INJECTION, SOLUTION INTRAVENOUS CONTINUOUS
Status: DISCONTINUED | OUTPATIENT
Start: 2025-02-25 | End: 2025-02-25

## 2025-02-25 RX ORDER — FAMOTIDINE 10 MG/ML
20 INJECTION, SOLUTION INTRAVENOUS ONCE
Status: DISCONTINUED | OUTPATIENT
Start: 2025-02-25 | End: 2025-02-25

## 2025-02-25 RX ORDER — FLUMAZENIL 0.1 MG/ML
0.2 INJECTION INTRAVENOUS AS NEEDED
Status: DISCONTINUED | OUTPATIENT
Start: 2025-02-25 | End: 2025-02-25 | Stop reason: HOSPADM

## 2025-02-25 RX ORDER — POLYETHYLENE GLYCOL 3350 17 G/17G
17 POWDER, FOR SOLUTION ORAL DAILY PRN
Status: DISCONTINUED | OUTPATIENT
Start: 2025-02-25 | End: 2025-03-04 | Stop reason: HOSPADM

## 2025-02-25 RX ORDER — ONDANSETRON 2 MG/ML
INJECTION INTRAMUSCULAR; INTRAVENOUS AS NEEDED
Status: DISCONTINUED | OUTPATIENT
Start: 2025-02-25 | End: 2025-02-25 | Stop reason: SURG

## 2025-02-25 RX ORDER — FAMOTIDINE 10 MG/ML
20 INJECTION, SOLUTION INTRAVENOUS ONCE
Status: COMPLETED | OUTPATIENT
Start: 2025-02-25 | End: 2025-02-25

## 2025-02-25 RX ORDER — NALOXONE HCL 0.4 MG/ML
0.2 VIAL (ML) INJECTION AS NEEDED
Status: DISCONTINUED | OUTPATIENT
Start: 2025-02-25 | End: 2025-02-25 | Stop reason: HOSPADM

## 2025-02-25 RX ORDER — HYDROMORPHONE HYDROCHLORIDE 1 MG/ML
0.5 INJECTION, SOLUTION INTRAMUSCULAR; INTRAVENOUS; SUBCUTANEOUS
Status: DISCONTINUED | OUTPATIENT
Start: 2025-02-25 | End: 2025-02-25 | Stop reason: HOSPADM

## 2025-02-25 RX ORDER — ACETAMINOPHEN 500 MG
500 TABLET ORAL EVERY 6 HOURS PRN
COMMUNITY
End: 2025-03-04 | Stop reason: HOSPADM

## 2025-02-25 RX ORDER — ROCURONIUM BROMIDE 10 MG/ML
INJECTION, SOLUTION INTRAVENOUS AS NEEDED
Status: DISCONTINUED | OUTPATIENT
Start: 2025-02-25 | End: 2025-02-25 | Stop reason: SURG

## 2025-02-25 RX ORDER — BUSPIRONE HYDROCHLORIDE 10 MG/1
10 TABLET ORAL 2 TIMES DAILY
Status: DISCONTINUED | OUTPATIENT
Start: 2025-02-25 | End: 2025-02-27

## 2025-02-25 RX ORDER — ZOLPIDEM TARTRATE 5 MG/1
10 TABLET ORAL NIGHTLY PRN
Status: DISCONTINUED | OUTPATIENT
Start: 2025-02-25 | End: 2025-03-04 | Stop reason: HOSPADM

## 2025-02-25 RX ORDER — ONDANSETRON 4 MG/1
4 TABLET, ORALLY DISINTEGRATING ORAL EVERY 6 HOURS PRN
Status: DISCONTINUED | OUTPATIENT
Start: 2025-02-25 | End: 2025-03-04 | Stop reason: HOSPADM

## 2025-02-25 RX ORDER — ONDANSETRON 2 MG/ML
4 INJECTION INTRAMUSCULAR; INTRAVENOUS ONCE AS NEEDED
Status: DISCONTINUED | OUTPATIENT
Start: 2025-02-25 | End: 2025-02-25 | Stop reason: HOSPADM

## 2025-02-25 RX ORDER — LIDOCAINE HYDROCHLORIDE 20 MG/ML
INJECTION, SOLUTION INFILTRATION; PERINEURAL AS NEEDED
Status: DISCONTINUED | OUTPATIENT
Start: 2025-02-25 | End: 2025-02-25 | Stop reason: SURG

## 2025-02-25 RX ORDER — OXYCODONE HYDROCHLORIDE 5 MG/1
10 TABLET ORAL EVERY 4 HOURS PRN
Status: DISCONTINUED | OUTPATIENT
Start: 2025-02-25 | End: 2025-03-04

## 2025-02-25 RX ORDER — IPRATROPIUM BROMIDE AND ALBUTEROL SULFATE 2.5; .5 MG/3ML; MG/3ML
3 SOLUTION RESPIRATORY (INHALATION) ONCE AS NEEDED
Status: DISCONTINUED | OUTPATIENT
Start: 2025-02-25 | End: 2025-02-25 | Stop reason: HOSPADM

## 2025-02-25 RX ORDER — METOPROLOL TARTRATE 1 MG/ML
INJECTION, SOLUTION INTRAVENOUS AS NEEDED
Status: DISCONTINUED | OUTPATIENT
Start: 2025-02-25 | End: 2025-02-25 | Stop reason: SURG

## 2025-02-25 RX ADMIN — METOPROLOL TARTRATE 2.5 MG: 5 INJECTION INTRAVENOUS at 14:19

## 2025-02-25 RX ADMIN — SUGAMMADEX 200 MG: 100 INJECTION, SOLUTION INTRAVENOUS at 13:55

## 2025-02-25 RX ADMIN — CEFAZOLIN 2 MG: 225 INJECTION, POWDER, FOR SOLUTION INTRAMUSCULAR; INTRAVENOUS at 13:22

## 2025-02-25 RX ADMIN — MAGNESIUM SULFATE HEPTAHYDRATE 2 G: 500 INJECTION, SOLUTION INTRAMUSCULAR; INTRAVENOUS at 08:52

## 2025-02-25 RX ADMIN — HYDROMORPHONE HYDROCHLORIDE 0.5 MG: 1 INJECTION, SOLUTION INTRAMUSCULAR; INTRAVENOUS; SUBCUTANEOUS at 15:43

## 2025-02-25 RX ADMIN — LIDOCAINE HYDROCHLORIDE 60 MG: 20 INJECTION, SOLUTION INFILTRATION; PERINEURAL at 08:21

## 2025-02-25 RX ADMIN — EPHEDRINE SULFATE 10 MG: 50 INJECTION INTRAVENOUS at 08:38

## 2025-02-25 RX ADMIN — MIDAZOLAM HYDROCHLORIDE 1 MG: 1 INJECTION, SOLUTION INTRAMUSCULAR; INTRAVENOUS at 08:00

## 2025-02-25 RX ADMIN — ROCURONIUM BROMIDE 10 MG: 10 INJECTION, SOLUTION INTRAVENOUS at 11:00

## 2025-02-25 RX ADMIN — LIDOCAINE HYDROCHLORIDE 1 EACH: 40 SOLUTION TOPICAL at 08:25

## 2025-02-25 RX ADMIN — ACETAMINOPHEN 1000 MG: 1000 INJECTION INTRAVENOUS at 08:52

## 2025-02-25 RX ADMIN — IPRATROPIUM BROMIDE AND ALBUTEROL SULFATE 3 ML: .5; 3 SOLUTION RESPIRATORY (INHALATION) at 19:59

## 2025-02-25 RX ADMIN — CEFAZOLIN 2 G: 2 INJECTION, POWDER, FOR SOLUTION INTRAMUSCULAR; INTRAVENOUS at 08:09

## 2025-02-25 RX ADMIN — ROCURONIUM BROMIDE 10 MG: 10 INJECTION, SOLUTION INTRAVENOUS at 09:21

## 2025-02-25 RX ADMIN — CEFAZOLIN 2000 MG: 2 INJECTION, POWDER, FOR SOLUTION INTRAMUSCULAR; INTRAVENOUS at 21:03

## 2025-02-25 RX ADMIN — IPRATROPIUM BROMIDE AND ALBUTEROL SULFATE 3 ML: .5; 3 SOLUTION RESPIRATORY (INHALATION) at 07:28

## 2025-02-25 RX ADMIN — ROCURONIUM BROMIDE 50 MG: 10 INJECTION, SOLUTION INTRAVENOUS at 08:23

## 2025-02-25 RX ADMIN — SENNOSIDES AND DOCUSATE SODIUM 2 TABLET: 50; 8.6 TABLET ORAL at 21:01

## 2025-02-25 RX ADMIN — EPHEDRINE SULFATE 10 MG: 50 INJECTION INTRAVENOUS at 10:09

## 2025-02-25 RX ADMIN — EPHEDRINE SULFATE 10 MG: 50 INJECTION INTRAVENOUS at 08:40

## 2025-02-25 RX ADMIN — SODIUM CHLORIDE: 9 INJECTION, SOLUTION INTRAVENOUS at 13:00

## 2025-02-25 RX ADMIN — PROPOFOL 50 MG: 10 INJECTION, EMULSION INTRAVENOUS at 14:36

## 2025-02-25 RX ADMIN — EPHEDRINE SULFATE 10 MG: 50 INJECTION INTRAVENOUS at 09:15

## 2025-02-25 RX ADMIN — OXYCODONE HYDROCHLORIDE 10 MG: 5 TABLET ORAL at 21:25

## 2025-02-25 RX ADMIN — METHOCARBAMOL TABLETS 750 MG: 750 TABLET, COATED ORAL at 22:40

## 2025-02-25 RX ADMIN — EPHEDRINE SULFATE 10 MG: 50 INJECTION INTRAVENOUS at 08:59

## 2025-02-25 RX ADMIN — Medication 10 MG: at 10:29

## 2025-02-25 RX ADMIN — FENTANYL CITRATE 50 MCG: 50 INJECTION, SOLUTION INTRAMUSCULAR; INTRAVENOUS at 07:59

## 2025-02-25 RX ADMIN — ONDANSETRON 4 MG: 2 INJECTION, SOLUTION INTRAMUSCULAR; INTRAVENOUS at 08:22

## 2025-02-25 RX ADMIN — GABAPENTIN 800 MG: 400 CAPSULE ORAL at 21:01

## 2025-02-25 RX ADMIN — PANTOPRAZOLE SODIUM 40 MG: 40 TABLET, DELAYED RELEASE ORAL at 21:01

## 2025-02-25 RX ADMIN — FENTANYL CITRATE 50 MCG: 50 INJECTION, SOLUTION INTRAMUSCULAR; INTRAVENOUS at 12:40

## 2025-02-25 RX ADMIN — SODIUM CHLORIDE, POTASSIUM CHLORIDE, SODIUM LACTATE AND CALCIUM CHLORIDE: 600; 310; 30; 20 INJECTION, SOLUTION INTRAVENOUS at 09:10

## 2025-02-25 RX ADMIN — FAMOTIDINE 20 MG: 10 INJECTION INTRAVENOUS at 07:47

## 2025-02-25 RX ADMIN — HYDROMORPHONE HYDROCHLORIDE: 10 INJECTION, SOLUTION INTRAMUSCULAR; INTRAVENOUS; SUBCUTANEOUS at 18:25

## 2025-02-25 RX ADMIN — ROCURONIUM BROMIDE 10 MG: 10 INJECTION, SOLUTION INTRAVENOUS at 10:29

## 2025-02-25 RX ADMIN — Medication 30 MG: at 08:21

## 2025-02-25 RX ADMIN — SODIUM CHLORIDE, SODIUM LACTATE, POTASSIUM CHLORIDE, CALCIUM CHLORIDE 100 ML/HR: 20; 30; 600; 310 INJECTION, SOLUTION INTRAVENOUS at 18:32

## 2025-02-25 RX ADMIN — Medication 100 MCG: at 09:16

## 2025-02-25 RX ADMIN — METOPROLOL TARTRATE 2.5 MG: 5 INJECTION INTRAVENOUS at 14:30

## 2025-02-25 RX ADMIN — ROCURONIUM BROMIDE 10 MG: 10 INJECTION, SOLUTION INTRAVENOUS at 09:56

## 2025-02-25 RX ADMIN — PROPOFOL 150 MG: 10 INJECTION, EMULSION INTRAVENOUS at 08:21

## 2025-02-25 RX ADMIN — HYDROCODONE BITARTRATE AND ACETAMINOPHEN 1 TABLET: 5; 325 TABLET ORAL at 17:00

## 2025-02-25 RX ADMIN — HYDROMORPHONE HYDROCHLORIDE 0.5 MG: 1 INJECTION, SOLUTION INTRAMUSCULAR; INTRAVENOUS; SUBCUTANEOUS at 09:27

## 2025-02-25 RX ADMIN — Medication 10 ML: at 21:02

## 2025-02-25 RX ADMIN — FENTANYL CITRATE 50 MCG: 50 INJECTION, SOLUTION INTRAMUSCULAR; INTRAVENOUS at 08:21

## 2025-02-25 RX ADMIN — Medication 100 MCG: at 10:11

## 2025-02-25 RX ADMIN — DEXAMETHASONE SODIUM PHOSPHATE 10 MG: 4 INJECTION, SOLUTION INTRAMUSCULAR; INTRAVENOUS at 08:24

## 2025-02-25 RX ADMIN — HYDROMORPHONE HYDROCHLORIDE 0.5 MG: 1 INJECTION, SOLUTION INTRAMUSCULAR; INTRAVENOUS; SUBCUTANEOUS at 11:44

## 2025-02-25 RX ADMIN — Medication 10 MG: at 09:21

## 2025-02-25 RX ADMIN — MIDAZOLAM HYDROCHLORIDE 1 MG: 1 INJECTION, SOLUTION INTRAMUSCULAR; INTRAVENOUS at 07:59

## 2025-02-25 RX ADMIN — METHOCARBAMOL 1000 MG: 1000 INJECTION, SOLUTION INTRAMUSCULAR; INTRAVENOUS at 16:06

## 2025-02-25 RX ADMIN — CEFAZOLIN 2 MG: 225 INJECTION, POWDER, FOR SOLUTION INTRAMUSCULAR; INTRAVENOUS at 09:22

## 2025-02-25 RX ADMIN — SODIUM CHLORIDE, POTASSIUM CHLORIDE, SODIUM LACTATE AND CALCIUM CHLORIDE 9 ML/HR: 600; 310; 30; 20 INJECTION, SOLUTION INTRAVENOUS at 07:24

## 2025-02-25 RX ADMIN — ROCURONIUM BROMIDE 10 MG: 10 INJECTION, SOLUTION INTRAVENOUS at 12:28

## 2025-02-25 NOTE — PLAN OF CARE
Goal Outcome Evaluation:           Progress: no change  Outcome Evaluation: A&Ox4. 3L nasal cannula. Patient received from PACU, ambulated 5 ft from stretcher to bed. Diaz catheter was removed by PACU before patient arrived to floor, although there is an order to continue urinary catheter. PCA was not set up prior to arrival to floor- upon arrival, pt rating pain 9/10- requested syringe from pharmacy stat and PCA now infusing. Patient educated on use of PCA. Island dressing dry and intact. HV in place, 50 mL output upon arrival. Urinal by bedside. DTV at midnight.

## 2025-02-25 NOTE — ANESTHESIA PROCEDURE NOTES
Airway  Urgency: elective    Date/Time: 2/25/2025 8:25 AM  Airway not difficult    General Information and Staff    Patient location during procedure: OR  Anesthesiologist: Donita Burns MD  CRNA/CAA: Odilon Trujillo CRNA    Indications and Patient Condition  Indications for airway management: airway protection    Preoxygenated: yes  MILS not maintained throughout  Mask difficulty assessment: 1 - vent by mask    Final Airway Details  Final airway type: endotracheal airway      Successful airway: ETT  Cuffed: yes   Successful intubation technique: video laryngoscopy  Facilitating devices/methods: intubating stylet  Endotracheal tube insertion site: oral  Blade: Yasmin  Blade size: D  ETT size (mm): 7.5  Cormack-Lehane Classification: grade I - full view of glottis  Placement verified by: chest auscultation and capnometry   Cuff volume (mL): 10  Measured from: teeth  ETT/EBT  to teeth (cm): 22  Number of attempts at approach: 1  Assessment: lips, teeth, and gum same as pre-op and atraumatic intubation             You may take 1 tablet of the hydrocodone every 6 hours as needed for severe pain.  This medicine can make you drowsy so use sparingly    If you have mild or moderate pain, you may take Tylenol or ibuprofen per bottle instructions as needed.  Be aware that the prescription pain medication has Tylenol in it, so do not take more than 3000 mg of Tylenol in a 24-hour period.    The CT scan done today will help the orthopedic surgeon plan appropriate repair for this injury.  You should keep the splint in place and remain nonweightbearing using the crutches.  You may need to sponge bathe or cover the splint with a plastic bag if you are showering or bathing so as not to get it wet.  Do not put weight on this foot    If you do not hear from the specialty scheduling within 1 to 2 days, you should contact the office to see if you can get into see Dr. Hood here at [place] and follow-up    If any new or concerning symptoms develop do not hesitate to return to the emergency room for evaluation

## 2025-02-25 NOTE — OP NOTE
Preoperative diagnosis: 1. S/p Anterior C3-C7 fusion for myelopathy; 2. Adjacent level bilateral foraminal stenosis C7/T1 with bilateral arm pain and bilateral arm/hand weakness    Postoperative diagnosis: Same as above    Procedures performed: Bilateral C7/T1 decompression C3 to T3 onlay posterolateral fusion, bilateral lateral mass screws C3, C4, C5, C6, bilateral T1, T2, T3 pedicle screws placed with Stealth spinal navigation, placement of bilateral rods from C3 to T3 with crosslink    Spinal Surgery Levels Completed:6+ Levels     Surgeon: Tobias    First Assistant: Viji Mak  (She greatly assisted in the exposure, visualization of neural structures, control of bleeding, retraction, and closure of the incision. Her skilled assistance was necessary for the success of this case.)     Anesthesia: GET    EBL: 150 cc    Complications: none    Specimen sent: none    Drains: 15 mm round Hemovac epidural drain    Findings: severe bilateral forminal stenosis C7/T1    Postoperative condition: stable    Indications for the operation: The patient is a 54-year-old male who is undergone a lumbar decompression by me as well as a C4, C5, C6 corpectomy with cage and plate from C3-C7 for myelopathy.  He did well for a while but began developing bilateral arm pain with weakness in the arms and hands because of adjacent level stenosis at C7-T1.  He failed conservative treatments including physical therapy, medicines and blocks because of that and the progression of his motor deficit, he was brought to the operating room for a posterior adjacent oval decompression and fusion from C3-T3.  He was not myelopathic but radiculopathic prior to the surgery.    Informed consent: He and his wife understood that the goal of surgery was relief of his bilateral arm pain and improvement of his bilateral arm and hand weakness over time as well as maintaining stability of his spine.  The risks include, but are not limited to, infection  possibly requiring reoperation, hemorrhage possibly requiring transfusion, CSF leak possible requiring reoperation, incomplete relief of symptoms, potential need for additional surgeries in the future, hardware difficulties, stroke, paralysis, coma, and death.  He agreed to proceed.    Details of the operation: After medical and pulmonary clearance, he was brought to the operating room and remained in his gurney in the supine position.  After induction of endotracheal ovation, he got 2 g of Kefzol as per the SCIP protocol.  A Diaz catheter was placed.  SCDs were placed.  Venous and arterial access have been secured.  He was rolled over the prone position on a radiolucent Rodger spinal table.  All pressure points were padded and the neck was flexed slightly and the shoulders were retracted inferiorly with tape.  We brought the centimeters and marked out the incision from C2-T3 for in the midline.  The cervical thoracic region was then prepped and draped in the usual sterile fashion.  We did a surgical timeout.  Initially I injected 60 cc of quarter percent Marcaine with epinephrine in the paraspinous muscular from C3-T3.  Upon closure an additional 60 cc without epinephrine were injected for further muscular pain control after surgery.  A linear incision was made from C2-T4 with a #10 skin knife.  Hemostasis was obtained with monopolar cautery.  Dissection was taken on the way down to the midline fascia which was divided to the left to the right at C2-C3-C4 C5-C6-C7 T1-T2 and T3.  The rib heads were identified bilaterally from T1-T3 and the lateral masses were exposed all the way from see 2 to C7.  We put on a stereotactic clamp at T4 and with the stereotactic array we did an O-arm spin.  We placed a notch on the T1 spinous process for reference.  When we went through the registration process her accuracy was excellent.  We placed a pair of 4.0 x 28 mm pedicle screws at T1 using Stealth navigation, a pair of 4.0 x  30 mm screws at T2 again using the transpedicular technique and Stealth navigation, and a pair of 4.0 x 32 mm screws at T3 using Stealth navigation the transpedicular technique.  An O-arm spin showed good placement and bone of all 6 screws.  The Star Trek clamp was removed.  I then went ahead and placed bilateral C3 bilateral C4 bilateral C5 and bilateral C6 lateral mass screws using standard landmarks angling in the midpoint of the lateral mass 15 degrees up and 15 degrees laterally, I.  A pair of 3.5 x 14 mm screws bilaterally at C3, para 3.5 x 14 mm screws bilaterally at C4, a pair of 3.5 x 14 mm screws bilaterally at C5, 3.5 x 14 mm screws bilaterally at C6.  X-rays show good placement of the lateral mass and the pedicle screws.  Microscope was draped and brought into the field.  Its use was essential to the performance of micro neurosurgical technique.  Using a Leksell rongeur I remove the spinous processes at C3 1 and C7 using the 3 mm matchstick under magnification I did a C7-T1 laminectomy exposing the central spinal cord dorsally and doing medial facetectomies and foraminotomies bilaterally unroofing and decompressing the C8 nerve root.  There was quite a bit of ligamentous hypertrophy and bony hypertrophy that was the main source of the compression of the C8 roots bilaterally.  This was relieved.  I had harvested some laminar autograft to be used during the fusion process.  Bilateral rods were placed from C3-T3 and tightened down using the breakaway technique and a cross-link was placed between T1 and T2.  The cortical surfaces were decorticated and I placed MagnetOs and Magnafuse surfaces from C3-T3.  Antibiotic irrigation was used.  Floseal was used in the epidural space.  A 15 mm round Hemovac was placed in the epidural space and exit through separate stab incision secured to skin with 0 silk.  The fascia was reapproximated with 0 Vicryl interrupted suture.  The subcutaneous layer was closed with 2-0  interrupted Vicryl suture.  The skin was closed with a running 4-0 Vicryl subcuticular.  Steri-Strips and a Prevena placed.  The drain was affixed to the skin with 2-0 silk.  The patient Toller procedure well.  She was rolled over in the supine position and extubated taken recovery in satisfactory condition.

## 2025-02-25 NOTE — ANESTHESIA PREPROCEDURE EVALUATION
Anesthesia Evaluation     NPO Solid Status: > 8 hours             Airway   Mallampati: IV  Neck ROM: limited  Difficult intubation highly probable  Dental      Pulmonary    (+) COPD,  Cardiovascular         Neuro/Psych  (+) numbness  GI/Hepatic/Renal/Endo    (+) GERD    Musculoskeletal     (+) neck pain      ROS comment: Arm weakness  Abdominal    Substance History       Comment: thc   OB/GYN          Other   arthritis,   history of cancer                Anesthesia Plan    ASA 3     general and Tessa     intravenous induction     Anesthetic plan, risks, benefits, and alternatives have been provided, discussed and informed consent has been obtained with: patient.    CODE STATUS:

## 2025-02-25 NOTE — ANESTHESIA PROCEDURE NOTES
Arterial Line      Patient location during procedure: holding area  Start time: 2/25/2025 7:55 AM  Stop Time:2/25/2025 8:00 AM       Line placed for hemodynamic monitoring.  Performed By   Anesthesiologist: Donita Burns MD   Preanesthetic Checklist  Completed: patient identified, IV checked, site marked, risks and benefits discussed, monitors and equipment checked and pre-op evaluation  Arterial Line Prep    Sterile Tech: gloves  Prep: alcohol swabs  Patient monitoring: blood pressure monitoring, continuous pulse oximetry and EKG  Arterial Line Procedure   Laterality:left  Location:  radial artery  Catheter size: 20 G   Guidance: ultrasound guided  PROCEDURE NOTE/ULTRASOUND INTERPRETATION.  Using ultrasound guidance the potential vascular sites for insertion of the catheter were visualized to determine the patency of the vessel to be used for vascular access.  After selecting the appropriate site for insertion, the needle was visualized under ultrasound being inserted into the radial artery, followed by ultrasound confirmation of wire and catheter placement. There were no abnormalities seen on ultrasound; an image was taken; and the patient tolerated the procedure with no complications.   Number of attempts: 1  Successful placement: yes   Post Assessment   Dressing Type: occlusive dressing applied.   Complications no  Circ/Move/Sens Assessment: normal.   Patient Tolerance: patient tolerated the procedure well with no apparent complications

## 2025-02-25 NOTE — H&P
Office Visit    1/22/2025  [facility] NEUROSURGERY       Bert Collado MD  Neurosurgery History of fusion of cervical spine +4 more  Dx Follow-up  Back Pain  Leg Pain  Reason for Visit     Progress Notes  Bert Collado MD (Physician)  Neurosurgery  Expand All Collapse All[]Expand All by Default     Subjective  Patient ID: Rafael Kat is a 54 y.o. male is here today for follow-up regarding his XR spine cervical      He saw Dr. Powers of pulmonary who thought that he had more asthma than COPD.  He does not think that there is a contraindication to a general anesthetic.  Will ask Dr. Powers to see him postoperatively in the hospital.  He did get a swallowing study which I reviewed and I do not think shows much mechanical obstruction.  He has yet to see the ENT doctors.  We can arrange that after the surgery since we are taking a posterior approach.  Will move forward with a C7-T1 decompression and a C3-T3 fusion with hardware.  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  If we do nothing he will continue to decline.  The epidural block that he got to help with the pain but not the motor function.  He has to be realistic also about the expectation of what he is able to do and he fully knowledges that he knows that he will be able to do as much significant physical work after surgery.  The goal is for him not to lose the function in his arms and hands and still to be able to use them well.  I think we can accomplish that but he just needs to be realistic.     He had a previous corpectomy and fusion several years ago and he has done reasonably well with his low back decompression as well.           History of Present Illness     The following portions of the patient's history were reviewed and updated as appropriate: allergies, current medications, past family history, past medical history, past social history,  past surgical history, and problem list.     Review of Systems   Constitutional:  Negative for fever.   Musculoskeletal:  Positive for back pain. Negative for gait problem.   Neurological:  Positive for weakness. Negative for numbness.   All other systems reviewed and are negative.           Objective  Physical Exam  Constitutional:       General: He is awake.      Appearance: He is well-developed.   HENT:      Head: Normocephalic and atraumatic.   Eyes:      General: Lids are normal.      Extraocular Movements: Extraocular movements intact.      Conjunctiva/sclera: Conjunctivae normal.      Pupils: Pupils are equal, round, and reactive to light.   Neck:      Vascular: No carotid bruit.   Neurological:      Mental Status: He is alert.      Coordination: Coordination is intact.      Deep Tendon Reflexes:      Reflex Scores:       Tricep reflexes are 2+ on the right side and 2+ on the left side.       Bicep reflexes are 2+ on the right side and 2+ on the left side.       Brachioradialis reflexes are 2+ on the right side and 2+ on the left side.       Patellar reflexes are 2+ on the right side and 2+ on the left side.       Achilles reflexes are 2+ on the right side and 2+ on the left side.  Psychiatric:         Speech: Speech normal.         Neurological Exam  Mental Status  Awake and alert. Oriented only to person, place, time and situation. Recent and remote memory are intact. Speech is normal. Language is fluent with no aphasia. Attention and concentration are normal. Fund of knowledge is appropriate for level of education.     Cranial Nerves  CN II: Visual acuity is normal. Visual fields full to confrontation.  CN III, IV, VI: Extraocular movements intact bilaterally. Normal lids and orbits bilaterally. Pupils equal round and reactive to light bilaterally.  CN V: Facial sensation is normal.  CN VII: Full and symmetric facial movement.  CN IX, X: Palate elevates symmetrically. Normal gag reflex.  CN XI: Shoulder  shrug strength is normal.  CN XII: Tongue midline without atrophy or fasciculations.     Motor  Normal muscle bulk throughout. Normal muscle tone.                                                Right                     Left  Rhomboids                            5                          5  Infraspinatus                          5                          5  Supraspinatus                       5                          5  Deltoid                                   5                          5   Biceps                                   5                          5  Brachioradialis                      5                          5   Triceps                                  5                          5   Pronator                                5                          5   Supinator                              5                           5   Wrist flexor                            5                          5   Wrist extensor                       5                          5   Finger flexor                          4                          4   Finger extensor                     4                          4   Interossei                              4                          4   Abductor pollicis brevis         4                          4   Flexor pollicis brevis             4                          4   Opponens pollicis                 4                          4  Extensor digitorum               4                          4  Abductor digiti minimi           4                          4   Abdominal                            5                          5  Glutei                                    5                          5  Hip abductor                         5                          5  Hip adductor                         5                          5   Iliopsoas                               5                          5   Quadriceps                           5                          5   Hamstring                              5                          5   Gastrocnemius                     5                           5   Anterior tibialis                      5                          5   Posterior tibialis                    5                          5   Peroneal                               5                          5  Ankle dorsiflexor                   5                          5  Ankle plantar flexor              5                           5  Extensor hallucis longus      5                           5     Sensory  Light touch is normal in upper and lower extremities. Proprioception is normal in upper and lower extremities.      Reflexes                                            Right                      Left  Brachioradialis                    2+                         2+  Biceps                                 2+                         2+  Triceps                                2+                         2+  Finger flex                           2+                         2+  Hamstring                            2+                         2+  Patellar                                2+                         2+  Achilles                                2+                         2+     Coordination     Finger-to-nose, rapid alternating movements and heel-to-shin normal bilaterally without dysmetria.     Gait  Casual gait is normal including stance, stride, and arm swing.Normal toe walking. Normal heel walking. Normal tandem gait.           Assessment & Plan  Independent Review of Radiographic Studies:       The cervical MRI done on 12/9/2024 was reviewed.  He is fused solidly from C3-C7 but he does have C7-T1 adjacent level stenosis with root compression.  Agree with the report.     Medical Decision Making:       Pulmonary clearance has been obtained.  Will asked Dr. Powers to see him while he is in the hospital.  I described and recommended to C7-T1 decompression and at C3-T3 fusion with lateral  mass screws, pedicle screws, bilateral rods and cross-links using Stealth spinal navigation.  The goal of surgery is to decompress the nerve so that he gets his arm and hand function back and it reduces his overall arm pain as well as reduction in his neck pain through stabilization from C3-T3.  The risks include, but are not limited to, infection, hemorrhage requiring transfusion or reoperation, CSF leak requiring reoperation, incomplete relief of symptoms, hardware problems requiring revision or removal, potential need for additional surgeries because of adjacent level problems, stroke, paralysis, coma, and death.  He agrees to proceed     He knows he will be seen by the APCs after surgery.  I like to see him at the 3 to 4-month joaquina after surgery with cervical thoracic x-rays.     Diagnoses and all orders for this visit:     1. History of fusion of cervical spine (Primary)  -     Case Request; Standing  -     Type & Screen; Future  -     ceFAZolin (ANCEF) 2 g in sodium chloride 0.9 % 100 mL IVPB  -     Case Request     2. Spinal stenosis of cervicothoracic region  -     Case Request; Standing  -     Type & Screen; Future  -     ceFAZolin (ANCEF) 2 g in sodium chloride 0.9 % 100 mL IVPB  -     Case Request     3. Weakness of both hands  -     Case Request; Standing  -     Type & Screen; Future  -     ceFAZolin (ANCEF) 2 g in sodium chloride 0.9 % 100 mL IVPB  -     Case Request     4. Weakness of both arms  -     Case Request; Standing  -     Type & Screen; Future  -     ceFAZolin (ANCEF) 2 g in sodium chloride 0.9 % 100 mL IVPB  -     Case Request     5. History of lumbar surgery     Other orders  -     Follow Anesthesia Guidelines / Protocol; Future  -     Follow Anesthesia Guidelines / Protocol; Standing  -     SCD (Sequential Compression Device) - To Be Placed on Patient in Pre-Op; Standing  -     Verify / Perform Chlorhexidine Skin Prep; Standing  -     Provide Patient With Instructions on NPO Status;  "Future  -     Provide Chlorhexidine Skin Prep Wipes and Instructions; Future        Return for 2 weeks after surgery with an APC.                                Instructions      Return for 2 weeks after surgery with an APC.  Additional Documentation    Vitals: /72 (BP Location: Left arm, Patient Position: Sitting, Cuff Size: Adult)     Resp 20     Ht 175.3 cm (69.02\")     Wt 90.7 kg (200 lb)     BMI 29.52 kg/m²     BSA 2.07 m²     Pain Sc 4  (Loc: Back)          More Vitals   Flowsheets: Admission / Procedure / Surgery Location   Encounter Info: Billing Info,     ...(11 more)     Communications    View All Conversations on this Encounter    Religion Preferred Visit Summary sent to Migdalia Olivo MD  Media  From this encounter  Scan on 1/31/2025 1309 by Kathy Copeland RegSched Rep: CONTROLLED SUBSTANCE AGREEMENT     Encounter Information    Encounter Information   Provider Department Encounter #   1/22/2025 10:15 AM Bert Collado MD [facility] [id number]     Primary Visit Coverage    Payer Plan Sponsor Code Group Number Group Name   [employer] BY [employer]  [id number]      Primary Visit Coverage Subscriber    Subscriber ID Subscriber Name Subscriber N Subscriber Address   [id number] [address]  [address] [place] [address]     Orders Placed      Type & Screen    Case Request Once    Follow Anesthesia Guidelines / Protocol    Provide Chlorhexidine Skin Prep Wipes and Instructions    Provide Patient With Instructions on NPO Status  Medication Changes      None  Medication List  Visit Diagnoses      History of fusion of cervical spine    Spinal stenosis of cervicothoracic region    Weakness of both hands    Weakness of both arms    History of lumbar surgery  Problem List  Encounters with Related Results    Pre-Admission Testing (2/14/2025)   Current Medications     Disp Start End   acetaminophen (TYLENOL) 500 MG tablet --  --   Sig " - Route: Take 1 tablet by mouth Every 6 (Six) Hours As Needed for Mild Pain. - Oral   Class: Historical Med   baclofen (LIORESAL) 20 MG tablet -- 6/10/2024 --   Sig - Route: Take 1 tablet by mouth 2 (Two) Times a Day. - Oral   Class: Historical Med   busPIRone (BUSPAR) 10 MG tablet -- 2024 --   Sig - Route: Take 1 tablet by mouth 2 (Two) Times a Day. - Oral   Class: Historical Med   celecoxib (CeleBREX) 200 MG capsule -- 10/3/2024 10/3/2025   Sig - Route: Take 1 capsule by mouth Daily. INSTRUCTED PT TO FOLLOW DR  INSTRUCTIONS REGARDING HOLDING FOR SURGERY - Oral   Class: Historical Med   cetirizine (zyrTEC) 10 MG tablet -- 2021 --   Sig - Route: Take 1 tablet by mouth Daily. - Oral   Class: Historical Med   cholecalciferol (VITAMIN D3) 25 MCG (1000 UT) tablet -- 2021 --   Sig - Route: Take 1 tablet by mouth Daily. - Oral   Class: Historical Med   Diclofenac Sodium (VOLTAREN) 1 % gel gel -- 2021 --   Sig - Route: Apply 4 g topically to the appropriate area as directed As Needed. - Topical   Class: Historical Med   docusate sodium 100 MG capsule -- 2023 --   Sig - Route: Take 1 capsule by mouth Daily. - Oral   Class: OTC   FeroSul 325 (65 Fe) MG tablet -- 2021 --   Sig - Route: Take 1 tablet by mouth Daily With Breakfast. PT HAS NOT HAD FOR 4 DAYS - Oral   Class: Historical Med   gabapentin (NEURONTIN) 800 MG tablet --  --   Sig - Route: Take 1 tablet by mouth 3 (Three) Times a Day. TYPICALLY TAKES TWICE DAILY - Oral   Class: Historical Med   lidocaine (LIDODERM) 5 % 30 patch 2024 --   Sig - Route: PLACE 1 PATCH ON THE SKIN AS DIRECTED BY PROVIDER DAILY. REMOVE & DISCARD PATCH WITHIN 12 HOURS OR AS DIRECTED BY MD - Transdermal   mometasone (ELOCON) 0.1 % cream -- 10/11/2022 --   Si Application As Needed.   Class: Historical Med   NON FORMULARY --  --   Sig: Every Night. STATES DOES USE NIGHT INHALER:  INSTRUCTED TO BRING THIS DAY OF SURGEY   Class: Historical Med   pantoprazole  (PROTONIX) 40 MG EC tablet -- 2016 --   Sig - Route: Take 1 tablet by mouth 2 (Two) Times a Day. - Oral   Class: Historical Med   polyethylene glycol (MIRALAX) 17 GM/SCOOP powder 510 g 2023 --   Sig - Route: Dissolve 17 g (1 capful) in liquid and drink by mouth 2 (Two) Times a Day As Needed for constipation - Oral   Potassium 75 MG tablet --  --   Sig - Route: Take  by mouth As Needed. DURING THE SUMMER WHEN SWEATS MORE - Oral   Class: Historical Med   sennosides-docusate (PERICOLACE) 8.6-50 MG per tablet 20 tablet 2023 --   Sig - Route: Take 2 tablets by mouth Every Night. - Oral   Unable to find --  --   Si each 1 (One) Time. INHALER NEW INSTRUCTED PT TO BRING WITH HIM   Class: Historical Med   Ventolin  (90 Base) MCG/ACT inhaler -- 2/3/2025 --   Sig - Route: Inhale 2 puffs Every 4 (Four) Hours As Needed. - Inhalation   Class: Historical Med   zolpidem (AMBIEN) 10 MG tablet -- 2016 --   Sig - Route: Take 1 tablet by mouth At Night As Needed. - Oral   Class: Historical Med   Hospital Medications     Dose Frequency Start End   ceFAZolin 2000 mg IVPB in 100 mL NS (MBP) 2 g Once 2025 --   Admin Instructions: Administer within 1 hour of surgical incision. Redose 4 hours from pre-op dose if procedure ongoing or >1.5 L blood loss.  Caution: Look alike/sound alike drug alert   Route: Intravenous   famotidine (PEPCID) injection 20 mg 20 mg Once 2025 --   Admin Instructions: Give IV push over 2 minutes.   Route: Intravenous   famotidine (PEPCID) injection 20 mg 20 mg Once 2025 --   Admin Instructions: Give IV push over 2 minutes.   Route: Intravenous   fentaNYL citrate (PF) (SUBLIMAZE) injection 50 mcg 50 mcg Once As Needed 2025 --   Admin Instructions: Maximum total dose of fentanyl is 50 mcg without additional orders from physician. May be used for preoperative pain or procedural sedation.  If given for pain, use the following pain scale:  Mild Pain = Pain Score of 1-3,  CPOT 1-2  Moderate Pain = Pain Score of 4-6, CPOT 3-4  Severe Pain = Pain Score of 7-10, CPOT 5-8   Route: Intravenous   fentaNYL citrate (PF) (SUBLIMAZE) injection 50 mcg 50 mcg Once As Needed 2/25/2025 --   Admin Instructions: Maximum total dose of fentanyl is 50 mcg without additional orders from physician. May be used for preoperative pain or procedural sedation.  If given for pain, use the following pain scale:  Mild Pain = Pain Score of 1-3, CPOT 1-2  Moderate Pain = Pain Score of 4-6, CPOT 3-4  Severe Pain = Pain Score of 7-10, CPOT 5-8   Route: Intravenous   ipratropium-albuterol (DUO-NEB) nebulizer solution 3 mL 3 mL 4 Times Daily - RT 2/25/2025 --   Admin Instructions: Include Respiratory Treatment Education   Route: Nebulization   lactated ringers infusion 9 mL/hr Continuous 2/25/2025 2/26/2025   Route: Intravenous   lactated ringers infusion 9 mL/hr Continuous 2/25/2025 2/26/2025   Route: Intravenous   lidocaine (XYLOCAINE) 1 % injection 0.5 mL 0.5 mL Once As Needed 2/25/2025 --   Route: Intradermal   lidocaine (XYLOCAINE) 1 % injection 0.5 mL 0.5 mL Once As Needed 2/25/2025 --   Route: Intradermal   midazolam (VERSED) injection 1 mg 1 mg Every 5 Minutes PRN 2/25/2025 --   Admin Instructions: May repeat dose in 5 minutes one time then contact provider for additional orders.     Route: Intravenous   midazolam (VERSED) injection 1 mg 1 mg Every 5 Minutes PRN 2/25/2025 --   Admin Instructions: May repeat dose in 5 minutes one time then contact provider for additional orders.     Route: Intravenous   sodium chloride 0.9 % flush 3 mL 3 mL Every 12 Hours Scheduled 2/25/2025 --   Route: Intravenous   sodium chloride 0.9 % flush 3 mL 3 mL Every 12 Hours Scheduled 2/25/2025 --   Route: Intravenous   sodium chloride 0.9 % flush 3-10 mL 3-10 mL As Needed 2/25/2025 --   Route: Intravenous   sodium chloride 0.9 % flush 3-10 mL 3-10 mL As Needed 2/25/2025 --   Route: Intravenous     Signed and Held Orders     Follow  Anesthesia Guidelines / Protocol  Once,   Status:  Canceled        Electronically Signed By: Bert Collado MD Authorizing Provider: Bert Collado MD Phase of Care: Pre-Op Ordered At: 01/22/25 1225       SCD (Sequential Compression Device) - To Be Placed on Patient in Pre-Op  Once,   Status:  Canceled        Electronically Signed By: Bert Collado MD Authorizing Provider: Bert Collado MD Phase of Care: Pre-Op Ordered At: 01/22/25 1225       Verify / Perform Chlorhexidine Skin Prep  Once,   Status:  Canceled        Comments: Chlorhexidine Skin Prep and Instructions For All Patients Having A Procedure Requiring an Outward Incision if Not Allergic. If Allergic, Give Antibacterial Skin Wipes and Instructions. Do Not Use For Facial Cases or on Any Mucus Membranes.   Electronically Signed By: Bert Collado MD Authorizing Provider: Bert Collado MD Phase of Care: Pre-Op Ordered At: 01/22/25 1225       ceFAZolin 2000 mg IVPB in 100 mL NS (MBP)  Once,   Status:  Canceled        Electronically Signed By: Bert Collado MD Authorizing Provider: Bert Collado MD Phase of Care: Pre-Op Ordered At: 02/25/25 0776

## 2025-02-25 NOTE — BRIEF OP NOTE
CERVICAL DISCECTOMY POSTERIOR FUSION WITH STEALTH  Progress Note    Rafael Kat  2/25/2025    Pre-op Diagnosis:   History of fusion of cervical spine [Z98.1]  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]  Weakness of both hands [R29.898]  Weakness of both arms [R29.898]       Post-Op Diagnosis Codes:     * History of fusion of cervical spine [Z98.1]     * 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]     * Weakness of both hands [R29.898]     * Weakness of both arms [R29.898]    Procedure(s):      Procedure(s):  Posterior cervical seven/thoracic one decompression, posterior cervical three/four/five/six/seven, thoracic one/two/three fusion with screws/rods/crosslinks with Stealth spinal navigation              Surgeon(s):  Bert Collado MD    Anesthesia: General    Staff:   Cell Saver : Amor Trevino  Circulator: Luiz Salgado RN; Norma Montana RN; Giulia Ibarra RN  Radiology Technologist: Faby Terry  Scrub Person: Ruben Fortune; Minerva Ambrose; Alfredito Walters  Vendor Representative: Benitez Gross  Assistant: Viji Mak CSA  Assistant: Viji Mak CSA    Estimated Blood Loss: 150 mL    Urine Voided: 450 mL    Specimens:                None      Drains:   Closed/Suction Drain 1 Left;Superior Back Accordion 15 Fr. (Active)       Urethral Catheter Latex 16 Fr. (Active)       Findings: bilateral foraminal stenosis C7/T1      Complications: none    Assistant: Viji Mak CSA  was responsible for performing the following activities: Retraction, Suction, Irrigation, Suturing, Closing, and Placing Dressing and their skilled assistance was necessary for the success of this case.    Bert Collado MD     Date: 2/25/2025  Time: 13:37 EST

## 2025-02-26 ENCOUNTER — APPOINTMENT (OUTPATIENT)
Dept: GENERAL RADIOLOGY | Facility: HOSPITAL | Age: 55
DRG: 448 | End: 2025-02-26
Payer: COMMERCIAL

## 2025-02-26 LAB
ANION GAP SERPL CALCULATED.3IONS-SCNC: 9.2 MMOL/L (ref 5–15)
BASOPHILS # BLD AUTO: 0.03 10*3/MM3 (ref 0–0.2)
BASOPHILS NFR BLD AUTO: 0.1 % (ref 0–1.5)
BUN SERPL-MCNC: 18 MG/DL (ref 6–20)
BUN/CREAT SERPL: 17.1 (ref 7–25)
CALCIUM SPEC-SCNC: 8.2 MG/DL (ref 8.6–10.5)
CHLORIDE SERPL-SCNC: 102 MMOL/L (ref 98–107)
CO2 SERPL-SCNC: 26.8 MMOL/L (ref 22–29)
CREAT SERPL-MCNC: 1.05 MG/DL (ref 0.76–1.27)
DEPRECATED RDW RBC AUTO: 43.5 FL (ref 37–54)
EGFRCR SERPLBLD CKD-EPI 2021: 83.3 ML/MIN/1.73
EOSINOPHIL # BLD AUTO: 0.01 10*3/MM3 (ref 0–0.4)
EOSINOPHIL NFR BLD AUTO: 0 % (ref 0.3–6.2)
ERYTHROCYTE [DISTWIDTH] IN BLOOD BY AUTOMATED COUNT: 13.5 % (ref 12.3–15.4)
GLUCOSE SERPL-MCNC: 132 MG/DL (ref 65–99)
HCT VFR BLD AUTO: 37 % (ref 37.5–51)
HGB BLD-MCNC: 12.5 G/DL (ref 13–17.7)
IMM GRANULOCYTES # BLD AUTO: 0.17 10*3/MM3 (ref 0–0.05)
IMM GRANULOCYTES NFR BLD AUTO: 0.8 % (ref 0–0.5)
IRON 24H UR-MRATE: 34 MCG/DL (ref 59–158)
IRON SATN MFR SERPL: 11 % (ref 20–50)
LYMPHOCYTES # BLD AUTO: 2.08 10*3/MM3 (ref 0.7–3.1)
LYMPHOCYTES NFR BLD AUTO: 9.9 % (ref 19.6–45.3)
MCH RBC QN AUTO: 30 PG (ref 26.6–33)
MCHC RBC AUTO-ENTMCNC: 33.8 G/DL (ref 31.5–35.7)
MCV RBC AUTO: 88.9 FL (ref 79–97)
MONOCYTES # BLD AUTO: 1.91 10*3/MM3 (ref 0.1–0.9)
MONOCYTES NFR BLD AUTO: 9.1 % (ref 5–12)
NEUTROPHILS NFR BLD AUTO: 16.83 10*3/MM3 (ref 1.7–7)
NEUTROPHILS NFR BLD AUTO: 80.1 % (ref 42.7–76)
NRBC BLD AUTO-RTO: 0 /100 WBC (ref 0–0.2)
PLATELET # BLD AUTO: 236 10*3/MM3 (ref 140–450)
PMV BLD AUTO: 11.2 FL (ref 6–12)
POTASSIUM SERPL-SCNC: 4.2 MMOL/L (ref 3.5–5.2)
RBC # BLD AUTO: 4.16 10*6/MM3 (ref 4.14–5.8)
SODIUM SERPL-SCNC: 138 MMOL/L (ref 136–145)
TIBC SERPL-MCNC: 316 MCG/DL (ref 298–536)
TRANSFERRIN SERPL-MCNC: 212 MG/DL (ref 200–360)
WBC NRBC COR # BLD AUTO: 21.03 10*3/MM3 (ref 3.4–10.8)

## 2025-02-26 PROCEDURE — 97530 THERAPEUTIC ACTIVITIES: CPT

## 2025-02-26 PROCEDURE — 71045 X-RAY EXAM CHEST 1 VIEW: CPT

## 2025-02-26 PROCEDURE — 94799 UNLISTED PULMONARY SVC/PX: CPT

## 2025-02-26 PROCEDURE — 94664 DEMO&/EVAL PT USE INHALER: CPT

## 2025-02-26 PROCEDURE — 25810000003 LACTATED RINGERS PER 1000 ML: Performed by: NEUROLOGICAL SURGERY

## 2025-02-26 PROCEDURE — 80048 BASIC METABOLIC PNL TOTAL CA: CPT | Performed by: NEUROLOGICAL SURGERY

## 2025-02-26 PROCEDURE — 97162 PT EVAL MOD COMPLEX 30 MIN: CPT

## 2025-02-26 PROCEDURE — 25010000002 HYDROMORPHONE HCL PF 50 MG/5ML SOLUTION: Performed by: NEUROLOGICAL SURGERY

## 2025-02-26 PROCEDURE — 72070 X-RAY EXAM THORAC SPINE 2VWS: CPT

## 2025-02-26 PROCEDURE — 99024 POSTOP FOLLOW-UP VISIT: CPT

## 2025-02-26 PROCEDURE — 83540 ASSAY OF IRON: CPT | Performed by: STUDENT IN AN ORGANIZED HEALTH CARE EDUCATION/TRAINING PROGRAM

## 2025-02-26 PROCEDURE — 72040 X-RAY EXAM NECK SPINE 2-3 VW: CPT

## 2025-02-26 PROCEDURE — 85025 COMPLETE CBC W/AUTO DIFF WBC: CPT | Performed by: NEUROLOGICAL SURGERY

## 2025-02-26 PROCEDURE — 25010000002 CEFAZOLIN PER 500 MG: Performed by: NEUROLOGICAL SURGERY

## 2025-02-26 PROCEDURE — 25810000003 SODIUM CHLORIDE 0.9 % SOLUTION: Performed by: NEUROLOGICAL SURGERY

## 2025-02-26 PROCEDURE — 25810000003 LACTATED RINGERS PER 1000 ML

## 2025-02-26 PROCEDURE — 84466 ASSAY OF TRANSFERRIN: CPT | Performed by: STUDENT IN AN ORGANIZED HEALTH CARE EDUCATION/TRAINING PROGRAM

## 2025-02-26 RX ORDER — CLONAZEPAM 0.5 MG/1
0.5 TABLET ORAL EVERY 8 HOURS PRN
Status: DISCONTINUED | OUTPATIENT
Start: 2025-02-26 | End: 2025-02-27

## 2025-02-26 RX ADMIN — HYDROMORPHONE HYDROCHLORIDE: 10 INJECTION, SOLUTION INTRAMUSCULAR; INTRAVENOUS; SUBCUTANEOUS at 15:42

## 2025-02-26 RX ADMIN — BUSPIRONE HYDROCHLORIDE 10 MG: 10 TABLET ORAL at 08:26

## 2025-02-26 RX ADMIN — SODIUM CHLORIDE, SODIUM LACTATE, POTASSIUM CHLORIDE, CALCIUM CHLORIDE 75 ML/HR: 20; 30; 600; 310 INJECTION, SOLUTION INTRAVENOUS at 16:00

## 2025-02-26 RX ADMIN — OXYCODONE HYDROCHLORIDE 10 MG: 5 TABLET ORAL at 06:19

## 2025-02-26 RX ADMIN — METHOCARBAMOL TABLETS 750 MG: 750 TABLET, COATED ORAL at 15:44

## 2025-02-26 RX ADMIN — METHOCARBAMOL TABLETS 750 MG: 750 TABLET, COATED ORAL at 22:47

## 2025-02-26 RX ADMIN — ACETAMINOPHEN 650 MG: 325 TABLET, FILM COATED ORAL at 22:47

## 2025-02-26 RX ADMIN — Medication 10 ML: at 08:35

## 2025-02-26 RX ADMIN — CETIRIZINE HYDROCHLORIDE 10 MG: 10 TABLET, FILM COATED ORAL at 08:34

## 2025-02-26 RX ADMIN — CLONAZEPAM 0.5 MG: 0.5 TABLET ORAL at 20:17

## 2025-02-26 RX ADMIN — GABAPENTIN 800 MG: 400 CAPSULE ORAL at 20:14

## 2025-02-26 RX ADMIN — PANTOPRAZOLE SODIUM 40 MG: 40 TABLET, DELAYED RELEASE ORAL at 08:26

## 2025-02-26 RX ADMIN — METHOCARBAMOL TABLETS 750 MG: 750 TABLET, COATED ORAL at 06:19

## 2025-02-26 RX ADMIN — SENNOSIDES AND DOCUSATE SODIUM 2 TABLET: 50; 8.6 TABLET ORAL at 20:14

## 2025-02-26 RX ADMIN — SODIUM CHLORIDE, SODIUM LACTATE, POTASSIUM CHLORIDE, CALCIUM CHLORIDE 100 ML/HR: 20; 30; 600; 310 INJECTION, SOLUTION INTRAVENOUS at 04:46

## 2025-02-26 RX ADMIN — IPRATROPIUM BROMIDE AND ALBUTEROL SULFATE 3 ML: .5; 3 SOLUTION RESPIRATORY (INHALATION) at 09:27

## 2025-02-26 RX ADMIN — IPRATROPIUM BROMIDE AND ALBUTEROL SULFATE 3 ML: .5; 3 SOLUTION RESPIRATORY (INHALATION) at 19:35

## 2025-02-26 RX ADMIN — GABAPENTIN 800 MG: 400 CAPSULE ORAL at 08:26

## 2025-02-26 RX ADMIN — SENNOSIDES AND DOCUSATE SODIUM 2 TABLET: 50; 8.6 TABLET ORAL at 08:26

## 2025-02-26 RX ADMIN — PANTOPRAZOLE SODIUM 40 MG: 40 TABLET, DELAYED RELEASE ORAL at 20:14

## 2025-02-26 RX ADMIN — IPRATROPIUM BROMIDE AND ALBUTEROL SULFATE 3 ML: .5; 3 SOLUTION RESPIRATORY (INHALATION) at 12:53

## 2025-02-26 RX ADMIN — OXYCODONE HYDROCHLORIDE 10 MG: 5 TABLET ORAL at 20:14

## 2025-02-26 RX ADMIN — CEFAZOLIN 2000 MG: 2 INJECTION, POWDER, FOR SOLUTION INTRAMUSCULAR; INTRAVENOUS at 12:52

## 2025-02-26 RX ADMIN — Medication 10 ML: at 20:14

## 2025-02-26 RX ADMIN — BUSPIRONE HYDROCHLORIDE 10 MG: 10 TABLET ORAL at 20:13

## 2025-02-26 RX ADMIN — OXYCODONE HYDROCHLORIDE 10 MG: 5 TABLET ORAL at 15:44

## 2025-02-26 RX ADMIN — OXYCODONE HYDROCHLORIDE 10 MG: 5 TABLET ORAL at 10:28

## 2025-02-26 RX ADMIN — OXYCODONE HYDROCHLORIDE 10 MG: 5 TABLET ORAL at 02:17

## 2025-02-26 RX ADMIN — CLONAZEPAM 0.5 MG: 0.5 TABLET ORAL at 09:21

## 2025-02-26 RX ADMIN — CEFAZOLIN 2000 MG: 2 INJECTION, POWDER, FOR SOLUTION INTRAMUSCULAR; INTRAVENOUS at 04:45

## 2025-02-26 NOTE — CONSULTS
Patient Name:  Rafael Kat  YOB: 1970  MRN:  [id number]  Admit Date:  2/25/2025  Patient Care Team:  Provider, No Known as PCP - Bert Moe MD as Surgeon (Neurosurgery)      Subjective   History Present Illness     No chief complaint on file.      This is a 54-year-old male who underwent bilateral C7/T1 decompression, C3-T3 [place] posterior fusion on 2/5/2025.  He has a past medical history of asthma, GERD and dysphagia.  Most of his medical history is related to his spine.  He is doing relatively well postoperatively. [facility] was asked to see the patient for medical management.     Personal History     Past Medical History:   Diagnosis Date    Anesthesia complication     PT STATES HE IS SLOW TO WAKE UP    Arthritis     COPD (chronic obstructive pulmonary disease)     STATES WHEEZES AT NIGHT AND DOES USE NIGHTLY INHALER    DDD (degenerative disc disease), cervical     DDD (degenerative disc disease), lumbar     PAIN IN RIGHT BUTTOCK AND DOWN AT TIMES RIGHT LEG, WEAKNESS RIGHT LEG AT TIMES    Dysphagia     STARTED AFTER HIS CERVICAL NECK SURGERY    GERD (gastroesophageal reflux disease)     History of COVID-19     Neck pain     Psoriasis     Shoulder pain, bilateral     Skin cancer     BASAL/SQUAMOUS     Past Surgical History:   Procedure Laterality Date    ANTERIOR CHANNEL VERTEBRECTOMY/CORPECTOMY Bilateral 03/29/2016    Procedure: C4-6 ANTERIOR CHANNEL VERTEBRECTOMY/CORPECTOMY WITH INSTRUMENTATION;  Surgeon: Bert Collado MD;  Location: [facility] OR;  Service:     COLONOSCOPY      COLONOSCOPY N/A 2/14/2025    Procedure: COLONOSCOPY into cecum with hot snare polypectomy, Resolutions clips x2, Tattoo of transverse polyp area;  Surgeon: Gonzalo Rodriguez MD;  Location: [facility] ENDOSCOPY;  Service: Gastroenterology;  Laterality: N/A;  Pre: Screening  Post: Polyp, Suboptimal prep    ELBOW PROCEDURE Right     UNCLEAR:  RIGHT ELBOW AREA    ENDOSCOPY N/A 2/14/2025     Procedure: ESOPHAGOGASTRODUODENOSCOPY with biopsies;  Surgeon: Gonzalo Rodriguez MD;  Location:  [facility] ENDOSCOPY;  Service: Gastroenterology;  Laterality: N/A;  Pre: GERD, Dysphagia  Post: Gastritis and Esophagitis    FINGER SURGERY Left     INDEX FINGER    LUMBAR EPIDURAL INJECTION      STATES X3    LUMBAR LAMINECTOMY DISCECTOMY DECOMPRESSION Bilateral 11/28/2023    Procedure: Open bilateral lumbar decompression lumbar one/two, lumbar two/three, lumbar three/four;  Surgeon: Bert Collado MD;  Location: [facility] MAIN OR;  Service: Neurosurgery;  Laterality: Bilateral;    MENISCECTOMY Right     UPPER GASTROINTESTINAL ENDOSCOPY  03/2021     Family History   Problem Relation Age of Onset    Malig Hyperthermia Neg Hx      Social History     Tobacco Use    Smoking status: Former     Current packs/day: 0.00     Average packs/day: 2.0 packs/day for 20.0 years (40.0 ttl pk-yrs)     Types: Cigarettes     Start date: 1/1/1990     Quit date: 2010     Years since quitting: 15.1     Passive exposure: Past    Smokeless tobacco: Never   Vaping Use    Vaping status: Never Used   Substance Use Topics    Alcohol use: No    Drug use: Yes     Frequency: 14.0 times per week     Types: Marijuana     Comment: 1-2 PER DAY,     No current facility-administered medications on file prior to encounter.     Current Outpatient Medications on File Prior to Encounter   Medication Sig Dispense Refill    acetaminophen (TYLENOL) 500 MG tablet Take 1 tablet by mouth Every 6 (Six) Hours As Needed for Mild Pain.      baclofen (LIORESAL) 20 MG tablet Take 1 tablet by mouth 2 (Two) Times a Day.      celecoxib (CeleBREX) 200 MG capsule Take 1 capsule by mouth Daily. INSTRUCTED PT TO FOLLOW DR  INSTRUCTIONS REGARDING HOLDING FOR SURGERY      cetirizine (zyrTEC) 10 MG tablet Take 1 tablet by mouth Daily.      cholecalciferol (VITAMIN D3) 25 MCG (1000 UT) tablet Take 1 tablet by mouth Daily.      docusate sodium 100 MG capsule Take 1 capsule by mouth  Daily. (Patient taking differently: Take 1 capsule by mouth Daily As Needed.)      FeroSul 325 (65 Fe) MG tablet Take 1 tablet by mouth Daily With Breakfast. PT HAS NOT HAD FOR 4 DAYS      gabapentin (NEURONTIN) 800 MG tablet Take 1 tablet by mouth 3 (Three) Times a Day. TYPICALLY TAKES TWICE DAILY      NON FORMULARY Every Night. STATES DOES USE NIGHT INHALER:  INSTRUCTED TO BRING THIS DAY OF SURGEY      pantoprazole (PROTONIX) 40 MG EC tablet Take 1 tablet by mouth 2 (Two) Times a Day.  11    polyethylene glycol (MIRALAX) 17 GM/SCOOP powder Dissolve 17 g (1 capful) in liquid and drink by mouth 2 (Two) Times a Day As Needed for constipation 510 g 0    sennosides-docusate (PERICOLACE) 8.6-50 MG per tablet Take 2 tablets by mouth Every Night. (Patient taking differently: Take 2 tablets by mouth At Night As Needed.) 20 tablet 0    zolpidem (AMBIEN) 10 MG tablet Take 1 tablet by mouth At Night As Needed.  2    Diclofenac Sodium (VOLTAREN) 1 % gel gel Apply 4 g topically to the appropriate area as directed As Needed.      lidocaine (LIDODERM) 5 % PLACE 1 PATCH ON THE SKIN AS DIRECTED BY PROVIDER DAILY. REMOVE & DISCARD PATCH WITHIN 12 HOURS OR AS DIRECTED BY MD (Patient taking differently: Place 1 patch on the skin as directed by provider Daily As Needed. Remove & Discard patch within 12 hours or as directed by MD) 30 patch 2    mometasone (ELOCON) 0.1 % cream 1 Application As Needed.      Potassium 75 MG tablet Take  by mouth As Needed. DURING THE SUMMER WHEN SWEATS MORE       Allergies   Allergen Reactions    Bacitracin-Polymyxin B Other (See Comments) and Rash    Chlorhexidine Rash    Tgrff-Flxzl-Mxzqyss-Pramoxine Rash       Objective    Objective     Vital Signs  Temp:  [97.3 °F (36.3 °C)-98.4 °F (36.9 °C)] 97.3 °F (36.3 °C)  Heart Rate:  [61-98] 89  Resp:  [12-20] 16  BP: (115-149)/() 115/90  Arterial Line BP: ()/() 141/85  SpO2:  [90 %-100 %] 93 %  on  Flow (L/min) (Oxygen Therapy):  [3-4] 3;    Device (Oxygen Therapy): nasal cannula  Body mass index is 31.09 kg/m².    Physical Exam    Results Review:  I reviewed the patient's new clinical results.  I reviewed the patient's new imaging results and agree with the interpretation.  I reviewed the patient's other test results and agree with the interpretation  I personally viewed and interpreted the patient's EKG/Telemetry data  Discussed with ED provider.    Lab Results (last 24 hours)       Procedure Component Value Units Date/Time    POC Glucose Once [[id number]]  (Abnormal) Collected: 02/25/25 1551    Specimen: Blood Updated: 02/25/25 1553     Glucose 179 mg/dL     CBC & Differential [[id number]]  (Abnormal) Collected: 02/25/25 1846    Specimen: Blood Updated: 02/25/25 1909    Narrative:      The following orders were created for panel order CBC & Differential.  Procedure                               Abnormality         Status                     ---------                               -----------         ------                     CBC Auto Differential[[id number]]        Abnormal            Final result                 Please view results for these tests on the individual orders.    CBC Auto Differential [[id number]]  (Abnormal) Collected: 02/25/25 1846    Specimen: Blood Updated: 02/25/25 1909     WBC 20.19 10*3/mm3      RBC 4.55 10*6/mm3      Hemoglobin 13.8 g/dL      Hematocrit 40.7 %      MCV 89.5 fL      MCH 30.3 pg      MCHC 33.9 g/dL      RDW 13.6 %      RDW-SD 44.2 fl      MPV 11.2 fL      Platelets 236 10*3/mm3      Neutrophil % 90.4 %      Lymphocyte % 4.8 %      Monocyte % 4.1 %      Eosinophil % 0.0 %      Basophil % 0.1 %      Immature Grans % 0.6 %      Neutrophils, Absolute 18.24 10*3/mm3      Lymphocytes, Absolute 0.97 10*3/mm3      Monocytes, Absolute 0.83 10*3/mm3      Eosinophils, Absolute 0.00 10*3/mm3      Basophils, Absolute 0.03 10*3/mm3      Immature Grans, Absolute 0.12 10*3/mm3      nRBC 0.0 /100 WBC             No results found  for this or any previous visit.    Imaging Results (Last 24 Hours)       Procedure Component Value Units Date/Time    FL C Arm During Surgery [[id number]] Resulted: 02/25/25 1200     Updated: 02/25/25 1200    Narrative:      This procedure was auto-finalized with no dictation required.                No orders to display              Assessment/Plan     Active Hospital Problems    Diagnosis  POA    Cervical stenosis of spine [M48.02]  Yes    Gastroesophageal reflux disease [K21.9]  Yes    Weakness of both hands [R29.898]  Unknown    Weakness of both arms [R29.898]  Unknown    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]  Unknown    Dysphagia [R13.10]  Yes    History of fusion of cervical spine [Z98.1]  Not Applicable      Resolved Hospital Problems   No resolved problems to display.       Assessment and plan  Spinal stenosis  Status post surgical intervention.  Management per primary  Check morning BMP and CBC  Dilaudid PCA for pain control currently    Asthma  Acute hypoxic respiratory failure   Currently on DuoNebs. Pulm following    GERD  PPI    Leukocytosis  Suspect reactive. Trend. Currently afebrile     Hypertension  Not currently on any antihypertensives. Monitor for now, likely related to pain         I discussed the patient's findings and my recommendations with patient and spouse.    VTE Prophylaxis - SCDs.  Code Status - Full code.       Adonay Greene MD  [facility]  02/25/25  23:06 EST

## 2025-02-26 NOTE — ANESTHESIA POSTPROCEDURE EVALUATION
Patient: Rafael Kat    Procedure Summary       Date: 02/25/25 Room / Location: [facility] OR [address] [place] [address] MAIN OR    Anesthesia Start: 0814 Anesthesia Stop: 1502    Procedure: Posterior cervical seven/thoracic one decompression, posterior cervical three/four/five/six/seven, thoracic one/two/three fusion with screws/rods/crosslinks with Stealth spinal navigation (Spine Cervical) Diagnosis:       History of fusion of cervical spine      Spinal stenosis of cervicothoracic region      Weakness of both hands      Weakness of both arms      (History of fusion of cervical spine [Z98.1])      (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])      (Weakness of both hands [R29.898])      (Weakness of both arms [R29.898])    Surgeons: Bert Collado MD Provider: Donita Burns MD    Anesthesia Type: general, Tessa ASA Status: 3            Anesthesia Type: general, [place]    Vitals  Vitals Value Taken Time   /100 02/25/25 1745   Temp 36.9 °C (98.4 °F) 02/25/25 1452   Pulse 85 02/25/25 1745   Resp 14 02/25/25 1630   SpO2 96 % 02/25/25 1745           Post Anesthesia Care and Evaluation    Patient location during evaluation: PACU  Patient participation: complete - patient participated  Level of consciousness: awake  Pain management: adequate    Airway patency: patent  Anesthetic complications: No anesthetic complications  PONV Status: none  Cardiovascular status: stable  Respiratory status: acceptable  Hydration status: acceptable

## 2025-02-26 NOTE — CASE MANAGEMENT/SOCIAL WORK
Discharge Planning Assessment  [facility]     Patient Name: Rafael Kat  MRN: [id number]  Today's Date: 2/26/2025    Admit Date: 2/25/2025    Plan: Home with spouse and [facility] pending PT progress   Discharge Needs Assessment       Row Name 02/26/25 0928       Living Environment    People in Home spouse;grandchild(varun)    Current Living Arrangements home    Potentially Unsafe Housing Conditions none    Primary Care Provided by self    Provides Primary Care For no one    Family Caregiver if Needed spouse    Quality of Family Relationships involved;helpful;supportive    Able to Return to Prior Arrangements yes       Resource/Environmental Concerns    Resource/Environmental Concerns none    Transportation Concerns none       Transition Planning    Patient/Family Anticipates Transition to home with family    Patient/Family Anticipated Services at Transition home health care    Transportation Anticipated family or friend will provide       Discharge Needs Assessment    Readmission Within the Last 30 Days no previous admission in last 30 days    Current Outpatient/Agency/Support Group homecare agency    Equipment Currently Used at Home walker, rolling    Concerns to be Addressed no discharge needs identified;denies needs/concerns at this time    Anticipated Changes Related to Illness none    Equipment Needed After Discharge none    Outpatient/Agency/Support Group Needs homecare agency    Discharge Facility/Level of Care Needs home with home health                   Discharge Plan       Row Name 02/26/25 0929       Plan    Plan Home with spouse and [facility] pending PT progress    Plan Comments CCP met with patient and patient's spouse at bedside. CCP role explained and discharge planning discussed. Face sheet verified and patient's PCP is Dr. Olivo in [facility] in [place]. Patient states Dr. Olivo has left so they are in the process of assigning another MD to him in the same office. Patient lives with  his wife and 16 year old granddaughter. Patient has three steps to the entrance of the home. Patient's bedroom and bathroom are on the main level. Patient has a walker at home. Patient has used [facility] in the past and denies any SNF. Patient and spouse are hopeful for patient to return home at d/c with in home PT. They would like to use [facility] again. CCP will follow for PT eval and assist as needed. Alessandra LOPEZ                  Continued Care and Services - Admitted Since 2/25/2025       Home Medical Care       Service Provider Request Status Services Address Phone Fax Patient Preferred    [facility] Pending - Request Sent -- [address] [facility] [address] [phone] --                     Demographic Summary       Row Name 02/26/25 0928       General Information    Admission Type inpatient    Arrived From emergency department    Referral Source admission list    Reason for Consult discharge planning    Preferred Language English                   Functional Status       Row Name 02/26/25 0928       Functional Status    Usual Activity Tolerance good    Current Activity Tolerance good       Functional Status, IADL    Medications independent    Meal Preparation independent    Housekeeping independent    Laundry independent    Shopping independent       Mental Status    General Appearance WDL WDL       Mental Status Summary    Recent Changes in Mental Status/Cognitive Functioning no changes                   Psychosocial    No documentation.                  Abuse/Neglect    No documentation.                  Legal    No documentation.                  Substance Abuse    No documentation.                  Patient Forms    No documentation.                     LESLEE Browning

## 2025-02-26 NOTE — PAYOR COMM NOTE
"Ricardo Chen (54 y.o. Male)    PLEASE SEE ATTACHED FOR INPT AUTH  REF#[id number]  PLEASE CALL AYDE STEINER RN/ DEPT [phone] FAX  DEPT [phone]  THANK YOU   AYDE STEINER RN  [facility]   [id number]  C   IS AN IP ONLY PROCEDURE      Date of Birth   1970    Social Security Number       Address   [address] [address]    Home Phone   [phone]    MRN   [id number]       Confucianist   None    Marital Status                               Admission Date   2/25/25    Admission Type   Elective    Admitting Provider   Bert Collado MD    Attending Provider   Bert Collado MD    Department, Room/Bed   [address], P794/1       Discharge Date       Discharge Disposition       Discharge Destination                                 Attending Provider: Bert Collado MD    Allergies: Bacitracin-polymyxin B, Chlorhexidine, Phjdj-tkdap-bwmfrae-pramoxine    Isolation: None   Infection: None   Code Status: CPR    Ht: 175.3 cm (69\")   Wt: 95.5 kg (210 lb 8.6 oz)    Admission Cmt: None   Principal Problem: None                  Active Insurance as of 2/25/2025       Primary Coverage       Payor Plan Insurance Group Employer/Plan Group    [facility] BY [employer] [facility] BY CEZAR [id number]       Payor Plan Address Payor Plan Phone Number Payor Plan Fax Number Effective Dates    [address] [address]   1/1/2021 - None Entered    [facility] [phone]         Subscriber Name Subscriber Birth Date Member ID       RICARDO CHEN PAT 1970 [id number]                     Emergency Contacts        (Rel.) Home Phone Work Phone Mobile Phone    Monika Chen (Spouse) [phone] -- [phone]              [place]: NPI [id number]  Tax ID [id number]     History & Physical        Bert Collado MD at 02/25/25 0747             Office Visit    1/22/2025  [facility] " NEUROSURGERY       Bert Collado MD  Neurosurgery History of fusion of cervical spine +4 more  Dx Follow-up  Back Pain  Leg Pain  Reason for Visit     Progress Notes  Bert Collado MD (Physician)  Neurosurgery  Expand All Collapse All[]Expand All by Default     Subjective  Patient ID: Rafael Kat is a 54 y.o. male is here today for follow-up regarding his XR spine cervical      He saw Dr. Powers of pulmonary who thought that he had more asthma than COPD.  He does not think that there is a contraindication to a general anesthetic.  Will ask Dr. Powers to see him postoperatively in the hospital.  He did get a swallowing study which I reviewed and I do not think shows much mechanical obstruction.  He has yet to see the ENT doctors.  We can arrange that after the surgery since we are taking a posterior approach.  Will move forward with a C7-T1 decompression and a C3-T3 fusion with hardware.  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  If we do nothing he will continue to decline.  The epidural block that he got to help with the pain but not the motor function.  He has to be realistic also about the expectation of what he is able to do and he fully knowledges that he knows that he will be able to do as much significant physical work after surgery.  The goal is for him not to lose the function in his arms and hands and still to be able to use them well.  I think we can accomplish that but he just needs to be realistic.     He had a previous corpectomy and fusion several years ago and he has done reasonably well with his low back decompression as well.           History of Present Illness     The following portions of the patient's history were reviewed and updated as appropriate: allergies, current medications, past family history, past medical history, past social history, past surgical history, and problem list.     Review of Systems    Constitutional:  Negative for fever.   Musculoskeletal:  Positive for back pain. Negative for gait problem.   Neurological:  Positive for weakness. Negative for numbness.   All other systems reviewed and are negative.           Objective  Physical Exam  Constitutional:       General: He is awake.      Appearance: He is well-developed.   HENT:      Head: Normocephalic and atraumatic.   Eyes:      General: Lids are normal.      Extraocular Movements: Extraocular movements intact.      Conjunctiva/sclera: Conjunctivae normal.      Pupils: Pupils are equal, round, and reactive to light.   Neck:      Vascular: No carotid bruit.   Neurological:      Mental Status: He is alert.      Coordination: Coordination is intact.      Deep Tendon Reflexes:      Reflex Scores:       Tricep reflexes are 2+ on the right side and 2+ on the left side.       Bicep reflexes are 2+ on the right side and 2+ on the left side.       Brachioradialis reflexes are 2+ on the right side and 2+ on the left side.       Patellar reflexes are 2+ on the right side and 2+ on the left side.       Achilles reflexes are 2+ on the right side and 2+ on the left side.  Psychiatric:         Speech: Speech normal.         Neurological Exam  Mental Status  Awake and alert. Oriented only to person, place, time and situation. Recent and remote memory are intact. Speech is normal. Language is fluent with no aphasia. Attention and concentration are normal. Fund of knowledge is appropriate for level of education.     Cranial Nerves  CN II: Visual acuity is normal. Visual fields full to confrontation.  CN III, IV, VI: Extraocular movements intact bilaterally. Normal lids and orbits bilaterally. Pupils equal round and reactive to light bilaterally.  CN V: Facial sensation is normal.  CN VII: Full and symmetric facial movement.  CN IX, X: Palate elevates symmetrically. Normal gag reflex.  CN XI: Shoulder shrug strength is normal.  CN XII: Tongue midline without atrophy  or fasciculations.     Motor  Normal muscle bulk throughout. Normal muscle tone.                                                Right                     Left  Rhomboids                            5                          5  Infraspinatus                          5                          5  Supraspinatus                       5                          5  Deltoid                                   5                          5   Biceps                                   5                          5  Brachioradialis                      5                          5   Triceps                                  5                          5   Pronator                                5                          5   Supinator                              5                           5   Wrist flexor                            5                          5   Wrist extensor                       5                          5   Finger flexor                          4                          4   Finger extensor                     4                          4   Interossei                              4                          4   Abductor pollicis brevis         4                          4   Flexor pollicis brevis             4                          4   Opponens pollicis                 4                          4  Extensor digitorum               4                          4  Abductor digiti minimi           4                          4   Abdominal                            5                          5  Glutei                                    5                          5  Hip abductor                         5                          5  Hip adductor                         5                          5   Iliopsoas                               5                          5   Quadriceps                           5                          5   Hamstring                             5                          5    Gastrocnemius                     5                           5   Anterior tibialis                      5                          5   Posterior tibialis                    5                          5   Peroneal                               5                          5  Ankle dorsiflexor                   5                          5  Ankle plantar flexor              5                           5  Extensor hallucis longus      5                           5     Sensory  Light touch is normal in upper and lower extremities. Proprioception is normal in upper and lower extremities.      Reflexes                                            Right                      Left  Brachioradialis                    2+                         2+  Biceps                                 2+                         2+  Triceps                                2+                         2+  Finger flex                           2+                         2+  Hamstring                            2+                         2+  Patellar                                2+                         2+  Achilles                                2+                         2+     Coordination     Finger-to-nose, rapid alternating movements and heel-to-shin normal bilaterally without dysmetria.     Gait  Casual gait is normal including stance, stride, and arm swing.Normal toe walking. Normal heel walking. Normal tandem gait.           Assessment & Plan  Independent Review of Radiographic Studies:       The cervical MRI done on 12/9/2024 was reviewed.  He is fused solidly from C3-C7 but he does have C7-T1 adjacent level stenosis with root compression.  Agree with the report.     Medical Decision Making:       Pulmonary clearance has been obtained.  Will asked Dr. Powers to see him while he is in the hospital.  I described and recommended to C7-T1 decompression and at C3-T3 fusion with lateral mass screws, pedicle screws, bilateral rods and  cross-links using Stealth spinal navigation.  The goal of surgery is to decompress the nerve so that he gets his arm and hand function back and it reduces his overall arm pain as well as reduction in his neck pain through stabilization from C3-T3.  The risks include, but are not limited to, infection, hemorrhage requiring transfusion or reoperation, CSF leak requiring reoperation, incomplete relief of symptoms, hardware problems requiring revision or removal, potential need for additional surgeries because of adjacent level problems, stroke, paralysis, coma, and death.  He agrees to proceed     He knows he will be seen by the APCs after surgery.  I like to see him at the 3 to 4-month joaquina after surgery with cervical thoracic x-rays.     Diagnoses and all orders for this visit:     1. History of fusion of cervical spine (Primary)  -     Case Request; Standing  -     Type & Screen; Future  -     ceFAZolin (ANCEF) 2 g in sodium chloride 0.9 % 100 mL IVPB  -     Case Request     2. Spinal stenosis of cervicothoracic region  -     Case Request; Standing  -     Type & Screen; Future  -     ceFAZolin (ANCEF) 2 g in sodium chloride 0.9 % 100 mL IVPB  -     Case Request     3. Weakness of both hands  -     Case Request; Standing  -     Type & Screen; Future  -     ceFAZolin (ANCEF) 2 g in sodium chloride 0.9 % 100 mL IVPB  -     Case Request     4. Weakness of both arms  -     Case Request; Standing  -     Type & Screen; Future  -     ceFAZolin (ANCEF) 2 g in sodium chloride 0.9 % 100 mL IVPB  -     Case Request     5. History of lumbar surgery     Other orders  -     Follow Anesthesia Guidelines / Protocol; Future  -     Follow Anesthesia Guidelines / Protocol; Standing  -     SCD (Sequential Compression Device) - To Be Placed on Patient in Pre-Op; Standing  -     Verify / Perform Chlorhexidine Skin Prep; Standing  -     Provide Patient With Instructions on NPO Status; Future  -     Provide Chlorhexidine Skin Prep Wipes and  "Instructions; Future        Return for 2 weeks after surgery with an APC.                                Instructions      Return for 2 weeks after surgery with an APC.  Additional Documentation    Vitals: /72 (BP Location: Left arm, Patient Position: Sitting, Cuff Size: Adult)     Resp 20     Ht 175.3 cm (69.02\")     Wt 90.7 kg (200 lb)     BMI 29.52 kg/m²     BSA 2.07 m²     Pain Sc 4  (Loc: Back)          More Vitals   Flowsheets: Admission / Procedure / Surgery Location   Encounter Info: Billing Info,     ...(11 more)     Communications    View All Conversations on this Encounter    Sikh[employer] Preferred Visit Summary sent to Migdalia Olivo MD  Media  From this encounter  Scan on 1/31/2025 1309 by Kathy Copeland RegSched Rep: CONTROLLED SUBSTANCE AGREEMENT     Encounter Information    Encounter Information   Provider Department Encounter #   1/22/2025 10:15 AM Bert Collado MD [facility] [id number]     Primary Visit Coverage    Payer Plan Sponsor Code Group Number Group Name   [facility] BY [facility] BY CEZAR  [id number]      Primary Visit Coverage Subscriber    Subscriber ID Subscriber Name Subscriber N Subscriber Address   [id number] RICARDO CHEN  [address] [place] [address]     Orders Placed      Type & Screen    Case Request Once    Follow Anesthesia Guidelines / Protocol    Provide Chlorhexidine Skin Prep Wipes and Instructions    Provide Patient With Instructions on NPO Status  Medication Changes      None  Medication List  Visit Diagnoses      History of fusion of cervical spine    Spinal stenosis of cervicothoracic region    Weakness of both hands    Weakness of both arms    History of lumbar surgery  Problem List  Encounters with Related Results    Pre-Admission Testing (2/14/2025)   Current Medications     Disp Start End   acetaminophen (TYLENOL) 500 MG tablet --  --   Sig - Route: Take 1 tablet by mouth Every 6 (Six) Hours As " Needed for Mild Pain. - Oral   Class: Historical Med   baclofen (LIORESAL) 20 MG tablet -- 6/10/2024 --   Sig - Route: Take 1 tablet by mouth 2 (Two) Times a Day. - Oral   Class: Historical Med   busPIRone (BUSPAR) 10 MG tablet -- 2024 --   Sig - Route: Take 1 tablet by mouth 2 (Two) Times a Day. - Oral   Class: Historical Med   celecoxib (CeleBREX) 200 MG capsule -- 10/3/2024 10/3/2025   Sig - Route: Take 1 capsule by mouth Daily. INSTRUCTED PT TO FOLLOW DR  INSTRUCTIONS REGARDING HOLDING FOR SURGERY - Oral   Class: Historical Med   cetirizine (zyrTEC) 10 MG tablet -- 2021 --   Sig - Route: Take 1 tablet by mouth Daily. - Oral   Class: Historical Med   cholecalciferol (VITAMIN D3) 25 MCG (1000 UT) tablet -- 2021 --   Sig - Route: Take 1 tablet by mouth Daily. - Oral   Class: Historical Med   Diclofenac Sodium (VOLTAREN) 1 % gel gel -- 2021 --   Sig - Route: Apply 4 g topically to the appropriate area as directed As Needed. - Topical   Class: Historical Med   docusate sodium 100 MG capsule -- 2023 --   Sig - Route: Take 1 capsule by mouth Daily. - Oral   Class: OTC   FeroSul 325 (65 Fe) MG tablet -- 2021 --   Sig - Route: Take 1 tablet by mouth Daily With Breakfast. PT HAS NOT HAD FOR 4 DAYS - Oral   Class: Historical Med   gabapentin (NEURONTIN) 800 MG tablet --  --   Sig - Route: Take 1 tablet by mouth 3 (Three) Times a Day. TYPICALLY TAKES TWICE DAILY - Oral   Class: Historical Med   lidocaine (LIDODERM) 5 % 30 patch 2024 --   Sig - Route: PLACE 1 PATCH ON THE SKIN AS DIRECTED BY PROVIDER DAILY. REMOVE & DISCARD PATCH WITHIN 12 HOURS OR AS DIRECTED BY MD - Transdermal   mometasone (ELOCON) 0.1 % cream -- 10/11/2022 --   Si Application As Needed.   Class: Historical Med   NON FORMULARY --  --   Sig: Every Night. STATES DOES USE NIGHT INHALER:  INSTRUCTED TO BRING THIS DAY OF SURGEY   Class: Historical Med   pantoprazole (PROTONIX) 40 MG EC tablet -- 2016 --   Sig - Route:  Take 1 tablet by mouth 2 (Two) Times a Day. - Oral   Class: Historical Med   polyethylene glycol (MIRALAX) 17 GM/SCOOP powder 510 g 2023 --   Sig - Route: Dissolve 17 g (1 capful) in liquid and drink by mouth 2 (Two) Times a Day As Needed for constipation - Oral   Potassium 75 MG tablet --  --   Sig - Route: Take  by mouth As Needed. DURING THE SUMMER WHEN SWEATS MORE - Oral   Class: Historical Med   sennosides-docusate (PERICOLACE) 8.6-50 MG per tablet 20 tablet 2023 --   Sig - Route: Take 2 tablets by mouth Every Night. - Oral   Unable to find --  --   Si each 1 (One) Time. INHALER NEW INSTRUCTED PT TO BRING WITH HIM   Class: Historical Med   Ventolin  (90 Base) MCG/ACT inhaler -- 2/3/2025 --   Sig - Route: Inhale 2 puffs Every 4 (Four) Hours As Needed. - Inhalation   Class: Historical Med   zolpidem (AMBIEN) 10 MG tablet -- 2016 --   Sig - Route: Take 1 tablet by mouth At Night As Needed. - Oral   Class: Historical Med   Hospital Medications     Dose Frequency Start End   ceFAZolin 2000 mg IVPB in 100 mL NS (MBP) 2 g Once 2025 --   Admin Instructions: Administer within 1 hour of surgical incision. Redose 4 hours from pre-op dose if procedure ongoing or >1.5 L blood loss.  Caution: Look alike/sound alike drug alert   Route: Intravenous   famotidine (PEPCID) injection 20 mg 20 mg Once 2025 --   Admin Instructions: Give IV push over 2 minutes.   Route: Intravenous   famotidine (PEPCID) injection 20 mg 20 mg Once 2025 --   Admin Instructions: Give IV push over 2 minutes.   Route: Intravenous   fentaNYL citrate (PF) (SUBLIMAZE) injection 50 mcg 50 mcg Once As Needed 2025 --   Admin Instructions: Maximum total dose of fentanyl is 50 mcg without additional orders from physician. May be used for preoperative pain or procedural sedation.  If given for pain, use the following pain scale:  Mild Pain = Pain Score of 1-3, CPOT 1-2  Moderate Pain = Pain Score of 4-6, CPOT  3-4  Severe Pain = Pain Score of 7-10, CPOT 5-8   Route: Intravenous   fentaNYL citrate (PF) (SUBLIMAZE) injection 50 mcg 50 mcg Once As Needed 2/25/2025 --   Admin Instructions: Maximum total dose of fentanyl is 50 mcg without additional orders from physician. May be used for preoperative pain or procedural sedation.  If given for pain, use the following pain scale:  Mild Pain = Pain Score of 1-3, CPOT 1-2  Moderate Pain = Pain Score of 4-6, CPOT 3-4  Severe Pain = Pain Score of 7-10, CPOT 5-8   Route: Intravenous   ipratropium-albuterol (DUO-NEB) nebulizer solution 3 mL 3 mL 4 Times Daily - RT 2/25/2025 --   Admin Instructions: Include Respiratory Treatment Education   Route: Nebulization   lactated ringers infusion 9 mL/hr Continuous 2/25/2025 2/26/2025   Route: Intravenous   lactated ringers infusion 9 mL/hr Continuous 2/25/2025 2/26/2025   Route: Intravenous   lidocaine (XYLOCAINE) 1 % injection 0.5 mL 0.5 mL Once As Needed 2/25/2025 --   Route: Intradermal   lidocaine (XYLOCAINE) 1 % injection 0.5 mL 0.5 mL Once As Needed 2/25/2025 --   Route: Intradermal   midazolam (VERSED) injection 1 mg 1 mg Every 5 Minutes PRN 2/25/2025 --   Admin Instructions: May repeat dose in 5 minutes one time then contact provider for additional orders.     Route: Intravenous   midazolam (VERSED) injection 1 mg 1 mg Every 5 Minutes PRN 2/25/2025 --   Admin Instructions: May repeat dose in 5 minutes one time then contact provider for additional orders.     Route: Intravenous   sodium chloride 0.9 % flush 3 mL 3 mL Every 12 Hours Scheduled 2/25/2025 --   Route: Intravenous   sodium chloride 0.9 % flush 3 mL 3 mL Every 12 Hours Scheduled 2/25/2025 --   Route: Intravenous   sodium chloride 0.9 % flush 3-10 mL 3-10 mL As Needed 2/25/2025 --   Route: Intravenous   sodium chloride 0.9 % flush 3-10 mL 3-10 mL As Needed 2/25/2025 --   Route: Intravenous     Signed and Held Orders     Follow Anesthesia Guidelines / Protocol  Once,   Status:   Canceled        Electronically Signed By: Bert Collado MD Authorizing Provider: Bert Collado MD Phase of Care: Pre-Op Ordered At: 01/22/25 1225       SCD (Sequential Compression Device) - To Be Placed on Patient in Pre-Op  Once,   Status:  Canceled        Electronically Signed By: Bert Collado MD Authorizing Provider: Bert Collado MD Phase of Care: Pre-Op Ordered At: 01/22/25 1225       Verify / Perform Chlorhexidine Skin Prep  Once,   Status:  Canceled        Comments: Chlorhexidine Skin Prep and Instructions For All Patients Having A Procedure Requiring an Outward Incision if Not Allergic. If Allergic, Give Antibacterial Skin Wipes and Instructions. Do Not Use For Facial Cases or on Any Mucus Membranes.   Electronically Signed By: Bert Collado MD Authorizing Provider: Bert Collado MD Phase of Care: Pre-Op Ordered At: 01/22/25 1225       ceFAZolin 2000 mg IVPB in 100 mL NS (MBP)  Once,   Status:  Canceled        Electronically Signed By: Bert Collado MD Authorizing Provider: Bert Collado MD Phase of Care: Pre-Op Ordered At: 02/25/25 0730             Electronically signed by Bert Collado MD at 02/25/25 0747       Emergency Department Notes    No notes of this type exist for this encounter.       Medication Administration Report for Rafael Kat PAT as of 2/26/25 through 2/26/25     Legend:    Given Hold Not Given Due Canceled Entry Other Actions    Time Time (Time) Time Time-Action         Discontinued     Completed     Future     MAR Hold     Linked             Medications 02/26/25      acetaminophen (TYLENOL) tablet 650 mg  Dose: 650 mg  Freq: Every 4 Hours PRN Route: PO  PRN Reason: Mild Pain  Start: 02/25/25 8098     Admin Instructions:   If given for fever, use fever parameter: fever greater than 100.4 °F  Based on patient request - if ordered for moderate or severe pain, provider allows for administration of a medication prescribed for a lower pain  scale.    Do not exceed 4 grams of acetaminophen in a 24 hr period. Max dose of 2gm for AST/ALT greater than 120 units/L.    If given for pain, use the following pain scale:   Mild Pain = Pain Score of 1-3, CPOT 1-2  Moderate Pain = Pain Score of 4-6, CPOT 3-4  Severe Pain = Pain Score of 7-10, CPOT 5-8         Or   acetaminophen (TYLENOL) 160 MG/5ML oral solution 650 mg  Dose: 650 mg  Freq: Every 4 Hours PRN Route: PO  PRN Reason: Mild Pain  Start: 02/25/25 1758     Admin Instructions:   If given for fever, use fever parameter: fever greater than 100.4 °F  Based on patient request - if ordered for moderate or severe pain, provider allows for administration of a medication prescribed for a lower pain scale.    Do not exceed 4 grams of acetaminophen in a 24 hr period. Max dose of 2gm for AST/ALT greater than 120 units/L.    If given for pain, use the following pain scale:   Mild Pain = Pain Score of 1-3, CPOT 1-2  Moderate Pain = Pain Score of 4-6, CPOT 3-4  Severe Pain = Pain Score of 7-10, CPOT 5-8         Or   acetaminophen (TYLENOL) suppository 650 mg  Dose: 650 mg  Freq: Every 4 Hours PRN Route: RE  PRN Reason: Mild Pain  Start: 02/25/25 1758     Admin Instructions:   If given for fever, use fever parameter: fever greater than 100.4 °F  Based on patient request - if ordered for moderate or severe pain, provider allows for administration of a medication prescribed for a lower pain scale.    Do not exceed 4 grams of acetaminophen in a 24 hr period. Max dose of 2gm for AST/ALT greater than 120 units/L.    If given for pain, use the following pain scale:   Mild Pain = Pain Score of 1-3, CPOT 1-2  Moderate Pain = Pain Score of 4-6, CPOT 3-4  Severe Pain = Pain Score of 7-10, CPOT 5-8         albuterol sulfate HFA (PROVENTIL HFA;VENTOLIN HFA;PROAIR HFA) inhaler 2 puff  Dose: 2 puff  Freq: Every 4 Hours PRN Route: IN  PRN Reason: Wheezing  Start: 02/25/25 1758     Admin Instructions:   Include Respiratory Treatment  Education   Shake well.          sennosides-docusate (PERICOLACE) 8.6-50 MG per tablet 2 tablet  Dose: 2 tablet  Freq: 2 Times Daily Route: PO  Start: 02/25/25 2100     Admin Instructions:   HOLD MEDICATION IF PATIENT HAS HAD BOWEL MOVEMENT. Start bowel management regimen if patient has not had a bowel movement after 12 hours.      0826-Given     2100                  And   polyethylene glycol (MIRALAX) packet 17 g  Dose: 17 g  Freq: Daily PRN Route: PO  PRN Reason: Constipation  PRN Comment: Use if senna-docusate is ineffective  Start: 02/25/25 1758     Admin Instructions:   Use if no bowel movement after 12 hours. Mix in 6-8 ounces of water.  Use 4-8 ounces of water, tea, or juice for each 17 gram dose.      (0826)-Not Given                   And   bisacodyl (DULCOLAX) EC tablet 5 mg  Dose: 5 mg  Freq: Daily PRN Route: PO  PRN Reason: Constipation  PRN Comment: Use if polyethylene glycol is ineffective  Start: 02/25/25 1758     Admin Instructions:   Use if no bowel movement after 12 hours.  Swallow whole. Do not crush, split, or chew tablet.         And   bisacodyl (DULCOLAX) suppository 10 mg  Dose: 10 mg  Freq: Daily PRN Route: RE  PRN Reason: Constipation  PRN Comment: Use if bisacodyl oral is ineffective  Start: 02/25/25 1758     Admin Instructions:   Use if no bowel movement after 12 hours.  Hold for diarrhea         busPIRone (BUSPAR) tablet 10 mg  Dose: 10 mg  Freq: 2 Times Daily Route: PO  Start: 02/25/25 2100     Admin Instructions:   Caution: Look alike/sound alike drug alert. Take with food.  Avoid grapefruit juice.      0826-Given     2100                  cetirizine (zyrTEC) tablet 10 mg  Dose: 10 mg  Freq: Daily Route: PO  Start: 02/25/25 1845 0834-Given                   clonazePAM (KlonoPIN) tablet 0.5 mg  Dose: 0.5 mg  Freq: Every 8 Hours PRN Route: PO  PRN Reason: Anxiety  Start: 02/26/25 0843 End: 03/03/25 0842     Admin Instructions:   Caution: Look alike/sound alike drug alert. Please  read the label.  Group 2 ([place]) Hazardous Drug - Reproductive Risk Only - See Handling Guide      0921-Given                   gabapentin (NEURONTIN) capsule 800 mg  Dose: 800 mg  Freq: Every 12 Hours Scheduled Route: PO  Start: 02/25/25 2100     Admin Instructions:         0826-Given     2100                  HYDROmorphone (DILAUDID) PCA 0.2 mg/ml 50 mL syringe  Loading Dose: 0.4 mg  PCA Dose: 0.2 mg  Lockout Interval: 7 Minutes  Basal Rate/Continuous Dose: 0.1 mg/hr  One Hour Dose Limit (Max Limit): 1 mg  Freq: Continuous Route: IV  Start: 02/25/25 1506 End: 02/28/25 1824     Admin Instructions:   Based on patient request - if ordered for moderate or severe pain, provider allows for administration of a medication prescribed for a lower pain scale.  Please ensure carrier fluids are initiated to maintain IV patency while on PCA pump if no other IV fluid is present.  If given for pain, use the following pain scale:  Mild Pain = Pain Score of 1-3, CPOT 1-2  Moderate Pain = Pain Score of 4-6, CPOT 3-4  Severe Pain = Pain Score of 7-10, CPOT 5-8      0715-Handoff     1542-New Bag                  ipratropium-albuterol (DUO-NEB) nebulizer solution 3 mL  Dose: 3 mL  Freq: 4 Times Daily - RT Route: NEBULIZATION  Start: 02/25/25 0830     Admin Instructions:   Include Respiratory Treatment Education      0927-Given     1253-Given     (1645)-Not Given     2030                lactated ringers infusion  Rate: 75 mL/hr Dose: 75 mL/hr  Freq: Continuous Route: IV  Start: 02/25/25 1845 End: 02/27/25 1831      0446-New Bag     1252-Rate/Dose Change     1600-New Bag                 methocarbamol (ROBAXIN) tablet 750 mg  Dose: 750 mg  Freq: 4 Times Daily PRN Route: PO  PRN Reason: Muscle Spasms  Start: 02/25/25 1758      0619-Given     1544-Given                  naloxone (NARCAN) injection 0.1 mg  Dose: 0.1 mg  Freq: Every 5 Minutes PRN Route: IV  PRN Reasons: Opioid Reversal,Respiratory Depression  PRN Comment: see administration  instructions  Start: 02/25/25 1758     Admin Instructions:   For respiratory rate less than 8 per minute and if the patient is difficult arouse:  D/C PCA.  Give naloxone (NARCAN) 0.1 mg slow IV push.  May repeat x 1 if needed in 5 minutes. Notify physician.    Mix naloxone 0.4 mg/1 mL ampule in 9 mL normal saline.          ondansetron ODT (ZOFRAN-ODT) disintegrating tablet 4 mg  Dose: 4 mg  Freq: Every 6 Hours PRN Route: PO  PRN Reasons: Nausea,Vomiting  Start: 02/25/25 1758     Admin Instructions:   If BOTH ondansetron (ZOFRAN) and promethazine (PHENERGAN) are ordered use ondansetron first and THEN promethazine IF ondansetron is ineffective.  Place on tongue and allow to dissolve.         Or   ondansetron (ZOFRAN) injection 4 mg  Dose: 4 mg  Freq: Every 6 Hours PRN Route: IV  PRN Reasons: Nausea,Vomiting  Start: 02/25/25 1758     Admin Instructions:   If BOTH ondansetron (ZOFRAN) and promethazine (PHENERGAN) are ordered use ondansetron first and THEN promethazine IF ondansetron is ineffective.         oxyCODONE (ROXICODONE) immediate release tablet 10 mg  Dose: 10 mg  Freq: Every 4 Hours PRN Route: PO  PRN Reason: Moderate Pain  PRN Comment: Breakthrough Pain (4-10)  Indications Comment: Breakthrough Pain  Start: 02/25/25 1758 End: 03/02/25 1757     Admin Instructions:   May be given for breakthrough pain (pain experienced despite patient reaching lockout on PCA) of 4-10.      If given for pain, use the following pain scale:  Mild Pain = Pain Score of 1-3, CPOT 1-2  Moderate Pain = Pain Score of 4-6, CPOT 3-4  Severe Pain = Pain Score of 7-10, CPOT 5-8      0217-Given     0619-Given     1028-Given     1544-Given                pantoprazole (PROTONIX) EC tablet 40 mg  Dose: 40 mg  Freq: 2 Times Daily Route: PO  Start: 02/25/25 2100     Admin Instructions:   Do not crush or chew the capsules or tablets. The drug may not work as designed if the capsule or tablet is crushed or chewed. Swallow whole.  Swallow whole; do  not crush, split, or chew.      0826-Given     2100                  Pharmacy Consult  Freq: Continuous PRN Route: XX  PRN Reason: Consult  Start: 02/25/25 1758     Admin Instructions:   Pharmacist to discontinue PRN opioid pain medications outside of the PCA order set or not associated with breakthrough pain at connection of PCA. If there is no basal rate on PCA, long-acting opioids may be continued. Scheduled opioids for pain are allowed while weaning patient off of PCA but PRN opioids for pain are not. If PCA is only providing basal rate PRN meds may remain.     Order specific questions:   Consult for Pharmacist to review/discontinue PRN opioid pain medications at connection of PCA.         sodium chloride 0.9 % flush 10 mL  Dose: 10 mL  Freq: As Needed Route: IV  PRN Reason: Line Care  Start: 02/25/25 1758         sodium chloride 0.9 % flush 10 mL  Dose: 10 mL  Freq: Every 12 Hours Scheduled Route: IV  Start: 02/25/25 2100      0835-Given     2100                  sodium chloride 0.9 % infusion  Rate: 30 mL/hr Dose: 30 mL/hr  Freq: Continuous PRN Route: IV  PRN Comment: To maintain IV patency while on PCA pump if no other IV fluid present  Start: 02/25/25 1758 End: 02/27/25 1757         sodium chloride 0.9 % infusion 40 mL  Dose: 40 mL  Freq: As Needed Route: IV  PRN Reason: Line Care  Start: 02/25/25 1758     Admin Instructions:   Following administration of an IV intermittent medication, flush line with 40mL NS at 100mL/hr.         zolpidem (AMBIEN) tablet 10 mg  Dose: 10 mg  Freq: Nightly PRN Route: PO  PRN Reason: Sleep  Start: 02/25/25 1758     Admin Instructions:            Completed Medications  Medications 02/26/25      ceFAZolin 2000 mg IVPB in 100 mL NS (MBP)  Dose: 2,000 mg  Freq: Every 8 Hours Route: IV  Indications of Use: PERIOPERATIVE PHARMACOPROPHYLAXIS  Start: 02/25/25 1845 End: 02/26/25 1322     Admin Instructions:   Time first dose from pre-op dose.  Caution: Look alike/sound alike drug  alert      0445-New Bag     1252-New Bag                  ceFAZolin 2000 mg IVPB in 100 mL NS (MBP)  Dose: 2 g  Freq: Once Route: IV  Indications of Use: PERIOPERATIVE PHARMACOPROPHYLAXIS  Start: 02/25/25 0644 End: 02/25/25 0839     Admin Instructions:   Administer within 1 hour of surgical incision. Redose 4 hours from pre-op dose if procedure ongoing or >1.5 L blood loss.  Caution: Look alike/sound alike drug alert         famotidine (PEPCID) injection 20 mg  Dose: 20 mg  Freq: Once Route: IV  Start: 02/25/25 0707 End: 02/25/25 0747     Admin Instructions:   Give IV push over 2 minutes.         fentaNYL citrate (PF) (SUBLIMAZE) injection 50 mcg  Dose: 50 mcg  Freq: Once As Needed Route: IV  PRN Reason: Severe Pain  Start: 02/25/25 0713 End: 02/25/25 0759     Admin Instructions:   Maximum total dose of fentanyl is 50 mcg without additional orders from physician. May be used for preoperative pain or procedural sedation.  If given for pain, use the following pain scale:  Mild Pain = Pain Score of 1-3, CPOT 1-2  Moderate Pain = Pain Score of 4-6, CPOT 3-4  Severe Pain = Pain Score of 7-10, CPOT 5-8         HYDROcodone-acetaminophen (NORCO) 5-325 MG per tablet 1 tablet  Dose: 1 tablet  Freq: Once As Needed Route: PO  PRN Reason: Moderate Pain  Start: 02/25/25 1505 End: 02/25/25 1700     Admin Instructions:   Based on patient request - if ordered for moderate or severe pain, provider allows for administration of a medication prescribed for a lower pain scale.  [ANEESH]    Do not exceed 4 grams of acetaminophen in a 24 hr period. Max dose of 2gm for AST/ALT greater than 120 units/L        If given for pain, use the following pain scale:   Mild Pain = Pain Score of 1-3, CPOT 1-2  Moderate Pain = Pain Score of 4-6, CPOT 3-4  Severe Pain = Pain Score of 7-10, CPOT 5-8         Methocarbamol (ROBAXIN) injection 1,000 mg  Dose: 1,000 mg  Freq: Once Route: IV  Start: 02/25/25 1506 End: 02/25/25 1606     Admin Instructions:   1.  For IV Admin: Give while recumbent maintain position for 15-30 min. Give slow IV push over 5 min not to exceed 3mL/min 2. For IM Admin: a max of 5mL can be administered into each gluteal region.         Discontinued Medications  Medications 02/26/25      Atropine Sulfate (PF) injection 0.4 mg  Dose: 0.4 mg  Freq: Once As Needed Route: IV  PRN Reason: Bradycardia  PRN Comment: symptomatic bradycardia (hypotension, dizziness, confusion). Notify attending anesthesiologist.  Start: 02/25/25 1505 End: 02/25/25 1751         bupivacaine (PF) (MARCAINE) 0.25 % injection  Freq: As Needed  Start: 02/25/25 1408 End: 02/25/25 1449         bupivacaine-EPINEPHrine PF (MARCAINE w/EPI) 0.25% -[id number] injection  Freq: As Needed  Start: 02/25/25 0825 End: 02/25/25 1449         diphenhydrAMINE (BENADRYL) injection 12.5 mg  Dose: 12.5 mg  Freq: Every 15 Minutes PRN Route: IV  PRN Reason: Itching  PRN Comment: May repeat x 1  Start: 02/25/25 1505 End: 02/25/25 1751     Admin Instructions:   Caution: Look alike/sound alike drug alert. This med may be ordered in other forms and routes. Before giving verify the last time the drug was given by any route/form.         ePHEDrine injection 5 mg  Dose: 5 mg  Freq: Once As Needed Route: IV  PRN Comment: symptomatic hypotension - Notify attending anesthesiologist if this needs to be given  Start: 02/25/25 1505 End: 02/25/25 1751     Admin Instructions:   Caution: Look alike/sound alike drug alert   Dilute with NS to 5-10 mg/mL.  Central line preferred, if unavailable use large bore IV access with frequent nurse monitoring of IV site.         famotidine (PEPCID) injection 20 mg  Dose: 20 mg  Freq: Once Route: IV  Start: 02/25/25 0715 End: 02/25/25 0813     Admin Instructions:   Give IV push over 2 minutes.         fentaNYL citrate (PF) (SUBLIMAZE) injection 50 mcg  Dose: 50 mcg  Freq: Every 5 Minutes PRN Route: IV  PRN Reason: Severe Pain  Start: 02/25/25 1505 End: 02/25/25 1750     Admin  Instructions:   Maximum total dose of fentanyl is 200 mcg.  If given for pain, use the following pain scale:  Mild Pain = Pain Score of 1-3, CPOT 1-2  Moderate Pain = Pain Score of 4-6, CPOT 3-4  Severe Pain = Pain Score of 7-10, CPOT 5-8         fentaNYL citrate (PF) (SUBLIMAZE) injection 50 mcg  Dose: 50 mcg  Freq: Once As Needed Route: IV  PRN Reason: Severe Pain  Start: 02/25/25 0657 End: 02/25/25 0813     Admin Instructions:   Maximum total dose of fentanyl is 50 mcg without additional orders from physician. May be used for preoperative pain or procedural sedation.  If given for pain, use the following pain scale:  Mild Pain = Pain Score of 1-3, CPOT 1-2  Moderate Pain = Pain Score of 4-6, CPOT 3-4  Severe Pain = Pain Score of 7-10, CPOT 5-8         flumazenil (ROMAZICON) injection 0.2 mg  Dose: 0.2 mg  Freq: As Needed Route: IV  PRN Comment: for benzodiazepine induced unresponsiveness or sedation  Start: 02/25/25 1505 End: 02/25/25 1751     Admin Instructions:   Notify Anesthesia if given  ** give IV over 15-30 seconds **         hydrALAZINE (APRESOLINE) injection 5 mg  Dose: 5 mg  Freq: Every 10 Minutes PRN Route: IV  PRN Reason: High Blood Pressure  PRN Comment: for systolic blood pressure greater than 180 mmHg or diastolic blood pressure greater than 105 mmHg  Start: 02/25/25 1505 End: 02/25/25 1751     Admin Instructions:   Up to 20 mg. If labetalol and hydralazine are both ordered, use labetalol first.  Caution: Look alike/sound alike drug alert         HYDROmorphone (DILAUDID) injection 0.5 mg  Dose: 0.5 mg  Freq: Every 5 Minutes PRN Route: IV  PRN Reason: Moderate Pain  Start: 02/25/25 1505 End: 02/25/25 1751     Admin Instructions:   Maximum total dose of hydromorphone is 2 mg.  If given for pain, use the following pain scale:  Mild Pain = Pain Score of 1-3, CPOT 1-2  Moderate Pain = Pain Score of 4-6, CPOT 3-4  Severe Pain = Pain Score of 7-10, CPOT 5-8         ipratropium-albuterol (DUO-NEB)  nebulizer solution 3 mL  Dose: 3 mL  Freq: Once As Needed Route: NEBULIZATION  PRN Reasons: Wheezing,Shortness of Air  PRN Comment: bronchospasm  Start: 02/25/25 1505 End: 02/25/25 1751     Admin Instructions:   Notify Anesthesia if minineb is given         ipratropium-albuterol (DUO-NEB) nebulizer solution 3 mL  Dose: 3 mL  Freq: 4 Times Daily - RT Route: NEBULIZATION  Start: 02/25/25 0830 End: 02/25/25 0716     Admin Instructions:   Include Respiratory Treatment Education         labetalol (NORMODYNE,TRANDATE) injection 5 mg  Dose: 5 mg  Freq: Every 5 Minutes PRN Route: IV  PRN Reason: High Blood Pressure  PRN Comment: for systolic blood pressure greater than 180 mmHg or diastolic blood pressure greater than 105 mmHg  Start: 02/25/25 1505 End: 02/25/25 1751     Admin Instructions:   Hold for heart rate less than 60.    If labetalol and hydralazine are both ordered, use labetalol first. Maximum dose of labetalol is 20mg IV while in PACU, notify anesthesiologist for further orders if needed.  Give IV Push over 2 minutes.         lactated ringers infusion  Rate: 9 mL/hr Dose: 9 mL/hr  Freq: Continuous Route: IV  Start: 02/25/25 0715 End: 02/25/25 0814         lactated ringers infusion  Rate: 9 mL/hr Dose: 9 mL/hr  Freq: Continuous Route: IV  Start: 02/25/25 0707 End: 02/26/25 0723         lidocaine (XYLOCAINE) 1 % injection 0.5 mL  Dose: 0.5 mL  Freq: Once As Needed Route: ID  PRN Comment: IV Start  Start: 02/25/25 0713 End: 02/25/25 1502         lidocaine (XYLOCAINE) 1 % injection 0.5 mL  Dose: 0.5 mL  Freq: Once As Needed Route: ID  PRN Comment: IV Start  Start: 02/25/25 0657 End: 02/25/25 1502         midazolam (VERSED) injection 1 mg  Dose: 1 mg  Freq: Every 5 Minutes PRN Route: IV  PRN Comment: Anxiety prophylaxis, Pre-op comfort  Start: 02/25/25 0713 End: 02/25/25 0813     Admin Instructions:   May repeat dose in 5 minutes one time then contact provider for additional orders.           midazolam (VERSED)  injection 1 mg  Dose: 1 mg  Freq: Every 5 Minutes PRN Route: IV  PRN Comment: Anxiety prophylaxis, Pre-op comfort  Start: 02/25/25 0657 End: 02/25/25 1502     Admin Instructions:   May repeat dose in 5 minutes one time then contact provider for additional orders.           naloxone (NARCAN) injection 0.2 mg  Dose: 0.2 mg  Freq: As Needed Route: IV  PRN Reasons: Opioid Reversal,Respiratory Depression  PRN Comment: unresponsiveness, decrease oxygen saturation  Indications of Use: ACUTE RESPIRATORY FAILURE,OPIOID-INDUCED RESPIRATORY DEPRESSION  Start: 02/25/25 1505 End: 02/25/25 1751     Admin Instructions:   Notify Anesthesia if given         ondansetron (ZOFRAN) injection 4 mg  Dose: 4 mg  Freq: Once As Needed Route: IV  PRN Reasons: Nausea,Vomiting  Indications of Use: POSTOPERATIVE NAUSEA AND VOMITING  Start: 02/25/25 1505 End: 02/25/25 1751     Admin Instructions:   If BOTH ondansetron (ZOFRAN) and promethazine (PHENERGAN) are ordered use ondansetron first and THEN promethazine IF ondansetron is ineffective.         oxyCODONE-acetaminophen (PERCOCET) 7.5-325 MG per tablet 1 tablet  Dose: 1 tablet  Freq: Every 4 Hours PRN Route: PO  PRN Reason: Severe Pain  Start: 02/25/25 1505 End: 02/25/25 1751     Admin Instructions:   [ANEESH]    Do not exceed 4 grams of acetaminophen in a 24 hr period. Max dose of 2gm for AST/ALT greater than 120 units/L        If given for pain, use the following pain scale:   Mild Pain = Pain Score of 1-3, CPOT 1-2  Moderate Pain = Pain Score of 4-6, CPOT 3-4  Severe Pain = Pain Score of 7-10, CPOT 5-8          promethazine (PHENERGAN) suppository 25 mg  Dose: 25 mg  Freq: Once As Needed Route: RE  PRN Reasons: Nausea,Vomiting  Start: 02/25/25 1505 End: 02/25/25 1751     Admin Instructions:   If BOTH ondansetron (ZOFRAN) and promethazine (PHENERGAN) are ordered use ondansetron first and THEN promethazine IF ondansetron is ineffective.         Or   promethazine (PHENERGAN) tablet 25 mg  Dose: 25  mg  Freq: Once As Needed Route: PO  PRN Reasons: Nausea,Vomiting  Start: 02/25/25 1505 End: 02/25/25 1751     Admin Instructions:   If BOTH ondansetron (ZOFRAN) and promethazine (PHENERGAN) are ordered use ondansetron first and THEN promethazine IF ondansetron is ineffective.         sodium chloride 0.9 % flush 3 mL  Dose: 3 mL  Freq: Every 12 Hours Scheduled Route: IV  Start: 02/25/25 0900 End: 02/25/25 1502         sodium chloride 0.9 % flush 3 mL  Dose: 3 mL  Freq: Every 12 Hours Scheduled Route: IV  Start: 02/25/25 0900 End: 02/25/25 1502         sodium chloride 0.9 % flush 3-10 mL  Dose: 3-10 mL  Freq: As Needed Route: IV  PRN Reason: Line Care  Start: 02/25/25 0713 End: 02/25/25 1502         sodium chloride 0.9 % flush 3-10 mL  Dose: 3-10 mL  Freq: As Needed Route: IV  PRN Reason: Line Care  Start: 02/25/25 0657 End: 02/25/25 1502         sodium chloride 1,000 mL with ceFAZolin 2 g irrigation  Freq: As Needed  Start: 02/25/25 1145 End: 02/25/25 1449         thrombin (THROMBIN-JMI) spray kit  Freq: As Needed  Start: 02/25/25 1145 End: 02/25/25 1449                        Operative/Procedure Notes (all)        Bert Collado MD at 02/25/25 0923  Version 1 of 1         CERVICAL DISCECTOMY POSTERIOR FUSION WITH STEALTH  Progress Note    Rafael Kat  2/25/2025    Pre-op Diagnosis:   History of fusion of cervical spine [Z98.1]  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]  Weakness of both hands [R29.898]  Weakness of both arms [R29.898]       Post-Op Diagnosis Codes:     * History of fusion of cervical spine [Z98.1]     * 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]     * Weakness of both hands [R29.898]     * Weakness of both arms [R29.898]    Procedure(s):      Procedure(s):  Posterior cervical seven/thoracic one decompression, posterior cervical three/four/five/six/seven, thoracic one/two/three fusion with screws/rods/crosslinks with Stealth spinal navigation      Surgeon(s):  Tobias,  MD Bert    Anesthesia: General    Staff:   Cell Saver : Amor Trevino  Circulator: Luiz Salgado, RAN; Norma Montana RN; Giulia Ibarra RN  Radiology Technologist: Faby Terry  Scrub Person: Ruben Fortune; Minerva Ambrose; Alfredito Walters  Vendor Representative: Benitez Gross  Assistant: Viji Mak CSA  Assistant: Viji Mak CSA    Estimated Blood Loss: 150 mL    Urine Voided: 450 mL    Specimens:                None      Drains:   Closed/Suction Drain 1 Left;Superior Back Accordion 15 Fr. (Active)       Urethral Catheter Latex 16 Fr. (Active)       Findings: bilateral foraminal stenosis C7/T1      Complications: none    Assistant: Viji Mak CSA  was responsible for performing the following activities: Retraction, Suction, Irrigation, Suturing, Closing, and Placing Dressing and their skilled assistance was necessary for the success of this case.    Bert Collado MD     Date: 2/25/2025  Time: 13:37 EST          Electronically signed by Bert Collado MD at 02/25/25 1338       Bert Collado MD at 02/25/25 0923  Version 1 of 1         Preoperative diagnosis: 1. S/p Anterior C3-C7 fusion for myelopathy; 2. Adjacent level bilateral foraminal stenosis C7/T1 with bilateral arm pain and bilateral arm/hand weakness    Postoperative diagnosis: Same as above    Procedures performed: Bilateral C7/T1 decompression C3 to T3 onlay posterolateral fusion, bilateral lateral mass screws C3, C4, C5, C6, bilateral T1, T2, T3 pedicle screws placed with Stealth spinal navigation, placement of bilateral rods from C3 to T3 with crosslink    Spinal Surgery Levels Completed:6+ Levels     Surgeon: Tobias    First Assistant: Viji Mak  (She greatly assisted in the exposure, visualization of neural structures, control of bleeding, retraction, and closure of the incision. Her skilled assistance was necessary for the success of this case.)      Anesthesia: GET    EBL: 150 cc    Complications: none    Specimen sent: none    Drains: 15 mm round Hemovac epidural drain    Findings: severe bilateral forminal stenosis C7/T1    Postoperative condition: stable    Indications for the operation: The patient is a 54-year-old male who is undergone a lumbar decompression by me as well as a C4, C5, C6 corpectomy with cage and plate from C3-C7 for myelopathy.  He did well for a while but began developing bilateral arm pain with weakness in the arms and hands because of adjacent level stenosis at C7-T1.  He failed conservative treatments including physical therapy, medicines and blocks because of that and the progression of his motor deficit, he was brought to the operating room for a posterior adjacent oval decompression and fusion from C3-T3.  He was not myelopathic but radiculopathic prior to the surgery.    Informed consent: He and his wife understood that the goal of surgery was relief of his bilateral arm pain and improvement of his bilateral arm and hand weakness over time as well as maintaining stability of his spine.  The risks include, but are not limited to, infection possibly requiring reoperation, hemorrhage possibly requiring transfusion, CSF leak possible requiring reoperation, incomplete relief of symptoms, potential need for additional surgeries in the future, hardware difficulties, stroke, paralysis, coma, and death.  He agreed to proceed.    Details of the operation: After medical and pulmonary clearance, he was brought to the operating room and remained in his gurney in the supine position.  After induction of endotracheal ovation, he got 2 g of Kefzol as per the SCIP protocol.  A Diaz catheter was placed.  SCDs were placed.  Venous and arterial access have been secured.  He was rolled over the prone position on a radiolucent Rodger spinal table.  All pressure points were padded and the neck was flexed slightly and the shoulders were retracted  inferiorly with tape.  We brought the centimeters and marked out the incision from C2-T3 for in the midline.  The cervical thoracic region was then prepped and draped in the usual sterile fashion.  We did a surgical timeout.  Initially I injected 60 cc of quarter percent Marcaine with epinephrine in the paraspinous muscular from C3-T3.  Upon closure an additional 60 cc without epinephrine were injected for further muscular pain control after surgery.  A linear incision was made from C2-T4 with a #10 skin knife.  Hemostasis was obtained with monopolar cautery.  Dissection was taken on the way down to the midline fascia which was divided to the left to the right at C2-C3-C4 C5-C6-C7 T1-T2 and T3.  The rib heads were identified bilaterally from T1-T3 and the lateral masses were exposed all the way from see 2 to C7.  We put on a stereotactic clamp at T4 and with the stereotactic array we did an O-arm spin.  We placed a notch on the T1 spinous process for reference.  When we went through the registration process her accuracy was excellent.  We placed a pair of 4.0 x 28 mm pedicle screws at T1 using Stealth navigation, a pair of 4.0 x 30 mm screws at T2 again using the transpedicular technique and Stealth navigation, and a pair of 4.0 x 32 mm screws at T3 using Stealth navigation the transpedicular technique.  An O-arm spin showed good placement and bone of all 6 screws.  The Star Trek clamp was removed.  I then went ahead and placed bilateral C3 bilateral C4 bilateral C5 and bilateral C6 lateral mass screws using standard landmarks angling in the midpoint of the lateral mass 15 degrees up and 15 degrees laterally, I.  A pair of 3.5 x 14 mm screws bilaterally at C3, para 3.5 x 14 mm screws bilaterally at C4, a pair of 3.5 x 14 mm screws bilaterally at C5, 3.5 x 14 mm screws bilaterally at C6.  X-rays show good placement of the lateral mass and the pedicle screws.  Microscope was draped and brought into the field.  Its use  was essential to the performance of micro neurosurgical technique.  Using a Leksell rongeur I remove the spinous processes at C3 1 and C7 using the 3 mm matchstick under magnification I did a C7-T1 laminectomy exposing the central spinal cord dorsally and doing medial facetectomies and foraminotomies bilaterally unroofing and decompressing the C8 nerve root.  There was quite a bit of ligamentous hypertrophy and bony hypertrophy that was the main source of the compression of the C8 roots bilaterally.  This was relieved.  I had harvested some laminar autograft to be used during the fusion process.  Bilateral rods were placed from C3-T3 and tightened down using the breakaway technique and a cross-link was placed between T1 and T2.  The cortical surfaces were decorticated and I placed MagnetOs and Magnafuse surfaces from C3-T3.  Antibiotic irrigation was used.  Floseal was used in the epidural space.  A 15 mm round Hemovac was placed in the epidural space and exit through separate stab incision secured to skin with 0 silk.  The fascia was reapproximated with 0 Vicryl interrupted suture.  The subcutaneous layer was closed with 2-0 interrupted Vicryl suture.  The skin was closed with a running 4-0 Vicryl subcuticular.  Steri-Strips and a Prevena placed.  The drain was affixed to the skin with 2-0 silk.  The patient Toller procedure well.  She was rolled over in the supine position and extubated taken recovery in satisfactory condition.    Electronically signed by Bert Collado MD at 02/25/25 1355          Physician Progress Notes (most recent note)        Joe Lombardo APRN at 02/26/25 1105          Anxiety: [facility] NEUROSURGERY CERVICAL POSTOP NOTE      CC:POD #1 s/p posterior C7-T1 decompression with posterior C3-T3 fusion      Subjective     Interval History: No acute issues overnight.  He is experiencing expected postop surgical site pain.  He does not yet notice improvement in upper extremity  "pain/weakness stating \"I have not removed around enough to notice any improvement\".  He is voiding without difficulty and passing flatus.  He has poor appetite but taking fluids well.  He notes some anxiety overnight-takes Valium at home.  Currently on antianxiety medication.  Vital signs stable and afebrile overnight.  WBC elevated today like reactive.    Objective     Vital signs in last 24 hours:  Temp:  [97.3 °F (36.3 °C)-98.4 °F (36.9 °C)] 98.2 °F (36.8 °C)  Heart Rate:  [75-98] 75  Resp:  [12-20] 16  BP: (110-149)/() 118/83  Arterial Line BP: ()/(80-94) 141/85    Intake/Output this shift:  I/O this shift:  In: 270 [P.O.:270]  Out: 400 [Urine:400]    Hemovac drain: 350 cc since placement yesterday    LABS:  .  Results from last 7 days   Lab Units 02/26/25  0607 02/25/25  1846   WBC 10*3/mm3 21.03* 20.19*   HEMOGLOBIN g/dL 12.5* 13.8   HEMATOCRIT % 37.0* 40.7   PLATELETS 10*3/mm3 236 236     .  Results from last 7 days   Lab Units 02/26/25  0607   SODIUM mmol/L 138   POTASSIUM mmol/L 4.2   CHLORIDE mmol/L 102   CO2 mmol/L 26.8   BUN mg/dL 18   CREATININE mg/dL 1.05   GLUCOSE mg/dL 132*   CALCIUM mg/dL 8.2*         IMAGING STUDIES:  X-ray cervical spine 2/26/2025:  Shows anterior cervical and posterior cervical thoracic spinal fusion hardware intact without any malalignment or fracture.  I personally reviewed and discussed the imaging with Dr. Collado.  I personally viewed and interpreted the patient's labs, imaging study, medications and chart..    Meds reviewed/changed: Yes    Current Facility-Administered Medications:     acetaminophen (TYLENOL) tablet 650 mg, 650 mg, Oral, Q4H PRN **OR** acetaminophen (TYLENOL) 160 MG/5ML oral solution 650 mg, 650 mg, Oral, Q4H PRN **OR** acetaminophen (TYLENOL) suppository 650 mg, 650 mg, Rectal, Q4H PRN, Bert Collado MD    albuterol sulfate HFA (PROVENTIL HFA;VENTOLIN HFA;PROAIR HFA) inhaler 2 puff, 2 puff, Inhalation, Q4H PRN, Bert oCllado MD    " sennosides-docusate (PERICOLACE) 8.6-50 MG per tablet 2 tablet, 2 tablet, Oral, BID, 2 tablet at 02/26/25 0826 **AND** polyethylene glycol (MIRALAX) packet 17 g, 17 g, Oral, Daily PRN **AND** bisacodyl (DULCOLAX) EC tablet 5 mg, 5 mg, Oral, Daily PRN **AND** bisacodyl (DULCOLAX) suppository 10 mg, 10 mg, Rectal, Daily PRN, Bert Collado MD    busPIRone (BUSPAR) tablet 10 mg, 10 mg, Oral, BID, Bert Collado MD, 10 mg at 02/26/25 0826    ceFAZolin 2000 mg IVPB in 100 mL NS (MBP), 2,000 mg, Intravenous, Q8H, Bert Collado MD, 2,000 mg at 02/26/25 0445    cetirizine (zyrTEC) tablet 10 mg, 10 mg, Oral, Daily, Bert Collado MD, 10 mg at 02/26/25 0834    clonazePAM (KlonoPIN) tablet 0.5 mg, 0.5 mg, Oral, Q8H PRN, Adonay Greene MD, 0.5 mg at 02/26/25 0921    gabapentin (NEURONTIN) capsule 800 mg, 800 mg, Oral, Q12H, Bert Collado MD, 800 mg at 02/26/25 0826    HYDROmorphone (DILAUDID) PCA 0.2 mg/ml 50 mL syringe, , Intravenous, Continuous, Bert Collado MD, New Bag at 02/25/25 1825    ipratropium-albuterol (DUO-NEB) nebulizer solution 3 mL, 3 mL, Nebulization, 4x Daily - RT, Bert Collado MD, 3 mL at 02/26/25 0927    lactated ringers infusion, 75 mL/hr, Intravenous, Continuous, Joe Lombardo APRN, Last Rate: 100 mL/hr at 02/26/25 0446, 100 mL/hr at 02/26/25 0446    methocarbamol (ROBAXIN) tablet 750 mg, 750 mg, Oral, 4x Daily PRN, Bert Collado MD, 750 mg at 02/26/25 0619    naloxone (NARCAN) injection 0.1 mg, 0.1 mg, Intravenous, Q5 Min PRN, Bert Collado MD    ondansetron ODT (ZOFRAN-ODT) disintegrating tablet 4 mg, 4 mg, Oral, Q6H PRN **OR** ondansetron (ZOFRAN) injection 4 mg, 4 mg, Intravenous, Q6H PRN, Bert Collado MD    oxyCODONE (ROXICODONE) immediate release tablet 10 mg, 10 mg, Oral, Q4H PRN, Bert Collado MD, 10 mg at 02/26/25 1028    pantoprazole (PROTONIX) EC tablet 40 mg, 40 mg, Oral, BID, Bert Collado MD, 40 mg at 02/26/25 0826    Pharmacy  Consult, , Not Applicable, Continuous PRN, Bert Collado MD    sodium chloride 0.9 % flush 10 mL, 10 mL, Intravenous, Q12H, Bert Collado MD, 10 mL at 02/26/25 0835    sodium chloride 0.9 % flush 10 mL, 10 mL, Intravenous, PRN, Bert Collado MD    sodium chloride 0.9 % infusion 40 mL, 40 mL, Intravenous, PRN, Bert Collado MD    sodium chloride 0.9 % infusion, 30 mL/hr, Intravenous, Continuous PRN, Bert Collado MD    zolpidem (AMBIEN) tablet 10 mg, 10 mg, Oral, Nightly PRN, Bert Collado MD      Physical Exam:    General:   Awake, alert, oriented x3. Speech clear with no aphasia  Neck:   Anterior cervical incision without erythema, swelling or drainage.  Posterior cervical dressing clean dry and intact., swallow/trachea midline; ; ROM deferred;   Motor:    Normal muscle strength, bulk and tone in upper and lower extremities.  No fasciculations, rigidity, spasticity, or abnormal movements.  Reflexes:   2+ in the upper and lower extremities. No Gutiérrez, no clonus  Sensation:   Normal to light touch  Station and Gait:           Awaiting PT evaluation  Extremities:   SCD in place    Assessment & Plan     ASSESSMENT:      History of fusion of cervical spine    Dysphagia    Spinal stenosis of cervicothoracic region    Weakness of both hands    Weakness of both arms    Gastroesophageal reflux disease    Cervical stenosis of spine    54-year-old male day #1 s/p posterior C7-T1 decompression with posterior C3-T3 fusion.  He reports expected surgical site pain today.  Due to the fact he is not done any significant movement he has not noticed whether there is been improvement in upper extremity strength/pain.  We will get him working with physical therapy today.  I will request that nursing order him a soft collar for comfort.  He is still in a significant amount of pain as expected and we will continue Dilaudid PCA pump.  Please continue to use other oral pain medication agents/muscle relaxers  "as well as ice for pain control.  Will keep Hemovac drain today.  Please mobilize.    Anxiety: Takes home Valium/buspirone.  Patient has been restarted on buspirone and clonazepam has been added.  Please monitor for oversedation with concurrent administration of benzodiazepines and p.o./IV opioids.    PLAN:     Keep drain  Soft collar for comfort  Bowel regimen  Mobilize with PT/nursing  Pain medication/muscle relaxers as needed  Utilize ice as needed  SCD/DVT prophylaxis  PT  Keep Dilaudid PCA pump  PM&R consult      I discussed the patient's findings and my recommendations with patient, family, and Dr. Collado.    During patient visit, I utilized appropriate personal protective equipment including mask and gloves.  Mask used was standard procedure mask. Appropriate PPE was worn during the entire visit.  Hand hygiene was completed before and after.      LOS: 1 day       HERON Cardenas  2/26/2025  12:24 EST    \"Dictated utilizing Dragon dictation\".      Electronically signed by Joe Lombardo APRN at 02/26/25 1235          Consult Notes (most recent note)        Adonay Greene MD at 02/25/25 2306        Consult Orders    1. Inpatient Hospitalist Consult [[id number]] ordered by Bert Collado MD at 02/25/25 1410                     Patient Name:  Rafael Kat  YOB: 1970  MRN:  [id number]  Admit Date:  2/25/2025  Patient Care Team:  Provider, No Known as PCP - General  Bert Collado MD as Surgeon (Neurosurgery)      Subjective   History Present Illness     No chief complaint on file.      This is a 54-year-old male who underwent bilateral C7/T1 decompression, C3-T3 [place] posterior fusion on 2/5/2025.  He has a past medical history of asthma, GERD and dysphagia.  Most of his medical history is related to his spine.  He is doing relatively well postoperatively. A was asked to see the patient for medical management.     Personal History     Past Medical History: "   Diagnosis Date    Anesthesia complication     PT STATES HE IS SLOW TO WAKE UP    Arthritis     COPD (chronic obstructive pulmonary disease)     STATES WHEEZES AT NIGHT AND DOES USE NIGHTLY INHALER    DDD (degenerative disc disease), cervical     DDD (degenerative disc disease), lumbar     PAIN IN RIGHT BUTTOCK AND DOWN AT TIMES RIGHT LEG, WEAKNESS RIGHT LEG AT TIMES    Dysphagia     STARTED AFTER HIS CERVICAL NECK SURGERY    GERD (gastroesophageal reflux disease)     History of COVID-19     Neck pain     Psoriasis     Shoulder pain, bilateral     Skin cancer     BASAL/SQUAMOUS     Past Surgical History:   Procedure Laterality Date    ANTERIOR CHANNEL VERTEBRECTOMY/CORPECTOMY Bilateral 03/29/2016    Procedure: C4-6 ANTERIOR CHANNEL VERTEBRECTOMY/CORPECTOMY WITH INSTRUMENTATION;  Surgeon: Bert Collado MD;  Location: [facility] OR;  Service:     COLONOSCOPY      COLONOSCOPY N/A 2/14/2025    Procedure: COLONOSCOPY into cecum with hot snare polypectomy, Resolutions clips x2, Tattoo of transverse polyp area;  Surgeon: Gonzalo Rodriguez MD;  Location: [facility] ENDOSCOPY;  Service: Gastroenterology;  Laterality: N/A;  Pre: Screening  Post: Polyp, Suboptimal prep    ELBOW PROCEDURE Right     UNCLEAR:  RIGHT ELBOW AREA    ENDOSCOPY N/A 2/14/2025    Procedure: ESOPHAGOGASTRODUODENOSCOPY with biopsies;  Surgeon: Gonzalo Rodriguez MD;  Location: [facility] ENDOSCOPY;  Service: Gastroenterology;  Laterality: N/A;  Pre: GERD, Dysphagia  Post: Gastritis and Esophagitis    FINGER SURGERY Left     INDEX FINGER    LUMBAR EPIDURAL INJECTION      STATES X3    LUMBAR LAMINECTOMY DISCECTOMY DECOMPRESSION Bilateral 11/28/2023    Procedure: Open bilateral lumbar decompression lumbar one/two, lumbar two/three, lumbar three/four;  Surgeon: Bert Collado MD;  Location: [facility] OR;  Service: Neurosurgery;  Laterality: Bilateral;    MENISCECTOMY Right     UPPER GASTROINTESTINAL ENDOSCOPY  03/2021     Family History   Problem  Relation Age of Onset    Malig Hyperthermia Neg Hx      Social History     Tobacco Use    Smoking status: Former     Current packs/day: 0.00     Average packs/day: 2.0 packs/day for 20.0 years (40.0 ttl pk-yrs)     Types: Cigarettes     Start date: 1/1/1990     Quit date: 2010     Years since quitting: 15.1     Passive exposure: Past    Smokeless tobacco: Never   Vaping Use    Vaping status: Never Used   Substance Use Topics    Alcohol use: No    Drug use: Yes     Frequency: 14.0 times per week     Types: Marijuana     Comment: 1-2 PER DAY,     No current facility-administered medications on file prior to encounter.     Current Outpatient Medications on File Prior to Encounter   Medication Sig Dispense Refill    acetaminophen (TYLENOL) 500 MG tablet Take 1 tablet by mouth Every 6 (Six) Hours As Needed for Mild Pain.      baclofen (LIORESAL) 20 MG tablet Take 1 tablet by mouth 2 (Two) Times a Day.      celecoxib (CeleBREX) 200 MG capsule Take 1 capsule by mouth Daily. INSTRUCTED PT TO FOLLOW DR  INSTRUCTIONS REGARDING HOLDING FOR SURGERY      cetirizine (zyrTEC) 10 MG tablet Take 1 tablet by mouth Daily.      cholecalciferol (VITAMIN D3) 25 MCG (1000 UT) tablet Take 1 tablet by mouth Daily.      docusate sodium 100 MG capsule Take 1 capsule by mouth Daily. (Patient taking differently: Take 1 capsule by mouth Daily As Needed.)      FeroSul 325 (65 Fe) MG tablet Take 1 tablet by mouth Daily With Breakfast. PT HAS NOT HAD FOR 4 DAYS      gabapentin (NEURONTIN) 800 MG tablet Take 1 tablet by mouth 3 (Three) Times a Day. TYPICALLY TAKES TWICE DAILY      NON FORMULARY Every Night. STATES DOES USE NIGHT INHALER:  INSTRUCTED TO BRING THIS DAY OF SURGEY      pantoprazole (PROTONIX) 40 MG EC tablet Take 1 tablet by mouth 2 (Two) Times a Day.  11    polyethylene glycol (MIRALAX) 17 GM/SCOOP powder Dissolve 17 g (1 capful) in liquid and drink by mouth 2 (Two) Times a Day As Needed for constipation 510 g 0    sennosides-docusate  (PERICOLACE) 8.6-50 MG per tablet Take 2 tablets by mouth Every Night. (Patient taking differently: Take 2 tablets by mouth At Night As Needed.) 20 tablet 0    zolpidem (AMBIEN) 10 MG tablet Take 1 tablet by mouth At Night As Needed.  2    Diclofenac Sodium (VOLTAREN) 1 % gel gel Apply 4 g topically to the appropriate area as directed As Needed.      lidocaine (LIDODERM) 5 % PLACE 1 PATCH ON THE SKIN AS DIRECTED BY PROVIDER DAILY. REMOVE & DISCARD PATCH WITHIN 12 HOURS OR AS DIRECTED BY MD (Patient taking differently: Place 1 patch on the skin as directed by provider Daily As Needed. Remove & Discard patch within 12 hours or as directed by MD) 30 patch 2    mometasone (ELOCON) 0.1 % cream 1 Application As Needed.      Potassium 75 MG tablet Take  by mouth As Needed. DURING THE SUMMER WHEN SWEATS MORE       Allergies   Allergen Reactions    Bacitracin-Polymyxin B Other (See Comments) and Rash    Chlorhexidine Rash    Bntpq-Xllpg-Yyzjnxh-Pramoxine Rash       Objective    Objective     Vital Signs  Temp:  [97.3 °F (36.3 °C)-98.4 °F (36.9 °C)] 97.3 °F (36.3 °C)  Heart Rate:  [61-98] 89  Resp:  [12-20] 16  BP: (115-149)/() 115/90  Arterial Line BP: ()/() 141/85  SpO2:  [90 %-100 %] 93 %  on  Flow (L/min) (Oxygen Therapy):  [3-4] 3;   Device (Oxygen Therapy): nasal cannula  Body mass index is 31.09 kg/m².    Physical Exam    Results Review:  I reviewed the patient's new clinical results.  I reviewed the patient's new imaging results and agree with the interpretation.  I reviewed the patient's other test results and agree with the interpretation  I personally viewed and interpreted the patient's EKG/Telemetry data  Discussed with ED provider.    Lab Results (last 24 hours)       Procedure Component Value Units Date/Time    POC Glucose Once [[id number]]  (Abnormal) Collected: 02/25/25 1551    Specimen: Blood Updated: 02/25/25 1553     Glucose 179 mg/dL     CBC & Differential [[id number]]  (Abnormal)  Collected: 02/25/25 1846    Specimen: Blood Updated: 02/25/25 1909    Narrative:      The following orders were created for panel order CBC & Differential.  Procedure                               Abnormality         Status                     ---------                               -----------         ------                     CBC Auto Differential[[id number]]        Abnormal            Final result                 Please view results for these tests on the individual orders.    CBC Auto Differential [[id number]]  (Abnormal) Collected: 02/25/25 1846    Specimen: Blood Updated: 02/25/25 1909     WBC 20.19 10*3/mm3      RBC 4.55 10*6/mm3      Hemoglobin 13.8 g/dL      Hematocrit 40.7 %      MCV 89.5 fL      MCH 30.3 pg      MCHC 33.9 g/dL      RDW 13.6 %      RDW-SD 44.2 fl      MPV 11.2 fL      Platelets 236 10*3/mm3      Neutrophil % 90.4 %      Lymphocyte % 4.8 %      Monocyte % 4.1 %      Eosinophil % 0.0 %      Basophil % 0.1 %      Immature Grans % 0.6 %      Neutrophils, Absolute 18.24 10*3/mm3      Lymphocytes, Absolute 0.97 10*3/mm3      Monocytes, Absolute 0.83 10*3/mm3      Eosinophils, Absolute 0.00 10*3/mm3      Basophils, Absolute 0.03 10*3/mm3      Immature Grans, Absolute 0.12 10*3/mm3      nRBC 0.0 /100 WBC             No results found for this or any previous visit.    Imaging Results (Last 24 Hours)       Procedure Component Value Units Date/Time    FL C Arm During Surgery [[id number]] Resulted: 02/25/25 1200     Updated: 02/25/25 1200    Narrative:      This procedure was auto-finalized with no dictation required.                No orders to display             Assessment/Plan     Active Hospital Problems    Diagnosis  POA    Cervical stenosis of spine [M48.02]  Yes    Gastroesophageal reflux disease [K21.9]  Yes    Weakness of both hands [R29.898]  Unknown    Weakness of both arms [R29.898]  Unknown    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]  Unknown    Dysphagia [R13.10]  Yes    History  of fusion of cervical spine [Z98.1]  Not Applicable      Resolved Hospital Problems   No resolved problems to display.       Assessment and plan  Spinal stenosis  Status post surgical intervention.  Management per primary  Check morning BMP and CBC  Dilaudid PCA for pain control currently    Asthma  Acute hypoxic respiratory failure   Currently on DuoNebs. Pulm following    GERD  PPI    Leukocytosis  Suspect reactive. Trend. Currently afebrile     Hypertension  Not currently on any antihypertensives. Monitor for now, likely related to pain         I discussed the patient's findings and my recommendations with patient and spouse.    VTE Prophylaxis - SCDs.  Code Status - Full code.       Adonay Greene MD  [facility]  02/25/25  23:06 EST      Electronically signed by Adonay Greene MD at 02/25/25 8672

## 2025-02-26 NOTE — THERAPY EVALUATION
Patient Name: Rafael Kat  : 1970    MRN: [id number]                              Today's Date: 2025       Admit Date: 2025    Visit Dx:     ICD-10-CM ICD-9-CM   1. History of fusion of cervical spine  Z98.1 V45.4   2. Spinal stenosis of cervicothoracic region  M48.03 723.0   3. Weakness of both hands  R29.898 729.89   4. Weakness of both arms  R29.898 729.89     Patient Active Problem List   Diagnosis    Degeneration of intervertebral disc of mid-cervical region    Chronic pain    Cervical disc disorder with radiculopathy of mid-cervical region    DDD (degenerative disc disease), lumbar    Other cervical disc degeneration, high cervical region    Neck pain    Pain in thoracic spine    Muscle spasm of right shoulder    History of fusion of cervical spine    Spinal stenosis of lumbar region with neurogenic claudication    Tear of right acetabular labrum    Spinal instability of lumbar region    Spondylolisthesis at L3-L4 level    Muscle ache    Foraminal stenosis of cervical region    Cervical radiculopathy at C7    Bilateral arm pain    Chronic obstructive pulmonary disease    Dysphagia    Spinal stenosis of cervicothoracic region    Weakness of both hands    Weakness of both arms    History of lumbar surgery    Gastroesophageal reflux disease    Screening for colorectal cancer    Cervical stenosis of spine     Past Medical History:   Diagnosis Date    Anesthesia complication     PT STATES HE IS SLOW TO WAKE UP    Arthritis     COPD (chronic obstructive pulmonary disease)     STATES WHEEZES AT NIGHT AND DOES USE NIGHTLY INHALER    DDD (degenerative disc disease), cervical     DDD (degenerative disc disease), lumbar     PAIN IN RIGHT BUTTOCK AND DOWN AT TIMES RIGHT LEG, WEAKNESS RIGHT LEG AT TIMES    Dysphagia     STARTED AFTER HIS CERVICAL NECK SURGERY    GERD (gastroesophageal reflux disease)     History of COVID-19     Neck pain     Psoriasis     Shoulder pain, bilateral     Skin cancer      BASAL/SQUAMOUS     Past Surgical History:   Procedure Laterality Date    ANTERIOR CHANNEL VERTEBRECTOMY/CORPECTOMY Bilateral 03/29/2016    Procedure: C4-6 ANTERIOR CHANNEL VERTEBRECTOMY/CORPECTOMY WITH INSTRUMENTATION;  Surgeon: Bert Collado MD;  Location: [facility] MAIN OR;  Service:     CERVICAL DISCECTOMY POSTERIOR FUSION WITH BRAIN LAB N/A 2/25/2025    Procedure: Posterior cervical seven/thoracic one decompression, posterior cervical three/four/five/six/seven, thoracic one/two/three fusion with screws/rods/crosslinks with Stealth spinal navigation;  Surgeon: Bert Collado MD;  Location: [facility] MAIN OR;  Service: Neurosurgery;  Laterality: N/A;    COLONOSCOPY      COLONOSCOPY N/A 2/14/2025    Procedure: COLONOSCOPY into cecum with hot snare polypectomy, Resolutions clips x2, Tattoo of transverse polyp area;  Surgeon: Gonzalo Rodriguez MD;  Location: [facility] ENDOSCOPY;  Service: Gastroenterology;  Laterality: N/A;  Pre: Screening  Post: Polyp, Suboptimal prep    ELBOW PROCEDURE Right     UNCLEAR:  RIGHT ELBOW AREA    ENDOSCOPY N/A 2/14/2025    Procedure: ESOPHAGOGASTRODUODENOSCOPY with biopsies;  Surgeon: Gonzalo Rodriguez MD;  Location: [facility] ENDOSCOPY;  Service: Gastroenterology;  Laterality: N/A;  Pre: GERD, Dysphagia  Post: Gastritis and Esophagitis    FINGER SURGERY Left     INDEX FINGER    LUMBAR EPIDURAL INJECTION      STATES X3    LUMBAR LAMINECTOMY DISCECTOMY DECOMPRESSION Bilateral 11/28/2023    Procedure: Open bilateral lumbar decompression lumbar one/two, lumbar two/three, lumbar three/four;  Surgeon: Bert Collado MD;  Location: [facility] MAIN OR;  Service: Neurosurgery;  Laterality: Bilateral;    MENISCECTOMY Right     UPPER GASTROINTESTINAL ENDOSCOPY  03/2021      General Information       Row Name 02/26/25 1611          Physical Therapy Time and Intention    Document Type evaluation  -EM     Mode of Treatment individual therapy;physical therapy  -EM       Row Name 02/26/25 161           General Information    Patient Profile Reviewed yes  -EM     Prior Level of Function independent:  -EM     Existing Precautions/Restrictions fall  -EM       Row Name 02/26/25 1611          Living Environment    People in Home spouse  -EM       Row Name 02/26/25 1611          Home Main Entrance    Number of Stairs, Main Entrance three  -EM     Stair Railings, Main Entrance railings safe and in good condition  -EM       Row Name 02/26/25 1611          Cognition    Orientation Status (Cognition) oriented x 4  -EM       Row Name 02/26/25 1611          Safety Issues/Impairments Affecting Functional Mobility    Impairments Affecting Function (Mobility) balance;endurance/activity tolerance;strength;pain  -EM               User Key  (r) = Recorded By, (t) = Taken By, (c) = Cosigned By      Initials Name Provider Type    Celeste Lynch PT Physical Therapist                   Mobility       Row Name 02/26/25 1612          Bed Mobility    Bed Mobility supine-sit  -EM     Supine-Sit [place] (Bed Mobility) standby assist  -EM     Assistive Device (Bed Mobility) head of bed elevated  -EM       Row Name 02/26/25 1612          Sit-Stand Transfer    Sit-Stand [place] (Transfers) standby assist  -EM       Row Name 02/26/25 1612          Gait/Stairs (Locomotion)    [place] Level (Gait) contact guard  -EM     Distance in Feet (Gait) 100  -EM     Deviations/Abnormal Patterns (Gait) gait speed decreased  -EM     Bilateral Gait Deviations decreased arm swing  -EM     Comment, (Gait/Stairs) guarded, slightly unsteady but no overt LOB  -EM               User Key  (r) = Recorded By, (t) = Taken By, (c) = Cosigned By      Initials Name Provider Type    Celeste Lynch PT Physical Therapist                   Obj/Interventions       Row Name 02/26/25 1615          Range of Motion Comprehensive    General Range of Motion no range of motion deficits identified  -EM       Row Name 02/26/25 1615           Strength Comprehensive (MMT)    General Manual Muscle Testing (MMT) Assessment other (see comments)  -EM     Comment, General Manual Muscle Testing (MMT) Assessment generalized post op weakness noted  -EM       Row Name 02/26/25 1615          Balance    Balance Assessment sitting static balance;sitting dynamic balance;standing static balance;standing dynamic balance  -EM     Static Sitting Balance supervision  -EM     Dynamic Sitting Balance supervision  -EM     Position, Sitting Balance sitting edge of bed  -EM     Static Standing Balance standby assist  -EM     Dynamic Standing Balance contact guard  -EM               User Key  (r) = Recorded By, (t) = Taken By, (c) = Cosigned By      Initials Name Provider Type    EM Celeste Crook, PT Physical Therapist                   Goals/Plan       Row Name 02/26/25 1621          Bed Mobility Goal 1 (PT)    Activity/Assistive Device (Bed Mobility Goal 1, PT) bed mobility activities, all  -EM     [place] Level/Cues Needed (Bed Mobility Goal 1, PT) modified independence  -EM     Time Frame (Bed Mobility Goal 1, PT) 1 week  -EM       Row Name 02/26/25 1621          Transfer Goal 1 (PT)    Activity/Assistive Device (Transfer Goal 1, PT) transfers, all  -EM     [place] Level/Cues Needed (Transfer Goal 1, PT) supervision required  -EM     Time Frame (Transfer Goal 1, PT) 1 week  -EM       Row Name 02/26/25 1621          Gait Training Goal 1 (PT)    Activity/Assistive Device (Gait Training Goal 1, PT) gait (walking locomotion)  -EM     [place] Level (Gait Training Goal 1, PT) standby assist  -EM     Distance (Gait Training Goal 1, PT) 200  -EM     Time Frame (Gait Training Goal 1, PT) 1 week  -EM       Row Name 02/26/25 1621          Therapy Assessment/Plan (PT)    Planned Therapy Interventions (PT) bed mobility training;gait training;home exercise program;patient/family education;transfer training;strengthening  -EM               User Key  (r) = Recorded By,  (t) = Taken By, (c) = Cosigned By      Initials Name Provider Type    Celeste Lynch, PT Physical Therapist                   Clinical Impression       Row Name 02/26/25 1616          Pain    Pretreatment Pain Rating 8/10  -EM     Pain Location neck  -EM     Pain Management Interventions premedicated for activity  -EM     Pre/Posttreatment Pain Comment RN gave meds before PT, also has PCA  -EM       Row Name 02/26/25 1616          Plan of Care Review    Plan of Care Reviewed With patient;spouse  -EM     Outcome Evaluation Pt is 55 yo male admitted to [facility] for posterior C7-T1 compression, C3-7, T1-3 fusion. PMH significant for ANDREINA, anxiety. Patient lives with spouse, independent with mobility prior to admission. Patient rates pain at 8/10 level, performed bed mobility with SBA, sit to stand with CGA, ambulated in hallway with CGA. Patient demonstrates impairments consisting of generalized post op weakness and pain, decreased activity tolerance and would benefit from skilled PT. d/c plan is to return home with family assist.  -EM       Row Name 02/26/25 1616          Therapy Assessment/Plan (PT)    Patient/Family Therapy Goals Statement (PT) get better  -EM     Rehab Potential (PT) good  -EM     Criteria for Skilled Interventions Met (PT) yes;skilled treatment is necessary  -EM     Therapy Frequency (PT) 6 times/wk  -EM       Row Name 02/26/25 1616          Positioning and Restraints    Pre-Treatment Position in bed  -EM     Post Treatment Position bed  -EM     In Bed sitting EOB;with family/caregiver;notified nsg  -EM               User Key  (r) = Recorded By, (t) = Taken By, (c) = Cosigned By      Initials Name Provider Type    Celeste Lynch PT Physical Therapist                   Outcome Measures       Row Name 02/26/25 1622 02/26/25 0826       How much help from another person do you currently need...    Turning from your back to your side while in flat bed without using bedrails? 4  -EM 3  -EE     Moving from lying on back to sitting on the side of a flat bed without bedrails? 3  -EM 3  -EE    Moving to and from a bed to a chair (including a wheelchair)? 3  -EM 3  -EE    Standing up from a chair using your arms (e.g., wheelchair, bedside chair)? 3  -EM 3  -EE    Climbing 3-5 steps with a railing? 3  -EM 2  -EE    To walk in hospital room? 3  -EM 3  -EE    AM-PAC 6 Clicks Score (PT) 19  -EM 17  -EE              User Key  (r) = Recorded By, (t) = Taken By, (c) = Cosigned By      Initials Name Provider Type    EM Celeste Crook, PT Physical Therapist    Lynsey Urena RN Registered Nurse                                 Physical Therapy Education       Title: PT OT SLP Therapies (In Progress)       Topic: Physical Therapy (In Progress)       Point: Mobility training (Done)       Learning Progress Summary            Patient Acceptance, E, VU by EM at 2/26/2025 1622                      Point: Home exercise program (Not Started)       Learner Progress:  Not documented in this visit.              Point: Body mechanics (Not Started)       Learner Progress:  Not documented in this visit.              Point: Precautions (Not Started)       Learner Progress:  Not documented in this visit.                              User Key       Initials Effective Dates Name Provider Type Discipline     06/16/21 -  Celeste Crook PT Physical Therapist PT                  PT Recommendation and Plan  Planned Therapy Interventions (PT): bed mobility training, gait training, home exercise program, patient/family education, transfer training, strengthening  Outcome Evaluation: Pt is 55 yo male admitted to [facility] for posterior C7-T1 compression, C3-7, T1-3 fusion. PMH significant for ANDREINA, anxiety. Patient lives with spouse, independent with mobility prior to admission. Patient rates pain at 8/10 level, performed bed mobility with SBA, sit to stand with CGA, ambulated in hallway with CGA. Patient demonstrates impairments  consisting of generalized post op weakness and pain, decreased activity tolerance and would benefit from skilled PT. d/c plan is to return home with family assist.     Time Calculation:         PT Charges       Row Name 02/26/25 1623             Time Calculation    Start Time 1543  -EM      Stop Time 1555  -EM      Time Calculation (min) 12 min  -EM      PT Received On 02/26/25  -EM      PT - Next Appointment 02/27/25  -EM      PT Goal Re-Cert Due Date 03/05/25  -EM         Time Calculation- PT    Total Timed Code Minutes- PT 8 minute(s)  -EM         Timed Charges    13617 - PT Therapeutic Activity Minutes 8  -EM         Total Minutes    Timed Charges Total Minutes 8  -EM       Total Minutes 8  -EM                User Key  (r) = Recorded By, (t) = Taken By, (c) = Cosigned By      Initials Name Provider Type    EM Celeste Crook, PT Physical Therapist                  Therapy Charges for Today       Code Description Service Date Service Provider Modifiers Qty    64945038529 HC PT EVAL MOD COMPLEXITY 2 2/26/2025 Celeste Crook, PT GP 1    76492806445 HC PT THERAPEUTIC ACT EA 15 MIN 2/26/2025 Celeste Crook, PT GP 1            PT G-Codes  AM-PAC 6 Clicks Score (PT): 19  PT Discharge Summary  Anticipated Discharge Disposition (PT): home with assist    Celeste Crook PT  2/26/2025

## 2025-02-26 NOTE — PLAN OF CARE
Goal Outcome Evaluation:  Plan of Care Reviewed With: patient   Vss, nvi, dressing c/d/I, voiding per urinal, ambulating assist x1, HV with 250 cc output, dilaudid PCA pump and prn meds given for pain with noted relief, plans to d/c home when medically stable, educated on bp meds and monitoring.      Progress: improving

## 2025-02-26 NOTE — CASE MANAGEMENT/SOCIAL WORK
Continued Stay Note  [facility]     Patient Name: Rafael Kat  MRN: [id number]  Today's Date: 2/26/2025    Admit Date: 2/25/2025    Plan: Home with spouse; follow for PT eval   Discharge Plan       Row Name 02/26/25 1541       Plan    Plan Home with spouse; follow for PT eval    Plan Comments Unable to locate home health agency for patient. DIscussed with patient and spouse at bedside. Agreeable to OP PT if it is appropriate. CCP will follow for PT eval and assist as needed. Alessandra CAMILO                   Discharge Codes    No documentation.                 Expected Discharge Date and Time       Expected Discharge Date Expected Discharge Time    Feb 28, 2025               LESLEE Browning

## 2025-02-26 NOTE — PROGRESS NOTES
[facility]  [phone]  Dr. Paolo Powers    Subjective:  LOS: 1    No major events overnight.  He had his scheduled cervical spinal fusion surgery yesterday.  Has been requiring some oxygen postoperatively, but no major respiratory complaints.  He is complaining of some restless leg syndrome which has been keeping him awake.  He reports that an APRN named Marco in the past had him on some medication which really helped.  I told him I would look back into his records and restart the medication and we can continue that as outpatient when he follows up in clinic.    Objective:  Vital Signs past 24hrs    Temp range: Temp (24hrs), Av.9 °F (36.6 °C), Min:97.3 °F (36.3 °C), Max:98.4 °F (36.9 °C)    BP range: BP: (110-149)/() 118/83  Pulse range: Heart Rate:  [75-98] 75  Resp rate range: Resp:  [12-20] 16  95.5 kg (210 lb 8.6 oz); Body mass index is 31.09 kg/m².    Device (Oxygen Therapy): nasal cannulaFlow (L/min) (Oxygen Therapy):  [3-4] 3    Oxygen range:SpO2:  [90 %-100 %] 96 %     S RR:  [0-14] 0      Intake/Output Summary (Last 24 hours) at 2025 1125  Last data filed at 2025 0833  Gross per 24 hour   Intake 1280 ml   Output 3175 ml   Net -1895 ml       Physical Exam  Constitutional:       Appearance: Normal appearance.   HENT:      Head: Normocephalic and atraumatic.      Nose: Nose normal.      Mouth/Throat:      Mouth: Mucous membranes are moist.      Pharynx: Oropharynx is clear.   Eyes:      Extraocular Movements: Extraocular movements intact.      Conjunctiva/sclera: Conjunctivae normal.   Cardiovascular:      Rate and Rhythm: Normal rate and regular rhythm.      Pulses: Normal pulses.      Heart sounds: Normal heart sounds. No murmur heard.  Pulmonary:      Effort: Pulmonary effort is normal. No respiratory distress.      Breath sounds: Normal breath sounds. No stridor. No wheezing or rales.   Abdominal:      General: Abdomen is flat. Bowel sounds are normal.       Palpations: Abdomen is soft.      Tenderness: There is no abdominal tenderness.   Skin:     General: Skin is warm and dry.   Neurological:      General: No focal deficit present.      Mental Status: He is alert and oriented to person, place, and time.            Result Review:  I have reviewed the results from last note by LPC physician and agree with these findings:  [x]  Laboratory accordion  [x]  Microbiology  [x]  Radiology  [x]  EKG/Telemetry   [x]  Cardiology/Vascular   [x]  Pathology  [x]  Old records  []  Other:    Medication Review:  I have reviewed the current MAR.  Antibiotics  ceFAZolin 2000 mg IVPB in 100 mL NS (MBP)     Scheduled Medications  busPIRone, 10 mg, Oral, BID  ceFAZolin, 2,000 mg, Intravenous, Q8H  cetirizine, 10 mg, Oral, Daily  gabapentin, 800 mg, Oral, Q12H  ipratropium-albuterol, 3 mL, Nebulization, 4x Daily - RT  pantoprazole, 40 mg, Oral, BID  senna-docusate sodium, 2 tablet, Oral, BID  sodium chloride, 10 mL, Intravenous, Q12H      ICU Drips  HYDROmorphone HCl-NaCl,   lactated ringers, 100 mL/hr, Last Rate: 100 mL/hr (02/26/25 0446)  Pharmacy Consult,   sodium chloride, 30 mL/hr      PRN Medications    acetaminophen **OR** acetaminophen **OR** acetaminophen    albuterol sulfate HFA    senna-docusate sodium **AND** polyethylene glycol **AND** bisacodyl **AND** bisacodyl    clonazePAM    methocarbamol    naloxone    ondansetron ODT **OR** ondansetron    oxyCODONE    Pharmacy Consult    sodium chloride    sodium chloride    sodium chloride    zolpidem    Lines, Drains & Airways       Active LDAs       Name Placement date Placement time Site Days    Peripheral IV 02/25/25 0724 Left;Posterior Forearm 02/25/25  0724  Forearm  1    Closed/Suction Drain 1 Left;Superior Back Accordion 15 Fr. 02/25/25  1321  Back  less than 1                    Diet Orders (active) (From admission, onward)       Start     Ordered    02/25/25 1759  Diet: Regular/House; Fluid Consistency: Thin (IDDSI 0)  Diet  Effective Now         02/25/25 8131                      Assessment:  Post-op cervical disc fusion surgery  Chronic back pain, on gabapentin  Intermittent wheezing  Dysphagia  Current marijuana smoker  Ex-cigarette smoker  Mild ANDREINA (AHI 7.5), not on CPAP  Anxiety disorder  Restless leg syndrome  Moderate PLMS    THESE ARE NEW MEDICAL PROBLEMS TO ME.    Plan of Treatment  - Has a hx of RLS with low ferritin. He is currently having a lot of symptoms that are keeping him awake. Check iron profile. If low, will replete with IV iron while he is here and continue the PO iron as outpatient.   - Already on gabapentin 800 mg Q12H.   - It's difficult to tell, but some of his worsening restless leg syndrome may actually be augmentation from the gabapentin (essentially this is where the chronic use of gabapentin has led his body to get used to it and it can be causing worsening RLS symptoms although he was initially started on gabapentin for chronic pain and not primarily for RLS). Will see if he is deficient in iron first. If not and still having symptoms, then will need to discuss with him about either trialing an increase of the gabapentin or adding on a dopaminergic agent, such as pramipexole or ropinirole. Use of dopaminergic agents can lead to some impulse control issues and he already has anxiety, so will need to weight risks vs benefits.   - Continue duonebs post-operatively to help with symptoms  - Incentive spirometer and flutter valve therapy  - Okay to continue ambien PRN at night. Has been on this for a long time.  - Will ask psychiatry to see him in case they would like to make any adjustments to his anxiety medications as this seems to be bothering him a lot right now.         Paolo Powers MD  [facility], [facility]  Pulmonary and Critical Care Medicine

## 2025-02-26 NOTE — PLAN OF CARE
Goal Outcome Evaluation:  Plan of Care Reviewed With: patient, spouse           Outcome Evaluation: Pt is 55 yo male admitted to [facility] for posterior C7-T1 compression, C3-7, T1-3 fusion. PMH significant for ANDREINA, anxiety. Patient lives with spouse, independent with mobility prior to admission. Patient rates pain at 8/10 level, performed bed mobility with SBA, sit to stand with CGA, ambulated in hallway with CGA. Patient demonstrates impairments consisting of generalized post op weakness and pain, decreased activity tolerance and would benefit from skilled PT. d/c plan is to return home with family assist.    Anticipated Discharge Disposition (PT): home with assist

## 2025-02-26 NOTE — CONSULTS
[facility]  [phone]  Dr. Filemon Saldana      Subjective   LOS: 0 days     Thank you for this consultation.  54-year-old male who underwent cervical disc fusion for cervical stenosis.  Saw Dr. Paolo Powers in our office.  He was evaluated for wheezing.  Ex-smoker 3 packs a day for 10 years and quit in 2010.  He continues to smoke 2 joints of marijuana daily.  His PFTs in the office were unremarkable.  His wheezing was felt to be related to reflux.  He was cleared for surgery.  He did report some dysphagia and this was confirmed on video swallow study done subsequently.  He currently denies any breathing problems.  He is on low-flow oxygen.  He reports that he gets anxiety attacks and is currently suffering the same.  He did not get his regular medicines this afternoon.    Rafael Kat  reports no history of alcohol use.,  reports that he quit smoking about 15 years ago. His smoking use included cigarettes. He started smoking about 35 years ago. He has a 40 pack-year smoking history. He has been exposed to tobacco smoke. He has never used smokeless tobacco.     Past Hx:  has a past medical history of Anesthesia complication, Arthritis, COPD (chronic obstructive pulmonary disease), DDD (degenerative disc disease), cervical, DDD (degenerative disc disease), lumbar, Dysphagia, GERD (gastroesophageal reflux disease), History of COVID-19, Neck pain, Psoriasis, Shoulder pain, bilateral, and Skin cancer.  Surg Hx:  has a past surgical history that includes Meniscectomy (Right); Anterior Channel Vertebrectomy/Corpectomy (Bilateral, 03/29/2016); Upper gastrointestinal endoscopy (03/2021); Finger surgery (Left); Elbow surgery (Right); Colonoscopy; Lumbar epidural injection; lumbar laminectomy discectomy decompression (Bilateral, 11/28/2023); Colonoscopy (N/A, 2/14/2025); and Esophagogastroduodenoscopy (N/A, 2/14/2025).  FH: family history is not on file.  SH:  reports that he quit smoking about 15  years ago. His smoking use included cigarettes. He started smoking about 35 years ago. He has a 40 pack-year smoking history. He has been exposed to tobacco smoke. He has never used smokeless tobacco. He reports current drug use. Frequency: 14.00 times per week. Drug: Marijuana. He reports that he does not drink alcohol.    Medications Prior to Admission   Medication Sig Dispense Refill Last Dose/Taking    acetaminophen (TYLENOL) 500 MG tablet Take 1 tablet by mouth Every 6 (Six) Hours As Needed for Mild Pain.   2/24/2025 Evening    baclofen (LIORESAL) 20 MG tablet Take 1 tablet by mouth 2 (Two) Times a Day.   2/24/2025 at  6:00 PM    busPIRone (BUSPAR) 10 MG tablet Take 1 tablet by mouth 2 (Two) Times a Day.   2/25/2025 at  4:30 AM    celecoxib (CeleBREX) 200 MG capsule Take 1 capsule by mouth Daily. INSTRUCTED PT TO FOLLOW DR  INSTRUCTIONS REGARDING HOLDING FOR SURGERY   Past Week    cetirizine (zyrTEC) 10 MG tablet Take 1 tablet by mouth Daily.   2/24/2025 at  7:00 AM    cholecalciferol (VITAMIN D3) 25 MCG (1000 UT) tablet Take 1 tablet by mouth Daily.   2/24/2025 at  7:00 AM    docusate sodium 100 MG capsule Take 1 capsule by mouth Daily. (Patient taking differently: Take 1 capsule by mouth Daily As Needed.)   Past Month    FeroSul 325 (65 Fe) MG tablet Take 1 tablet by mouth Daily With Breakfast. PT HAS NOT HAD FOR 4 DAYS   2/24/2025 at  7:00 AM    gabapentin (NEURONTIN) 800 MG tablet Take 1 tablet by mouth 3 (Three) Times a Day. TYPICALLY TAKES TWICE DAILY   2/25/2025 at  4:30 AM    NON FORMULARY Every Night. STATES DOES USE NIGHT INHALER:  INSTRUCTED TO BRING THIS DAY OF SURGEY   2/25/2025 at  4:30 AM    pantoprazole (PROTONIX) 40 MG EC tablet Take 1 tablet by mouth 2 (Two) Times a Day.  11 2/25/2025 at  4:30 AM    polyethylene glycol (MIRALAX) 17 GM/SCOOP powder Dissolve 17 g (1 capful) in liquid and drink by mouth 2 (Two) Times a Day As Needed for constipation 510 g 0 Past Month    sennosides-docusate  (PERICOLACE) 8.6-50 MG per tablet Take 2 tablets by mouth Every Night. (Patient taking differently: Take 2 tablets by mouth At Night As Needed.) 20 tablet 0 Past Month    Ventolin  (90 Base) MCG/ACT inhaler Inhale 2 puffs Every 4 (Four) Hours As Needed.   2/25/2025 at  7:00 AM    zolpidem (AMBIEN) 10 MG tablet Take 1 tablet by mouth At Night As Needed.  2 Past Week    Diclofenac Sodium (VOLTAREN) 1 % gel gel Apply 4 g topically to the appropriate area as directed As Needed.   More than a month    lidocaine (LIDODERM) 5 % PLACE 1 PATCH ON THE SKIN AS DIRECTED BY PROVIDER DAILY. REMOVE & DISCARD PATCH WITHIN 12 HOURS OR AS DIRECTED BY MD (Patient taking differently: Place 1 patch on the skin as directed by provider Daily As Needed. Remove & Discard patch within 12 hours or as directed by MD) 30 patch 2 More than a month    mometasone (ELOCON) 0.1 % cream 1 Application As Needed.   More than a month    Potassium 75 MG tablet Take  by mouth As Needed. DURING THE SUMMER WHEN SWEATS MORE   More than a month    Unable to find 1 each 1 (One) Time. INHALER NEW INSTRUCTED PT TO BRING WITH HIM        Allergies   Allergen Reactions    Bacitracin-Polymyxin B Other (See Comments) and Rash    Chlorhexidine Rash    Oyhqh-Dckbd-Rilcxqv-Pramoxine Rash       Review of Systems   Constitutional:  Negative for chills and fever.   HENT:  Negative for congestion and sore throat.    Respiratory:  Negative for cough, shortness of breath and wheezing.    Cardiovascular:  Negative for chest pain and leg swelling.   Gastrointestinal:  Negative for abdominal pain, nausea and vomiting.   Genitourinary:  Negative for dysuria and hematuria.   Musculoskeletal:  Negative for arthralgias and back pain.   Skin:  Negative for pallor and rash.   Neurological:  Negative for seizures and headaches.   Psychiatric/Behavioral:  Negative for agitation and confusion.        Vital Signs past 24hrs  BP range: BP: (115-149)/() 115/90  Pulse range:  Heart Rate:  [61-98] 89  Resp rate range: Resp:  [12-20] 16  Temp range: Temp (24hrs), Av.8 °F (36.6 °C), Min:97.3 °F (36.3 °C), Max:98.4 °F (36.9 °C)    Oxygen range: SpO2:  [90 %-100 %] 93 %; Flow (L/min) (Oxygen Therapy):  [3-4] 3;   Device (Oxygen Therapy): nasal cannula  95.5 kg (210 lb 8.6 oz); Body mass index is 31.09 kg/m².  Net IO Since Admission: 360 mL [25]      Mechanical Ventilator:     Adult male who is sitting in bed.  On low-flow supplemental oxygen.  No respiratory distress.  Appears somewhat anxious but not in any kind of significant pain.  Oropharynx moist.  Has a cervical collar in place.  Lungs reveal bilateral air entry and very few rales in the lung bases.  Otherwise clear to auscultation.  Percussion note resonant chest expansion equal no chest wall deformity or tenderness.  Heart examination S1-S2 present rhythm regular no murmurs.  No edema lower extremities.  Abdomen is soft nontender bowel sounds present no liver spleen enlargement.  No peripheral cyanosis clubbing.  Moves all 4 extremities sensorimotor intact.  No cervical, axillary, inguinal adenopathy    Results Review:    I have reviewed the laboratory and imaging data from current admission. My annotations are as noted in assessment and plan.  Result Review:  I have personally reviewed the results from the time of this admission to 2025 22:11 EST and agree with these findings:  [x]  Laboratory list / accordion  [x]  Microbiology  [x]  Radiology  []  EKG/Telemetry   []  Cardiology/Vascular   []  Pathology  []  Old records  []  Other:        Medication Review:  I have reviewed the current MAR. My annotations are as noted in assessment and plan.    HYDROmorphone HCl-NaCl,   lactated ringers, 9 mL/hr, Last Rate: 9 mL/hr (25 0814)  lactated ringers, 100 mL/hr, Last Rate: 100 mL/hr (25 1832)  Pharmacy Consult,   sodium chloride, 30 mL/hr      Lines, Drains & Airways       Active LDAs       Name Placement  date Placement time Site Days    Peripheral IV 02/25/25 0724 Left;Posterior Forearm 02/25/25  0724  Forearm  less than 1    Peripheral IV 02/25/25 0828 Right;Posterior Hand 02/25/25  0828  Hand  less than 1    Closed/Suction Drain 1 Left;Superior Back Accordion 15 Fr. 02/25/25  1321  Back  less than 1                  Isolation status: No active isolations    Dietary Orders (From admission, onward)       Start     Ordered    02/25/25 1759  Diet: Regular/House; Fluid Consistency: Thin (IDDSI 0)  Diet Effective Now        References:    Diet Order Definitions   Question Answer Comment   Diets: Regular/House    Fluid Consistency: Thin (IDDSI 0)        02/25/25 1758                    Isolation:  No active isolations    PCCM Problems  Postop cervical disc fusion surgery  Intermittent wheezing  Dysphagia  Current marijuana smoker  Ex cigarette smoker  ANDREINA not on CPAP  Anxiety disorder    Plan of Treatment  Patient appears to be doing reasonably well postoperatively.  At this point important to encourage him to use incentive spirometer and mobilize secretions.  I will add some nebs to help with clearance but start only tomorrow because of his professed anxiety.    When seen in the office he reported wheezing when he lays down.  His video swallow study showed some dysphagia.  Unclear how much of this is related to his cervical disc disease though Dr. Collado felt that he does not have mechanical obstruction.  May benefit from a double contrast upper GI study to look for acid reflux disease that may also be contributing.    Should completely quit using marijuana.    May need to readdress treatment for sleep apnea.    Electronically signed by Filemon Saldana MD, 02/25/25, 10:11 PM EST.      Part of this note may be an electronic transcription/translation of spoken language to printed text using the Dragon Dictation System.

## 2025-02-26 NOTE — PLAN OF CARE
Goal Outcome Evaluation:  Plan of Care Reviewed With: patient        Progress: improving  Outcome Evaluation: Pt remains stable. PCA pump in use, also given PRN flores and robaxin for pain. Has stood at EOB with 1 assist and is voiding per urinal. IV fluids infusing. Remains on 3L NC. Dressing is cdi. DC plan pending.

## 2025-02-26 NOTE — PROGRESS NOTES
"Anxiety: [facility] NEUROSURGERY CERVICAL POSTOP NOTE      CC:POD #1 s/p posterior C7-T1 decompression with posterior C3-T3 fusion      Subjective     Interval History: No acute issues overnight.  He is experiencing expected postop surgical site pain.  He does not yet notice improvement in upper extremity pain/weakness stating \"I have not removed around enough to notice any improvement\".  He is voiding without difficulty and passing flatus.  He has poor appetite but taking fluids well.  He notes some anxiety overnight-takes Valium at home.  Currently on antianxiety medication.  Vital signs stable and afebrile overnight.  WBC elevated today like reactive.    Objective     Vital signs in last 24 hours:  Temp:  [97.3 °F (36.3 °C)-98.4 °F (36.9 °C)] 98.2 °F (36.8 °C)  Heart Rate:  [75-98] 75  Resp:  [12-20] 16  BP: (110-149)/() 118/83  Arterial Line BP: ()/(80-94) 141/85    Intake/Output this shift:  I/O this shift:  In: 270 [P.O.:270]  Out: 400 [Urine:400]    Hemovac drain: 350 cc since placement yesterday    LABS:  .  Results from last 7 days   Lab Units 02/26/25  0607 02/25/25  1846   WBC 10*3/mm3 21.03* 20.19*   HEMOGLOBIN g/dL 12.5* 13.8   HEMATOCRIT % 37.0* 40.7   PLATELETS 10*3/mm3 236 236     .  Results from last 7 days   Lab Units 02/26/25  0607   SODIUM mmol/L 138   POTASSIUM mmol/L 4.2   CHLORIDE mmol/L 102   CO2 mmol/L 26.8   BUN mg/dL 18   CREATININE mg/dL 1.05   GLUCOSE mg/dL 132*   CALCIUM mg/dL 8.2*         IMAGING STUDIES:  X-ray cervical spine 2/26/2025:  Shows anterior cervical and posterior cervical thoracic spinal fusion hardware intact without any malalignment or fracture.  I personally reviewed and discussed the imaging with Dr. Collado.  I personally viewed and interpreted the patient's labs, imaging study, medications and chart..    Meds reviewed/changed: Yes    Current Facility-Administered Medications:     acetaminophen (TYLENOL) tablet 650 mg, 650 mg, Oral, Q4H PRN **OR** " acetaminophen (TYLENOL) 160 MG/5ML oral solution 650 mg, 650 mg, Oral, Q4H PRN **OR** acetaminophen (TYLENOL) suppository 650 mg, 650 mg, Rectal, Q4H PRN, Bert Collado MD    albuterol sulfate HFA (PROVENTIL HFA;VENTOLIN HFA;PROAIR HFA) inhaler 2 puff, 2 puff, Inhalation, Q4H PRN, Bert Collado MD    sennosides-docusate (PERICOLACE) 8.6-50 MG per tablet 2 tablet, 2 tablet, Oral, BID, 2 tablet at 02/26/25 0826 **AND** polyethylene glycol (MIRALAX) packet 17 g, 17 g, Oral, Daily PRN **AND** bisacodyl (DULCOLAX) EC tablet 5 mg, 5 mg, Oral, Daily PRN **AND** bisacodyl (DULCOLAX) suppository 10 mg, 10 mg, Rectal, Daily PRN, Bert Collado MD    busPIRone (BUSPAR) tablet 10 mg, 10 mg, Oral, BID, Bert Collado MD, 10 mg at 02/26/25 0826    ceFAZolin 2000 mg IVPB in 100 mL NS (MBP), 2,000 mg, Intravenous, Q8H, Bert Collado MD, 2,000 mg at 02/26/25 0445    cetirizine (zyrTEC) tablet 10 mg, 10 mg, Oral, Daily, Bert Collado MD, 10 mg at 02/26/25 0834    clonazePAM (KlonoPIN) tablet 0.5 mg, 0.5 mg, Oral, Q8H PRN, Adonay Greene MD, 0.5 mg at 02/26/25 0921    gabapentin (NEURONTIN) capsule 800 mg, 800 mg, Oral, Q12H, Bert Collado MD, 800 mg at 02/26/25 0826    HYDROmorphone (DILAUDID) PCA 0.2 mg/ml 50 mL syringe, , Intravenous, Continuous, Bert Collado MD, New Bag at 02/25/25 1825    ipratropium-albuterol (DUO-NEB) nebulizer solution 3 mL, 3 mL, Nebulization, 4x Daily - RT, Bert Collado MD, 3 mL at 02/26/25 0927    lactated ringers infusion, 75 mL/hr, Intravenous, Continuous, Joe Lombardo, HERON, Last Rate: 100 mL/hr at 02/26/25 0446, 100 mL/hr at 02/26/25 0446    methocarbamol (ROBAXIN) tablet 750 mg, 750 mg, Oral, 4x Daily PRN, Bert Collado MD, 750 mg at 02/26/25 0619    naloxone (NARCAN) injection 0.1 mg, 0.1 mg, Intravenous, Q5 Min PRN, Bert Collado MD    ondansetron ODT (ZOFRAN-ODT) disintegrating tablet 4 mg, 4 mg, Oral, Q6H PRN **OR** ondansetron (ZOFRAN)  injection 4 mg, 4 mg, Intravenous, Q6H PRN, Bert Collado MD    oxyCODONE (ROXICODONE) immediate release tablet 10 mg, 10 mg, Oral, Q4H PRN, Bert Collado MD, 10 mg at 02/26/25 1028    pantoprazole (PROTONIX) EC tablet 40 mg, 40 mg, Oral, BID, Bert Collado MD, 40 mg at 02/26/25 0826    Pharmacy Consult, , Not Applicable, Continuous PRN, Bert Collado MD    sodium chloride 0.9 % flush 10 mL, 10 mL, Intravenous, Q12H, Bert Collado MD, 10 mL at 02/26/25 0835    sodium chloride 0.9 % flush 10 mL, 10 mL, Intravenous, PRN, Bert Collado MD    sodium chloride 0.9 % infusion 40 mL, 40 mL, Intravenous, PRN, Bert Collado MD    sodium chloride 0.9 % infusion, 30 mL/hr, Intravenous, Continuous PRN, Bert Collado MD    zolpidem (AMBIEN) tablet 10 mg, 10 mg, Oral, Nightly PRN, Bert Collado MD      Physical Exam:    General:   Awake, alert, oriented x3. Speech clear with no aphasia  Neck:   Anterior cervical incision without erythema, swelling or drainage.  Posterior cervical dressing clean dry and intact., swallow/trachea midline; ; ROM deferred;   Motor:    Normal muscle strength, bulk and tone in upper and lower extremities.  No fasciculations, rigidity, spasticity, or abnormal movements.  Reflexes:   2+ in the upper and lower extremities. No Gutiérrez, no clonus  Sensation:   Normal to light touch  Station and Gait:           Awaiting PT evaluation  Extremities:   SCD in place    Assessment & Plan     ASSESSMENT:      History of fusion of cervical spine    Dysphagia    Spinal stenosis of cervicothoracic region    Weakness of both hands    Weakness of both arms    Gastroesophageal reflux disease    Cervical stenosis of spine    54-year-old male day #1 s/p posterior C7-T1 decompression with posterior C3-T3 fusion.  He reports expected surgical site pain today.  Due to the fact he is not done any significant movement he has not noticed whether there is been improvement in upper  "extremity strength/pain.  We will get him working with physical therapy today.  I will request that nursing order him a soft collar for comfort.  He is still in a significant amount of pain as expected and we will continue Dilaudid PCA pump.  Please continue to use other oral pain medication agents/muscle relaxers as well as ice for pain control.  Will keep Hemovac drain today.  Please mobilize.    Anxiety: Takes home Valium/buspirone.  Patient has been restarted on buspirone and clonazepam has been added.  Please monitor for oversedation with concurrent administration of benzodiazepines and p.o./IV opioids.    PLAN:     Keep drain  Soft collar for comfort  Bowel regimen  Mobilize with PT/nursing  Pain medication/muscle relaxers as needed  Utilize ice as needed  SCD/DVT prophylaxis  PT  Keep Dilaudid PCA pump  PM&R consult      I discussed the patient's findings and my recommendations with patient, family, and Dr. Collado.    During patient visit, I utilized appropriate personal protective equipment including mask and gloves.  Mask used was standard procedure mask. Appropriate PPE was worn during the entire visit.  Hand hygiene was completed before and after.      LOS: 1 day       Joe Lombardo, APRN  2/26/2025  12:24 EST    \"Dictated utilizing Dragon dictation\".    "

## 2025-02-26 NOTE — DISCHARGE PLACEMENT REQUEST
"Ricardo Kat (54 y.o. Male)       Date of Birth   1970    Social Security Number       Address   [address] [place] [id number]    Home Phone   [phone]    MRN   [id number]       Congregational   None    Marital Status                               Admission Date   2/25/25    Admission Type   Elective    Admitting Provider   Bert Collado MD    Attending Provider   Bert Collado MD    Department, Room/Bed   [address], P794/1       Discharge Date       Discharge Disposition       Discharge Destination                                 Attending Provider: Bert Collado MD    Allergies: Bacitracin-polymyxin B, Chlorhexidine, Nvdku-kiswh-jsdxhqv-pramoxine    Isolation: None   Infection: None   Code Status: CPR    Ht: 175.3 cm (69\")   Wt: 95.5 kg (210 lb 8.6 oz)    Admission Cmt: None   Principal Problem: None                  Active Insurance as of 2/25/2025       Primary Coverage       Payor Plan Insurance Group Employer/Plan Group    [facility] BY CEZAR [facility] BY CEZAR [id number]       Payor Plan Address Payor Plan Phone Number Payor Plan Fax Number Effective Dates    [address] [address]   1/1/2021 - None Entered    [facility] [phone]         Subscriber Name Subscriber Birth Date Member ID       RICARDO KAT 1970 [id number]                     Emergency Contacts        (Rel.) Home Phone Work Phone Mobile Phone    NorthMonika collazo (Spouse) [phone] -- [phone]                "

## 2025-02-26 NOTE — PROGRESS NOTES
Name: Rafael Kat ADMIT: 2025   : 1970  PCP: Provider, No Known    MRN: [id number] LOS: 1 days   AGE/SEX: 54 y.o. male  ROOM: [facility]     Subjective     He is endorsing anxiety today.  His white blood cell count is also remained elevated since yesterday.  He denies being on any steroids recently.  He states that the last time he had cervical intervention he developed pneumonia.    Objective   Objective   Vital Signs  Temp:  [97.3 °F (36.3 °C)-98.4 °F (36.9 °C)] 98.2 °F (36.8 °C)  Heart Rate:  [77-98] 77  Resp:  [12-20] 16  BP: (110-149)/() 110/84  Arterial Line BP: ()/(80-94) 141/85  SpO2:  [90 %-100 %] 95 %  on  Flow (L/min) (Oxygen Therapy):  [3-4] 3;   Device (Oxygen Therapy): nasal cannula  Body mass index is 31.09 kg/m².  Physical Exam  Constitutional:       General: He is not in acute distress.     Appearance: He is ill-appearing.   HENT:      Head: Normocephalic and atraumatic.   Eyes:      Extraocular Movements: Extraocular movements intact.      Pupils: Pupils are equal, round, and reactive to light.   Neck:      Comments: Soft cervical ice pack in place  Cardiovascular:      Rate and Rhythm: Normal rate and regular rhythm.   Pulmonary:      Effort: Pulmonary effort is normal. No respiratory distress.      Comments: Diminished at the bases  Abdominal:      General: There is no distension.      Palpations: Abdomen is soft.      Tenderness: There is no abdominal tenderness.   Musculoskeletal:      Comments: Midline upper back incision is bandaged   Neurological:      Mental Status: He is alert and oriented to person, place, and time.         Results Review     I reviewed the patient's new clinical results.  Results from last 7 days   Lab Units 25  0607 25  1846   WBC 10*3/mm3 21.03* 20.19*   HEMOGLOBIN g/dL 12.5* 13.8   PLATELETS 10*3/mm3 236 236     Results from last 7 days   Lab Units 25  0607   SODIUM mmol/L 138   POTASSIUM mmol/L 4.2   CHLORIDE mmol/L  "102   CO2 mmol/L 26.8   BUN mg/dL 18   CREATININE mg/dL 1.05   GLUCOSE mg/dL 132*   Estimated Creatinine Clearance: 89.6 mL/min (by C-G formula based on SCr of 1.05 mg/dL).    Results from last 7 days   Lab Units 02/26/25  0607   CALCIUM mg/dL 8.2*     No results found for: \"COVID19\"  Glucose   Date/Time Value Ref Range Status   02/25/2025 1551 179 (H) 70 - 130 mg/dL Final     No results found for this or any previous visit.      FL C Arm During Surgery  This procedure was auto-finalized with no dictation required.  Arterial Line  Donita Burns MD     2/25/2025  8:15 AM  Arterial Line    Patient location during procedure: holding area  Start time: 2/25/2025 7:55 AM  Stop Time:2/25/2025 8:00 AM       Line placed for hemodynamic monitoring.  Performed By   Anesthesiologist: Donita Burns MD   Preanesthetic Checklist  Completed: patient identified, IV checked, site marked, risks and benefits   discussed, monitors and equipment checked and pre-op evaluation  Arterial Line Prep    Sterile Tech: gloves  Prep: alcohol swabs  Patient monitoring: blood pressure monitoring, continuous pulse oximetry   and EKG  Arterial Line Procedure   Laterality:left  Location:  radial artery  Catheter size: 20 G   Guidance: ultrasound guided  PROCEDURE NOTE/ULTRASOUND INTERPRETATION.  Using ultrasound guidance the   potential vascular sites for insertion of the catheter were visualized to   determine the patency of the vessel to be used for vascular access.  After   selecting the appropriate site for insertion, the needle was visualized   under ultrasound being inserted into the radial artery, followed by   ultrasound confirmation of wire and catheter placement. There were no   abnormalities seen on ultrasound; an image was taken; and the patient   tolerated the procedure with no complications.   Number of attempts: 1  Successful placement: yes   Post Assessment   Dressing Type: occlusive dressing applied.   Complications " no  Circ/Move/Sens Assessment: normal.   Patient Tolerance: patient tolerated the procedure well with no apparent   complications    Scheduled Medications  busPIRone, 10 mg, Oral, BID  ceFAZolin, 2,000 mg, Intravenous, Q8H  cetirizine, 10 mg, Oral, Daily  gabapentin, 800 mg, Oral, Q12H  ipratropium-albuterol, 3 mL, Nebulization, 4x Daily - RT  pantoprazole, 40 mg, Oral, BID  senna-docusate sodium, 2 tablet, Oral, BID  sodium chloride, 10 mL, Intravenous, Q12H    Infusions  HYDROmorphone HCl-NaCl,   lactated ringers, 100 mL/hr, Last Rate: 100 mL/hr (02/26/25 0446)  Pharmacy Consult,   sodium chloride, 30 mL/hr    Diet  Diet: Regular/House; Fluid Consistency: Thin (IDDSI 0)       Assessment/Plan     Active Hospital Problems    Diagnosis  POA    Cervical stenosis of spine [M48.02]  Yes    Gastroesophageal reflux disease [K21.9]  Yes    Weakness of both hands [R29.898]  Unknown    Weakness of both arms [R29.898]  Unknown    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]  Unknown    Dysphagia [R13.10]  Yes    History of fusion of cervical spine [Z98.1]  Not Applicable      Resolved Hospital Problems   No resolved problems to display.       54 y.o. male admitted with <principal problem not specified>.    Leukocytosis  This has persisted postop day 1 of surgery.  He denies being on any steroid medications he denies being on any glucocorticoids recently.  Will obtain a chest x-ray especially since he reports having had a previous episode of pneumonia when he had spinal surgery and required admission to the ICU.  He has been on perioperative cefazolin.    Anxiety   PRN clonazepam    Spinal stenosis  Status post surgical intervention.  Management per primary  Check morning BMP and CBC  Dilaudid PCA for pain control currently     Asthma  Currently on DuoNebs. Pulm following     GERD  PPI     Hypertension  Not currently on any antihypertensives. Monitor for now, likely related to pain     Discussed with patient and  family.        Adonay Greene MD  [facility]  02/26/25  08:57 EST

## 2025-02-26 NOTE — CASE MANAGEMENT/SOCIAL WORK
Continued Stay Note  Psychiatric     Patient Name: Rafael Kat  MRN: [id number]  Today's Date: 2/26/2025    Admit Date: 2/25/2025    Plan: Home with spouse and home health   Discharge Plan       Row Name 02/26/25 1018       Plan    Plan Home with spouse and home health    Plan Comments [facility] unable to accept. CCP met with patient's spouse at bedside. She is agreeable to other home health referrals to see who can accept. Alessandra CAMILO      Row Name 02/26/25 0929       Plan    Plan Home with spouse and [facility] pending PT progress    Plan Comments CCP met with patient and patient's spouse at bedside. CCP role explained and discharge planning discussed. Face sheet verified and patient's PCP is Dr. Olivo in [facility] in [place]. Patient states Dr. Olivo has left so they are in the process of assigning another MD to him in the same office. Patient lives with his wife and 16 year old granddaughter. Patient has three steps to the entrance of the home. Patient's bedroom and bathroom are on the main level. Patient has a walker at home. Patient has used [facility] in the past and denies any SNF. Patient and spouse are hopeful for patient to return home at d/c with in home PT. They would like to use [facility] again. CCP will follow for PT eval and assist as needed. Alessandra LOPEZ                   Discharge Codes    No documentation.                       LESLEE Browning

## 2025-02-27 LAB
ANION GAP SERPL CALCULATED.3IONS-SCNC: 7.3 MMOL/L (ref 5–15)
B PARAPERT DNA SPEC QL NAA+PROBE: NOT DETECTED
B PERT DNA SPEC QL NAA+PROBE: NOT DETECTED
BACTERIA UR QL AUTO: NORMAL /HPF
BASOPHILS # BLD AUTO: 0.03 10*3/MM3 (ref 0–0.2)
BASOPHILS NFR BLD AUTO: 0.2 % (ref 0–1.5)
BILIRUB UR QL STRIP: NEGATIVE
BUN SERPL-MCNC: 13 MG/DL (ref 6–20)
BUN/CREAT SERPL: 13.1 (ref 7–25)
C PNEUM DNA NPH QL NAA+NON-PROBE: NOT DETECTED
CALCIUM SPEC-SCNC: 7.9 MG/DL (ref 8.6–10.5)
CHLORIDE SERPL-SCNC: 100 MMOL/L (ref 98–107)
CLARITY UR: CLEAR
CO2 SERPL-SCNC: 28.7 MMOL/L (ref 22–29)
COLOR UR: YELLOW
CREAT SERPL-MCNC: 0.99 MG/DL (ref 0.76–1.27)
D-LACTATE SERPL-SCNC: 1.7 MMOL/L (ref 0.5–2)
DEPRECATED RDW RBC AUTO: 42.3 FL (ref 37–54)
EGFRCR SERPLBLD CKD-EPI 2021: 89.4 ML/MIN/1.73
EOSINOPHIL # BLD AUTO: 0.06 10*3/MM3 (ref 0–0.4)
EOSINOPHIL NFR BLD AUTO: 0.4 % (ref 0.3–6.2)
ERYTHROCYTE [DISTWIDTH] IN BLOOD BY AUTOMATED COUNT: 13.1 % (ref 12.3–15.4)
FLUAV SUBTYP SPEC NAA+PROBE: NOT DETECTED
FLUBV RNA ISLT QL NAA+PROBE: NOT DETECTED
FOLATE SERPL-MCNC: 5.51 NG/ML (ref 4.78–24.2)
GLUCOSE SERPL-MCNC: 114 MG/DL (ref 65–99)
GLUCOSE UR STRIP-MCNC: NEGATIVE MG/DL
HADV DNA SPEC NAA+PROBE: NOT DETECTED
HCOV 229E RNA SPEC QL NAA+PROBE: NOT DETECTED
HCOV HKU1 RNA SPEC QL NAA+PROBE: NOT DETECTED
HCOV NL63 RNA SPEC QL NAA+PROBE: NOT DETECTED
HCOV OC43 RNA SPEC QL NAA+PROBE: NOT DETECTED
HCT VFR BLD AUTO: 31.5 % (ref 37.5–51)
HGB BLD-MCNC: 10.5 G/DL (ref 13–17.7)
HGB UR QL STRIP.AUTO: ABNORMAL
HMPV RNA NPH QL NAA+NON-PROBE: NOT DETECTED
HPIV1 RNA ISLT QL NAA+PROBE: NOT DETECTED
HPIV2 RNA SPEC QL NAA+PROBE: NOT DETECTED
HPIV3 RNA NPH QL NAA+PROBE: NOT DETECTED
HPIV4 P GENE NPH QL NAA+PROBE: NOT DETECTED
HYALINE CASTS UR QL AUTO: NORMAL /LPF
IMM GRANULOCYTES # BLD AUTO: 0.07 10*3/MM3 (ref 0–0.05)
IMM GRANULOCYTES NFR BLD AUTO: 0.4 % (ref 0–0.5)
KETONES UR QL STRIP: NEGATIVE
LEUKOCYTE ESTERASE UR QL STRIP.AUTO: NEGATIVE
LYMPHOCYTES # BLD AUTO: 2.78 10*3/MM3 (ref 0.7–3.1)
LYMPHOCYTES NFR BLD AUTO: 17.9 % (ref 19.6–45.3)
M PNEUMO IGG SER IA-ACNC: NOT DETECTED
MCH RBC QN AUTO: 29.7 PG (ref 26.6–33)
MCHC RBC AUTO-ENTMCNC: 33.3 G/DL (ref 31.5–35.7)
MCV RBC AUTO: 89 FL (ref 79–97)
MONOCYTES # BLD AUTO: 1.7 10*3/MM3 (ref 0.1–0.9)
MONOCYTES NFR BLD AUTO: 10.9 % (ref 5–12)
NEUTROPHILS NFR BLD AUTO: 10.93 10*3/MM3 (ref 1.7–7)
NEUTROPHILS NFR BLD AUTO: 70.2 % (ref 42.7–76)
NITRITE UR QL STRIP: NEGATIVE
NRBC BLD AUTO-RTO: 0 /100 WBC (ref 0–0.2)
PH UR STRIP.AUTO: 7 [PH] (ref 5–8)
PLATELET # BLD AUTO: 179 10*3/MM3 (ref 140–450)
PMV BLD AUTO: 11.1 FL (ref 6–12)
POTASSIUM SERPL-SCNC: 3.7 MMOL/L (ref 3.5–5.2)
PROCALCITONIN SERPL-MCNC: 0.21 NG/ML (ref 0–0.25)
PROT UR QL STRIP: NEGATIVE
RBC # BLD AUTO: 3.54 10*6/MM3 (ref 4.14–5.8)
RBC # UR STRIP: NORMAL /HPF
REF LAB TEST METHOD: NORMAL
RHINOVIRUS RNA SPEC NAA+PROBE: NOT DETECTED
RSV RNA NPH QL NAA+NON-PROBE: NOT DETECTED
SARS-COV-2 RNA NPH QL NAA+NON-PROBE: NOT DETECTED
SODIUM SERPL-SCNC: 136 MMOL/L (ref 136–145)
SP GR UR STRIP: 1.01 (ref 1–1.03)
SQUAMOUS #/AREA URNS HPF: NORMAL /HPF
UROBILINOGEN UR QL STRIP: ABNORMAL
VIT B12 BLD-MCNC: 263 PG/ML (ref 211–946)
WBC # UR STRIP: NORMAL /HPF
WBC NRBC COR # BLD AUTO: 15.57 10*3/MM3 (ref 3.4–10.8)

## 2025-02-27 PROCEDURE — 0202U NFCT DS 22 TRGT SARS-COV-2: CPT | Performed by: STUDENT IN AN ORGANIZED HEALTH CARE EDUCATION/TRAINING PROGRAM

## 2025-02-27 PROCEDURE — 82746 ASSAY OF FOLIC ACID SERUM: CPT | Performed by: STUDENT IN AN ORGANIZED HEALTH CARE EDUCATION/TRAINING PROGRAM

## 2025-02-27 PROCEDURE — 94799 UNLISTED PULMONARY SVC/PX: CPT

## 2025-02-27 PROCEDURE — 25010000002 NA FERRIC GLUC CPLX PER 12.5 MG: Performed by: STUDENT IN AN ORGANIZED HEALTH CARE EDUCATION/TRAINING PROGRAM

## 2025-02-27 PROCEDURE — 94664 DEMO&/EVAL PT USE INHALER: CPT

## 2025-02-27 PROCEDURE — 25810000003 SODIUM CHLORIDE 0.9 % SOLUTION: Performed by: STUDENT IN AN ORGANIZED HEALTH CARE EDUCATION/TRAINING PROGRAM

## 2025-02-27 PROCEDURE — 25810000003 SODIUM CHLORIDE 0.9 % SOLUTION: Performed by: NEUROLOGICAL SURGERY

## 2025-02-27 PROCEDURE — 82607 VITAMIN B-12: CPT | Performed by: STUDENT IN AN ORGANIZED HEALTH CARE EDUCATION/TRAINING PROGRAM

## 2025-02-27 PROCEDURE — 83605 ASSAY OF LACTIC ACID: CPT | Performed by: STUDENT IN AN ORGANIZED HEALTH CARE EDUCATION/TRAINING PROGRAM

## 2025-02-27 PROCEDURE — 80048 BASIC METABOLIC PNL TOTAL CA: CPT | Performed by: STUDENT IN AN ORGANIZED HEALTH CARE EDUCATION/TRAINING PROGRAM

## 2025-02-27 PROCEDURE — 84145 PROCALCITONIN (PCT): CPT | Performed by: STUDENT IN AN ORGANIZED HEALTH CARE EDUCATION/TRAINING PROGRAM

## 2025-02-27 PROCEDURE — 97530 THERAPEUTIC ACTIVITIES: CPT

## 2025-02-27 PROCEDURE — 81001 URINALYSIS AUTO W/SCOPE: CPT | Performed by: STUDENT IN AN ORGANIZED HEALTH CARE EDUCATION/TRAINING PROGRAM

## 2025-02-27 PROCEDURE — 99024 POSTOP FOLLOW-UP VISIT: CPT

## 2025-02-27 PROCEDURE — 25010000002 ENOXAPARIN PER 10 MG

## 2025-02-27 PROCEDURE — 87040 BLOOD CULTURE FOR BACTERIA: CPT | Performed by: STUDENT IN AN ORGANIZED HEALTH CARE EDUCATION/TRAINING PROGRAM

## 2025-02-27 PROCEDURE — 94761 N-INVAS EAR/PLS OXIMETRY MLT: CPT

## 2025-02-27 PROCEDURE — 25010000002 HYDROMORPHONE HCL PF 50 MG/5ML SOLUTION: Performed by: NEUROLOGICAL SURGERY

## 2025-02-27 PROCEDURE — 85025 COMPLETE CBC W/AUTO DIFF WBC: CPT | Performed by: NEUROLOGICAL SURGERY

## 2025-02-27 RX ORDER — BUSPIRONE HYDROCHLORIDE 15 MG/1
15 TABLET ORAL 2 TIMES DAILY
Status: DISCONTINUED | OUTPATIENT
Start: 2025-02-27 | End: 2025-03-04 | Stop reason: HOSPADM

## 2025-02-27 RX ORDER — METHOCARBAMOL 500 MG/1
1000 TABLET, FILM COATED ORAL EVERY 6 HOURS SCHEDULED
Status: DISCONTINUED | OUTPATIENT
Start: 2025-02-27 | End: 2025-03-04 | Stop reason: HOSPADM

## 2025-02-27 RX ORDER — ENOXAPARIN SODIUM 100 MG/ML
40 INJECTION SUBCUTANEOUS EVERY 24 HOURS
Status: DISCONTINUED | OUTPATIENT
Start: 2025-02-27 | End: 2025-03-04 | Stop reason: HOSPADM

## 2025-02-27 RX ORDER — ESCITALOPRAM OXALATE 20 MG/1
10 TABLET ORAL NIGHTLY
Status: DISCONTINUED | OUTPATIENT
Start: 2025-02-27 | End: 2025-03-04 | Stop reason: HOSPADM

## 2025-02-27 RX ORDER — IPRATROPIUM BROMIDE AND ALBUTEROL SULFATE 2.5; .5 MG/3ML; MG/3ML
3 SOLUTION RESPIRATORY (INHALATION) EVERY 4 HOURS PRN
Status: DISCONTINUED | OUTPATIENT
Start: 2025-02-27 | End: 2025-03-04 | Stop reason: HOSPADM

## 2025-02-27 RX ORDER — CLONAZEPAM 0.5 MG/1
0.5 TABLET ORAL 3 TIMES DAILY
Status: DISCONTINUED | OUTPATIENT
Start: 2025-02-27 | End: 2025-03-04 | Stop reason: HOSPADM

## 2025-02-27 RX ORDER — PRAMIPEXOLE DIHYDROCHLORIDE 0.25 MG/1
0.12 TABLET ORAL NIGHTLY
Status: DISCONTINUED | OUTPATIENT
Start: 2025-02-27 | End: 2025-03-04

## 2025-02-27 RX ADMIN — GABAPENTIN 800 MG: 400 CAPSULE ORAL at 08:26

## 2025-02-27 RX ADMIN — PRAMIPEXOLE DIHYDROCHLORIDE 0.12 MG: 0.25 TABLET ORAL at 18:20

## 2025-02-27 RX ADMIN — ENOXAPARIN SODIUM 40 MG: 100 INJECTION SUBCUTANEOUS at 14:56

## 2025-02-27 RX ADMIN — BUSPIRONE HYDROCHLORIDE 15 MG: 15 TABLET ORAL at 20:37

## 2025-02-27 RX ADMIN — SENNOSIDES AND DOCUSATE SODIUM 2 TABLET: 50; 8.6 TABLET ORAL at 08:27

## 2025-02-27 RX ADMIN — IPRATROPIUM BROMIDE AND ALBUTEROL SULFATE 3 ML: .5; 3 SOLUTION RESPIRATORY (INHALATION) at 09:27

## 2025-02-27 RX ADMIN — CLONAZEPAM 0.5 MG: 0.5 TABLET ORAL at 11:19

## 2025-02-27 RX ADMIN — PANTOPRAZOLE SODIUM 40 MG: 40 TABLET, DELAYED RELEASE ORAL at 20:38

## 2025-02-27 RX ADMIN — CLONAZEPAM 0.5 MG: 0.5 TABLET ORAL at 14:56

## 2025-02-27 RX ADMIN — SODIUM CHLORIDE 250 MG: 9 INJECTION, SOLUTION INTRAVENOUS at 10:26

## 2025-02-27 RX ADMIN — METHOCARBAMOL TABLETS 1000 MG: 500 TABLET, COATED ORAL at 17:04

## 2025-02-27 RX ADMIN — CETIRIZINE HYDROCHLORIDE 10 MG: 10 TABLET, FILM COATED ORAL at 08:26

## 2025-02-27 RX ADMIN — ZOLPIDEM TARTRATE 10 MG: 5 TABLET, FILM COATED ORAL at 00:11

## 2025-02-27 RX ADMIN — OXYCODONE HYDROCHLORIDE 10 MG: 5 TABLET ORAL at 06:28

## 2025-02-27 RX ADMIN — METHOCARBAMOL TABLETS 1000 MG: 500 TABLET, COATED ORAL at 23:17

## 2025-02-27 RX ADMIN — METHOCARBAMOL TABLETS 1000 MG: 500 TABLET, COATED ORAL at 11:19

## 2025-02-27 RX ADMIN — Medication 10 ML: at 08:01

## 2025-02-27 RX ADMIN — METHOCARBAMOL TABLETS 750 MG: 750 TABLET, COATED ORAL at 06:28

## 2025-02-27 RX ADMIN — SENNOSIDES AND DOCUSATE SODIUM 2 TABLET: 50; 8.6 TABLET ORAL at 20:38

## 2025-02-27 RX ADMIN — HYDROMORPHONE HYDROCHLORIDE: 10 INJECTION, SOLUTION INTRAMUSCULAR; INTRAVENOUS; SUBCUTANEOUS at 23:17

## 2025-02-27 RX ADMIN — CLONAZEPAM 0.5 MG: 0.5 TABLET ORAL at 20:38

## 2025-02-27 RX ADMIN — Medication 10 ML: at 20:44

## 2025-02-27 RX ADMIN — ESCITALOPRAM 10 MG: 20 TABLET, FILM COATED ORAL at 20:37

## 2025-02-27 RX ADMIN — OXYCODONE HYDROCHLORIDE 10 MG: 5 TABLET ORAL at 00:11

## 2025-02-27 RX ADMIN — PANTOPRAZOLE SODIUM 40 MG: 40 TABLET, DELAYED RELEASE ORAL at 08:26

## 2025-02-27 RX ADMIN — GABAPENTIN 800 MG: 400 CAPSULE ORAL at 20:37

## 2025-02-27 RX ADMIN — BUSPIRONE HYDROCHLORIDE 10 MG: 10 TABLET ORAL at 08:27

## 2025-02-27 NOTE — CONSULTS
"IDENTIFYING INFORMATION: The patient is a 54-year-old white male admitted after C7-T1 decompression.  He is seen by psychiatry related to history of anxiety and \"panic attacks\".      CHIEF COMPLAINT: Panic attacks    INFORMANT: Patient and wife    RELIABILITY: Good    HISTORY OF PRESENT ILLNESS: The patient is a 56-year-old white male admitted after C7-T1 decompression.  The patient reports a history of anxiety and panic attacks for which she has been prescribed BuSpar and as needed clonazepam.  He reports that since hospitalization his anxiety has worsened considerably.  He describes shortness of air and fear of suffocating as a primary symptom.  He he reportedly had several such episodes on the first night of hospitalization.  When seen today the patient is calm and cooperative.  He admits to occasional cannabis use but denies abuse of other psychoactive substances.  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  He denies any prior history of psychiatric contact, his psychiatric meds having been prescribed by his primary care physician.    PAST PSYCHIATRIC HISTORY: As above    PAST MEDICAL HISTORY: Significant for degenerative disk disease, COPD, GERD, psoriasis, shoulder pain, history of basal skin cancer    MEDICATIONS:   Current Facility-Administered Medications   Medication Dose Route Frequency Provider Last Rate Last Admin    acetaminophen (TYLENOL) tablet 650 mg  650 mg Oral Q4H PRN Bert Collado MD   650 mg at 02/26/25 2247    Or    acetaminophen (TYLENOL) 160 MG/5ML oral solution 650 mg  650 mg Oral Q4H PRN Bert Collado MD        Or    acetaminophen (TYLENOL) suppository 650 mg  650 mg Rectal Q4H PRN Bert Collado MD        albuterol sulfate HFA (PROVENTIL HFA;VENTOLIN HFA;PROAIR HFA) inhaler 2 puff  2 puff Inhalation Q4H PRN Bert Collado MD        sennosides-docusate (PERICOLACE) 8.6-50 MG per tablet 2 tablet  2 tablet Oral BID Bert Collado MD   2 tablet at 02/27/25 0827    And    " polyethylene glycol (MIRALAX) packet 17 g  17 g Oral Daily PRN Bert Collado MD        And    bisacodyl (DULCOLAX) EC tablet 5 mg  5 mg Oral Daily PRN Bert Collado MD        And    bisacodyl (DULCOLAX) suppository 10 mg  10 mg Rectal Daily PRN Bert Collado MD        busPIRone (BUSPAR) tablet 15 mg  15 mg Oral BID Jordan Mcneal III, MD        cetirizine (zyrTEC) tablet 10 mg  10 mg Oral Daily Bert Collado MD   10 mg at 02/27/25 0826    clonazePAM (KlonoPIN) tablet 0.5 mg  0.5 mg Oral TID Jordan Mcneal III, MD        escitalopram (LEXAPRO) tablet 10 mg  10 mg Oral Nightly Jordan Mcneal III, MD        ferric gluconate (FERRLECIT) 250 MG in sodium chloride 0.9% 250 mL IVPB  250 mg Intravenous Daily Paolo Powers  mL/hr at 02/27/25 1026 250 mg at 02/27/25 1026    gabapentin (NEURONTIN) capsule 800 mg  800 mg Oral Q12H Bert Collado MD   800 mg at 02/27/25 0826    HYDROmorphone (DILAUDID) PCA 0.2 mg/ml 50 mL syringe   Intravenous Continuous Bert Collado MD   New Bag at 02/26/25 1542    ipratropium-albuterol (DUO-NEB) nebulizer solution 3 mL  3 mL Nebulization Q4H PRN Paolo Powers MD        methocarbamol (ROBAXIN) tablet 1,000 mg  1,000 mg Oral Q6H Joe Lombardo, APRN   1,000 mg at 02/27/25 1119    naloxone (NARCAN) injection 0.1 mg  0.1 mg Intravenous Q5 Min PRN Bert Collado MD        ondansetron ODT (ZOFRAN-ODT) disintegrating tablet 4 mg  4 mg Oral Q6H PRN Bert Collado MD        Or    ondansetron (ZOFRAN) injection 4 mg  4 mg Intravenous Q6H PRN Bert Collado MD        oxyCODONE (ROXICODONE) immediate release tablet 10 mg  10 mg Oral Q4H PRN Bert Collado MD   10 mg at 02/27/25 0628    pantoprazole (PROTONIX) EC tablet 40 mg  40 mg Oral BID Bert Collado MD   40 mg at 02/27/25 0826    Pharmacy Consult   Not Applicable Continuous PRN Bert Collado MD        pramipexole (MIRAPEX) tablet 0.125 mg   0.125 mg Oral Nightly Paolo Powers MD        sodium chloride 0.9 % flush 10 mL  10 mL Intravenous Q12H Bert Collado MD   10 mL at 02/27/25 0801    sodium chloride 0.9 % flush 10 mL  10 mL Intravenous PRBert Reis MD        sodium chloride 0.9 % infusion 40 mL  40 mL Intravenous PRBert Reis MD        sodium chloride 0.9 % infusion  30 mL/hr Intravenous Continuous PRN Bert Collado MD        zolpidem (AMBIEN) tablet 10 mg  10 mg Oral Nightly PRN Bert Collado MD   10 mg at 02/27/25 0011         ALLERGIES: Bacitracin polymyxin, chlorhexidine, Neosporin    FAMILY HISTORY: Noncontributory    SOCIAL HISTORY: The patient lives with his wife.  He is a danielson.  He admits to occasional use of cannabis.    MENTAL STATUS EXAM: The patient is a well-developed well-nourished white male with a neck brace in place.  He is awake alert and oriented all spheres.  His mood is mildly dysphoric his affect congruent.  Speech is relevant and coherent.  There are no deficits memory or cognition noted.  Intelligence is judged to be in the average range based on fund of knowledge, the patient is cooperative with interview.  He is currently reporting no suicidal or homicidal ideation or psychotic features.  Judgement and insight are intact.    ASSETS/LIABILITIES: Motivation for change/none    DIAGNOSTIC IMPRESSION: Adjustment disorder with anxious features, depressive disorder unspecified, possible panic disorder with agoraphobia, medical problems as previously noted    PLAN: I have started the patient on Lexapro, a more definitive treatment for anxiety and panic and have changed his clonazepam to scheduled dosing in hopes of preventing emergence of anxiety.  I have also increased his BuSpar to a more definitive dose of 15 mg twice daily.  I will continue to follow with you.

## 2025-02-27 NOTE — PROGRESS NOTES
Name: Rafael Kat ADMIT: 2025   : 1970  PCP: Provider, No Known    MRN: [id number] LOS: 2 days   AGE/SEX: 54 y.o. male  ROOM: [facility]/     Subjective     WBC improving. Tm 100.8.     Objective   Objective   Vital Signs  Temp:  [98 °F (36.7 °C)-100.8 °F (38.2 °C)] 100.5 °F (38.1 °C)  Heart Rate:  [] 96  Resp:  [14-16] 16  BP: (121-144)/(81-88) 135/81  SpO2:  [93 %-98 %] 94 %  on  Flow (L/min) (Oxygen Therapy):  [2] 2;   Device (Oxygen Therapy): nasal cannula  Body mass index is 31.09 kg/m².  Physical Exam  Constitutional:       General: He is not in acute distress.     Appearance: He is ill-appearing.   HENT:      Head: Normocephalic and atraumatic.   Eyes:      Extraocular Movements: Extraocular movements intact.      Pupils: Pupils are equal, round, and reactive to light.   Neck:      Comments: Soft cervical ice pack in place  Cardiovascular:      Rate and Rhythm: Normal rate and regular rhythm.   Pulmonary:      Effort: Pulmonary effort is normal. No respiratory distress.      Comments: Diminished at the bases  Abdominal:      General: There is no distension.      Palpations: Abdomen is soft.      Tenderness: There is no abdominal tenderness.   Musculoskeletal:      Comments: Midline upper back incision is bandaged   Neurological:      Mental Status: He is alert and oriented to person, place, and time.         Results Review     I reviewed the patient's new clinical results.  Results from last 7 days   Lab Units 25  0537 25  0607 25  1846   WBC 10*3/mm3 15.57* 21.03* 20.19*   HEMOGLOBIN g/dL 10.5* 12.5* 13.8   PLATELETS 10*3/mm3 179 236 236     Results from last 7 days   Lab Units 25  0537 25  0607   SODIUM mmol/L 136 138   POTASSIUM mmol/L 3.7 4.2   CHLORIDE mmol/L 100 102   CO2 mmol/L 28.7 26.8   BUN mg/dL 13 18   CREATININE mg/dL 0.99 1.05   GLUCOSE mg/dL 114* 132*   Estimated Creatinine Clearance: 95 mL/min (by C-G formula based on SCr of 0.99 mg/dL).   "  Results from last 7 days   Lab Units 02/27/25  0537 02/26/25  0607   CALCIUM mg/dL 7.9* 8.2*     No results found for: \"COVID19\"  Glucose   Date/Time Value Ref Range Status   02/25/2025 1551 179 (H) 70 - 130 mg/dL Final     No results found for this or any previous visit.      XR Spine Cervical 2 or 3 View, XR Spine Thoracic 2 View  Narrative: XR SPINE CERVICAL 2 OR 3 VW-, XR SPINE THORACIC 2 VW-     INDICATIONS: Postoperative evaluation     TECHNIQUE: 3 views of the cervical spine, 3 views of the thoracic spine     COMPARISON: 1/15/2025     FINDINGS:     Anterior cervical and posterior cervicothoracic spinal fusion hardware  appears intact. No acute fracture or malalignment is noted. In the  thoracic spine, multilevel endplate spurring and mid to lower thoracic  disc space narrowing are noted.     Impression:    Postsurgical and degenerative changes.           This report was finalized on 2/26/2025 12:07 PM by Dr. Dejon Adair M.D on Workstation: [id number]     XR Chest 1 View  Narrative: XR CHEST 1 VW-        INDICATION: Leukocytosis     COMPARISON: Chest radiograph April 4, 2016     TECHNIQUE: 1 view chest     FINDINGS:      No focal opacity. No effusions. Normal mediastinal contour. Heart is  normal in size. Anterior cervical discectomy and fusion. Interval  posterior cervical thoracic fusion with keya and pedicle screws and  suspected laminectomies.     Impression: No acute cardiopulmonary process     This report was finalized on 2/26/2025 10:54 AM by Dr. Gonzalo Brennan M.D on Workstation: [id number]       Scheduled Medications  busPIRone, 15 mg, Oral, BID  cetirizine, 10 mg, Oral, Daily  clonazePAM, 0.5 mg, Oral, TID  enoxaparin, 40 mg, Subcutaneous, Q24H  escitalopram, 10 mg, Oral, Nightly  ferric gluconate, 250 mg, Intravenous, Daily  gabapentin, 800 mg, Oral, Q12H  methocarbamol, 1,000 mg, Oral, Q6H  pantoprazole, 40 mg, Oral, BID  pramipexole, 0.125 mg, Oral, Nightly  senna-docusate sodium, 2 " tablet, Oral, BID  sodium chloride, 10 mL, Intravenous, Q12H    Infusions  HYDROmorphone HCl-NaCl,   Pharmacy Consult,   sodium chloride, 30 mL/hr    Diet  Diet: Regular/House; Fluid Consistency: Thin (IDDSI 0)       Assessment/Plan     Active Hospital Problems    Diagnosis  POA    Cervical stenosis of spine [M48.02]  Yes    Gastroesophageal reflux disease [K21.9]  Yes    Weakness of both hands [R29.898]  Unknown    Weakness of both arms [R29.898]  Unknown    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]  Unknown    Dysphagia [R13.10]  Yes    History of fusion of cervical spine [Z98.1]  Not Applicable      Resolved Hospital Problems   No resolved problems to display.       54 y.o. male admitted with <principal problem not specified>.    Fever  Tm 100.8. Suspect post-op fever. CXR yesterday was negative. Obtain blood cultures, lactic acid, procal, RVP, UA     Leukocytosis  This has persisted postop day 1 of surgery.  He denies being on any steroid medications he denies being on any glucocorticoids recently.  Will obtain a chest x-ray especially since he reports having had a previous episode of pneumonia when he had spinal surgery and required admission to the ICU.  He has been on perioperative cefazolin.    Anxiety   PRN clonazepam    Spinal stenosis  Status post surgical intervention.  Management per primary  Check morning BMP and CBC  Dilaudid PCA for pain control currently     Asthma  Currently on DuoNebs. Pulm following     GERD  PPI     Hypertension  Not currently on any antihypertensives. Monitor for now, likely related to pain     Discussed with patient and family.        Adonay Greene MD  [facility]  02/27/25  15:19 EST

## 2025-02-27 NOTE — PROGRESS NOTES
"[facility] Clinical Pharmacy Services: Enoxaparin Consult    Rafael Kat has a pharmacy consult to dose prophylactic enoxaparin per APRN Shopta[employer]'s request.     Indication: VTE Prophylaxis       Relevant clinical data and objective history reviewed:  54 y.o. male 175.3 cm (69\") 95.5 kg (210 lb 8.6 oz)   Body mass index is 31.09 kg/m².   Results from last 7 days   Lab Units 02/27/25  0537   PLATELETS 10*3/mm3 179     Estimated Creatinine Clearance: 95 mL/min (by C-G formula based on SCr of 0.99 mg/dL).    Assessment/Plan    Will start patient on 40mg subcutaneous every 24 hours, adjusted for renal function. Consult order will be discontinued but pharmacy will continue to follow.     Deena Dias, [facility]  Clinical Pharmacist    "

## 2025-02-27 NOTE — PROGRESS NOTES
Anxiety: [facility] NEUROS[facility] CERVICAL POSTOP NOTE      CC:POD #2 s/p posterior C7-T1 decompression with posterior C3-T3 fusion      Subjective     Interval History: No acute issues overnight.  Please continue to experience significant posterior surgical site pain.  Also has a lot of spasm pain/muscle tightness in the neck-still requiring Dilaudid PCA pump.  Some mild tachycardia overnight but appears more normal this morning.  WBC count improving however he has experienced elevated temperatures ever since 8:00pm  last night (highest at 100.8).  Denies any calf pain or tenderness.  No shortness of air.          Objective     Vital signs in last 24 hours:  Temp:  [98 °F (36.7 °C)-100.8 °F (38.2 °C)] 100.2 °F (37.9 °C)  Heart Rate:  [] 93  Resp:  [14-17] 16  BP: (121-144)/(81-98) 144/88    Intake/Output this shift:  I/O this shift:  In: 240 [P.O.:240]  Out: -     Hemovac drain: 350 cc since placement yesterday    LABS:  .  Results from last 7 days   Lab Units 02/27/25  0537 02/26/25  0607 02/25/25  1846   WBC 10*3/mm3 15.57* 21.03* 20.19*   HEMOGLOBIN g/dL 10.5* 12.5* 13.8   HEMATOCRIT % 31.5* 37.0* 40.7   PLATELETS 10*3/mm3 179 236 236     .  Results from last 7 days   Lab Units 02/27/25  0537   SODIUM mmol/L 136   POTASSIUM mmol/L 3.7   CHLORIDE mmol/L 100   CO2 mmol/L 28.7   BUN mg/dL 13   CREATININE mg/dL 0.99   GLUCOSE mg/dL 114*   CALCIUM mg/dL 7.9*         IMAGING STUDIES:  X-ray cervical spine 2/26/2025:  Shows anterior cervical and posterior cervical thoracic spinal fusion hardware intact without any malalignment or fracture.  I personally reviewed and discussed the imaging with Dr. Collado.  I personally viewed and interpreted the patient's labs, imaging study, medications and chart..    Meds reviewed/changed: Yes    Current Facility-Administered Medications:     acetaminophen (TYLENOL) tablet 650 mg, 650 mg, Oral, Q4H PRN, 650 mg at 02/26/25 1387 **OR** acetaminophen (TYLENOL) 160 MG/5ML oral  solution 650 mg, 650 mg, Oral, Q4H PRN **OR** acetaminophen (TYLENOL) suppository 650 mg, 650 mg, Rectal, Q4H PRN, Bert Collado MD    albuterol sulfate HFA (PROVENTIL HFA;VENTOLIN HFA;PROAIR HFA) inhaler 2 puff, 2 puff, Inhalation, Q4H PRN, Bert Collado MD    sennosides-docusate (PERICOLACE) 8.6-50 MG per tablet 2 tablet, 2 tablet, Oral, BID, 2 tablet at 02/27/25 0827 **AND** polyethylene glycol (MIRALAX) packet 17 g, 17 g, Oral, Daily PRN **AND** bisacodyl (DULCOLAX) EC tablet 5 mg, 5 mg, Oral, Daily PRN **AND** bisacodyl (DULCOLAX) suppository 10 mg, 10 mg, Rectal, Daily PRN, Bert Collado MD    busPIRone (BUSPAR) tablet 10 mg, 10 mg, Oral, BID, Bert Collado MD, 10 mg at 02/27/25 0827    cetirizine (zyrTEC) tablet 10 mg, 10 mg, Oral, Daily, Bert Collado MD, 10 mg at 02/27/25 0826    clonazePAM (KlonoPIN) tablet 0.5 mg, 0.5 mg, Oral, Q8H PRN, Adonay Greene MD, 0.5 mg at 02/27/25 1119    ferric gluconate (FERRLECIT) 250 MG in sodium chloride 0.9% 250 mL IVPB, 250 mg, Intravenous, Daily, Paolo Powers MD, Last Rate: 125 mL/hr at 02/27/25 1026, 250 mg at 02/27/25 1026    gabapentin (NEURONTIN) capsule 800 mg, 800 mg, Oral, Q12H, Bert Collado MD, 800 mg at 02/27/25 0826    HYDROmorphone (DILAUDID) PCA 0.2 mg/ml 50 mL syringe, , Intravenous, Continuous, Bert Collado MD, New Bag at 02/26/25 1542    ipratropium-albuterol (DUO-NEB) nebulizer solution 3 mL, 3 mL, Nebulization, Q4H PRN, Paolo Powers MD    methocarbamol (ROBAXIN) tablet 1,000 mg, 1,000 mg, Oral, Q6H, Joe Lombardo, HERON, 1,000 mg at 02/27/25 1119    naloxone (NARCAN) injection 0.1 mg, 0.1 mg, Intravenous, Q5 Min PRN, Bert Collado MD    ondansetron ODT (ZOFRAN-ODT) disintegrating tablet 4 mg, 4 mg, Oral, Q6H PRN **OR** ondansetron (ZOFRAN) injection 4 mg, 4 mg, Intravenous, Q6H PRN, Bert Collado MD    oxyCODONE (ROXICODONE) immediate release tablet 10 mg, 10 mg, Oral, Q4H PRN, Tobias  MD Bert, 10 mg at 02/27/25 0628    pantoprazole (PROTONIX) EC tablet 40 mg, 40 mg, Oral, BID, Bert Collado MD, 40 mg at 02/27/25 0826    Pharmacy Consult, , Not Applicable, Continuous PRN, Bert Collado MD    pramipexole (MIRAPEX) tablet 0.125 mg, 0.125 mg, Oral, Nightly, Paolo Powers MD    sodium chloride 0.9 % flush 10 mL, 10 mL, Intravenous, Q12H, Bert Collado MD, 10 mL at 02/27/25 0801    sodium chloride 0.9 % flush 10 mL, 10 mL, Intravenous, PRN, Bert Collado MD    sodium chloride 0.9 % infusion 40 mL, 40 mL, Intravenous, PRN, Bert Collado MD    sodium chloride 0.9 % infusion, 30 mL/hr, Intravenous, Continuous PRN, Bert Collado MD    zolpidem (AMBIEN) tablet 10 mg, 10 mg, Oral, Nightly PRN, Bert Collado MD, 10 mg at 02/27/25 0011      Physical Exam:    General:   Awake, alert, oriented x3. Speech clear with no aphasia  Neck:   Posterior cervical dressing without erythema, swelling or drainage.  ., swallow/trachea midline; ; ROM deferred;   Motor:    Normal muscle strength, bulk and tone in upper and lower extremities.  No fasciculations, rigidity, spasticity, or abnormal movements.  Reflexes:   2+ in the upper and lower extremities. No Gutiérrez, no clonus  Sensation:   Normal to light touch  Station and Gait:           Awaiting PT evaluation  Extremities:   SCD placed.  Negative pain to palpation in bilateral calves and no erythema or swelling in calves.  Respiratory: No shortness of air  Assessment & Plan     ASSESSMENT:      History of fusion of cervical spine    Dysphagia    Spinal stenosis of cervicothoracic region    Weakness of both hands    Weakness of both arms    Gastroesophageal reflux disease    Cervical stenosis of spine    54-year-old male day #2 s/p posterior C7-T1 decompression with posterior C3-T3 fusion.  Continues to note significant posterior neck pain at the surgical site.  He has a lot of spasm pain and tightness in the neck and I have  "increased Robaxin dosage to 1000 mg every 6 hours scheduled.  Will keep Dilaudid PCA pump today.      He is still in a significant amount of pain as expected and we will continue Dilaudid PCA pump.  Please continue to use other oral pain medication agents/muscle relaxers as well as ice for pain control.  Robaxin dosage has been increased to hopefully help neck spasms.  Hemovac drain was intermittently moved per patient overnight.  Please continue to mobilize.  We will have pharmacy to dose Lovenox.    Anxiety:on buspirone and clonazepam. Please monitor for oversedation with concurrent administration of benzodiazepines and p.o./IV opioids.    Anemia: Pulmonology ordered iron infusion    Restless leg syndrome: Mirapex added per pulmonology    Nursing to encourage regular use of incentive spirometer to prevent complications such as pneumonia.  PLAN:     Soft collar for comfort  Bowel regimen  Mobilize with PT/nursing  Pain medication/muscle relaxers as needed  Robaxin dosage increased/scheduled  Utilize ice as needed  SCD/DVT prophylaxis  Pharmacy to dose Lovenox  PT  Keep Dilaudid PCA pump  PM&R consult  Iron infusion per pulmonology  CBC/BMP in a.m.    I discussed the patient's findings and my recommendations with patient, family, and Dr. Collado.    During patient visit, I utilized appropriate personal protective equipment including mask and gloves.  Mask used was standard procedure mask. Appropriate PPE was worn during the entire visit.  Hand hygiene was completed before and after.      LOS: 2 days       Joe Lombardo, APRN  2/27/2025  12:55 EST    \"Dictated utilizing Dragon dictation\".    "

## 2025-02-27 NOTE — PROGRESS NOTES
[facility]  [phone]  Dr. Paolo Powers    Subjective:  LOS: 2    Seems to be doing okay this morning.  He reports that he slept better last night.  He did wake up due to the restless leg syndrome, but only a few times and he was able to go back to sleep he reports.  His iron panel came back, his TSAT was low at 11%.  Will plan on IV iron repletion while he is inpatient.  He is already on p.o. iron supplementation at home as outpatient, and he has had a colonoscopy recently.    Objective:  Vital Signs past 24hrs    Temp range: Temp (24hrs), Av.5 °F (37.5 °C), Min:98 °F (36.7 °C), Max:100.8 °F (38.2 °C)    BP range: BP: (121-144)/(81-98) 144/88  Pulse range: Heart Rate:  [] 93  Resp rate range: Resp:  [14-17] 16  95.5 kg (210 lb 8.6 oz); Body mass index is 31.09 kg/m².    Device (Oxygen Therapy): nasal cannulaFlow (L/min) (Oxygen Therapy):  [2] 2    Oxygen range:SpO2:  [92 %-98 %] 98 %            Intake/Output Summary (Last 24 hours) at 2025 1124  Last data filed at 2025 0827  Gross per 24 hour   Intake 590 ml   Output 1450 ml   Net -860 ml       Physical Exam  Constitutional:       Appearance: Normal appearance.   HENT:      Head: Normocephalic and atraumatic.      Nose: Nose normal.      Mouth/Throat:      Mouth: Mucous membranes are moist.      Pharynx: Oropharynx is clear.   Eyes:      Extraocular Movements: Extraocular movements intact.      Conjunctiva/sclera: Conjunctivae normal.   Cardiovascular:      Rate and Rhythm: Normal rate and regular rhythm.      Pulses: Normal pulses.      Heart sounds: Normal heart sounds. No murmur heard.  Pulmonary:      Effort: Pulmonary effort is normal. No respiratory distress.      Breath sounds: Normal breath sounds. No stridor. No wheezing or rales.   Abdominal:      General: Abdomen is flat. Bowel sounds are normal.      Palpations: Abdomen is soft.      Tenderness: There is no abdominal tenderness.   Skin:     General: Skin is warm  and dry.   Neurological:      General: No focal deficit present.      Mental Status: He is alert and oriented to person, place, and time.            Result Review:  I have reviewed the results from last note by LPC physician and agree with these findings:  [x]  Laboratory accordion  [x]  Microbiology  [x]  Radiology  [x]  EKG/Telemetry   [x]  Cardiology/Vascular   [x]  Pathology  [x]  Old records  []  Other:    Medication Review:  I have reviewed the current MAR.  Antibiotics  This patient does not have an active medication from one of the medication groupers.     Scheduled Medications  busPIRone, 10 mg, Oral, BID  cetirizine, 10 mg, Oral, Daily  ferric gluconate, 250 mg, Intravenous, Daily  gabapentin, 800 mg, Oral, Q12H  ipratropium-albuterol, 3 mL, Nebulization, 4x Daily - RT  methocarbamol, 1,000 mg, Oral, Q6H  pantoprazole, 40 mg, Oral, BID  senna-docusate sodium, 2 tablet, Oral, BID  sodium chloride, 10 mL, Intravenous, Q12H      ICU Drips  HYDROmorphone HCl-NaCl,   Pharmacy Consult,   sodium chloride, 30 mL/hr      PRN Medications    acetaminophen **OR** acetaminophen **OR** acetaminophen    albuterol sulfate HFA    senna-docusate sodium **AND** polyethylene glycol **AND** bisacodyl **AND** bisacodyl    clonazePAM    naloxone    ondansetron ODT **OR** ondansetron    oxyCODONE    Pharmacy Consult    sodium chloride    sodium chloride    sodium chloride    zolpidem    Lines, Drains & Airways       Active LDAs       Name Placement date Placement time Site Days    Peripheral IV 02/25/25 0724 Left;Posterior Forearm 02/25/25  0724  Forearm  1    Closed/Suction Drain 1 Left;Superior Back Accordion 15 Fr. 02/25/25  1321  Back  less than 1                    Diet Orders (active) (From admission, onward)       Start     Ordered    02/25/25 1759  Diet: Regular/House; Fluid Consistency: Thin (IDDSI 0)  Diet Effective Now         02/25/25 1758                      Assessment:  Post-op cervical disc fusion surgery  Chronic  back pain, on gabapentin  Intermittent wheezing  Dysphagia  Current marijuana smoker  Ex-cigarette smoker  Mild ANDREINA (AHI 7.5), not on CPAP  Anxiety disorder  Restless leg syndrome  Moderate PLMS  Iron deficiency anemia    Plan of Treatment  - Has a hx of RLS with low ferritin. Tsat 11%, will replete with 250 mg IV iron daily for 4 days or until discharge. If does not complete all 4 doses of IV iron, this does NOT need to hold up discharge.  - Continue gabapentin 800 mg Q12H.   - Due to persistent RLS symptoms, will add pramipexole.  - Duonebs PRN  - Incentive spirometer and flutter valve therapy  - Okay to continue ambien PRN at night. Has been on this for a long time.  - Psych consulted for anxiety      Paolo Powers MD  [facility], [facility]  Pulmonary and Critical Care Medicine

## 2025-02-27 NOTE — PLAN OF CARE
Problem: Adult Inpatient Plan of Care  Goal: Absence of Hospital-Acquired Illness or Injury  Intervention: Identify and Manage Fall Risk  Recent Flowsheet Documentation  Taken 2/27/2025 0416 by Thelma Ferguson RN  Safety Promotion/Fall Prevention:   assistive device/personal items within reach   safety round/check completed  Taken 2/27/2025 0253 by Thelma Ferguson RN  Safety Promotion/Fall Prevention:   assistive device/personal items within reach   safety round/check completed  Taken 2/27/2025 0011 by Thelma Ferguson RN  Safety Promotion/Fall Prevention:   assistive device/personal items within reach   safety round/check completed  Taken 2/26/2025 2247 by Thelma Ferguson RN  Safety Promotion/Fall Prevention:   assistive device/personal items within reach   safety round/check completed  Taken 2/26/2025 2014 by Thelma Ferguson RN  Safety Promotion/Fall Prevention:   activity supervised   assistive device/personal items within reach   clutter free environment maintained   fall prevention program maintained   gait belt   lighting adjusted   mobility aid in reach   nonskid shoes/slippers when out of bed   safety round/check completed  Intervention: Prevent Skin Injury  Recent Flowsheet Documentation  Taken 2/27/2025 0416 by Thelma Ferguson RN  Body Position: supine  Taken 2/27/2025 0253 by Thelma Ferguson RN  Body Position: supine  Taken 2/27/2025 0011 by Thelma Ferguson RN  Body Position: sitting up in bed  Taken 2/26/2025 2247 by Thelma Ferguson RN  Body Position:   supine   sitting up in bed  Taken 2/26/2025 2014 by Thelma Ferguson RN  Body Position: supine  Skin Protection:   incontinence pads utilized   protective footwear used  Intervention: Prevent and Manage VTE (Venous Thromboembolism) Risk  Recent Flowsheet Documentation  Taken 2/26/2025 2014 by Thelma Ferguson RN  VTE Prevention/Management:   bilateral   SCDs (sequential compression devices) on  Goal: Optimal Comfort and Wellbeing  Intervention: Monitor Pain and Promote Comfort  Recent  Flowsheet Documentation  Taken 2/27/2025 0011 by Thelma Ferguson RN  Pain Management Interventions: pain medication given  Taken 2/26/2025 2014 by Thelma Ferguson RN  Pain Management Interventions:   pain medication given   pain pump in use  Intervention: Provide Person-Centered Care  Recent Flowsheet Documentation  Taken 2/26/2025 2014 by Thelma Ferguson RN  Trust Relationship/Rapport:   care explained   thoughts/feelings acknowledged     Problem: Comorbidity Management  Goal: Maintenance of COPD Symptom Control  Intervention: Maintain COPD (Chronic Obstructive Pulmonary Disease) Symptom Control  Recent Flowsheet Documentation  Taken 2/27/2025 0416 by Thelma Ferguson RN  Medication Review/Management: medications reviewed  Taken 2/27/2025 0253 by Thelma Ferguson RN  Medication Review/Management: medications reviewed  Taken 2/27/2025 0011 by Thelma Ferguson RN  Medication Review/Management: medications reviewed  Taken 2/26/2025 2247 by Thelma Ferguson RN  Medication Review/Management: medications reviewed  Taken 2/26/2025 2014 by Thelma Ferguson RN  Medication Review/Management: medications reviewed  Goal: Blood Pressure in Desired Range  Intervention: Maintain Blood Pressure Management  Recent Flowsheet Documentation  Taken 2/27/2025 0416 by Thelma Ferguson RN  Medication Review/Management: medications reviewed  Taken 2/27/2025 0253 by Thelma Ferguson RN  Medication Review/Management: medications reviewed  Taken 2/27/2025 0011 by Thelma Ferguson RN  Medication Review/Management: medications reviewed  Taken 2/26/2025 2247 by Thelma Ferguson RN  Medication Review/Management: medications reviewed  Taken 2/26/2025 2014 by Thelma Ferguson RN  Medication Review/Management: medications reviewed     Problem: Spinal Surgery  Goal: Optimal Coping with Surgery  Intervention: Support Psychosocial Response to Surgery  Recent Flowsheet Documentation  Taken 2/26/2025 2014 by Thelma Ferguson RN  Supportive Measures:   active listening utilized   self-care encouraged  Goal:  Absence of Bleeding  Intervention: Monitor and Manage Bleeding  Recent Flowsheet Documentation  Taken 2/26/2025 2014 by Thelma Ferguson RN  Bleeding Management: dressing monitored  Goal: Optimal Functional Ability  Intervention: Optimize Functional Status  Recent Flowsheet Documentation  Taken 2/27/2025 0416 by Thelma Ferguson RN  Positioning/Transfer Devices: pillows  Taken 2/27/2025 0253 by Thelma Ferguson RN  Positioning/Transfer Devices: pillows  Taken 2/27/2025 0011 by Thelma Ferguson RN  Positioning/Transfer Devices: pillows  Taken 2/26/2025 2247 by Thelma Ferguson RN  Positioning/Transfer Devices: pillows  Taken 2/26/2025 2014 by Thelma Ferguson RN  Activity Management:   dorsiflexion/plantar flexion performed   activity encouraged  Positioning/Transfer Devices: pillows  Self-Care Promotion:   independence encouraged   BADL personal objects within reach  Goal: Optimal Neurologic Function  Intervention: Optimize Neurologic Function  Recent Flowsheet Documentation  Taken 2/27/2025 0416 by Thelma Ferguson RN  Body Position: supine  Taken 2/27/2025 0253 by Thelma Ferguson RN  Body Position: supine  Taken 2/27/2025 0011 by Thelma Ferguson RN  Body Position: sitting up in bed  Taken 2/26/2025 2247 by Thelma Ferguson RN  Body Position:   supine   sitting up in bed  Taken 2/26/2025 2014 by Thelma Ferguson RN  Body Position: supine  Range of Motion: active ROM (range of motion) encouraged  Goal: Anesthesia/Sedation Recovery  Intervention: Optimize Anesthesia Recovery  Recent Flowsheet Documentation  Taken 2/27/2025 0416 by Thelma Ferguson RN  Safety Promotion/Fall Prevention:   assistive device/personal items within reach   safety round/check completed  Taken 2/27/2025 0253 by Thelma Ferguson RN  Safety Promotion/Fall Prevention:   assistive device/personal items within reach   safety round/check completed  Taken 2/27/2025 0011 by Thelma Ferguson RN  Safety Promotion/Fall Prevention:   assistive device/personal items within reach   safety round/check  completed  Taken 2/26/2025 2247 by Thelma Ferguson RN  Safety Promotion/Fall Prevention:   assistive device/personal items within reach   safety round/check completed  Administration (IS): refused  Taken 2/26/2025 2014 by Thelma Ferguson RN  Patient Tolerance (IS): good  Safety Promotion/Fall Prevention:   activity supervised   assistive device/personal items within reach   clutter free environment maintained   fall prevention program maintained   gait belt   lighting adjusted   mobility aid in reach   nonskid shoes/slippers when out of bed   safety round/check completed  Administration (IS):   proper technique demonstrated   self-administered  Goal: Optimal Pain Control and Function  Intervention: Prevent or Manage Pain  Recent Flowsheet Documentation  Taken 2/27/2025 0011 by Thelma Ferguson RN  Pain Management Interventions: pain medication given  Taken 2/26/2025 2014 by Thelma Ferguson RN  Pain Management Interventions:   pain medication given   pain pump in use  Diversional Activities: television  Goal: Effective Oxygenation and Ventilation  Intervention: Optimize Oxygenation and Ventilation  Recent Flowsheet Documentation  Taken 2/27/2025 0416 by Thelma Ferguson RN  Head of Bed (HOB) Positioning: HOB at 30-45 degrees  Taken 2/27/2025 0253 by Thelma Ferguson RN  Head of Bed (HOB) Positioning: HOB elevated  Taken 2/27/2025 0011 by Thelma Ferguson RN  Head of Bed (HOB) Positioning: HOB elevated  Taken 2/26/2025 2247 by Thelma Ferguson RN  Head of Bed (HOB) Positioning: HOB elevated  Taken 2/26/2025 2014 by Thelma Ferguson RN  Head of Bed (HOB) Positioning: HOB elevated     Problem: Fall Injury Risk  Goal: Absence of Fall and Fall-Related Injury  Intervention: Identify and Manage Contributors  Recent Flowsheet Documentation  Taken 2/27/2025 0416 by Thelma Ferguson RN  Medication Review/Management: medications reviewed  Taken 2/27/2025 0253 by Thelma Ferguson RN  Medication Review/Management: medications reviewed  Taken 2/27/2025 0011 by Marty  RAN Renee  Medication Review/Management: medications reviewed  Taken 2/26/2025 2247 by Thelma Ferguson RN  Medication Review/Management: medications reviewed  Taken 2/26/2025 2014 by Thelma Ferguson RN  Medication Review/Management: medications reviewed  Self-Care Promotion:   independence encouraged   BADL personal objects within reach  Intervention: Promote Injury-Free Environment  Recent Flowsheet Documentation  Taken 2/27/2025 0416 by Thelma Ferguson RN  Safety Promotion/Fall Prevention:   assistive device/personal items within reach   safety round/check completed  Taken 2/27/2025 0253 by Thelma Ferguson RN  Safety Promotion/Fall Prevention:   assistive device/personal items within reach   safety round/check completed  Taken 2/27/2025 0011 by Thelma Ferguson RN  Safety Promotion/Fall Prevention:   assistive device/personal items within reach   safety round/check completed  Taken 2/26/2025 2247 by Thelma Ferguson RN  Safety Promotion/Fall Prevention:   assistive device/personal items within reach   safety round/check completed  Taken 2/26/2025 2014 by Thelma Ferguson RN  Safety Promotion/Fall Prevention:   activity supervised   assistive device/personal items within reach   clutter free environment maintained   fall prevention program maintained   gait belt   lighting adjusted   mobility aid in reach   nonskid shoes/slippers when out of bed   safety round/check completed   Goal Outcome Evaluation:

## 2025-02-27 NOTE — THERAPY TREATMENT NOTE
Patient Name: Rafael Kat  : 1970    MRN: [id number]                              Today's Date: 2025       Admit Date: 2025    Visit Dx:     ICD-10-CM ICD-9-CM   1. History of fusion of cervical spine  Z98.1 V45.4   2. Spinal stenosis of cervicothoracic region  M48.03 723.0   3. Weakness of both hands  R29.898 729.89   4. Weakness of both arms  R29.898 729.89     Patient Active Problem List   Diagnosis    Degeneration of intervertebral disc of mid-cervical region    Chronic pain    Cervical disc disorder with radiculopathy of mid-cervical region    DDD (degenerative disc disease), lumbar    Other cervical disc degeneration, high cervical region    Neck pain    Pain in thoracic spine    Muscle spasm of right shoulder    History of fusion of cervical spine    Spinal stenosis of lumbar region with neurogenic claudication    Tear of right acetabular labrum    Spinal instability of lumbar region    Spondylolisthesis at L3-L4 level    Muscle ache    Foraminal stenosis of cervical region    Cervical radiculopathy at C7    Bilateral arm pain    Chronic obstructive pulmonary disease    Dysphagia    Spinal stenosis of cervicothoracic region    Weakness of both hands    Weakness of both arms    History of lumbar surgery    Gastroesophageal reflux disease    Screening for colorectal cancer    Cervical stenosis of spine     Past Medical History:   Diagnosis Date    Anesthesia complication     PT STATES HE IS SLOW TO WAKE UP    Arthritis     COPD (chronic obstructive pulmonary disease)     STATES WHEEZES AT NIGHT AND DOES USE NIGHTLY INHALER    DDD (degenerative disc disease), cervical     DDD (degenerative disc disease), lumbar     PAIN IN RIGHT BUTTOCK AND DOWN AT TIMES RIGHT LEG, WEAKNESS RIGHT LEG AT TIMES    Dysphagia     STARTED AFTER HIS CERVICAL NECK SURGERY    GERD (gastroesophageal reflux disease)     History of COVID-19     Neck pain     Psoriasis     Shoulder pain, bilateral     Skin cancer      BASAL/SQUAMOUS     Past Surgical History:   Procedure Laterality Date    ANTERIOR CHANNEL VERTEBRECTOMY/CORPECTOMY Bilateral 03/29/2016    Procedure: C4-6 ANTERIOR CHANNEL VERTEBRECTOMY/CORPECTOMY WITH INSTRUMENTATION;  Surgeon: Bert Collado MD;  Location: [facility] MAIN OR;  Service:     CERVICAL DISCECTOMY POSTERIOR FUSION WITH BRAIN LAB N/A 2/25/2025    Procedure: Posterior cervical seven/thoracic one decompression, posterior cervical three/four/five/six/seven, thoracic one/two/three fusion with screws/rods/crosslinks with Stealth spinal navigation;  Surgeon: Bert Collado MD;  Location: [facility] MAIN OR;  Service: Neurosurgery;  Laterality: N/A;    COLONOSCOPY      COLONOSCOPY N/A 2/14/2025    Procedure: COLONOSCOPY into cecum with hot snare polypectomy, Resolutions clips x2, Tattoo of transverse polyp area;  Surgeon: Gonzalo Rodriguez MD;  Location: [facility] ENDOSCOPY;  Service: Gastroenterology;  Laterality: N/A;  Pre: Screening  Post: Polyp, Suboptimal prep    ELBOW PROCEDURE Right     UNCLEAR:  RIGHT ELBOW AREA    ENDOSCOPY N/A 2/14/2025    Procedure: ESOPHAGOGASTRODUODENOSCOPY with biopsies;  Surgeon: Gonzalo Rodriguez MD;  Location: [facility] ENDOSCOPY;  Service: Gastroenterology;  Laterality: N/A;  Pre: GERD, Dysphagia  Post: Gastritis and Esophagitis    FINGER SURGERY Left     INDEX FINGER    LUMBAR EPIDURAL INJECTION      STATES X3    LUMBAR LAMINECTOMY DISCECTOMY DECOMPRESSION Bilateral 11/28/2023    Procedure: Open bilateral lumbar decompression lumbar one/two, lumbar two/three, lumbar three/four;  Surgeon: Bert Collado MD;  Location: [facility] OR;  Service: Neurosurgery;  Laterality: Bilateral;    MENISCECTOMY Right     UPPER GASTROINTESTINAL ENDOSCOPY  03/2021      General Information       Row Name 02/27/25 1313          Physical Therapy Time and Intention    Document Type therapy note (daily note)  -EM     Mode of Treatment individual therapy;physical therapy  -EM       Row Name  "02/27/25 1313          General Information    Existing Precautions/Restrictions fall  -EM               User Key  (r) = Recorded By, (t) = Taken By, (c) = Cosigned By      Initials Name Provider Type    Celeste Lynch PT Physical Therapist                   Mobility       Row Name 02/27/25 1313          Bed Mobility    Bed Mobility sit-supine  -EM     Supine-Sit [place] (Bed Mobility) standby assist  -EM     Sit-Supine [place] (Bed Mobility) standby assist  -EM     Assistive Device (Bed Mobility) head of bed elevated  -EM       Row Name 02/27/25 1313          Sit-Stand Transfer    Sit-Stand [place]ond (Transfers) contact guard  -EM       Row Name 02/27/25 1313          Gait/Stairs (Locomotion)    Esmond Level (Gait) minimum assist (75% patient effort)  -EM     Distance in Feet (Gait) 75  -EM     Deviations/Abnormal Patterns (Gait) gait speed decreased;stride length decreased  -EM     Bilateral Gait Deviations decreased arm swing  -EM     Comment, (Gait/Stairs) very guarded, holding UEs in high guard position or holding onto rail in hallway for support, pt c/o \"terrible pressure\" in his head when upright limiting activity tolerance  -EM               User Key  (r) = Recorded By, (t) = Taken By, (c) = Cosigned By      Initials Name Provider Type    Celeste Lynch PT Physical Therapist                   Obj/Interventions       Row Name 02/27/25 1314          Motor Skills    Therapeutic Exercise other (see comments)  AROM UEs, attempted in sitting position on EOB but pt limited by pain, attempted again once back in supine and pt able to more comfortably complete ROM ex  -EM               User Key  (r) = Recorded By, (t) = Taken By, (c) = Cosigned By      Initials Name Provider Type    Celeste Lynch PT Physical Therapist                   Goals/Plan    No documentation.                  Clinical Impression       Row Name 02/27/25 1315          Pain    Pretreatment Pain " "Rating 8/10  -EM     Posttreatment Pain Rating 10/10  -EM     Pain Location neck;head  -EM     Pain Side/Orientation posterior  -EM     Pre/Posttreatment Pain Comment pt has PCA, had meds right before PT session per pt report  -EM       Row Name 02/27/25 0586          Plan of Care Review    Plan of Care Reviewed With patient;spouse  -EM     Outcome Evaluation Pt in supine, awake and alert, spouse at bedside, agreeable to therapy but c/o significant pain and fatigue. Patient performed bed mobility with SBA, attempted UE AROM ex while sitting on EOB but pt limited by pain and tolerance in sitting, sit to stand with CGA, ambulated about 75 feet with Celia, demonstrating very guarded posture holding arms in high guard position or reaching out for rail in hallway. Pt activity tolerance limited by pain today, pt reports \"pressure\" in his head is >10/10 pain when upright. Pt may benefit from rehab at d/c at current level of function.  -EM       Row Name 02/27/25 3418          Positioning and Restraints    Pre-Treatment Position in bed  -EM     Post Treatment Position bed  -EM     In Bed sitting;exit alarm on;with nsg  IV nurse in room  -EM               User Key  (r) = Recorded By, (t) = Taken By, (c) = Cosigned By      Initials Name Provider Type    EM Celeste Crook, PT Physical Therapist                   Outcome Measures       Row Name 02/27/25 1472          How much help from another person do you currently need...    Turning from your back to your side while in flat bed without using bedrails? 4  -EM     Moving from lying on back to sitting on the side of a flat bed without bedrails? 3  -EM     Moving to and from a bed to a chair (including a wheelchair)? 3  -EM     Standing up from a chair using your arms (e.g., wheelchair, bedside chair)? 3  -EM     Climbing 3-5 steps with a railing? 3  -EM     To walk in hospital room? 3  -EM     AM-PAC 6 Clicks Score (PT) 19  -EM               User Key  (r) = Recorded By, " "(t) = Taken By, (c) = Cosigned By      Initials Name Provider Type    EM Celeste Crook, PT Physical Therapist                                 Physical Therapy Education       Title: PT OT SLP Therapies (In Progress)       Topic: Physical Therapy (In Progress)       Point: Mobility training (In Progress)       Learning Progress Summary            Patient Acceptance, E, NR by EM at 2/27/2025 1319    Acceptance, E,TB, VU by  at 2/26/2025 2323    Acceptance, E, VU by  at 2/26/2025 1622                      Point: Home exercise program (In Progress)       Learning Progress Summary            Patient Acceptance, E, NR by EM at 2/27/2025 1319    Acceptance, E,TB, VU by  at 2/26/2025 2323                      Point: Body mechanics (Done)       Learning Progress Summary            Patient Acceptance, E,TB, VU by  at 2/26/2025 2323                      Point: Precautions (Done)       Learning Progress Summary            Patient Acceptance, E,TB, VU by  at 2/26/2025 2323                                      User Key       Initials Effective Dates Name Provider Type Discipline     06/16/21 -  Celeste Crook PT Physical Therapist PT     06/09/23 -  Thelma Ferguson, RAN Registered Nurse Nurse                  PT Recommendation and Plan  Planned Therapy Interventions (PT): bed mobility training, gait training, home exercise program, patient/family education, transfer training, strengthening  Outcome Evaluation: Pt in supine, awake and alert, spouse at bedside, agreeable to therapy but c/o significant pain and fatigue. Patient performed bed mobility with SBA, attempted UE AROM ex while sitting on EOB but pt limited by pain and tolerance in sitting, sit to stand with CGA, ambulated about 75 feet with Celia, demonstrating very guarded posture holding arms in high guard position or reaching out for rail in hallway. Pt activity tolerance limited by pain today, pt reports \"pressure\" in his head is >10/10 pain when " upright. Pt may benefit from rehab at d/c at current level of function.     Time Calculation:         PT Charges       Row Name 02/27/25 1319             Time Calculation    Start Time 1034  -EM      Stop Time 1047  -EM      Time Calculation (min) 13 min  -EM      PT Received On 02/27/25  -EM      PT - Next Appointment 02/28/25  -EM         Time Calculation- PT    Total Timed Code Minutes- PT 13 minute(s)  -EM         Timed Charges    08820 - PT Therapeutic Exercise Minutes 5  -EM      07153 - PT Therapeutic Activity Minutes 8  -EM         Total Minutes    Timed Charges Total Minutes 13  -EM       Total Minutes 13  -EM                User Key  (r) = Recorded By, (t) = Taken By, (c) = Cosigned By      Initials Name Provider Type    EM Celeste Crook PT Physical Therapist                  Therapy Charges for Today       Code Description Service Date Service Provider Modifiers Qty    37645814523 HC PT EVAL MOD COMPLEXITY 2 2/26/2025 Celeste Crook, PT GP 1    46319518474 HC PT THERAPEUTIC ACT EA 15 MIN 2/26/2025 Celeste Crook, PT GP 1    66140887955 HC PT THERAPEUTIC ACT EA 15 MIN 2/27/2025 Celeste Crook, PT GP 1            PT G-Codes  AM-PAC 6 Clicks Score (PT): 19  PT Discharge Summary  Anticipated Discharge Disposition (PT): inpatient rehabilitation facility (pending progress)    Celeste Crook PT  2/27/2025

## 2025-02-27 NOTE — PLAN OF CARE
"Goal Outcome Evaluation:  Plan of Care Reviewed With: patient, spouse           Outcome Evaluation: Pt in supine, awake and alert, spouse at bedside, agreeable to therapy but c/o significant pain and fatigue. Patient performed bed mobility with SBA, attempted UE AROM ex while sitting on EOB but pt limited by pain and tolerance in sitting, sit to stand with CGA, ambulated about 75 feet with Celia, demonstrating very guarded posture holding arms in high guard position or reaching out for rail in hallway. Pt activity tolerance limited by pain today, pt reports \"pressure\" in his head is >10/10 pain when upright. Pt may benefit from rehab at d/c at current level of function.    Anticipated Discharge Disposition (PT): inpatient rehabilitation facility (pending progress)                        "

## 2025-02-28 LAB
ANION GAP SERPL CALCULATED.3IONS-SCNC: 8.3 MMOL/L (ref 5–15)
BASOPHILS # BLD AUTO: 0.03 10*3/MM3 (ref 0–0.2)
BASOPHILS NFR BLD AUTO: 0.2 % (ref 0–1.5)
BUN SERPL-MCNC: 11 MG/DL (ref 6–20)
BUN/CREAT SERPL: 13.8 (ref 7–25)
CALCIUM SPEC-SCNC: 8.3 MG/DL (ref 8.6–10.5)
CHLORIDE SERPL-SCNC: 99 MMOL/L (ref 98–107)
CO2 SERPL-SCNC: 27.7 MMOL/L (ref 22–29)
CREAT SERPL-MCNC: 0.8 MG/DL (ref 0.76–1.27)
DEPRECATED RDW RBC AUTO: 43.6 FL (ref 37–54)
EGFRCR SERPLBLD CKD-EPI 2021: 103.9 ML/MIN/1.73
EOSINOPHIL # BLD AUTO: 0.08 10*3/MM3 (ref 0–0.4)
EOSINOPHIL NFR BLD AUTO: 0.6 % (ref 0.3–6.2)
ERYTHROCYTE [DISTWIDTH] IN BLOOD BY AUTOMATED COUNT: 13.1 % (ref 12.3–15.4)
GLUCOSE SERPL-MCNC: 146 MG/DL (ref 65–99)
HCT VFR BLD AUTO: 32.7 % (ref 37.5–51)
HGB BLD-MCNC: 10.5 G/DL (ref 13–17.7)
IMM GRANULOCYTES # BLD AUTO: 0.09 10*3/MM3 (ref 0–0.05)
IMM GRANULOCYTES NFR BLD AUTO: 0.6 % (ref 0–0.5)
LYMPHOCYTES # BLD AUTO: 1.73 10*3/MM3 (ref 0.7–3.1)
LYMPHOCYTES NFR BLD AUTO: 12.1 % (ref 19.6–45.3)
MCH RBC QN AUTO: 29.3 PG (ref 26.6–33)
MCHC RBC AUTO-ENTMCNC: 32.1 G/DL (ref 31.5–35.7)
MCV RBC AUTO: 91.3 FL (ref 79–97)
MONOCYTES # BLD AUTO: 1.42 10*3/MM3 (ref 0.1–0.9)
MONOCYTES NFR BLD AUTO: 9.9 % (ref 5–12)
NEUTROPHILS NFR BLD AUTO: 10.98 10*3/MM3 (ref 1.7–7)
NEUTROPHILS NFR BLD AUTO: 76.6 % (ref 42.7–76)
NRBC BLD AUTO-RTO: 0 /100 WBC (ref 0–0.2)
PLATELET # BLD AUTO: 178 10*3/MM3 (ref 140–450)
PMV BLD AUTO: 11.3 FL (ref 6–12)
POTASSIUM SERPL-SCNC: 3.9 MMOL/L (ref 3.5–5.2)
RBC # BLD AUTO: 3.58 10*6/MM3 (ref 4.14–5.8)
SODIUM SERPL-SCNC: 135 MMOL/L (ref 136–145)
WBC NRBC COR # BLD AUTO: 14.33 10*3/MM3 (ref 3.4–10.8)

## 2025-02-28 PROCEDURE — 25010000002 ENOXAPARIN PER 10 MG

## 2025-02-28 PROCEDURE — 99024 POSTOP FOLLOW-UP VISIT: CPT

## 2025-02-28 PROCEDURE — 85025 COMPLETE CBC W/AUTO DIFF WBC: CPT

## 2025-02-28 PROCEDURE — 25010000002 HYDROMORPHONE PER 4 MG

## 2025-02-28 PROCEDURE — 25010000002 NA FERRIC GLUC CPLX PER 12.5 MG: Performed by: STUDENT IN AN ORGANIZED HEALTH CARE EDUCATION/TRAINING PROGRAM

## 2025-02-28 PROCEDURE — 97530 THERAPEUTIC ACTIVITIES: CPT

## 2025-02-28 PROCEDURE — 80048 BASIC METABOLIC PNL TOTAL CA: CPT

## 2025-02-28 PROCEDURE — 25810000003 SODIUM CHLORIDE 0.9 % SOLUTION: Performed by: STUDENT IN AN ORGANIZED HEALTH CARE EDUCATION/TRAINING PROGRAM

## 2025-02-28 RX ORDER — HYDROMORPHONE HYDROCHLORIDE 1 MG/ML
0.5 INJECTION, SOLUTION INTRAMUSCULAR; INTRAVENOUS; SUBCUTANEOUS
Status: DISCONTINUED | OUTPATIENT
Start: 2025-02-28 | End: 2025-03-03

## 2025-02-28 RX ADMIN — PANTOPRAZOLE SODIUM 40 MG: 40 TABLET, DELAYED RELEASE ORAL at 08:46

## 2025-02-28 RX ADMIN — HYDROMORPHONE HYDROCHLORIDE 0.5 MG: 1 INJECTION, SOLUTION INTRAMUSCULAR; INTRAVENOUS; SUBCUTANEOUS at 12:37

## 2025-02-28 RX ADMIN — SODIUM CHLORIDE 250 MG: 9 INJECTION, SOLUTION INTRAVENOUS at 08:46

## 2025-02-28 RX ADMIN — HYDROMORPHONE HYDROCHLORIDE 0.5 MG: 1 INJECTION, SOLUTION INTRAMUSCULAR; INTRAVENOUS; SUBCUTANEOUS at 16:25

## 2025-02-28 RX ADMIN — HYDROMORPHONE HYDROCHLORIDE 0.5 MG: 1 INJECTION, SOLUTION INTRAMUSCULAR; INTRAVENOUS; SUBCUTANEOUS at 20:46

## 2025-02-28 RX ADMIN — CLONAZEPAM 0.5 MG: 0.5 TABLET ORAL at 20:39

## 2025-02-28 RX ADMIN — SENNOSIDES AND DOCUSATE SODIUM 2 TABLET: 50; 8.6 TABLET ORAL at 08:46

## 2025-02-28 RX ADMIN — CLONAZEPAM 0.5 MG: 0.5 TABLET ORAL at 14:09

## 2025-02-28 RX ADMIN — OXYCODONE HYDROCHLORIDE 10 MG: 5 TABLET ORAL at 09:15

## 2025-02-28 RX ADMIN — OXYCODONE HYDROCHLORIDE 10 MG: 5 TABLET ORAL at 18:15

## 2025-02-28 RX ADMIN — METHOCARBAMOL TABLETS 1000 MG: 500 TABLET, COATED ORAL at 06:33

## 2025-02-28 RX ADMIN — BUSPIRONE HYDROCHLORIDE 15 MG: 15 TABLET ORAL at 20:39

## 2025-02-28 RX ADMIN — METHOCARBAMOL TABLETS 1000 MG: 500 TABLET, COATED ORAL at 12:38

## 2025-02-28 RX ADMIN — Medication 10 ML: at 20:43

## 2025-02-28 RX ADMIN — PRAMIPEXOLE DIHYDROCHLORIDE 0.12 MG: 0.25 TABLET ORAL at 18:15

## 2025-02-28 RX ADMIN — ENOXAPARIN SODIUM 40 MG: 100 INJECTION SUBCUTANEOUS at 14:09

## 2025-02-28 RX ADMIN — POLYETHYLENE GLYCOL 3350 17 G: 17 POWDER, FOR SOLUTION ORAL at 08:46

## 2025-02-28 RX ADMIN — GABAPENTIN 800 MG: 400 CAPSULE ORAL at 20:39

## 2025-02-28 RX ADMIN — CLONAZEPAM 0.5 MG: 0.5 TABLET ORAL at 08:46

## 2025-02-28 RX ADMIN — METHOCARBAMOL TABLETS 1000 MG: 500 TABLET, COATED ORAL at 23:57

## 2025-02-28 RX ADMIN — CETIRIZINE HYDROCHLORIDE 10 MG: 10 TABLET, FILM COATED ORAL at 08:46

## 2025-02-28 RX ADMIN — SENNOSIDES AND DOCUSATE SODIUM 2 TABLET: 50; 8.6 TABLET ORAL at 20:40

## 2025-02-28 RX ADMIN — PANTOPRAZOLE SODIUM 40 MG: 40 TABLET, DELAYED RELEASE ORAL at 20:39

## 2025-02-28 RX ADMIN — OXYCODONE HYDROCHLORIDE 10 MG: 5 TABLET ORAL at 14:09

## 2025-02-28 RX ADMIN — OXYCODONE HYDROCHLORIDE 10 MG: 5 TABLET ORAL at 23:57

## 2025-02-28 RX ADMIN — ESCITALOPRAM 10 MG: 20 TABLET, FILM COATED ORAL at 20:39

## 2025-02-28 RX ADMIN — BUSPIRONE HYDROCHLORIDE 15 MG: 15 TABLET ORAL at 08:46

## 2025-02-28 RX ADMIN — Medication 10 ML: at 08:47

## 2025-02-28 RX ADMIN — GABAPENTIN 800 MG: 400 CAPSULE ORAL at 08:46

## 2025-02-28 RX ADMIN — METHOCARBAMOL TABLETS 1000 MG: 500 TABLET, COATED ORAL at 18:15

## 2025-02-28 NOTE — PROGRESS NOTES
[facility]  [phone]  Dr. Paolo Powers    Subjective:  LOS: 3    No acute events overnight.  Appears to be doing well.  His wife reports that his fever broke.  His restless leg syndrome is improved, but still there.  He is getting his second infusion of IV iron.  Per discussion with the wife, neurosurgery is planning to try to wean him off the PCA pump today.    Objective:  Vital Signs past 24hrs    Temp range: Temp (24hrs), Av °F (37.2 °C), Min:98.2 °F (36.8 °C), Max:100.5 °F (38.1 °C)    BP range: BP: (130-137)/(69-92) 130/78  Pulse range: Heart Rate:  [] 81  Resp rate range: Resp:  [16-18] 18  95.5 kg (210 lb 8.6 oz); Body mass index is 31.09 kg/m².    Device (Oxygen Therapy): nasal cannulaFlow (L/min) (Oxygen Therapy):  [2] 2    Oxygen range:SpO2:  [90 %-96 %] 96 %            Intake/Output Summary (Last 24 hours) at 2025 1252  Last data filed at 2025 0849  Gross per 24 hour   Intake 640 ml   Output 1410 ml   Net -770 ml       Physical Exam  Constitutional:       Appearance: Normal appearance.   HENT:      Head: Normocephalic and atraumatic.      Nose: Nose normal.      Mouth/Throat:      Mouth: Mucous membranes are moist.      Pharynx: Oropharynx is clear.   Eyes:      Extraocular Movements: Extraocular movements intact.      Conjunctiva/sclera: Conjunctivae normal.   Cardiovascular:      Rate and Rhythm: Normal rate and regular rhythm.      Pulses: Normal pulses.      Heart sounds: Normal heart sounds. No murmur heard.  Pulmonary:      Effort: Pulmonary effort is normal. No respiratory distress.      Breath sounds: Normal breath sounds. No stridor. No wheezing or rales.   Abdominal:      General: Abdomen is flat. Bowel sounds are normal.      Palpations: Abdomen is soft.      Tenderness: There is no abdominal tenderness.   Skin:     General: Skin is warm and dry.   Neurological:      General: No focal deficit present.      Mental Status: He is alert and oriented to  person, place, and time.            Result Review:  I have reviewed the results from last note by LPC physician and agree with these findings:  [x]  Laboratory accordion  [x]  Microbiology  [x]  Radiology  [x]  EKG/Telemetry   [x]  Cardiology/Vascular   [x]  Pathology  [x]  Old records  []  Other:    Medication Review:  I have reviewed the current MAR.  Antibiotics  This patient does not have an active medication from one of the medication groupers.     Scheduled Medications  busPIRone, 15 mg, Oral, BID  cetirizine, 10 mg, Oral, Daily  clonazePAM, 0.5 mg, Oral, TID  enoxaparin sodium, 40 mg, Subcutaneous, Q24H  escitalopram, 10 mg, Oral, Nightly  ferric gluconate, 250 mg, Intravenous, Daily  gabapentin, 800 mg, Oral, Q12H  methocarbamol, 1,000 mg, Oral, Q6H  pantoprazole, 40 mg, Oral, BID  pramipexole, 0.125 mg, Oral, Nightly  senna-docusate sodium, 2 tablet, Oral, BID  sodium chloride, 10 mL, Intravenous, Q12H      ICU Drips  Pharmacy Consult,       PRN Medications    acetaminophen **OR** acetaminophen **OR** acetaminophen    albuterol sulfate HFA    senna-docusate sodium **AND** polyethylene glycol **AND** bisacodyl **AND** bisacodyl    HYDROmorphone    ipratropium-albuterol    naloxone    ondansetron ODT **OR** ondansetron    oxyCODONE    Pharmacy Consult    sodium chloride    sodium chloride    zolpidem    Lines, Drains & Airways       Active LDAs       Name Placement date Placement time Site Days    Peripheral IV 02/25/25 0724 Left;Posterior Forearm 02/25/25  0724  Forearm  1    Closed/Suction Drain 1 Left;Superior Back Accordion 15 Fr. 02/25/25  1321  Back  less than 1                    Diet Orders (active) (From admission, onward)       Start     Ordered    02/25/25 1759  Diet: Regular/House; Fluid Consistency: Thin (IDDSI 0)  Diet Effective Now         02/25/25 1758                      Assessment:  Post-op cervical disc fusion surgery  Chronic back pain, on gabapentin  Intermittent  wheezing  Dysphagia  Current marijuana smoker  Ex-cigarette smoker  Mild ANDREINA (AHI 7.5), not on CPAP  Anxiety disorder  Restless leg syndrome  Moderate PLMS  Iron deficiency anemia    Plan of Treatment  - Has a hx of RLS with low ferritin. Tsat 11%, will replete with 250 mg IV iron daily for 4 days or until discharge. If does not complete all 4 doses of IV iron, this does NOT need to hold up discharge. Continue home PO iron upon discharge.  - Continue gabapentin 800 mg Q12H.   - Due to persistent RLS symptoms, continue pramipexole.  - Duonebs PRN  - Incentive spirometer and flutter valve therapy  - Okay to continue ambien PRN at night. Has been on this for a long time.  - Psych consulted for anxiety    From a pulmonary perspective, he is doing well.  Currently on 2 L nasal cannula which can just be weaned off prior to discharge.  Recommend to continue incentive spirometer and flutter valve therapy while he is here.  Upon discharge, please continue the pramipexole at current dose.  I will get him follow-up scheduled with me in clinic in 2 to 4 weeks.  Will sign off at this time.  If there are questions or concerns, please call us.      Paolo Powers MD  [facility], [facility]  Pulmonary and Critical Care Medicine

## 2025-02-28 NOTE — PLAN OF CARE
Goal Outcome Evaluation:       Patient is alert x4.  Soft collar worn. Dressing is dry and intact.  Dilaudid PCA, roxicodone, and scheduled robaxin were ordered for pain management. Respiratory panel was ordered.  Negative for influenza or covid.  Will continue to monitor and update accordingly.

## 2025-02-28 NOTE — PROGRESS NOTES
Continued Stay Note  [facility]     Patient Name: Rafael Kat  MRN: [id number]  Today's Date: 2/28/2025    Admit Date: 2/25/2025    Plan: Home with spouse; follow for PT eval   Discharge Plan       Row Name 02/28/25 1602       Plan    Plan Comments Plan remains home and outpatient PT at d/c at this time. Not medically stable for d/c. CCP to f/u on d/c planning 3/3am and to follow patient progress with physical therapy. Awaiting eval from Dr. Veliz.                   Discharge Codes    No documentation.                 Expected Discharge Date and Time       Expected Discharge Date Expected Discharge Time    Feb 28, 2025               Karely De Los Santos RN

## 2025-02-28 NOTE — PLAN OF CARE
Goal Outcome Evaluation:              Outcome Evaluation: Pt calm, quiet and cooperative with care. Wife at bedside attentive to pt needs. Dilaudid pain pump infusing as ordered, pt has not asked for any additional pain med 'till this time.

## 2025-02-28 NOTE — PROGRESS NOTES
The patient is sleeping soundly today and is not awakened per request of wife.  She states that he did much better with the regularly scheduled Klonopin dosing, experiencing no anxiety or panic attacks after its initiation.

## 2025-02-28 NOTE — CONSULTS
"Nutrition Services    Patient Name:  Rafael Kat  YOB: 1970  MRN: [id number]  Admit Date:  2/25/2025    Assessment Date:  02/28/25    NUTRITION SCREENING      Reason for Encounter Chronic Poor Intake, Physician Consult    Diagnosis/Problem Cervical stenosis of spine,(2/25/25) s/p s/p posterior C7-T1 decompression with posterior C3-T3 fusion, GERD, dysphagia  Pt with poor po intake post-op d/t loss of appetite  MD ordered Boost BID today.       PO Diet Diet: Regular/House; Fluid Consistency: Thin (IDDSI 0)   Supplements Boost Plus, BID   PO Intake % 0-25%       Medications MAR reviewed by RD   Labs  Listed below, reviewed   Physical Findings A/Ox4, anxiety, dental appliance, obese   GI Function Last BM 2/25   Skin Status Surgical incision cervial spine       Height  Weight  BMI  Weight Trend     Height: 175.3 cm (69\")  Weight: 95.5 kg (210 lb 8.6 oz) (02/25/25 1800)  Body mass index is 31.09 kg/m². (Obese)  Stable       Nutrition Problem (PES) Inadequate oral intake related to ASHLEY as evidence by 0-25% po intake of meals.       Intervention/Plan Pt c/o decreased appetite and resulting poor po intake post-op. MD ordered Boost BID today and agree with additional of oral nutritional supplement. Encourage increased po intake of meals/ONS at all meals. Will follow for adequate po intake. Do not feel appetite stimulant is necessary at this time.     RD to follow up per protocol.     Results from last 7 days   Lab Units 02/28/25  0535 02/27/25  0537 02/26/25  0607   SODIUM mmol/L 135* 136 138   POTASSIUM mmol/L 3.9 3.7 4.2   CHLORIDE mmol/L 99 100 102   CO2 mmol/L 27.7 28.7 26.8   BUN mg/dL 11 13 18   CREATININE mg/dL 0.80 0.99 1.05   CALCIUM mg/dL 8.3* 7.9* 8.2*   GLUCOSE mg/dL 146* 114* 132*     Results from last 7 days   Lab Units 02/28/25  0535   HEMOGLOBIN g/dL 10.5*   HEMATOCRIT % 32.7*     Lab Results   Component Value Date    HGBA1C 6.1 (H) 09/25/2023         Electronically signed by:  Hailee" , ARMAND  02/28/25 14:17 EST

## 2025-02-28 NOTE — PLAN OF CARE
"Goal Outcome Evaluation:  Plan of Care Reviewed With: patient, spouse           Outcome Evaluation: Pt sleeping, spouse awakened for therapy. Pt performed bed mobility with supervision, sit to stand with SBA, ambulated in hallway with CGA/Celia with slow guarded gait mechanics, shaking at times reporting he has \"cold chills\". Pt gait steadier than before but still very slow and guarded with no arm swing or trunk or cervical rotation. Pt rates pain today at 6/10 which is improved. PT will continue to follow, noted plans for evaluation for acute rehab.    Anticipated Discharge Disposition (PT): inpatient rehabilitation facility                        "

## 2025-02-28 NOTE — PAYOR COMM NOTE
"Ricardo Kat (54 y.o. Male)    PLEASE SEE ATTACHED FOR CLINICAL UPDATES    REF#[id number]   THANK YOU  AYDE STEINER RN/ DEPT   [facility]  PH: [phone]  FAX:  [phone]    Discharge Planning assistance may be needed.   MD has put in consult for PM&R to evaluate for acute rehab. Is there a list of in-network IRF and SNF? If so can that be faxed? And Is there a point of contact for post acute needs?     Thanks      Date of Birth   1970    Social Security Number       Address   [address] [address]    Home Phone   [phone]    MRN   [id number]       Mosque   None    Marital Status                               Admission Date   2/25/25    Admission Type   Elective    Admitting Provider   Bert Collado MD    Attending Provider   Bert Collado MD    Department, Room/Bed   [address], P794/1       Discharge Date       Discharge Disposition       Discharge Destination                                 Attending Provider: Bert Collado MD    Allergies: Bacitracin-polymyxin B, Chlorhexidine, Wqlla-okwev-odeexrw-pramoxine    Isolation: None   Infection: None   Code Status: CPR    Ht: 175.3 cm (69\")   Wt: 95.5 kg (210 lb 8.6 oz)    Admission Cmt: None   Principal Problem: None                  Active Insurance as of 2/25/2025       Primary Coverage       Payor Plan Insurance Group Employer/Plan Group    [facility] [id number]       Payor Plan Address Payor Plan Phone Number Payor Plan Fax Number Effective Dates    [address] [address]   1/1/2021 - None Entered    [facility] [id number]         Subscriber Name Subscriber Birth Date Member ID       RICARDO KAT 1970 [id number]                     Emergency Contacts        (Rel.) Home Phone Work Phone Mobile Phone    Monika Kat (Spouse) [phone] -- [phone]              [place]: NPI [id number]  " Tax ID [id number]  Medication Administration Report for Rafael Kat as of 2/28/25 through 2/28/25     Legend:    Given Hold Not Given Due Canceled Entry Other Actions    Time Time (Time) Time Time-Action         Discontinued     Completed     Future     MAR Hold     Linked             Medications 02/28/25      acetaminophen (TYLENOL) tablet 650 mg  Dose: 650 mg  Freq: Every 4 Hours PRN Route: PO  PRN Reason: Mild Pain  Start: 02/25/25 1758     Admin Instructions:   If given for fever, use fever parameter: fever greater than 100.4 °F  Based on patient request - if ordered for moderate or severe pain, provider allows for administration of a medication prescribed for a lower pain scale.    Do not exceed 4 grams of acetaminophen in a 24 hr period. Max dose of 2gm for AST/ALT greater than 120 units/L.    If given for pain, use the following pain scale:   Mild Pain = Pain Score of 1-3, CPOT 1-2  Moderate Pain = Pain Score of 4-6, CPOT 3-4  Severe Pain = Pain Score of 7-10, CPOT 5-8         Or   acetaminophen (TYLENOL) 160 MG/5ML oral solution 650 mg  Dose: 650 mg  Freq: Every 4 Hours PRN Route: PO  PRN Reason: Mild Pain  Start: 02/25/25 1758     Admin Instructions:   If given for fever, use fever parameter: fever greater than 100.4 °F  Based on patient request - if ordered for moderate or severe pain, provider allows for administration of a medication prescribed for a lower pain scale.    Do not exceed 4 grams of acetaminophen in a 24 hr period. Max dose of 2gm for AST/ALT greater than 120 units/L.    If given for pain, use the following pain scale:   Mild Pain = Pain Score of 1-3, CPOT 1-2  Moderate Pain = Pain Score of 4-6, CPOT 3-4  Severe Pain = Pain Score of 7-10, CPOT 5-8         Or   acetaminophen (TYLENOL) suppository 650 mg  Dose: 650 mg  Freq: Every 4 Hours PRN Route: RE  PRN Reason: Mild Pain  Start: 02/25/25 1758     Admin Instructions:   If given for fever, use fever parameter: fever greater than 100.4  °F  Based on patient request - if ordered for moderate or severe pain, provider allows for administration of a medication prescribed for a lower pain scale.    Do not exceed 4 grams of acetaminophen in a 24 hr period. Max dose of 2gm for AST/ALT greater than 120 units/L.    If given for pain, use the following pain scale:   Mild Pain = Pain Score of 1-3, CPOT 1-2  Moderate Pain = Pain Score of 4-6, CPOT 3-4  Severe Pain = Pain Score of 7-10, CPOT 5-8         albuterol sulfate HFA (PROVENTIL HFA;VENTOLIN HFA;PROAIR HFA) inhaler 2 puff  Dose: 2 puff  Freq: Every 4 Hours PRN Route: IN  PRN Reason: Wheezing  Start: 02/25/25 1758     Admin Instructions:   Include Respiratory Treatment Education   Shake well.          sennosides-docusate (PERICOLACE) 8.6-50 MG per tablet 2 tablet  Dose: 2 tablet  Freq: 2 Times Daily Route: PO  Start: 02/25/25 2100     Admin Instructions:   HOLD MEDICATION IF PATIENT HAS HAD BOWEL MOVEMENT. Start bowel management regimen if patient has not had a bowel movement after 12 hours.      0846-Given     2100                  And   polyethylene glycol (MIRALAX) packet 17 g  Dose: 17 g  Freq: Daily PRN Route: PO  PRN Reason: Constipation  PRN Comment: Use if senna-docusate is ineffective  Start: 02/25/25 1758     Admin Instructions:   Use if no bowel movement after 12 hours. Mix in 6-8 ounces of water.  Use 4-8 ounces of water, tea, or juice for each 17 gram dose.      0846-Given                   And   bisacodyl (DULCOLAX) EC tablet 5 mg  Dose: 5 mg  Freq: Daily PRN Route: PO  PRN Reason: Constipation  PRN Comment: Use if polyethylene glycol is ineffective  Start: 02/25/25 1758     Admin Instructions:   Use if no bowel movement after 12 hours.  Swallow whole. Do not crush, split, or chew tablet.         And   bisacodyl (DULCOLAX) suppository 10 mg  Dose: 10 mg  Freq: Daily PRN Route: RE  PRN Reason: Constipation  PRN Comment: Use if bisacodyl oral is ineffective  Start: 02/25/25 1758     Admin  Instructions:   Use if no bowel movement after 12 hours.  Hold for diarrhea         busPIRone (BUSPAR) tablet 15 mg  Dose: 15 mg  Freq: 2 Times Daily Route: PO  Start: 02/27/25 2100     Admin Instructions:   Caution: Look alike/sound alike drug alert. Take with food.  Avoid grapefruit juice.      0846-Given     2100                  cetirizine (zyrTEC) tablet 10 mg  Dose: 10 mg  Freq: Daily Route: PO  Start: 02/25/25 1845      0846-Given                   clonazePAM (KlonoPIN) tablet 0.5 mg  Dose: 0.5 mg  Freq: 3 times daily Route: PO  Start: 02/27/25 1500 End: 03/06/25 1459     Admin Instructions:   Caution: Look alike/sound alike drug alert. Please read the label.  Group 2 ([place]) Hazardous Drug - Reproductive Risk Only - See Handling Guide      0846-Given     1500 2100                 Enoxaparin Sodium (LOVENOX) syringe 40 mg  Dose: 40 mg  Freq: Every 24 Hours Route: SC  Indications of Use: PROPHYLAXIS OF VENOUS THROMBOEMBOLISM  Start: 02/27/25 1430     Admin Instructions:   Give subcutaneous in abdomen only. Do not massage site after injection.      1430                   escitalopram (LEXAPRO) tablet 10 mg  Dose: 10 mg  Freq: Nightly Route: PO  Start: 02/27/25 2100     Admin Instructions:   Caution: Look alike/sound alike drug alert.      2100                   ferric gluconate (FERRLECIT) 250 MG in sodium chloride 0.9% 250 mL IVPB  Dose: 250 mg  Freq: Daily Route: IV  Start: 02/27/25 0915 End: 03/03/25 0859     Admin Instructions:   Not to exceed 2.1 mg/min     Order specific questions:   Please Select Indication for Intravenous Iron Therapy (Criteria can be seen in report to the left) Diagnosed iron deficiency anemia with severe intolerance to oral iron or has failed oral iron or have documented history of malabsorption      0846-New Bag                   gabapentin (NEURONTIN) capsule 800 mg  Dose: 800 mg  Freq: Every 12 Hours Scheduled Route: PO  Start: 02/25/25 2100     Admin Instructions:          0846-Given     2100                  HYDROmorphone (DILAUDID) injection 0.5 mg  Dose: 0.5 mg  Freq: Every 2 Hours PRN Route: IV  PRN Reason: Severe Pain  Start: 02/28/25 0909 End: 03/05/25 0908     Admin Instructions:   Based on patient request - if ordered for moderate or severe pain, provider allows for administration of a medication prescribed for a lower pain scale.  If given for pain, use the following pain scale:  Mild Pain = Pain Score of 1-3, CPOT 1-2  Moderate Pain = Pain Score of 4-6, CPOT 3-4  Severe Pain = Pain Score of 7-10, CPOT 5-8         ipratropium-albuterol (DUO-NEB) nebulizer solution 3 mL  Dose: 3 mL  Freq: Every 4 Hours PRN Route: NEBULIZATION  PRN Reason: Shortness of Air  Start: 02/27/25 1130     Admin Instructions:   Include Respiratory Treatment Education         methocarbamol (ROBAXIN) tablet 1,000 mg  Dose: 1,000 mg  Freq: Every 6 Hours Scheduled Route: PO  Start: 02/27/25 1200      0633-Given     1200     1800                 naloxone (NARCAN) injection 0.1 mg  Dose: 0.1 mg  Freq: Every 5 Minutes PRN Route: IV  PRN Reasons: Opioid Reversal,Respiratory Depression  PRN Comment: see administration instructions  Start: 02/25/25 1758     Admin Instructions:   For respiratory rate less than 8 per minute and if the patient is difficult arouse:  D/C PCA.  Give naloxone (NARCAN) 0.1 mg slow IV push.  May repeat x 1 if needed in 5 minutes. Notify physician.    Mix naloxone 0.4 mg/1 mL ampule in 9 mL normal saline.          ondansetron ODT (ZOFRAN-ODT) disintegrating tablet 4 mg  Dose: 4 mg  Freq: Every 6 Hours PRN Route: PO  PRN Reasons: Nausea,Vomiting  Start: 02/25/25 1758     Admin Instructions:   If BOTH ondansetron (ZOFRAN) and promethazine (PHENERGAN) are ordered use ondansetron first and THEN promethazine IF ondansetron is ineffective.  Place on tongue and allow to dissolve.         Or   ondansetron (ZOFRAN) injection 4 mg  Dose: 4 mg  Freq: Every 6 Hours PRN Route: IV  PRN Reasons:  Nausea,Vomiting  Start: 02/25/25 1758     Admin Instructions:   If BOTH ondansetron (ZOFRAN) and promethazine (PHENERGAN) are ordered use ondansetron first and THEN promethazine IF ondansetron is ineffective.         oxyCODONE (ROXICODONE) immediate release tablet 10 mg  Dose: 10 mg  Freq: Every 4 Hours PRN Route: PO  PRN Reason: Moderate Pain  PRN Comment: Breakthrough Pain (4-10)  Indications Comment: Breakthrough Pain  Start: 02/25/25 1758 End: 03/02/25 1757     Admin Instructions:   May be given for breakthrough pain (pain experienced despite patient reaching lockout on PCA) of 4-10.      If given for pain, use the following pain scale:  Mild Pain = Pain Score of 1-3, CPOT 1-2  Moderate Pain = Pain Score of 4-6, CPOT 3-4  Severe Pain = Pain Score of 7-10, CPOT 5-8      0915-Given                   pantoprazole (PROTONIX) EC tablet 40 mg  Dose: 40 mg  Freq: 2 Times Daily Route: PO  Start: 02/25/25 2100     Admin Instructions:   Do not crush or chew the capsules or tablets. The drug may not work as designed if the capsule or tablet is crushed or chewed. Swallow whole.  Swallow whole; do not crush, split, or chew.      0846-Given     2100                  Pharmacy Consult  Freq: Continuous PRN Route: XX  PRN Reason: Consult  Start: 02/25/25 1758     Admin Instructions:   Pharmacist to discontinue PRN opioid pain medications outside of the PCA order set or not associated with breakthrough pain at connection of PCA. If there is no basal rate on PCA, long-acting opioids may be continued. Scheduled opioids for pain are allowed while weaning patient off of PCA but PRN opioids for pain are not. If PCA is only providing basal rate PRN meds may remain.     Order specific questions:   Consult for Pharmacist to review/discontinue PRN opioid pain medications at connection of PCA.         pramipexole (MIRAPEX) tablet 0.125 mg  Dose: 0.125 mg  Freq: Nightly Route: PO  Start: 02/27/25 1900     Admin Instructions:   Give to  patient 2-3 hours before bedtime.      1900                   sodium chloride 0.9 % flush 10 mL  Dose: 10 mL  Freq: As Needed Route: IV  PRN Reason: Line Care  Start: 02/25/25 1758         sodium chloride 0.9 % flush 10 mL  Dose: 10 mL  Freq: Every 12 Hours Scheduled Route: IV  Start: 02/25/25 2100      0847-Given     2100                  sodium chloride 0.9 % infusion 40 mL  Dose: 40 mL  Freq: As Needed Route: IV  PRN Reason: Line Care  Start: 02/25/25 1758     Admin Instructions:   Following administration of an IV intermittent medication, flush line with 40mL NS at 100mL/hr.         zolpidem (AMBIEN) tablet 10 mg  Dose: 10 mg  Freq: Nightly PRN Route: PO  PRN Reason: Sleep  Start: 02/25/25 1758     Admin Instructions:            Completed Medications  Medications 02/28/25      ceFAZolin 2000 mg IVPB in 100 mL NS (MBP)  Dose: 2,000 mg  Freq: Every 8 Hours Route: IV  Indications of Use: PERIOPERATIVE PHARMACOPROPHYLAXIS  Start: 02/25/25 1845 End: 02/26/25 1322     Admin Instructions:   Time first dose from pre-op dose.  Caution: Look alike/sound alike drug alert         ceFAZolin 2000 mg IVPB in 100 mL NS (MBP)  Dose: 2 g  Freq: Once Route: IV  Indications of Use: PERIOPERATIVE PHARMACOPROPHYLAXIS  Start: 02/25/25 0644 End: 02/25/25 0839     Admin Instructions:   Administer within 1 hour of surgical incision. Redose 4 hours from pre-op dose if procedure ongoing or >1.5 L blood loss.  Caution: Look alike/sound alike drug alert         famotidine (PEPCID) injection 20 mg  Dose: 20 mg  Freq: Once Route: IV  Start: 02/25/25 0707 End: 02/25/25 0747     Admin Instructions:   Give IV push over 2 minutes.         fentaNYL citrate (PF) (SUBLIMAZE) injection 50 mcg  Dose: 50 mcg  Freq: Once As Needed Route: IV  PRN Reason: Severe Pain  Start: 02/25/25 0713 End: 02/25/25 0759     Admin Instructions:   Maximum total dose of fentanyl is 50 mcg without additional orders from physician. May be used for preoperative pain or  procedural sedation.  If given for pain, use the following pain scale:  Mild Pain = Pain Score of 1-3, CPOT 1-2  Moderate Pain = Pain Score of 4-6, CPOT 3-4  Severe Pain = Pain Score of 7-10, CPOT 5-8         HYDROcodone-acetaminophen (NORCO) 5-325 MG per tablet 1 tablet  Dose: 1 tablet  Freq: Once As Needed Route: PO  PRN Reason: Moderate Pain  Start: 02/25/25 1505 End: 02/25/25 1700     Admin Instructions:   Based on patient request - if ordered for moderate or severe pain, provider allows for administration of a medication prescribed for a lower pain scale.  [ANEESH]    Do not exceed 4 grams of acetaminophen in a 24 hr period. Max dose of 2gm for AST/ALT greater than 120 units/L        If given for pain, use the following pain scale:   Mild Pain = Pain Score of 1-3, CPOT 1-2  Moderate Pain = Pain Score of 4-6, CPOT 3-4  Severe Pain = Pain Score of 7-10, CPOT 5-8         Methocarbamol (ROBAXIN) injection 1,000 mg  Dose: 1,000 mg  Freq: Once Route: IV  Start: 02/25/25 1506 End: 02/25/25 1606     Admin Instructions:   1. For IV Admin: Give while recumbent maintain position for 15-30 min. Give slow IV push over 5 min not to exceed 3mL/min 2. For IM Admin: a max of 5mL can be administered into each gluteal region.         Discontinued Medications  Medications 02/28/25      Atropine Sulfate (PF) injection 0.4 mg  Dose: 0.4 mg  Freq: Once As Needed Route: IV  PRN Reason: Bradycardia  PRN Comment: symptomatic bradycardia (hypotension, dizziness, confusion). Notify attending anesthesiologist.  Start: 02/25/25 1505 End: 02/25/25 1751         bupivacaine (PF) (MARCAINE) 0.25 % injection  Freq: As Needed  Start: 02/25/25 1408 End: 02/25/25 1449         bupivacaine-EPINEPHrine PF (MARCAINE w/EPI) 0.25% -[id number] injection  Freq: As Needed  Start: 02/25/25 0825 End: 02/25/25 1449         busPIRone (BUSPAR) tablet 10 mg  Dose: 10 mg  Freq: 2 Times Daily Route: PO  Start: 02/25/25 2100 End: 02/27/25 1302     Admin Instructions:    Caution: Look alike/sound alike drug alert. Take with food.  Avoid grapefruit juice.         clonazePAM (KlonoPIN) tablet 0.5 mg  Dose: 0.5 mg  Freq: Every 8 Hours PRN Route: PO  PRN Reason: Anxiety  Start: 02/26/25 0843 End: 02/27/25 1302     Admin Instructions:   Caution: Look alike/sound alike drug alert. Please read the label.  Group 2 ([place]) Hazardous Drug - Reproductive Risk Only - See Handling Guide         diphenhydrAMINE (BENADRYL) injection 12.5 mg  Dose: 12.5 mg  Freq: Every 15 Minutes PRN Route: IV  PRN Reason: Itching  PRN Comment: May repeat x 1  Start: 02/25/25 1505 End: 02/25/25 1751     Admin Instructions:   Caution: Look alike/sound alike drug alert. This med may be ordered in other forms and routes. Before giving verify the last time the drug was given by any route/form.         ePHEDrine injection 5 mg  Dose: 5 mg  Freq: Once As Needed Route: IV  PRN Comment: symptomatic hypotension - Notify attending anesthesiologist if this needs to be given  Start: 02/25/25 1505 End: 02/25/25 1751     Admin Instructions:   Caution: Look alike/sound alike drug alert   Dilute with NS to 5-10 mg/mL.  Central line preferred, if unavailable use large bore IV access with frequent nurse monitoring of IV site.         famotidine (PEPCID) injection 20 mg  Dose: 20 mg  Freq: Once Route: IV  Start: 02/25/25 0715 End: 02/25/25 0813     Admin Instructions:   Give IV push over 2 minutes.         fentaNYL citrate (PF) (SUBLIMAZE) injection 50 mcg  Dose: 50 mcg  Freq: Every 5 Minutes PRN Route: IV  PRN Reason: Severe Pain  Start: 02/25/25 1505 End: 02/25/25 1751     Admin Instructions:   Maximum total dose of fentanyl is 200 mcg.  If given for pain, use the following pain scale:  Mild Pain = Pain Score of 1-3, CPOT 1-2  Moderate Pain = Pain Score of 4-6, CPOT 3-4  Severe Pain = Pain Score of 7-10, CPOT 5-8         fentaNYL citrate (PF) (SUBLIMAZE) injection 50 mcg  Dose: 50 mcg  Freq: Once As Needed Route: IV  PRN Reason:  Severe Pain  Start: 02/25/25 0657 End: 02/25/25 0813     Admin Instructions:   Maximum total dose of fentanyl is 50 mcg without additional orders from physician. May be used for preoperative pain or procedural sedation.  If given for pain, use the following pain scale:  Mild Pain = Pain Score of 1-3, CPOT 1-2  Moderate Pain = Pain Score of 4-6, CPOT 3-4  Severe Pain = Pain Score of 7-10, CPOT 5-8         flumazenil (ROMAZICON) injection 0.2 mg  Dose: 0.2 mg  Freq: As Needed Route: IV  PRN Comment: for benzodiazepine induced unresponsiveness or sedation  Start: 02/25/25 1505 End: 02/25/25 1751     Admin Instructions:   Notify Anesthesia if given  ** give IV over 15-30 seconds **         hydrALAZINE (APRESOLINE) injection 5 mg  Dose: 5 mg  Freq: Every 10 Minutes PRN Route: IV  PRN Reason: High Blood Pressure  PRN Comment: for systolic blood pressure greater than 180 mmHg or diastolic blood pressure greater than 105 mmHg  Start: 02/25/25 1505 End: 02/25/25 1751     Admin Instructions:   Up to 20 mg. If labetalol and hydralazine are both ordered, use labetalol first.  Caution: Look alike/sound alike drug alert         HYDROmorphone (DILAUDID) injection 0.5 mg  Dose: 0.5 mg  Freq: Every 5 Minutes PRN Route: IV  PRN Reason: Moderate Pain  Start: 02/25/25 1505 End: 02/25/25 1751     Admin Instructions:   Maximum total dose of hydromorphone is 2 mg.  If given for pain, use the following pain scale:  Mild Pain = Pain Score of 1-3, CPOT 1-2  Moderate Pain = Pain Score of 4-6, CPOT 3-4  Severe Pain = Pain Score of 7-10, CPOT 5-8         HYDROmorphone (DILAUDID) PCA 0.2 mg/ml 50 mL syringe  Loading Dose: 0.4 mg  PCA Dose: 0.2 mg  Lockout Interval: 7 Minutes  Basal Rate/Continuous Dose: 0.1 mg/hr  One Hour Dose Limit (Max Limit): 1 mg  Freq: Continuous Route: IV  Start: 02/25/25 1506 End: 02/28/25 1989     Admin Instructions:   Based on patient request - if ordered for moderate or severe pain, provider allows for administration  of a medication prescribed for a lower pain scale.  Please ensure carrier fluids are initiated to maintain IV patency while on PCA pump if no other IV fluid is present.  If given for pain, use the following pain scale:  Mild Pain = Pain Score of 1-3, CPOT 1-2  Moderate Pain = Pain Score of 4-6, CPOT 3-4  Severe Pain = Pain Score of 7-10, CPOT 5-8      0732-Handoff     1101-Stopped                  ipratropium-albuterol (DUO-NEB) nebulizer solution 3 mL  Dose: 3 mL  Freq: Once As Needed Route: NEBULIZATION  PRN Reasons: Wheezing,Shortness of Air  PRN Comment: bronchospasm  Start: 02/25/25 1505 End: 02/25/25 1751     Admin Instructions:   Notify Anesthesia if minineb is given         ipratropium-albuterol (DUO-NEB) nebulizer solution 3 mL  Dose: 3 mL  Freq: 4 Times Daily - RT Route: NEBULIZATION  Start: 02/25/25 0830 End: 02/27/25 1125     Admin Instructions:   Include Respiratory Treatment Education         ipratropium-albuterol (DUO-NEB) nebulizer solution 3 mL  Dose: 3 mL  Freq: 4 Times Daily - RT Route: NEBULIZATION  Start: 02/25/25 0830 End: 02/25/25 0716     Admin Instructions:   Include Respiratory Treatment Education         labetalol (NORMODYNE,TRANDATE) injection 5 mg  Dose: 5 mg  Freq: Every 5 Minutes PRN Route: IV  PRN Reason: High Blood Pressure  PRN Comment: for systolic blood pressure greater than 180 mmHg or diastolic blood pressure greater than 105 mmHg  Start: 02/25/25 1505 End: 02/25/25 1751     Admin Instructions:   Hold for heart rate less than 60.    If labetalol and hydralazine are both ordered, use labetalol first. Maximum dose of labetalol is 20mg IV while in PACU, notify anesthesiologist for further orders if needed.  Give IV Push over 2 minutes.         lactated ringers infusion  Rate: 75 mL/hr Dose: 75 mL/hr  Freq: Continuous Route: IV  Start: 02/25/25 1845 End: 02/27/25 1018         lactated ringers infusion  Rate: 9 mL/hr Dose: 9 mL/hr  Freq: Continuous Route: IV  Start: 02/25/25 0715  End: 02/25/25 0814         lactated ringers infusion  Rate: 9 mL/hr Dose: 9 mL/hr  Freq: Continuous Route: IV  Start: 02/25/25 0707 End: 02/26/25 0723         lidocaine (XYLOCAINE) 1 % injection 0.5 mL  Dose: 0.5 mL  Freq: Once As Needed Route: ID  PRN Comment: IV Start  Start: 02/25/25 0713 End: 02/25/25 1502         lidocaine (XYLOCAINE) 1 % injection 0.5 mL  Dose: 0.5 mL  Freq: Once As Needed Route: ID  PRN Comment: IV Start  Start: 02/25/25 0657 End: 02/25/25 1502         methocarbamol (ROBAXIN) tablet 750 mg  Dose: 750 mg  Freq: 4 Times Daily PRN Route: PO  PRN Reason: Muscle Spasms  Start: 02/25/25 1758 End: 02/27/25 1019         midazolam (VERSED) injection 1 mg  Dose: 1 mg  Freq: Every 5 Minutes PRN Route: IV  PRN Comment: Anxiety prophylaxis, Pre-op comfort  Start: 02/25/25 0713 End: 02/25/25 0813     Admin Instructions:   May repeat dose in 5 minutes one time then contact provider for additional orders.           midazolam (VERSED) injection 1 mg  Dose: 1 mg  Freq: Every 5 Minutes PRN Route: IV  PRN Comment: Anxiety prophylaxis, Pre-op comfort  Start: 02/25/25 0657 End: 02/25/25 1502     Admin Instructions:   May repeat dose in 5 minutes one time then contact provider for additional orders.           naloxone (NARCAN) injection 0.2 mg  Dose: 0.2 mg  Freq: As Needed Route: IV  PRN Reasons: Opioid Reversal,Respiratory Depression  PRN Comment: unresponsiveness, decrease oxygen saturation  Indications of Use: ACUTE RESPIRATORY FAILURE,OPIOID-INDUCED RESPIRATORY DEPRESSION  Start: 02/25/25 1505 End: 02/25/25 1751     Admin Instructions:   Notify Anesthesia if given         ondansetron (ZOFRAN) injection 4 mg  Dose: 4 mg  Freq: Once As Needed Route: IV  PRN Reasons: Nausea,Vomiting  Indications of Use: POSTOPERATIVE NAUSEA AND VOMITING  Start: 02/25/25 1505 End: 02/25/25 1751     Admin Instructions:   If BOTH ondansetron (ZOFRAN) and promethazine (PHENERGAN) are ordered use ondansetron first and THEN  promethazine IF ondansetron is ineffective.         oxyCODONE-acetaminophen (PERCOCET) 7.5-325 MG per tablet 1 tablet  Dose: 1 tablet  Freq: Every 4 Hours PRN Route: PO  PRN Reason: Severe Pain  Start: 02/25/25 1505 End: 02/25/25 1751     Admin Instructions:   [ANEESH]    Do not exceed 4 grams of acetaminophen in a 24 hr period. Max dose of 2gm for AST/ALT greater than 120 units/L        If given for pain, use the following pain scale:   Mild Pain = Pain Score of 1-3, CPOT 1-2  Moderate Pain = Pain Score of 4-6, CPOT 3-4  Severe Pain = Pain Score of 7-10, CPOT 5-8         Pharmacy to Dose enoxaparin (LOVENOX)  Freq: Continuous PRN Route: XX  PRN Reason: Consult  Start: 02/27/25 1307 End: 02/27/25 1317     Order specific questions:   Indication of use VTE Prophylaxis          promethazine (PHENERGAN) suppository 25 mg  Dose: 25 mg  Freq: Once As Needed Route: RE  PRN Reasons: Nausea,Vomiting  Start: 02/25/25 1505 End: 02/25/25 1751     Admin Instructions:   If BOTH ondansetron (ZOFRAN) and promethazine (PHENERGAN) are ordered use ondansetron first and THEN promethazine IF ondansetron is ineffective.         Or   promethazine (PHENERGAN) tablet 25 mg  Dose: 25 mg  Freq: Once As Needed Route: PO  PRN Reasons: Nausea,Vomiting  Start: 02/25/25 1505 End: 02/25/25 1751     Admin Instructions:   If BOTH ondansetron (ZOFRAN) and promethazine (PHENERGAN) are ordered use ondansetron first and THEN promethazine IF ondansetron is ineffective.           sodium chloride 0.9 % flush 3 mL  Dose: 3 mL  Freq: Every 12 Hours Scheduled Route: IV  Start: 02/25/25 0900 End: 02/25/25 1502         sodium chloride 0.9 % flush 3 mL  Dose: 3 mL  Freq: Every 12 Hours Scheduled Route: IV  Start: 02/25/25 0900 End: 02/25/25 1502         sodium chloride 0.9 % flush 3-10 mL  Dose: 3-10 mL  Freq: As Needed Route: IV  PRN Reason: Line Care  Start: 02/25/25 0713 End: 02/25/25 1502         sodium chloride 0.9 % flush 3-10 mL  Dose: 3-10 mL  Freq: As  Needed Route: IV  PRN Reason: Line Care  Start: 02/25/25 0657 End: 02/25/25 1502         sodium chloride 0.9 % infusion  Rate: 30 mL/hr Dose: 30 mL/hr  Freq: Continuous PRN Route: IV  PRN Comment: To maintain IV patency while on PCA pump if no other IV fluid present  Start: 02/25/25 1758 End: 02/27/25 1757         sodium chloride 1,000 mL with ceFAZolin 2 g irrigation  Freq: As Needed  Start: 02/25/25 1145 End: 02/25/25 1449         thrombin (THROMBIN-JMI) spray kit  Freq: As Needed  Start: 02/25/25 1145 End: 02/25/25 1449           Physician Progress Notes (last 24 hours)        Joe Lombardo APRN at 02/28/25 0760          Anxiety: [facility] CERVICAL POSTOP NOTE      CC:POD #3 s/p posterior C7-T1 decompression with posterior C3-T3 fusion      Subjective     Interval History: No acute issues overnight.  Continues to have a significant amount of surgical site pain but notes using PCA pump less frequently.  Spasm pain appears to be mildly better.  WBC count continues to improve and afebrile overnight.  Denies any calf pain or tenderness.  No shortness of air.  Continues to experience decreased appetite.-Boost supplements added and nutrition consult placed.  Spoke with case management and asked them about where we are on rehab placement-will look into this today.  Has not yet been evaluated by PM&R-consult has already been placed.          Objective     Vital signs in last 24 hours:  Temp:  [98.2 °F (36.8 °C)-100.5 °F (38.1 °C)] 98.2 °F (36.8 °C)  Heart Rate:  [] 81  Resp:  [16-18] 18  BP: (130-137)/(69-92) 130/78    Intake/Output this shift:  I/O this shift:  In: 360 [P.O.:360]  Out: -         LABS:  .  Results from last 7 days   Lab Units 02/28/25  0535 02/27/25  0537 02/26/25  0607   WBC 10*3/mm3 14.33* 15.57* 21.03*   HEMOGLOBIN g/dL 10.5* 10.5* 12.5*   HEMATOCRIT % 32.7* 31.5* 37.0*   PLATELETS 10*3/mm3 178 179 236     .  Results from last 7 days   Lab Units 02/28/25  0535   SODIUM mmol/L  135*   POTASSIUM mmol/L 3.9   CHLORIDE mmol/L 99   CO2 mmol/L 27.7   BUN mg/dL 11   CREATININE mg/dL 0.80   GLUCOSE mg/dL 146*   CALCIUM mg/dL 8.3*         IMAGING STUDIES:  No new neuroimaging    Meds reviewed/changed: Yes    Current Facility-Administered Medications:     acetaminophen (TYLENOL) tablet 650 mg, 650 mg, Oral, Q4H PRN, 650 mg at 02/26/25 3037 **OR** acetaminophen (TYLENOL) 160 MG/5ML oral solution 650 mg, 650 mg, Oral, Q4H PRN **OR** acetaminophen (TYLENOL) suppository 650 mg, 650 mg, Rectal, Q4H PRN, Bert Collado MD    albuterol sulfate HFA (PROVENTIL HFA;VENTOLIN HFA;PROAIR HFA) inhaler 2 puff, 2 puff, Inhalation, Q4H PRN, Bert Collado MD    sennosides-docusate (PERICOLACE) 8.6-50 MG per tablet 2 tablet, 2 tablet, Oral, BID, 2 tablet at 02/28/25 0846 **AND** polyethylene glycol (MIRALAX) packet 17 g, 17 g, Oral, Daily PRN, 17 g at 02/28/25 0846 **AND** bisacodyl (DULCOLAX) EC tablet 5 mg, 5 mg, Oral, Daily PRN **AND** bisacodyl (DULCOLAX) suppository 10 mg, 10 mg, Rectal, Daily PRN, Bert Collado MD    busPIRone (BUSPAR) tablet 15 mg, 15 mg, Oral, BID, Jordan Mcneal III, MD, 15 mg at 02/28/25 0846    cetirizine (zyrTEC) tablet 10 mg, 10 mg, Oral, Daily, Bert Collado MD, 10 mg at 02/28/25 0846    clonazePAM (KlonoPIN) tablet 0.5 mg, 0.5 mg, Oral, TID, Jordan Mcneal III, MD, 0.5 mg at 02/28/25 0846    Enoxaparin Sodium (LOVENOX) syringe 40 mg, 40 mg, Subcutaneous, Q24H, Joe Lombardo APRN, 40 mg at 02/27/25 1456    escitalopram (LEXAPRO) tablet 10 mg, 10 mg, Oral, Nightly, Jordan Mcneal III, MD, 10 mg at 02/27/25 2037    ferric gluconate (FERRLECIT) 250 MG in sodium chloride 0.9% 250 mL IVPB, 250 mg, Intravenous, Daily, Paolo Powers MD, Last Rate: 125 mL/hr at 02/28/25 0846, 250 mg at 02/28/25 0846    gabapentin (NEURONTIN) capsule 800 mg, 800 mg, Oral, Q12H, Bert Collado MD, 800 mg at 02/28/25 0846    HYDROmorphone  (DILAUDID) injection 0.5 mg, 0.5 mg, Intravenous, Q2H PRN, Joe Lombardo, APRN    ipratropium-albuterol (DUO-NEB) nebulizer solution 3 mL, 3 mL, Nebulization, Q4H PRN, Paolo Powers MD    methocarbamol (ROBAXIN) tablet 1,000 mg, 1,000 mg, Oral, Q6H, Joe Lombardo, APRN, 1,000 mg at 02/28/25 0633    naloxone (NARCAN) injection 0.1 mg, 0.1 mg, Intravenous, Q5 Min PRN, Bert Collado MD    ondansetron ODT (ZOFRAN-ODT) disintegrating tablet 4 mg, 4 mg, Oral, Q6H PRN **OR** ondansetron (ZOFRAN) injection 4 mg, 4 mg, Intravenous, Q6H PRN, Bert Collado MD    oxyCODONE (ROXICODONE) immediate release tablet 10 mg, 10 mg, Oral, Q4H PRN, Bert Collado MD, 10 mg at 02/28/25 0915    pantoprazole (PROTONIX) EC tablet 40 mg, 40 mg, Oral, BID, Bert Collado MD, 40 mg at 02/28/25 0846    Pharmacy Consult, , Not Applicable, Continuous PRN, Bert Collado MD    pramipexole (MIRAPEX) tablet 0.125 mg, 0.125 mg, Oral, Nightly, Paolo Powers MD, 0.125 mg at 02/27/25 1820    sodium chloride 0.9 % flush 10 mL, 10 mL, Intravenous, Q12H, Bert Collado MD, 10 mL at 02/28/25 0847    sodium chloride 0.9 % flush 10 mL, 10 mL, Intravenous, PRN, Bert Collado MD    sodium chloride 0.9 % infusion 40 mL, 40 mL, Intravenous, PRN, Bert Collado MD    zolpidem (AMBIEN) tablet 10 mg, 10 mg, Oral, Nightly PRN, Bert Collado MD, 10 mg at 02/27/25 0011      Physical Exam:    General:   Awake, alert, oriented x3. Speech clear with no aphasia  Neck:   Posterior cervical dressing clean dry and intact., swallow/trachea midline; ; ROM deferred;   Motor:    Normal muscle strength, bulk and tone in upper and lower extremities.  No fasciculations, rigidity, spasticity, or abnormal movements.  Reflexes:   2+ in the upper and lower extremities. No Gutiérrez, no clonus  Sensation:   Normal to light touch  Station and Gait:           Awaiting PT evaluation  Extremities:   SCD placed.  Negative pain to  "palpation in bilateral calves and no erythema or swelling in calves.  Respiratory: No shortness of air  Assessment & Plan     ASSESSMENT:      History of fusion of cervical spine    Dysphagia    Spinal stenosis of cervicothoracic region    Weakness of both hands    Weakness of both arms    Gastroesophageal reflux disease    Cervical stenosis of spine    54-year-old male day #3 s/p posterior C7-T1 decompression with posterior C3-T3 fusion.  He continues to note significant but expected postop surgical site pain.  He has some improvement in muscle spasm pain with increase in Robaxin dosage.  He is in agreement to remove PCA pump today and we will add Dilaudid IV every 2 hours as needed.     Please continue to mobilize with nursing and PT    Anxiety:on buspirone and clonazepam. Please monitor for oversedation with concurrent administration of benzodiazepines and p.o./IV opioids.    Anemia: iron infusion per pulmonology    Restless leg syndrome: Mirapex per pulmonology    Nursing to encourage regular use of incentive spirometer to prevent complications such as pneumonia.    Anorexia: Nursing to please include coverage p.o. fluid/solid intake.  Added boost supplements.  Nutrition consult.  PLAN:     DC Dilaudid PCA  Soft collar for comfort  Bowel regimen  Mobilize with PT/nursing  Pain medication/muscle relaxers as needed  Utilize ice as needed  SCD/DVT prophylaxis  Lovenox for VTE prophylaxis  PT  PM&R consult  Iron infusion per pulmonology  CBC/BMP in a.m.  Nutrition consult    I discussed the patient's findings and my recommendations with patient, family, and Dr. Collado.    During patient visit, I utilized appropriate personal protective equipment including mask and gloves.  Mask used was standard procedure mask. Appropriate PPE was worn during the entire visit.  Hand hygiene was completed before and after.      LOS: 3 days       [facility], APRN  2/28/2025  11:40 EST    \"Dictated utilizing Dragon " "dictation\".      Electronically signed by Joe Lombardo APRN at 25 1154       Adonay Greene MD at 25 2429              Name: Rafael Kat ADMIT: 2025   : 1970  PCP: Provider, No Known    MRN: [id number] LOS: 2 days   AGE/SEX: 54 y.o. male  ROOM: [facility]     Subjective     WBC improving. Tm 100.8.     Objective   Objective   Vital Signs  Temp:  [98 °F (36.7 °C)-100.8 °F (38.2 °C)] 100.5 °F (38.1 °C)  Heart Rate:  [] 96  Resp:  [14-16] 16  BP: (121-144)/(81-88) 135/81  SpO2:  [93 %-98 %] 94 %  on  Flow (L/min) (Oxygen Therapy):  [2] 2;   Device (Oxygen Therapy): nasal cannula  Body mass index is 31.09 kg/m².  Physical Exam  Constitutional:       General: He is not in acute distress.     Appearance: He is ill-appearing.   HENT:      Head: Normocephalic and atraumatic.   Eyes:      Extraocular Movements: Extraocular movements intact.      Pupils: Pupils are equal, round, and reactive to light.   Neck:      Comments: Soft cervical ice pack in place  Cardiovascular:      Rate and Rhythm: Normal rate and regular rhythm.   Pulmonary:      Effort: Pulmonary effort is normal. No respiratory distress.      Comments: Diminished at the bases  Abdominal:      General: There is no distension.      Palpations: Abdomen is soft.      Tenderness: There is no abdominal tenderness.   Musculoskeletal:      Comments: Midline upper back incision is bandaged   Neurological:      Mental Status: He is alert and oriented to person, place, and time.         Results Review     I reviewed the patient's new clinical results.  Results from last 7 days   Lab Units 25  0537 25  0607 25  1846   WBC 10*3/mm3 15.57* 21.03* 20.19*   HEMOGLOBIN g/dL 10.5* 12.5* 13.8   PLATELETS 10*3/mm3 179 236 236     Results from last 7 days   Lab Units 25  0537 25  0607   SODIUM mmol/L 136 138   POTASSIUM mmol/L 3.7 4.2   CHLORIDE mmol/L 100 102   CO2 mmol/L 28.7 26.8   BUN mg/dL 13 18 " "  CREATININE mg/dL 0.99 1.05   GLUCOSE mg/dL 114* 132*   Estimated Creatinine Clearance: 95 mL/min (by C-G formula based on SCr of 0.99 mg/dL).    Results from last 7 days   Lab Units 02/27/25  0537 02/26/25  0607   CALCIUM mg/dL 7.9* 8.2*     No results found for: \"COVID19\"  Glucose   Date/Time Value Ref Range Status   02/25/2025 1551 179 (H) 70 - 130 mg/dL Final     No results found for this or any previous visit.      XR Spine Cervical 2 or 3 View, XR Spine Thoracic 2 View  Narrative: XR SPINE CERVICAL 2 OR 3 VW-, XR SPINE THORACIC 2 VW-     INDICATIONS: Postoperative evaluation     TECHNIQUE: 3 views of the cervical spine, 3 views of the thoracic spine     COMPARISON: 1/15/2025     FINDINGS:     Anterior cervical and posterior cervicothoracic spinal fusion hardware  appears intact. No acute fracture or malalignment is noted. In the  thoracic spine, multilevel endplate spurring and mid to lower thoracic  disc space narrowing are noted.     Impression:    Postsurgical and degenerative changes.           This report was finalized on 2/26/2025 12:07 PM by Dr. Dejon Adair M.D on Workstation: [id number]     XR Chest 1 View  Narrative: XR CHEST 1 VW-        INDICATION: Leukocytosis     COMPARISON: Chest radiograph April 4, 2016     TECHNIQUE: 1 view chest     FINDINGS:      No focal opacity. No effusions. Normal mediastinal contour. Heart is  normal in size. Anterior cervical discectomy and fusion. Interval  posterior cervical thoracic fusion with keya and pedicle screws and  suspected laminectomies.     Impression: No acute cardiopulmonary process     This report was finalized on 2/26/2025 10:54 AM by Dr. Gonzalo Brennan M.D on Workstation: [id number]       Scheduled Medications  busPIRone, 15 mg, Oral, BID  cetirizine, 10 mg, Oral, Daily  clonazePAM, 0.5 mg, Oral, TID  enoxaparin, 40 mg, Subcutaneous, Q24H  escitalopram, 10 mg, Oral, Nightly  ferric gluconate, 250 mg, Intravenous, Daily  gabapentin, 800 mg, " Oral, Q12H  methocarbamol, 1,000 mg, Oral, Q6H  pantoprazole, 40 mg, Oral, BID  pramipexole, 0.125 mg, Oral, Nightly  senna-docusate sodium, 2 tablet, Oral, BID  sodium chloride, 10 mL, Intravenous, Q12H    Infusions  HYDROmorphone HCl-NaCl,   Pharmacy Consult,   sodium chloride, 30 mL/hr    Diet  Diet: Regular/House; Fluid Consistency: Thin (IDDSI 0)      Assessment/Plan     Active Hospital Problems    Diagnosis  POA    Cervical stenosis of spine [M48.02]  Yes    Gastroesophageal reflux disease [K21.9]  Yes    Weakness of both hands [R29.898]  Unknown    Weakness of both arms [R29.898]  Unknown    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]  Unknown    Dysphagia [R13.10]  Yes    History of fusion of cervical spine [Z98.1]  Not Applicable      Resolved Hospital Problems   No resolved problems to display.       54 y.o. male admitted with <principal problem not specified>.    Fever  Tm 100.8. Suspect post-op fever. CXR yesterday was negative. Obtain blood cultures, lactic acid, procal, RVP, UA     Leukocytosis  This has persisted postop day 1 of surgery.  He denies being on any steroid medications he denies being on any glucocorticoids recently.  Will obtain a chest x-ray especially since he reports having had a previous episode of pneumonia when he had spinal surgery and required admission to the ICU.  He has been on perioperative cefazolin.    Anxiety   PRN clonazepam    Spinal stenosis  Status post surgical intervention.  Management per primary  Check morning BMP and CBC  Dilaudid PCA for pain control currently     Asthma  Currently on DuoNebs. Pulm following     GERD  PPI     Hypertension  Not currently on any antihypertensives. Monitor for now, likely related to pain     Discussed with patient and family.        Adonay Greene MD  [facility]  02/27/25  15:19 EST            Electronically signed by Adonay Greene MD at 02/27/25 9122          Consult Notes (last 24 hours)     "    Jordan Mcneal III, MD at 02/27/25 1303        Consult Orders    1. Inpatient Psychiatrist Consult [[id number]] ordered by Paolo Powers MD at 02/26/25 6267                 IDENTIFYING INFORMATION: The patient is a 54-year-old white male admitted after C7-T1 decompression.  He is seen by psychiatry related to history of anxiety and \"panic attacks\".      CHIEF COMPLAINT: Panic attacks    INFORMANT: Patient and wife    RELIABILITY: Good    HISTORY OF PRESENT ILLNESS: The patient is a 56-year-old white male admitted after C7-T1 decompression.  The patient reports a history of anxiety and panic attacks for which she has been prescribed BuSpar and as needed clonazepam.  He reports that since hospitalization his anxiety has worsened considerably.  He describes shortness of air and fear of suffocating as a primary symptom.  He he reportedly had several such episodes on the first night of hospitalization.  When seen today the patient is calm and cooperative.  He admits to occasional cannabis use but denies abuse of other psychoactive substances.  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  He denies any prior history of psychiatric contact, his psychiatric meds having been prescribed by his primary care physician.    PAST PSYCHIATRIC HISTORY: As above    PAST MEDICAL HISTORY: Significant for degenerative disk disease, COPD, GERD, psoriasis, shoulder pain, history of basal skin cancer    MEDICATIONS:   Current Facility-Administered Medications   Medication Dose Route Frequency Provider Last Rate Last Admin    acetaminophen (TYLENOL) tablet 650 mg  650 mg Oral Q4H PRN Bert Collado MD   650 mg at 02/26/25 4137    Or    acetaminophen (TYLENOL) 160 MG/5ML oral solution 650 mg  650 mg Oral Q4H PRN Bert Collado MD        Or    acetaminophen (TYLENOL) suppository 650 mg  650 mg Rectal Q4H PRN Bert Collado MD        albuterol sulfate HFA (PROVENTIL HFA;VENTOLIN HFA;PROAIR HFA) inhaler 2 puff  2 puff " Inhalation Q4H PRN Bert Collado MD        sennosides-docusate (PERICOLACE) 8.6-50 MG per tablet 2 tablet  2 tablet Oral BID Bert Collado MD   2 tablet at 02/27/25 0827    And    polyethylene glycol (MIRALAX) packet 17 g  17 g Oral Daily PRN Bert Collado MD        And    bisacodyl (DULCOLAX) EC tablet 5 mg  5 mg Oral Daily PRN Bert Collado MD        And    bisacodyl (DULCOLAX) suppository 10 mg  10 mg Rectal Daily PRN Bert Collado MD        busPIRone (BUSPAR) tablet 15 mg  15 mg Oral BID Jordan Mcneal III, MD        cetirizine (zyrTEC) tablet 10 mg  10 mg Oral Daily Bert Collado MD   10 mg at 02/27/25 0826    clonazePAM (KlonoPIN) tablet 0.5 mg  0.5 mg Oral TID Jordan Mcneal III, MD        escitalopram (LEXAPRO) tablet 10 mg  10 mg Oral Nightly Jordan Mcneal III, MD        ferric gluconate (FERRLECIT) 250 MG in sodium chloride 0.9% 250 mL IVPB  250 mg Intravenous Daily Paolo Powers  mL/hr at 02/27/25 1026 250 mg at 02/27/25 1026    gabapentin (NEURONTIN) capsule 800 mg  800 mg Oral Q12H Bert Collado MD   800 mg at 02/27/25 0826    HYDROmorphone (DILAUDID) PCA 0.2 mg/ml 50 mL syringe   Intravenous Continuous Bert Collado MD   New Bag at 02/26/25 1542    ipratropium-albuterol (DUO-NEB) nebulizer solution 3 mL  3 mL Nebulization Q4H PRN Paolo Powers MD        methocarbamol (ROBAXIN) tablet 1,000 mg  1,000 mg Oral Q6H Joe Lombardo APRN   1,000 mg at 02/27/25 1119    naloxone (NARCAN) injection 0.1 mg  0.1 mg Intravenous Q5 Min PRN Bert Collado MD        ondansetron ODT (ZOFRAN-ODT) disintegrating tablet 4 mg  4 mg Oral Q6H PRN Bert Collado MD        Or    ondansetron (ZOFRAN) injection 4 mg  4 mg Intravenous Q6H PRN Bert Collado MD        oxyCODONE (ROXICODONE) immediate release tablet 10 mg  10 mg Oral Q4H PRN Bert Collado MD   10 mg at 02/27/25 0628    pantoprazole (PROTONIX) EC  tablet 40 mg  40 mg Oral BID Bert Collado MD   40 mg at 02/27/25 0826    Pharmacy Consult   Not Applicable Continuous PRN Bert Collado MD        pramipexole (MIRAPEX) tablet 0.125 mg  0.125 mg Oral Nightly Paolo Powers MD        sodium chloride 0.9 % flush 10 mL  10 mL Intravenous Q12H Bert Collado MD   10 mL at 02/27/25 0801    sodium chloride 0.9 % flush 10 mL  10 mL Intravenous PRN Bert Collado MD        sodium chloride 0.9 % infusion 40 mL  40 mL Intravenous PRN Bert Collado MD        sodium chloride 0.9 % infusion  30 mL/hr Intravenous Continuous PRN Bert Collado MD        zolpidem (AMBIEN) tablet 10 mg  10 mg Oral Nightly PRN Bert Collado MD   10 mg at 02/27/25 0011         ALLERGIES: Bacitracin polymyxin, chlorhexidine, Neosporin    FAMILY HISTORY: Noncontributory    SOCIAL HISTORY: The patient lives with his wife.  He is a danielson.  He admits to occasional use of cannabis.    MENTAL STATUS EXAM: The patient is a well-developed well-nourished white male with a neck brace in place.  He is awake alert and oriented all spheres.  His mood is mildly dysphoric his affect congruent.  Speech is relevant and coherent.  There are no deficits memory or cognition noted.  Intelligence is judged to be in the average range based on fund of knowledge, the patient is cooperative with interview.  He is currently reporting no suicidal or homicidal ideation or psychotic features.  Judgement and insight are intact.    ASSETS/LIABILITIES: Motivation for change/none    DIAGNOSTIC IMPRESSION: Adjustment disorder with anxious features, depressive disorder unspecified, possible panic disorder with agoraphobia, medical problems as previously noted    PLAN: I have started the patient on Lexapro, a more definitive treatment for anxiety and panic and have changed his clonazepam to scheduled dosing in hopes of preventing emergence of anxiety.  I have also increased his BuSpar to a more  definitive dose of 15 mg twice daily.  I will continue to follow with you.    Electronically signed by Jordan Mcneal III, MD at 02/27/25 6439

## 2025-02-28 NOTE — THERAPY TREATMENT NOTE
Patient Name: Rafael Kat  : 1970    MRN: [id number]                              Today's Date: 2025       Admit Date: 2025    Visit Dx:     ICD-10-CM ICD-9-CM   1. History of fusion of cervical spine  Z98.1 V45.4   2. Spinal stenosis of cervicothoracic region  M48.03 723.0   3. Weakness of both hands  R29.898 729.89   4. Weakness of both arms  R29.898 729.89     Patient Active Problem List   Diagnosis    Degeneration of intervertebral disc of mid-cervical region    Chronic pain    Cervical disc disorder with radiculopathy of mid-cervical region    DDD (degenerative disc disease), lumbar    Other cervical disc degeneration, high cervical region    Neck pain    Pain in thoracic spine    Muscle spasm of right shoulder    History of fusion of cervical spine    Spinal stenosis of lumbar region with neurogenic claudication    Tear of right acetabular labrum    Spinal instability of lumbar region    Spondylolisthesis at L3-L4 level    Muscle ache    Foraminal stenosis of cervical region    Cervical radiculopathy at C7    Bilateral arm pain    Chronic obstructive pulmonary disease    Dysphagia    Spinal stenosis of cervicothoracic region    Weakness of both hands    Weakness of both arms    History of lumbar surgery    Gastroesophageal reflux disease    Screening for colorectal cancer    Cervical stenosis of spine     Past Medical History:   Diagnosis Date    Anesthesia complication     PT STATES HE IS SLOW TO WAKE UP    Arthritis     COPD (chronic obstructive pulmonary disease)     STATES WHEEZES AT NIGHT AND DOES USE NIGHTLY INHALER    DDD (degenerative disc disease), cervical     DDD (degenerative disc disease), lumbar     PAIN IN RIGHT BUTTOCK AND DOWN AT TIMES RIGHT LEG, WEAKNESS RIGHT LEG AT TIMES    Dysphagia     STARTED AFTER HIS CERVICAL NECK SURGERY    GERD (gastroesophageal reflux disease)     History of COVID-19     Neck pain     Psoriasis     Shoulder pain, bilateral     Skin cancer      BASAL/SQUAMOUS     Past Surgical History:   Procedure Laterality Date    ANTERIOR CHANNEL VERTEBRECTOMY/CORPECTOMY Bilateral 03/29/2016    Procedure: C4-6 ANTERIOR CHANNEL VERTEBRECTOMY/CORPECTOMY WITH INSTRUMENTATION;  Surgeon: Bert Collado MD;  Location: [facility] MAIN OR;  Service:     CERVICAL DISCECTOMY POSTERIOR FUSION WITH BRAIN LAB N/A 2/25/2025    Procedure: Posterior cervical seven/thoracic one decompression, posterior cervical three/four/five/six/seven, thoracic one/two/three fusion with screws/rods/crosslinks with Stealth spinal navigation;  Surgeon: Bert Collado MD;  Location: [facility] MAIN OR;  Service: Neurosurgery;  Laterality: N/A;    COLONOSCOPY      COLONOSCOPY N/A 2/14/2025    Procedure: COLONOSCOPY into cecum with hot snare polypectomy, Resolutions clips x2, Tattoo of transverse polyp area;  Surgeon: Gonzalo Rodriguez MD;  Location: [facility] ENDOSCOPY;  Service: Gastroenterology;  Laterality: N/A;  Pre: Screening  Post: Polyp, Suboptimal prep    ELBOW PROCEDURE Right     UNCLEAR:  RIGHT ELBOW AREA    ENDOSCOPY N/A 2/14/2025    Procedure: ESOPHAGOGASTRODUODENOSCOPY with biopsies;  Surgeon: Gonzalo Rodriguez MD;  Location: [facility] ENDOSCOPY;  Service: Gastroenterology;  Laterality: N/A;  Pre: GERD, Dysphagia  Post: Gastritis and Esophagitis    FINGER SURGERY Left     INDEX FINGER    LUMBAR EPIDURAL INJECTION      STATES X3    LUMBAR LAMINECTOMY DISCECTOMY DECOMPRESSION Bilateral 11/28/2023    Procedure: Open bilateral lumbar decompression lumbar one/two, lumbar two/three, lumbar three/four;  Surgeon: Bert Collado MD;  Location: [facility] OR;  Service: Neurosurgery;  Laterality: Bilateral;    MENISCECTOMY Right     UPPER GASTROINTESTINAL ENDOSCOPY  03/2021      General Information       Row Name 02/28/25 1642          Physical Therapy Time and Intention    Document Type therapy note (daily note)  -EM     Mode of Treatment individual therapy;physical therapy  -EM                "User Key  (r) = Recorded By, (t) = Taken By, (c) = Cosigned By      Initials Name Provider Type    Celeste Lynch PT Physical Therapist                   Mobility       Row Name 02/28/25 1642          Bed Mobility    Supine-Sit [place] (Bed Mobility) supervision  -EM     Sit-Supine [place] (Bed Mobility) contact guard  -EM     Assistive Device (Bed Mobility) head of bed elevated  -EM       Row Name 02/28/25 1642          Sit-Stand Transfer    Sit-Stand [place] (Transfers) standby assist  -EM       Row Name 02/28/25 1642          Gait/Stairs (Locomotion)    [place] Level (Gait) contact guard;minimum assist (75% patient effort)  -EM     Distance in Feet (Gait) 100  -EM     Deviations/Abnormal Patterns (Gait) gait speed decreased;stride length decreased  -EM     Bilateral Gait Deviations decreased arm swing  -EM     Comment, (Gait/Stairs) steadier today,slow guarded ambulation  -EM               User Key  (r) = Recorded By, (t) = Taken By, (c) = Cosigned By      Initials Name Provider Type    Celeste Lynch PT Physical Therapist                   Obj/Interventions    No documentation.                  Goals/Plan    No documentation.                  Clinical Impression       Row Name 02/28/25 1643          Pain    Pretreatment Pain Rating 6/10  -EM     Pain Location neck  -EM     Pain Management Interventions premedicated for activity  -EM     Pre/Posttreatment Pain Comment pt sleeping soundly when entered, spouse states meds have been adjusted and he is more comfortable but makes him sleepy  -EM       Row Name 02/28/25 1643          Plan of Care Review    Plan of Care Reviewed With patient;spouse  -EM     Outcome Evaluation Pt sleeping, spouse awakened for therapy. Pt performed bed mobility with supervision, sit to stand with SBA, ambulated in hallway with CGA/Celia with slow guarded gait mechanics, shaking at times reporting he has \"cold chills\". Pt gait steadier than before but " still very slow and guarded with no arm swing or trunk or cervical rotation. Pt rates pain today at 6/10 which is improved. PT will continue to follow, noted plans for evaluation for acute rehab.  -EM       Row Name 02/28/25 1643          Positioning and Restraints    Pre-Treatment Position in bed  -EM     Post Treatment Position bed  -EM     In Bed sitting;call light within reach;with family/caregiver  -EM               User Key  (r) = Recorded By, (t) = Taken By, (c) = Cosigned By      Initials Name Provider Type    Celeste Lynch, PT Physical Therapist                   Outcome Measures       Row Name 02/28/25 1646 02/28/25 0849       How much help from another person do you currently need...    Turning from your back to your side while in flat bed without using bedrails? 4  -EM 4  -EE    Moving from lying on back to sitting on the side of a flat bed without bedrails? 3  -EM 3  -EE    Moving to and from a bed to a chair (including a wheelchair)? 3  -EM 3  -EE    Standing up from a chair using your arms (e.g., wheelchair, bedside chair)? 3  -EM 3  -EE    Climbing 3-5 steps with a railing? 3  -EM 3  -EE    To walk in hospital room? 3  -EM 3  -EE    AM-PAC 6 Clicks Score (PT) 19  -EM 19  -EE              User Key  (r) = Recorded By, (t) = Taken By, (c) = Cosigned By      Initials Name Provider Type    Celeste Lynch PT Physical Therapist    Lynsey Urena RN Registered Nurse                                 Physical Therapy Education       Title: PT OT SLP Therapies (In Progress)       Topic: Physical Therapy (In Progress)       Point: Mobility training (Done)       Learning Progress Summary            Patient Acceptance, E, VU by EM at 2/28/2025 1647    Acceptance, E, NR by EM at 2/27/2025 1319    Acceptance, E,TB, VU by TITA at 2/26/2025 2323    Acceptance, E, VU by EM at 2/26/2025 1622   Significant Other Acceptance, E, VU by EM at 2/28/2025 1647                      Point: Home exercise  "program (In Progress)       Learning Progress Summary            Patient Acceptance, E, NR by EM at 2/27/2025 1319    Acceptance, E,TB, VU by  at 2/26/2025 2323                      Point: Body mechanics (Done)       Learning Progress Summary            Patient Acceptance, E,TB, VU by  at 2/26/2025 2323                      Point: Precautions (Done)       Learning Progress Summary            Patient Acceptance, E,TB, VU by  at 2/26/2025 2323                                      User Key       Initials Effective Dates Name Provider Type Discipline    EM 06/16/21 -  Celeste Crook, PT Physical Therapist PT     06/09/23 -  Thelma Ferguson, RAN Registered Nurse Nurse                  PT Recommendation and Plan  Planned Therapy Interventions (PT): bed mobility training, gait training, home exercise program, patient/family education, transfer training, strengthening  Outcome Evaluation: Pt sleeping, spouse awakened for therapy. Pt performed bed mobility with supervision, sit to stand with SBA, ambulated in hallway with CGA/Celia with slow guarded gait mechanics, shaking at times reporting he has \"cold chills\". Pt gait steadier than before but still very slow and guarded with no arm swing or trunk or cervical rotation. Pt rates pain today at 6/10 which is improved. PT will continue to follow, noted plans for evaluation for acute rehab.     Time Calculation:         PT Charges       Row Name 02/28/25 1647             Time Calculation    Start Time 1607  -EM      Stop Time 1622  -EM      Time Calculation (min) 15 min  -EM      PT Received On 02/28/25  -EM      PT - Next Appointment 03/01/25  -EM         Time Calculation- PT    Total Timed Code Minutes- PT 15 minute(s)  -EM         Timed Charges    06895 - PT Therapeutic Activity Minutes 15  -EM         Total Minutes    Timed Charges Total Minutes 15  -EM       Total Minutes 15  -EM                User Key  (r) = Recorded By, (t) = Taken By, (c) = Cosigned By      " Initials Name Provider Type     Celeste Crook, PT Physical Therapist                  Therapy Charges for Today       Code Description Service Date Service Provider Modifiers Qty    64458394210 HC PT THERAPEUTIC ACT EA 15 MIN 2/27/2025 Celeste Crook, PT GP 1    99308160769  PT THERAPEUTIC ACT EA 15 MIN 2/28/2025 Celeste Crook PT GP 1            PT G-Codes  AM-PAC 6 Clicks Score (PT): 19  PT Discharge Summary  Anticipated Discharge Disposition (PT): inpatient rehabilitation facility    Celeste Crook, PT  2/28/2025

## 2025-02-28 NOTE — PROGRESS NOTES
Anxiety: [facility] CERVICAL POSTOP NOTE      CC:POD #3 s/p posterior C7-T1 decompression with posterior C3-T3 fusion      Subjective     Interval History: No acute issues overnight.  Continues to have a significant amount of surgical site pain but notes using PCA pump less frequently.  Spasm pain appears to be mildly better.  WBC count continues to improve and afebrile overnight.  Denies any calf pain or tenderness.  No shortness of air.  Continues to experience decreased appetite.-Boost supplements added and nutrition consult placed.  Spoke with case management and asked them about where we are on rehab placement-will look into this today.  Has not yet been evaluated by PM&R-consult has already been placed.          Objective     Vital signs in last 24 hours:  Temp:  [98.2 °F (36.8 °C)-100.5 °F (38.1 °C)] 98.2 °F (36.8 °C)  Heart Rate:  [] 81  Resp:  [16-18] 18  BP: (130-137)/(69-92) 130/78    Intake/Output this shift:  I/O this shift:  In: 360 [P.O.:360]  Out: -         LABS:  .  Results from last 7 days   Lab Units 02/28/25  0535 02/27/25  0537 02/26/25  0607   WBC 10*3/mm3 14.33* 15.57* 21.03*   HEMOGLOBIN g/dL 10.5* 10.5* 12.5*   HEMATOCRIT % 32.7* 31.5* 37.0*   PLATELETS 10*3/mm3 178 179 236     .  Results from last 7 days   Lab Units 02/28/25  0535   SODIUM mmol/L 135*   POTASSIUM mmol/L 3.9   CHLORIDE mmol/L 99   CO2 mmol/L 27.7   BUN mg/dL 11   CREATININE mg/dL 0.80   GLUCOSE mg/dL 146*   CALCIUM mg/dL 8.3*         IMAGING STUDIES:  No new neuroimaging    Meds reviewed/changed: Yes    Current Facility-Administered Medications:     acetaminophen (TYLENOL) tablet 650 mg, 650 mg, Oral, Q4H PRN, 650 mg at 02/26/25 2247 **OR** acetaminophen (TYLENOL) 160 MG/5ML oral solution 650 mg, 650 mg, Oral, Q4H PRN **OR** acetaminophen (TYLENOL) suppository 650 mg, 650 mg, Rectal, Q4H PRN, Collado, Bert, MD    albuterol sulfate HFA (PROVENTIL HFA;VENTOLIN HFA;PROAIR HFA) inhaler 2 puff, 2 puff,  Inhalation, Q4H PRN, Bert Collado MD    sennosides-docusate (PERICOLACE) 8.6-50 MG per tablet 2 tablet, 2 tablet, Oral, BID, 2 tablet at 02/28/25 0846 **AND** polyethylene glycol (MIRALAX) packet 17 g, 17 g, Oral, Daily PRN, 17 g at 02/28/25 0846 **AND** bisacodyl (DULCOLAX) EC tablet 5 mg, 5 mg, Oral, Daily PRN **AND** bisacodyl (DULCOLAX) suppository 10 mg, 10 mg, Rectal, Daily PRN, Bert Collado MD    busPIRone (BUSPAR) tablet 15 mg, 15 mg, Oral, BID, Jordan Mcneal III, MD, 15 mg at 02/28/25 0846    cetirizine (zyrTEC) tablet 10 mg, 10 mg, Oral, Daily, Bert Collado MD, 10 mg at 02/28/25 0846    clonazePAM (KlonoPIN) tablet 0.5 mg, 0.5 mg, Oral, TID, Jordan Mcneal III, MD, 0.5 mg at 02/28/25 0846    Enoxaparin Sodium (LOVENOX) syringe 40 mg, 40 mg, Subcutaneous, Q24H, Joe Lombardo, APRN, 40 mg at 02/27/25 1456    escitalopram (LEXAPRO) tablet 10 mg, 10 mg, Oral, Nightly, Jordan Mcneal III, MD, 10 mg at 02/27/25 2037    ferric gluconate (FERRLECIT) 250 MG in sodium chloride 0.9% 250 mL IVPB, 250 mg, Intravenous, Daily, Paolo Powers MD, Last Rate: 125 mL/hr at 02/28/25 0846, 250 mg at 02/28/25 0846    gabapentin (NEURONTIN) capsule 800 mg, 800 mg, Oral, Q12H, Bert Collado MD, 800 mg at 02/28/25 0846    HYDROmorphone (DILAUDID) injection 0.5 mg, 0.5 mg, Intravenous, Q2H PRN, Joe Lombardo APRN    ipratropium-albuterol (DUO-NEB) nebulizer solution 3 mL, 3 mL, Nebulization, Q4H PRN, Paolo Powers MD    methocarbamol (ROBAXIN) tablet 1,000 mg, 1,000 mg, Oral, Q6H, Joe Lombardo APRN, 1,000 mg at 02/28/25 0633    naloxone (NARCAN) injection 0.1 mg, 0.1 mg, Intravenous, Q5 Min PRN, Bert Collado MD    ondansetron ODT (ZOFRAN-ODT) disintegrating tablet 4 mg, 4 mg, Oral, Q6H PRN **OR** ondansetron (ZOFRAN) injection 4 mg, 4 mg, Intravenous, Q6H PRN, Bert Collado MD    oxyCODONE (ROXICODONE) immediate release tablet  10 mg, 10 mg, Oral, Q4H PRN, Bert Collado MD, 10 mg at 02/28/25 0915    pantoprazole (PROTONIX) EC tablet 40 mg, 40 mg, Oral, BID, Bert Collado MD, 40 mg at 02/28/25 0846    Pharmacy Consult, , Not Applicable, Continuous PRN, Bert Collado MD    pramipexole (MIRAPEX) tablet 0.125 mg, 0.125 mg, Oral, Nightly, Paolo Powers MD, 0.125 mg at 02/27/25 1820    sodium chloride 0.9 % flush 10 mL, 10 mL, Intravenous, Q12H, Bert Collado MD, 10 mL at 02/28/25 0847    sodium chloride 0.9 % flush 10 mL, 10 mL, Intravenous, PRN, Bert Collado MD    sodium chloride 0.9 % infusion 40 mL, 40 mL, Intravenous, PRN, Bert Collado MD    zolpidem (AMBIEN) tablet 10 mg, 10 mg, Oral, Nightly PRN, Bert Collado MD, 10 mg at 02/27/25 0011      Physical Exam:    General:   Awake, alert, oriented x3. Speech clear with no aphasia  Neck:   Posterior cervical dressing clean dry and intact., swallow/trachea midline; ; ROM deferred;   Motor:    Normal muscle strength, bulk and tone in upper and lower extremities.  No fasciculations, rigidity, spasticity, or abnormal movements.  Reflexes:   2+ in the upper and lower extremities. No Gutiérrez, no clonus  Sensation:   Normal to light touch  Station and Gait:           Awaiting PT evaluation  Extremities:   SCD placed.  Negative pain to palpation in bilateral calves and no erythema or swelling in calves.  Respiratory: No shortness of air  Assessment & Plan     ASSESSMENT:      History of fusion of cervical spine    Dysphagia    Spinal stenosis of cervicothoracic region    Weakness of both hands    Weakness of both arms    Gastroesophageal reflux disease    Cervical stenosis of spine    54-year-old male day #3 s/p posterior C7-T1 decompression with posterior C3-T3 fusion.  He continues to note significant but expected postop surgical site pain.  He has some improvement in muscle spasm pain with increase in Robaxin dosage.  He is in agreement to remove PCA pump  "today and we will add Dilaudid IV every 2 hours as needed.     Please continue to mobilize with nursing and PT    Anxiety:on buspirone and clonazepam. Please monitor for oversedation with concurrent administration of benzodiazepines and p.o./IV opioids.    Anemia: iron infusion per pulmonology    Restless leg syndrome: Mirapex per pulmonology    Nursing to encourage regular use of incentive spirometer to prevent complications such as pneumonia.    Anorexia: Nursing to please include coverage p.o. fluid/solid intake.  Added boost supplements.  Nutrition consult.  PLAN:     DC Dilaudid PCA  Soft collar for comfort  Bowel regimen  Mobilize with PT/nursing  Pain medication/muscle relaxers as needed  Utilize ice as needed  SCD/DVT prophylaxis  Lovenox for VTE prophylaxis  PT  PM&R consult  Iron infusion per pulmonology  CBC/BMP in a.m.  Nutrition consult    I discussed the patient's findings and my recommendations with patient, family, and Dr. Collado.    During patient visit, I utilized appropriate personal protective equipment including mask and gloves.  Mask used was standard procedure mask. Appropriate PPE was worn during the entire visit.  Hand hygiene was completed before and after.      LOS: 3 days       [facility] La[facility]anoSur[facility], APRN  2/28/2025  11:40 EST    \"Dictated utilizing Dragon dictation\".    "

## 2025-02-28 NOTE — PLAN OF CARE
Goal Outcome Evaluation:  Plan of Care Reviewed With: patient   Vss, nvi, dressing c/d/I, voiding per brp, ambulating assist x1, plans to d/c home when medically stable, dilaudid PCA d/cd today, prn flores and dilaudid administered for pain with noted relief, educated on bp meds and monitoring.      Progress: improving

## 2025-02-28 NOTE — PROGRESS NOTES
Name: Rafael Kat ADMIT: 2025   : 1970  PCP: Provider, No Known    MRN: [id number] LOS: 3 days   AGE/SEX: 54 y.o. male  ROOM: [facility]     Subjective     Resting comfortably. No new concerns from patient     Objective   Objective   Vital Signs  Temp:  [97.5 °F (36.4 °C)-99.5 °F (37.5 °C)] 97.5 °F (36.4 °C)  Heart Rate:  [] 86  Resp:  [16-18] 16  BP: (128-137)/(69-92) 128/81  SpO2:  [90 %-96 %] 93 %  on  Flow (L/min) (Oxygen Therapy):  [2] 2;   Device (Oxygen Therapy): nasal cannula  Body mass index is 31.09 kg/m².  Physical Exam  Constitutional:       General: He is not in acute distress.     Appearance: He is ill-appearing.   HENT:      Head: Normocephalic and atraumatic.   Eyes:      Extraocular Movements: Extraocular movements intact.      Pupils: Pupils are equal, round, and reactive to light.   Neck:      Comments: Soft cervical ice pack in place  Cardiovascular:      Rate and Rhythm: Normal rate and regular rhythm.   Pulmonary:      Effort: Pulmonary effort is normal. No respiratory distress.      Comments: Diminished at the bases  Abdominal:      General: There is no distension.      Palpations: Abdomen is soft.      Tenderness: There is no abdominal tenderness.   Musculoskeletal:      Comments: Midline upper back incision is bandaged   Neurological:      Mental Status: He is alert and oriented to person, place, and time.         Results Review     I reviewed the patient's new clinical results.  Results from last 7 days   Lab Units 25  0535 25  0537 25  0607 25  1846   WBC 10*3/mm3 14.33* 15.57* 21.03* 20.19*   HEMOGLOBIN g/dL 10.5* 10.5* 12.5* 13.8   PLATELETS 10*3/mm3 178 179 236 236     Results from last 7 days   Lab Units 25  0535 25  0537 25  0607   SODIUM mmol/L 135* 136 138   POTASSIUM mmol/L 3.9 3.7 4.2   CHLORIDE mmol/L 99 100 102   CO2 mmol/L 27.7 28.7 26.8   BUN mg/dL 11 13 18   CREATININE mg/dL 0.80 0.99 1.05   GLUCOSE mg/dL 146*  114* 132*   Estimated Creatinine Clearance: 117.5 mL/min (by C-G formula based on SCr of 0.8 mg/dL).    Results from last 7 days   Lab Units 02/28/25  0535 02/27/25  0537 02/26/25  0607   CALCIUM mg/dL 8.3* 7.9* 8.2*     Results from last 7 days   Lab Units 02/27/25  1626   PROCALCITONIN ng/mL 0.21   LACTATE mmol/L 1.7     COVID19   Date Value Ref Range Status   02/27/2025 Not Detected Not Detected - Ref. Range Final     Glucose   Date/Time Value Ref Range Status   02/25/2025 1551 179 (H) 70 - 130 mg/dL Final     No results found for this or any previous visit.      XR Spine Cervical 2 or 3 View, XR Spine Thoracic 2 View  Narrative: XR SPINE CERVICAL 2 OR 3 VW-, XR SPINE THORACIC 2 VW-     INDICATIONS: Postoperative evaluation     TECHNIQUE: 3 views of the cervical spine, 3 views of the thoracic spine     COMPARISON: 1/15/2025     FINDINGS:     Anterior cervical and posterior cervicothoracic spinal fusion hardware  appears intact. No acute fracture or malalignment is noted. In the  thoracic spine, multilevel endplate spurring and mid to lower thoracic  disc space narrowing are noted.     Impression:    Postsurgical and degenerative changes.           This report was finalized on 2/26/2025 12:07 PM by Dr. Dejon Adair M.D on Workstation: [id number]     XR Chest 1 View  Narrative: XR CHEST 1 VW-        INDICATION: Leukocytosis     COMPARISON: Chest radiograph April 4, 2016     TECHNIQUE: 1 view chest     FINDINGS:      No focal opacity. No effusions. Normal mediastinal contour. Heart is  normal in size. Anterior cervical discectomy and fusion. Interval  posterior cervical thoracic fusion with keya and pedicle screws and  suspected laminectomies.     Impression: No acute cardiopulmonary process     This report was finalized on 2/26/2025 10:54 AM by Dr. Gonzalo Brennan M.D on Workstation: [id number]       Scheduled Medications  busPIRone, 15 mg, Oral, BID  cetirizine, 10 mg, Oral, Daily  clonazePAM, 0.5 mg, Oral,  TID  enoxaparin sodium, 40 mg, Subcutaneous, Q24H  escitalopram, 10 mg, Oral, Nightly  ferric gluconate, 250 mg, Intravenous, Daily  gabapentin, 800 mg, Oral, Q12H  methocarbamol, 1,000 mg, Oral, Q6H  pantoprazole, 40 mg, Oral, BID  pramipexole, 0.125 mg, Oral, Nightly  senna-docusate sodium, 2 tablet, Oral, BID  sodium chloride, 10 mL, Intravenous, Q12H    Infusions     Diet  Diet: Regular/House; Fluid Consistency: Thin (IDDSI 0)       Assessment/Plan     Active Hospital Problems    Diagnosis  POA    Cervical stenosis of spine [M48.02]  Yes    Gastroesophageal reflux disease [K21.9]  Yes    Weakness of both hands [R29.898]  Unknown    Weakness of both arms [R29.898]  Unknown    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]  Unknown    Dysphagia [R13.10]  Yes    History of fusion of cervical spine [Z98.1]  Not Applicable      Resolved Hospital Problems   No resolved problems to display.       54 y.o. male admitted with <principal problem not specified>.    Fever  Tm 100.8. Suspect post-op fever. CXR yesterday was negative. Obtain blood cultures, lactic acid, procal, RVP, UA   -now afebrile, infectious work up negative to date.     Leukocytosis  Improving     Anxiety   PRN clonazepam    Spinal stenosis  Status post surgical intervention.  Management per primary  Check morning BMP and CBC  Dilaudid PCA for pain control currently     Asthma  Currently on DuoNebs. Pulm following     GERD  PPI     Hypertension  Not currently on any antihypertensives. Monitor for now, likely related to pain     Discussed with patient and family.        Adonay Greene MD  [facility]  02/28/25  14:17 EST

## 2025-03-01 LAB
ANION GAP SERPL CALCULATED.3IONS-SCNC: 4.8 MMOL/L (ref 5–15)
BASOPHILS # BLD AUTO: 0.02 10*3/MM3 (ref 0–0.2)
BASOPHILS NFR BLD AUTO: 0.2 % (ref 0–1.5)
BUN SERPL-MCNC: 12 MG/DL (ref 6–20)
BUN/CREAT SERPL: 11.9 (ref 7–25)
CALCIUM SPEC-SCNC: 8.6 MG/DL (ref 8.6–10.5)
CHLORIDE SERPL-SCNC: 97 MMOL/L (ref 98–107)
CO2 SERPL-SCNC: 33.2 MMOL/L (ref 22–29)
CREAT SERPL-MCNC: 1.01 MG/DL (ref 0.76–1.27)
DEPRECATED RDW RBC AUTO: 40.9 FL (ref 37–54)
EGFRCR SERPLBLD CKD-EPI 2021: 87.3 ML/MIN/1.73
EOSINOPHIL # BLD AUTO: 0.24 10*3/MM3 (ref 0–0.4)
EOSINOPHIL NFR BLD AUTO: 2.4 % (ref 0.3–6.2)
ERYTHROCYTE [DISTWIDTH] IN BLOOD BY AUTOMATED COUNT: 12.7 % (ref 12.3–15.4)
GLUCOSE SERPL-MCNC: 119 MG/DL (ref 65–99)
HCT VFR BLD AUTO: 30.9 % (ref 37.5–51)
HGB BLD-MCNC: 10.3 G/DL (ref 13–17.7)
IMM GRANULOCYTES # BLD AUTO: 0.05 10*3/MM3 (ref 0–0.05)
IMM GRANULOCYTES NFR BLD AUTO: 0.5 % (ref 0–0.5)
LYMPHOCYTES # BLD AUTO: 2.13 10*3/MM3 (ref 0.7–3.1)
LYMPHOCYTES NFR BLD AUTO: 21.3 % (ref 19.6–45.3)
MCH RBC QN AUTO: 29.6 PG (ref 26.6–33)
MCHC RBC AUTO-ENTMCNC: 33.3 G/DL (ref 31.5–35.7)
MCV RBC AUTO: 88.8 FL (ref 79–97)
MONOCYTES # BLD AUTO: 1.08 10*3/MM3 (ref 0.1–0.9)
MONOCYTES NFR BLD AUTO: 10.8 % (ref 5–12)
NEUTROPHILS NFR BLD AUTO: 6.49 10*3/MM3 (ref 1.7–7)
NEUTROPHILS NFR BLD AUTO: 64.8 % (ref 42.7–76)
NRBC BLD AUTO-RTO: 0 /100 WBC (ref 0–0.2)
PLATELET # BLD AUTO: 210 10*3/MM3 (ref 140–450)
PMV BLD AUTO: 11.3 FL (ref 6–12)
POTASSIUM SERPL-SCNC: 4.1 MMOL/L (ref 3.5–5.2)
RBC # BLD AUTO: 3.48 10*6/MM3 (ref 4.14–5.8)
SODIUM SERPL-SCNC: 135 MMOL/L (ref 136–145)
WBC NRBC COR # BLD AUTO: 10.01 10*3/MM3 (ref 3.4–10.8)

## 2025-03-01 PROCEDURE — 85025 COMPLETE CBC W/AUTO DIFF WBC: CPT

## 2025-03-01 PROCEDURE — 25010000002 NA FERRIC GLUC CPLX PER 12.5 MG: Performed by: STUDENT IN AN ORGANIZED HEALTH CARE EDUCATION/TRAINING PROGRAM

## 2025-03-01 PROCEDURE — 25810000003 SODIUM CHLORIDE 0.9 % SOLUTION: Performed by: STUDENT IN AN ORGANIZED HEALTH CARE EDUCATION/TRAINING PROGRAM

## 2025-03-01 PROCEDURE — 25010000002 ENOXAPARIN PER 10 MG

## 2025-03-01 PROCEDURE — 80048 BASIC METABOLIC PNL TOTAL CA: CPT

## 2025-03-01 PROCEDURE — 97530 THERAPEUTIC ACTIVITIES: CPT

## 2025-03-01 PROCEDURE — 25010000002 HYDROMORPHONE PER 4 MG

## 2025-03-01 PROCEDURE — 99024 POSTOP FOLLOW-UP VISIT: CPT | Performed by: NURSE PRACTITIONER

## 2025-03-01 RX ADMIN — CLONAZEPAM 0.5 MG: 0.5 TABLET ORAL at 14:45

## 2025-03-01 RX ADMIN — Medication 10 ML: at 20:47

## 2025-03-01 RX ADMIN — HYDROMORPHONE HYDROCHLORIDE 0.5 MG: 1 INJECTION, SOLUTION INTRAMUSCULAR; INTRAVENOUS; SUBCUTANEOUS at 15:47

## 2025-03-01 RX ADMIN — BISACODYL 5 MG: 5 TABLET, COATED ORAL at 08:18

## 2025-03-01 RX ADMIN — HYDROMORPHONE HYDROCHLORIDE 0.5 MG: 1 INJECTION, SOLUTION INTRAMUSCULAR; INTRAVENOUS; SUBCUTANEOUS at 02:02

## 2025-03-01 RX ADMIN — CLONAZEPAM 0.5 MG: 0.5 TABLET ORAL at 08:06

## 2025-03-01 RX ADMIN — HYDROMORPHONE HYDROCHLORIDE 0.5 MG: 1 INJECTION, SOLUTION INTRAMUSCULAR; INTRAVENOUS; SUBCUTANEOUS at 20:47

## 2025-03-01 RX ADMIN — ENOXAPARIN SODIUM 40 MG: 100 INJECTION SUBCUTANEOUS at 14:46

## 2025-03-01 RX ADMIN — OXYCODONE HYDROCHLORIDE 10 MG: 5 TABLET ORAL at 18:43

## 2025-03-01 RX ADMIN — SENNOSIDES AND DOCUSATE SODIUM 2 TABLET: 50; 8.6 TABLET ORAL at 20:41

## 2025-03-01 RX ADMIN — ESCITALOPRAM 10 MG: 20 TABLET, FILM COATED ORAL at 20:42

## 2025-03-01 RX ADMIN — METHOCARBAMOL TABLETS 1000 MG: 500 TABLET, COATED ORAL at 05:43

## 2025-03-01 RX ADMIN — METHOCARBAMOL TABLETS 1000 MG: 500 TABLET, COATED ORAL at 23:45

## 2025-03-01 RX ADMIN — Medication 10 ML: at 08:06

## 2025-03-01 RX ADMIN — CLONAZEPAM 0.5 MG: 0.5 TABLET ORAL at 20:42

## 2025-03-01 RX ADMIN — GABAPENTIN 800 MG: 400 CAPSULE ORAL at 08:07

## 2025-03-01 RX ADMIN — BUSPIRONE HYDROCHLORIDE 15 MG: 15 TABLET ORAL at 08:06

## 2025-03-01 RX ADMIN — CETIRIZINE HYDROCHLORIDE 10 MG: 10 TABLET, FILM COATED ORAL at 08:06

## 2025-03-01 RX ADMIN — SODIUM CHLORIDE 250 MG: 9 INJECTION, SOLUTION INTRAVENOUS at 08:07

## 2025-03-01 RX ADMIN — BUSPIRONE HYDROCHLORIDE 15 MG: 15 TABLET ORAL at 20:42

## 2025-03-01 RX ADMIN — SENNOSIDES AND DOCUSATE SODIUM 2 TABLET: 50; 8.6 TABLET ORAL at 08:07

## 2025-03-01 RX ADMIN — GABAPENTIN 800 MG: 400 CAPSULE ORAL at 20:42

## 2025-03-01 RX ADMIN — OXYCODONE HYDROCHLORIDE 10 MG: 5 TABLET ORAL at 14:45

## 2025-03-01 RX ADMIN — HYDROMORPHONE HYDROCHLORIDE 0.5 MG: 1 INJECTION, SOLUTION INTRAMUSCULAR; INTRAVENOUS; SUBCUTANEOUS at 08:06

## 2025-03-01 RX ADMIN — OXYCODONE HYDROCHLORIDE 10 MG: 5 TABLET ORAL at 05:43

## 2025-03-01 RX ADMIN — OXYCODONE HYDROCHLORIDE 10 MG: 5 TABLET ORAL at 10:34

## 2025-03-01 RX ADMIN — METHOCARBAMOL TABLETS 1000 MG: 500 TABLET, COATED ORAL at 12:51

## 2025-03-01 RX ADMIN — OXYCODONE HYDROCHLORIDE 10 MG: 5 TABLET ORAL at 23:45

## 2025-03-01 RX ADMIN — METHOCARBAMOL TABLETS 1000 MG: 500 TABLET, COATED ORAL at 18:43

## 2025-03-01 RX ADMIN — PANTOPRAZOLE SODIUM 40 MG: 40 TABLET, DELAYED RELEASE ORAL at 08:06

## 2025-03-01 RX ADMIN — PRAMIPEXOLE DIHYDROCHLORIDE 0.12 MG: 0.25 TABLET ORAL at 18:43

## 2025-03-01 RX ADMIN — PANTOPRAZOLE SODIUM 40 MG: 40 TABLET, DELAYED RELEASE ORAL at 20:42

## 2025-03-01 NOTE — PROGRESS NOTES
[facility] NEUROS[facility] CERVICAL PROGRESS NOTE      CC: POD 4, sp posterior C7-T1 decompression with posterior C3-T3 fusion       Subjective     Interval History:  No significant events overnight. Appetite slightly improved.     ROS:  Constitutional: No fever, chills  Neck: +neck pain  GI: No nausea, vomiting, no swallow difficulties  Neuro: No numbness, tingling, or weakness,  no balance difficulties  : no difficulty voiding, no incontinence    Objective     Vital signs in last 24 hours:  Temp:  [97.5 °F (36.4 °C)-98.9 °F (37.2 °C)] 98.5 °F (36.9 °C)  Heart Rate:  [77-86] 78  Resp:  [16-18] 18  BP: (119-130)/(72-84) 121/72    Intake/Output this shift:  I/O this shift:  In: 240 [P.O.:240]  Out: -     LABS:  Results from last 7 days   Lab Units 03/01/25  0502 02/28/25  0535 02/27/25  0537   WBC 10*3/mm3 10.01 14.33* 15.57*   HEMOGLOBIN g/dL 10.3* 10.5* 10.5*   HEMATOCRIT % 30.9* 32.7* 31.5*   PLATELETS 10*3/mm3 210 178 179      Results from last 7 days   Lab Units 03/01/25  0502 02/28/25  0535 02/27/25  0537   SODIUM mmol/L 135* 135* 136   POTASSIUM mmol/L 4.1 3.9 3.7   CHLORIDE mmol/L 97* 99 100   CO2 mmol/L 33.2* 27.7 28.7   BUN mg/dL 12 11 13   CREATININE mg/dL 1.01 0.80 0.99   CALCIUM mg/dL 8.6 8.3* 7.9*   GLUCOSE mg/dL 119* 146* 114*      2/27/25 Blood cultures No growth 24 hours   2/27/25 Lactic Acid 1.7  2/27/25 Folate 5.51  2/27/25 Vitamin B12 263    IMAGING STUDIES:  No new imaging     I personally viewed and interpreted the patient's chart.    Meds reviewed/changed: Yes  Buspar 15mg BID  Zyrtec 10mg daily   Clonazepam 0.5mg TID  Lovenox 40mg daily   Protonix 40mg BID  Mirapex 0.125 mg at night  Pericolace BID  Ferric Glyconate 250mg daily x 4 doses   Oxycodone 10mg every 4 hours prn-1 dose/12 hours  Dilaudid 0.5mg every 2 hours prn-2 doses/12 hours     Physical Exam:    General:   Awake, alert.  Neck:    Posterior cervical incision well approximated. No erythema, no edema, no drainage   Motor:    Normal  "muscle strength, bulk and tone in upper and lower extremities.  No fasciculations, rigidity, spasticity, or abnormal movements.  Reflexes:   No Gutiérrez, no clonus  Sensation:   Normal to light touch  Station and Gait:             Per PT note, patient performed bed mobility with supervision, sit to stand with SBA, ambulated in hallway with CGA/Celia with slow guarded gait mechanics, shaking at times reporting \"cold chills\". Patient gait steadier than before but very slow and guarded with no arm swing or trunk cervical rotation.   Extremities:   SCD in place    Assessment & Plan     ASSESSMENT:      History of fusion of cervical spine    Dysphagia    Spinal stenosis of cervicothoracic region    Weakness of both hands    Weakness of both arms    Gastroesophageal reflux disease    Cervical stenosis of spine    The patient is POD 4, sp posterior C7-T1 decompression with posterior C3-T3 fusion. The patient reports improvement with pain. His appetite has also improved somewhat. He is receiving an iron infusion this morning.     He did have leukocytosis at the beginning of the week but this has since resolved. He has been afebrile. Pulmonology and [facility] have both signed off.     He is working with PT. A PM&R consult was placed but is still pending.     PLAN:   Soft collar for comfort  Mobilize with PT  Utilize ice as needed  Lovenox for VTE prophylaxis   Iron infusion   PM & R consult still pending  Case management following for discharge planning    I discussed the patient's findings and my recommendations with patient and family         LOS: 4 days       Indigo Tristan, APRN  3/1/2025  10:13 EST      "

## 2025-03-01 NOTE — PROGRESS NOTES
Name: Rafael Kat ADMIT: 2025   : 1970  PCP: Provider, No Known    MRN: [id number] LOS: 4 days   AGE/SEX: 54 y.o. male  ROOM: [facility]     Subjective     Examined at bedside.     Objective   Objective   Vital Signs  Temp:  [97.5 °F (36.4 °C)-98.9 °F (37.2 °C)] 98.5 °F (36.9 °C)  Heart Rate:  [77-86] 78  Resp:  [16-18] 18  BP: (119-128)/(72-84) 121/72  SpO2:  [91 %-98 %] 91 %  on  Flow (L/min) (Oxygen Therapy):  [2] 2;   Device (Oxygen Therapy): room air  Body mass index is 31.09 kg/m².  Physical Exam  Constitutional:       General: He is not in acute distress.     Appearance: He is ill-appearing.   HENT:      Head: Normocephalic and atraumatic.   Eyes:      Extraocular Movements: Extraocular movements intact.      Pupils: Pupils are equal, round, and reactive to light.   Cardiovascular:      Rate and Rhythm: Normal rate and regular rhythm.   Pulmonary:      Effort: Pulmonary effort is normal. No respiratory distress.      Comments: Diminished at the bases  Abdominal:      General: There is no distension.      Palpations: Abdomen is soft.      Tenderness: There is no abdominal tenderness.   Musculoskeletal:      Comments: Midline upper back incision is bandaged   Neurological:      Mental Status: He is alert and oriented to person, place, and time.         Results Review     I reviewed the patient's new clinical results.  Results from last 7 days   Lab Units 25  0502 25  0535 25  0537 25  0607   WBC 10*3/mm3 10.01 14.33* 15.57* 21.03*   HEMOGLOBIN g/dL 10.3* 10.5* 10.5* 12.5*   PLATELETS 10*3/mm3 210 178 179 236     Results from last 7 days   Lab Units 25  0502 25  0535 25  0537 25  0607   SODIUM mmol/L 135* 135* 136 138   POTASSIUM mmol/L 4.1 3.9 3.7 4.2   CHLORIDE mmol/L 97* 99 100 102   CO2 mmol/L 33.2* 27.7 28.7 26.8   BUN mg/dL 12 11 13 18   CREATININE mg/dL 1.01 0.80 0.99 1.05   GLUCOSE mg/dL 119* 146* 114* 132*   Estimated Creatinine  "Clearance: 93.1 mL/min (by C-G formula based on SCr of 1.01 mg/dL).    Results from last 7 days   Lab Units 03/01/25  0502 02/28/25  0535 02/27/25  0537 02/26/25  0607   CALCIUM mg/dL 8.6 8.3* 7.9* 8.2*     Results from last 7 days   Lab Units 02/27/25  1626   PROCALCITONIN ng/mL 0.21   LACTATE mmol/L 1.7     COVID19   Date Value Ref Range Status   02/27/2025 Not Detected Not Detected - Ref. Range Final     No results found for: \"HGBA1C\", \"POCGLU\"    Results for orders placed or performed during the hospital encounter of 02/25/25   Blood Culture - Blood, Hand, Left    Specimen: Hand, Left; Blood   Result Value Ref Range    Blood Culture No growth at 24 hours          XR Spine Cervical 2 or 3 View, XR Spine Thoracic 2 View  Narrative: XR SPINE CERVICAL 2 OR 3 VW-, XR SPINE THORACIC 2 VW-     INDICATIONS: Postoperative evaluation     TECHNIQUE: 3 views of the cervical spine, 3 views of the thoracic spine     COMPARISON: 1/15/2025     FINDINGS:     Anterior cervical and posterior cervicothoracic spinal fusion hardware  appears intact. No acute fracture or malalignment is noted. In the  thoracic spine, multilevel endplate spurring and mid to lower thoracic  disc space narrowing are noted.     Impression:    Postsurgical and degenerative changes.           This report was finalized on 2/26/2025 12:07 PM by Dr. Dejon Adair M.D on Workstation: [id number]     XR Chest 1 View  Narrative: XR CHEST 1 VW-        INDICATION: Leukocytosis     COMPARISON: Chest radiograph April 4, 2016     TECHNIQUE: 1 view chest     FINDINGS:      No focal opacity. No effusions. Normal mediastinal contour. Heart is  normal in size. Anterior cervical discectomy and fusion. Interval  posterior cervical thoracic fusion with keya and pedicle screws and  suspected laminectomies.     Impression: No acute cardiopulmonary process     This report was finalized on 2/26/2025 10:54 AM by Dr. Gonzalo Brennan M.D on Workstation: [id number]   "     Scheduled Medications  busPIRone, 15 mg, Oral, BID  cetirizine, 10 mg, Oral, Daily  clonazePAM, 0.5 mg, Oral, TID  enoxaparin sodium, 40 mg, Subcutaneous, Q24H  escitalopram, 10 mg, Oral, Nightly  ferric gluconate, 250 mg, Intravenous, Daily  gabapentin, 800 mg, Oral, Q12H  methocarbamol, 1,000 mg, Oral, Q6H  pantoprazole, 40 mg, Oral, BID  pramipexole, 0.125 mg, Oral, Nightly  senna-docusate sodium, 2 tablet, Oral, BID  sodium chloride, 10 mL, Intravenous, Q12H    Infusions     Diet  Diet: Regular/House; Fluid Consistency: Thin (IDDSI 0)       Assessment/Plan     Active Hospital Problems    Diagnosis  POA    Cervical stenosis of spine [M48.02]  Yes    Gastroesophageal reflux disease [K21.9]  Yes    Weakness of both hands [R29.898]  Unknown    Weakness of both arms [R29.898]  Unknown    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]  Unknown    Dysphagia [R13.10]  Yes    History of fusion of cervical spine [Z98.1]  Not Applicable      Resolved Hospital Problems   No resolved problems to display.       54 y.o. male admitted with <principal problem not specified>.    Fever  Resolved.  Infectious workup is negative    Leukocytosis  Improving     Anxiety   PRN clonazepam    Spinal stenosis  Status post surgical intervention.  Management per primary    Asthma  Currently on DuoNebs. Pulm following     GERD  PPI     Hypertension  Not currently on any antihypertensives. Monitor for now, likely related to pain     Discussed with patient and family.    Patient is afebrile.  Vitals are normal.  Leukocytosis has resolved.  From medical standpoint he stable for discharge.  I have nothing more to add and I will sign off.  Please reconsult should any needs arise.    Adonay Greene MD  [facility]  03/01/25  12:21 EST

## 2025-03-01 NOTE — PLAN OF CARE
Goal Outcome Evaluation:  Plan of Care Reviewed With: patient        Progress: improving    A/Ox4. Calm and cooperative, flat affect. Denies panic attacks this shift. No s/s distress. Spouse at bedside. POD#4 posterior C7-T1 decompression with posterior C3-T3 fusion. Pain controlled w/ PRN Angie and Dilaudid. On scheduled Robaxin q6H. Lovenox daily. SLIV. Voiding per urinal. Last BM on 2/25. Needs attended. Up in chair this morning.

## 2025-03-01 NOTE — THERAPY TREATMENT NOTE
Patient Name: Rafael Kat  : 1970    MRN: [id number]                              Today's Date: 3/1/2025       Admit Date: 2025    Visit Dx:     ICD-10-CM ICD-9-CM   1. History of fusion of cervical spine  Z98.1 V45.4   2. Spinal stenosis of cervicothoracic region  M48.03 723.0   3. Weakness of both hands  R29.898 729.89   4. Weakness of both arms  R29.898 729.89     Patient Active Problem List   Diagnosis    Degeneration of intervertebral disc of mid-cervical region    Chronic pain    Cervical disc disorder with radiculopathy of mid-cervical region    DDD (degenerative disc disease), lumbar    Other cervical disc degeneration, high cervical region    Neck pain    Pain in thoracic spine    Muscle spasm of right shoulder    History of fusion of cervical spine    Spinal stenosis of lumbar region with neurogenic claudication    Tear of right acetabular labrum    Spinal instability of lumbar region    Spondylolisthesis at L3-L4 level    Muscle ache    Foraminal stenosis of cervical region    Cervical radiculopathy at C7    Bilateral arm pain    Chronic obstructive pulmonary disease    Dysphagia    Spinal stenosis of cervicothoracic region    Weakness of both hands    Weakness of both arms    History of lumbar surgery    Gastroesophageal reflux disease    Screening for colorectal cancer    Cervical stenosis of spine     Past Medical History:   Diagnosis Date    Anesthesia complication     PT STATES HE IS SLOW TO WAKE UP    Arthritis     COPD (chronic obstructive pulmonary disease)     STATES WHEEZES AT NIGHT AND DOES USE NIGHTLY INHALER    DDD (degenerative disc disease), cervical     DDD (degenerative disc disease), lumbar     PAIN IN RIGHT BUTTOCK AND DOWN AT TIMES RIGHT LEG, WEAKNESS RIGHT LEG AT TIMES    Dysphagia     STARTED AFTER HIS CERVICAL NECK SURGERY    GERD (gastroesophageal reflux disease)     History of COVID-19     Neck pain     Psoriasis     Shoulder pain, bilateral     Skin cancer      BASAL/SQUAMOUS     Past Surgical History:   Procedure Laterality Date    ANTERIOR CHANNEL VERTEBRECTOMY/CORPECTOMY Bilateral 03/29/2016    Procedure: C4-6 ANTERIOR CHANNEL VERTEBRECTOMY/CORPECTOMY WITH INSTRUMENTATION;  Surgeon: Bert Collado MD;  Location: [facility] MAIN OR;  Service:     CERVICAL DISCECTOMY POSTERIOR FUSION WITH BRAIN LAB N/A 2/25/2025    Procedure: Posterior cervical seven/thoracic one decompression, posterior cervical three/four/five/six/seven, thoracic one/two/three fusion with screws/rods/crosslinks with Stealth spinal navigation;  Surgeon: Bert Collado MD;  Location: [facility] MAIN OR;  Service: Neurosurgery;  Laterality: N/A;    COLONOSCOPY      COLONOSCOPY N/A 2/14/2025    Procedure: COLONOSCOPY into cecum with hot snare polypectomy, Resolutions clips x2, Tattoo of transverse polyp area;  Surgeon: Gonzalo Rodriguez MD;  Location: [facility] ENDOSCOPY;  Service: Gastroenterology;  Laterality: N/A;  Pre: Screening  Post: Polyp, Suboptimal prep    ELBOW PROCEDURE Right     UNCLEAR:  RIGHT ELBOW AREA    ENDOSCOPY N/A 2/14/2025    Procedure: ESOPHAGOGASTRODUODENOSCOPY with biopsies;  Surgeon: Gonzalo Rodriguez MD;  Location: [facility] ENDOSCOPY;  Service: Gastroenterology;  Laterality: N/A;  Pre: GERD, Dysphagia  Post: Gastritis and Esophagitis    FINGER SURGERY Left     INDEX FINGER    LUMBAR EPIDURAL INJECTION      STATES X3    LUMBAR LAMINECTOMY DISCECTOMY DECOMPRESSION Bilateral 11/28/2023    Procedure: Open bilateral lumbar decompression lumbar one/two, lumbar two/three, lumbar three/four;  Surgeon: Bert Collado MD;  Location: [facility] OR;  Service: Neurosurgery;  Laterality: Bilateral;    MENISCECTOMY Right     UPPER GASTROINTESTINAL ENDOSCOPY  03/2021      General Information       Row Name 03/01/25 1543          Physical Therapy Time and Intention    Document Type therapy note (daily note)  -EM     Mode of Treatment individual therapy;physical therapy  -EM                User Key  (r) = Recorded By, (t) = Taken By, (c) = Cosigned By      Initials Name Provider Type    Celeste Lynch PT Physical Therapist                   Mobility       Row Name 03/01/25 1544          Bed Mobility    Supine-Sit [place] (Bed Mobility) supervision  -EM     Sit-Supine [place] (Bed Mobility) contact guard  spouse assist patient with getting legs back up into bed  -EM       Row Name 03/01/25 1544          Sit-Stand Transfer    Sit-Stand [place] (Transfers) standby assist  -EM       Row Name 03/01/25 1544          Gait/Stairs (Locomotion)    [place] Level (Gait) contact guard  -EM     Distance in Feet (Gait) 200  -EM     Deviations/Abnormal Patterns (Gait) stride length decreased  -EM     Bilateral Gait Deviations decreased arm swing;heel strike decreased  -EM     Comment, (Gait/Stairs) shuffles his feet as he walks but steady with ambulation, slow pace, cues to take hands out of pockets  -EM               User Key  (r) = Recorded By, (t) = Taken By, (c) = Cosigned By      Initials Name Provider Type    Celeste Lynch PT Physical Therapist                   Obj/Interventions       Row Name 03/01/25 1545          Motor Skills    Therapeutic Exercise other (see comments)  pt reports his UEs are 'fine', no problems  -EM               User Key  (r) = Recorded By, (t) = Taken By, (c) = Cosigned By      Initials Name Provider Type    Celeste Lynch PT Physical Therapist                   Goals/Plan    No documentation.                  Clinical Impression       Row Name 03/01/25 1545          Pain    Pretreatment Pain Rating 5/10  -EM     Pain Location neck  -EM       Row Name 03/01/25 1545          Plan of Care Review    Plan of Care Reviewed With patient;spouse  -EM     Progress improving  -EM     Outcome Evaluation Pt awake and alert, agreeable to therapy. Patient reports pain somewhat better today, soft cervical collar donned once in sitting on EOB. Patient  "performed bed mobility with SBA, sit to stand with SBA, ambulated further in hallway with CGA, demonstrates steadier gait but tends to shuffle his feet, cues to take his hands out of his pocket. Pt reports his UEs are \"fine\", no issues or concerns with strength or fine motor skills. Pt and spouse report plan is to d/c home on Monday, pt is continuing to demonstrate improvement in mobility.  -EM       Row Name 03/01/25 1545          Positioning and Restraints    Pre-Treatment Position in bed  -EM     Post Treatment Position bed  -EM     In Bed supine;call light within reach;with family/caregiver  -EM               User Key  (r) = Recorded By, (t) = Taken By, (c) = Cosigned By      Initials Name Provider Type    Celeste Lynch PT Physical Therapist                   Outcome Measures       Row Name 03/01/25 1547 03/01/25 0806       How much help from another person do you currently need...    Turning from your back to your side while in flat bed without using bedrails? 4  -EM 4  -EV    Moving from lying on back to sitting on the side of a flat bed without bedrails? 3  -EM 3  -EV    Moving to and from a bed to a chair (including a wheelchair)? 3  -EM 3  -EV    Standing up from a chair using your arms (e.g., wheelchair, bedside chair)? 3  -EM 3  -EV    Climbing 3-5 steps with a railing? 3  -EM 3  -EV    To walk in hospital room? 3  -EM 3  -EV    AM-PAC 6 Clicks Score (PT) 19  -EM 19  -EV              User Key  (r) = Recorded By, (t) = Taken By, (c) = Cosigned By      Initials Name Provider Type    Celeste Lynch PT Physical Therapist    Neville Spicer RN Registered Nurse                                 Physical Therapy Education       Title: PT OT SLP Therapies (In Progress)       Topic: Physical Therapy (In Progress)       Point: Mobility training (Done)       Learning Progress Summary            Patient Acceptance, E, VU by EM at 3/1/2025 1548    Acceptance, E, VU by EM at 2/28/2025 1647    " "Acceptance, E, NR by EM at 2/27/2025 1319    Acceptance, E,TB, VU by TITA at 2/26/2025 2323    Acceptance, E, VU by EM at 2/26/2025 1622   Significant Other Acceptance, E, VU by EM at 3/1/2025 1548    Acceptance, E, VU by EM at 2/28/2025 1647                      Point: Home exercise program (In Progress)       Learning Progress Summary            Patient Acceptance, E, NR by EM at 2/27/2025 1319    Acceptance, E,TB, VU by TITA at 2/26/2025 2323                      Point: Body mechanics (Done)       Learning Progress Summary            Patient Acceptance, E,TB, VU by TITA at 2/26/2025 2323                      Point: Precautions (Done)       Learning Progress Summary            Patient Acceptance, E,TB, VU by  at 2/26/2025 2323                                      User Key       Initials Effective Dates Name Provider Type Discipline    EM 06/16/21 -  Celeste Crook PT Physical Therapist PT    TITA 06/09/23 -  Thelma Ferguson RN Registered Nurse Nurse                  PT Recommendation and Plan  Planned Therapy Interventions (PT): bed mobility training, gait training, home exercise program, patient/family education, transfer training, strengthening  Progress: improving  Outcome Evaluation: Pt awake and alert, agreeable to therapy. Patient reports pain somewhat better today, soft cervical collar donned once in sitting on EOB. Patient performed bed mobility with SBA, sit to stand with SBA, ambulated further in hallway with CGA, demonstrates steadier gait but tends to shuffle his feet, cues to take his hands out of his pocket. Pt reports his UEs are \"fine\", no issues or concerns with strength or fine motor skills. Pt and spouse report plan is to d/c home on Monday, pt is continuing to demonstrate improvement in mobility.     Time Calculation:         PT Charges       Row Name 03/01/25 1548             Time Calculation    Start Time 1524  -EM      Stop Time 1538  -EM      Time Calculation (min) 14 min  -EM      PT " Received On 03/01/25  -EM      PT - Next Appointment 03/03/25  -EM         Time Calculation- PT    Total Timed Code Minutes- PT 14 minute(s)  -EM         Timed Charges    14753 - PT Therapeutic Activity Minutes 14  -EM         Total Minutes    Timed Charges Total Minutes 14  -EM       Total Minutes 14  -EM                User Key  (r) = Recorded By, (t) = Taken By, (c) = Cosigned By      Initials Name Provider Type     Celeste Crook, PT Physical Therapist                  Therapy Charges for Today       Code Description Service Date Service Provider Modifiers Qty    [id number]  PT THERAPEUTIC ACT EA 15 MIN 2/28/2025 Celeste Crook, PT GP 1    [id number]  PT THERAPEUTIC ACT EA 15 MIN 3/1/2025 Celeste Crook, PT GP 1            PT G-Codes  AM-PAC 6 Clicks Score (PT): 19  PT Discharge Summary  Anticipated Discharge Disposition (PT): home with assist    Celeste Crook PT  3/1/2025

## 2025-03-01 NOTE — PROGRESS NOTES
Patient seen, chart reviewed and discussed with staff. Per staff, patient has been cooperative, compliant with medications and no behavioral problems noted.   Patient found lying in bed, dressed in hospital gown, and appears luis be in no apparent distress. Wife is at bedside and adds collateral information for interview.   He was polite and cooperative with the interview process and is alert and oriented x 4. His mood is mildly anxious and affect slightly blunted. He denies thoughts of suicidal or homicidal ideation or auditory or visual hallucinations.    Both Mr. Kat and wife state that he continues to do better on scheduled klonopin for anxiety and panic. He had brief episode yesterday of panic onset that lasted approximately 30 seconds per wife in which he became short of breath, his heart was racing, and he felt panicked, however it quickly went away.   He is tolerating the initiation of Lexapro and increase of Buspar.   We will continue these medications as ordered for now. Psychiatry will continue to follow.

## 2025-03-01 NOTE — PLAN OF CARE
"Goal Outcome Evaluation:  Plan of Care Reviewed With: patient, spouse        Progress: improving  Outcome Evaluation: Pt awake and alert, agreeable to therapy. Patient reports pain somewhat better today, soft cervical collar donned once in sitting on EOB. Patient performed bed mobility with SBA, sit to stand with SBA, ambulated further in hallway with CGA, demonstrates steadier gait but tends to shuffle his feet, cues to take his hands out of his pocket. Pt reports his UEs are \"fine\", no issues or concerns with strength or fine motor skills. Pt and spouse report plan is to d/c home on Monday, pt is continuing to demonstrate improvement in mobility.    Anticipated Discharge Disposition (PT): home with assist                        "

## 2025-03-02 LAB
DEPRECATED RDW RBC AUTO: 41.3 FL (ref 37–54)
ERYTHROCYTE [DISTWIDTH] IN BLOOD BY AUTOMATED COUNT: 12.7 % (ref 12.3–15.4)
HCT VFR BLD AUTO: 31.4 % (ref 37.5–51)
HGB BLD-MCNC: 10.5 G/DL (ref 13–17.7)
MCH RBC QN AUTO: 29.7 PG (ref 26.6–33)
MCHC RBC AUTO-ENTMCNC: 33.4 G/DL (ref 31.5–35.7)
MCV RBC AUTO: 89 FL (ref 79–97)
PLATELET # BLD AUTO: 261 10*3/MM3 (ref 140–450)
PMV BLD AUTO: 10.8 FL (ref 6–12)
RBC # BLD AUTO: 3.53 10*6/MM3 (ref 4.14–5.8)
WBC NRBC COR # BLD AUTO: 9.56 10*3/MM3 (ref 3.4–10.8)

## 2025-03-02 PROCEDURE — 99024 POSTOP FOLLOW-UP VISIT: CPT | Performed by: NURSE PRACTITIONER

## 2025-03-02 PROCEDURE — 25010000002 HYDROMORPHONE PER 4 MG

## 2025-03-02 PROCEDURE — 25010000002 ENOXAPARIN PER 10 MG

## 2025-03-02 PROCEDURE — 25810000003 SODIUM CHLORIDE 0.9 % SOLUTION: Performed by: STUDENT IN AN ORGANIZED HEALTH CARE EDUCATION/TRAINING PROGRAM

## 2025-03-02 PROCEDURE — 25010000002 NA FERRIC GLUC CPLX PER 12.5 MG: Performed by: STUDENT IN AN ORGANIZED HEALTH CARE EDUCATION/TRAINING PROGRAM

## 2025-03-02 PROCEDURE — 85027 COMPLETE CBC AUTOMATED: CPT | Performed by: NURSE PRACTITIONER

## 2025-03-02 RX ORDER — CALCIUM CARBONATE 500 MG/1
1 TABLET, CHEWABLE ORAL 3 TIMES DAILY PRN
Status: DISCONTINUED | OUTPATIENT
Start: 2025-03-02 | End: 2025-03-04 | Stop reason: HOSPADM

## 2025-03-02 RX ADMIN — ENOXAPARIN SODIUM 40 MG: 100 INJECTION SUBCUTANEOUS at 16:16

## 2025-03-02 RX ADMIN — OXYCODONE HYDROCHLORIDE 10 MG: 5 TABLET ORAL at 22:18

## 2025-03-02 RX ADMIN — BISACODYL 5 MG: 5 TABLET, COATED ORAL at 08:43

## 2025-03-02 RX ADMIN — PRAMIPEXOLE DIHYDROCHLORIDE 0.12 MG: 0.25 TABLET ORAL at 18:03

## 2025-03-02 RX ADMIN — HYDROMORPHONE HYDROCHLORIDE 0.5 MG: 1 INJECTION, SOLUTION INTRAMUSCULAR; INTRAVENOUS; SUBCUTANEOUS at 02:24

## 2025-03-02 RX ADMIN — BUSPIRONE HYDROCHLORIDE 15 MG: 15 TABLET ORAL at 20:34

## 2025-03-02 RX ADMIN — OXYCODONE HYDROCHLORIDE 10 MG: 5 TABLET ORAL at 11:54

## 2025-03-02 RX ADMIN — METHOCARBAMOL TABLETS 1000 MG: 500 TABLET, COATED ORAL at 06:14

## 2025-03-02 RX ADMIN — GABAPENTIN 800 MG: 400 CAPSULE ORAL at 08:42

## 2025-03-02 RX ADMIN — OXYCODONE HYDROCHLORIDE 10 MG: 5 TABLET ORAL at 16:16

## 2025-03-02 RX ADMIN — PANTOPRAZOLE SODIUM 40 MG: 40 TABLET, DELAYED RELEASE ORAL at 08:43

## 2025-03-02 RX ADMIN — SENNOSIDES AND DOCUSATE SODIUM 2 TABLET: 50; 8.6 TABLET ORAL at 08:42

## 2025-03-02 RX ADMIN — GABAPENTIN 800 MG: 400 CAPSULE ORAL at 20:36

## 2025-03-02 RX ADMIN — CLONAZEPAM 0.5 MG: 0.5 TABLET ORAL at 20:36

## 2025-03-02 RX ADMIN — SODIUM CHLORIDE 250 MG: 9 INJECTION, SOLUTION INTRAVENOUS at 08:43

## 2025-03-02 RX ADMIN — METHOCARBAMOL TABLETS 1000 MG: 500 TABLET, COATED ORAL at 11:00

## 2025-03-02 RX ADMIN — HYDROMORPHONE HYDROCHLORIDE 0.5 MG: 1 INJECTION, SOLUTION INTRAMUSCULAR; INTRAVENOUS; SUBCUTANEOUS at 08:43

## 2025-03-02 RX ADMIN — OXYCODONE HYDROCHLORIDE 10 MG: 5 TABLET ORAL at 06:14

## 2025-03-02 RX ADMIN — Medication 10 ML: at 08:44

## 2025-03-02 RX ADMIN — ESCITALOPRAM 10 MG: 20 TABLET, FILM COATED ORAL at 20:36

## 2025-03-02 RX ADMIN — CLONAZEPAM 0.5 MG: 0.5 TABLET ORAL at 16:16

## 2025-03-02 RX ADMIN — HYDROMORPHONE HYDROCHLORIDE 0.5 MG: 1 INJECTION, SOLUTION INTRAMUSCULAR; INTRAVENOUS; SUBCUTANEOUS at 18:51

## 2025-03-02 RX ADMIN — CLONAZEPAM 0.5 MG: 0.5 TABLET ORAL at 08:43

## 2025-03-02 RX ADMIN — METHOCARBAMOL TABLETS 1000 MG: 500 TABLET, COATED ORAL at 18:03

## 2025-03-02 RX ADMIN — CETIRIZINE HYDROCHLORIDE 10 MG: 10 TABLET, FILM COATED ORAL at 08:43

## 2025-03-02 RX ADMIN — ANTACID TABLETS 1 TABLET: 500 TABLET, CHEWABLE ORAL at 10:59

## 2025-03-02 RX ADMIN — PANTOPRAZOLE SODIUM 40 MG: 40 TABLET, DELAYED RELEASE ORAL at 20:36

## 2025-03-02 RX ADMIN — BUSPIRONE HYDROCHLORIDE 15 MG: 15 TABLET ORAL at 08:43

## 2025-03-02 NOTE — PLAN OF CARE
Problem: Adult Inpatient Plan of Care  Goal: Plan of Care Review  Outcome: Progressing  Goal: Patient-Specific Goal (Individualized)  Outcome: Progressing  Goal: Absence of Hospital-Acquired Illness or Injury  Outcome: Progressing  Intervention: Identify and Manage Fall Risk  Recent Flowsheet Documentation  Taken 3/2/2025 1432 by Neville aJnsen RN  Safety Promotion/Fall Prevention: safety round/check completed  Taken 3/2/2025 1154 by Neville Jansen RN  Safety Promotion/Fall Prevention: safety round/check completed  Taken 3/2/2025 0945 by Neville Jansen RN  Safety Promotion/Fall Prevention: safety round/check completed  Taken 3/2/2025 0840 by Neville Jansen RN  Safety Promotion/Fall Prevention: safety round/check completed  Goal: Optimal Comfort and Wellbeing  Outcome: Progressing  Intervention: Monitor Pain and Promote Comfort  Recent Flowsheet Documentation  Taken 3/2/2025 1154 by Neville Jansen RN  Pain Management Interventions: pain medication given  Taken 3/2/2025 0840 by Neville Jansen RN  Pain Management Interventions: pain medication given  Goal: Readiness for Transition of Care  Outcome: Progressing   Goal Outcome Evaluation:      VSS. PRN meds given for pain. No bm since admission, bowel sounds present x4, abd soft and non tender, able to pass gas, PRN dulcolax po given,   suppository offered but refused.

## 2025-03-02 NOTE — PROGRESS NOTES
[facility] CERVICAL PROGRESS NOTE      CC: POD 5, sp posterior C7-T1 decompression with posterior C3-T3 fusion       Subjective     Interval History:      ROS:  Constitutional: No fever, chills  Neck: +neck pain  GI: No nausea, vomiting, no swallow difficulties  Neuro: No numbness, tingling, or weakness,  no balance difficulties  : no difficulty voiding, no incontinence    Objective     Vital signs in last 24 hours:  Temp:  [98.2 °F (36.8 °C)-98.8 °F (37.1 °C)] 98.7 °F (37.1 °C)  Heart Rate:  [78-82] 81  Resp:  [18] 18  BP: (117-130)/(72-85) 117/81    Intake/Output this shift:  No intake/output data recorded.    LABS:  Results from last 7 days   Lab Units 03/02/25  0427 03/01/25  0502 02/28/25  0535   WBC 10*3/mm3 9.56 10.01 14.33*   HEMOGLOBIN g/dL 10.5* 10.3* 10.5*   HEMATOCRIT % 31.4* 30.9* 32.7*   PLATELETS 10*3/mm3 261 210 178      Results from last 7 days   Lab Units 03/01/25  0502 02/28/25  0535 02/27/25  0537   SODIUM mmol/L 135* 135* 136   POTASSIUM mmol/L 4.1 3.9 3.7   CHLORIDE mmol/L 97* 99 100   CO2 mmol/L 33.2* 27.7 28.7   BUN mg/dL 12 11 13   CREATININE mg/dL 1.01 0.80 0.99   CALCIUM mg/dL 8.6 8.3* 7.9*   GLUCOSE mg/dL 119* 146* 114*      2/27/25 Respiratory Panel Negative   2/27/25 Procalcitonin 0.21  2/27/25 Blood cultures No growth 2 days   2/27/25 Lactic Acid 1.7  2/27/25 Folate 5.51  2/27/25 Vitamin B12 263    IMAGING STUDIES:  No new imaging     I personally viewed and interpreted the patient's chart.    Meds reviewed/changed: Yes  Buspar 15mg BID  Zyrtec 10mg daily   Clonazepam 0.5mg TID  Lovenox 40mg daily   Lexapro 10mg daily   Gabapentin 800 mg BID  Robaxin 1000 mg every 6 hours   Protonix 40mg BID  Mirapex 0.125 mg at night  Pericolace BID  Ferric Glyconate 250mg daily x 4 doses   Oxycodone 10mg every 4 hours prn-1 dose/12 hours  Dilaudid 0.5mg every 2 hours prn-1 dose/12 hours     Physical Exam:  General:                                 Awake, alert.  Neck:                        "               Posterior cervical incision well approximated. No erythema, no edema, no drainage   Motor:                                     Normal muscle strength, bulk and tone in upper and lower extremities.  No fasciculations, rigidity, spasticity, or abnormal movements.  Reflexes:                                No Gutiérrez, no clonus  Sensation:                              Normal to light touch  Station and Gait:                   Per PT note, patient performed bed mobility with supervision, sit to stand with SBA, ambulated in hallway with CGA/Celia with slow guarded gait mechanics, shaking at times reporting \"cold chills\". Patient gait steadier than before but very slow and guarded with no arm swing or trunk cervical rotation.   Extremities:                           SCD in place      Assessment & Plan     ASSESSMENT:      History of fusion of cervical spine    Dysphagia    Spinal stenosis of cervicothoracic region    Weakness of both hands    Weakness of both arms    Gastroesophageal reflux disease    Cervical stenosis of spine    The patient is POD 5, sp posterior C7-T1 decompression with posterior C3-T3 fusion   The patient reports improvement with pain. He is receiving the fourth iron infusion this morning.      He did have leukocytosis at the beginning of the week but this has since resolved. He has been afebrile. Pulmonology and [facility] have both signed off.      He is working with PT. A PM&R consult was placed but is still pending.       PLAN:   Soft collar for comfort  Mobilize with PT  Utilize ice as needed  Lovenox for VTE prophylaxis   Iron infusion   PM & R consult still pending  Case management following for discharge planning    I discussed the patient's findings and my recommendations with patient and family         LOS: 5 days       Indigo Tristan, APRN  3/2/2025  08:18 EST      "

## 2025-03-03 PROCEDURE — 97530 THERAPEUTIC ACTIVITIES: CPT

## 2025-03-03 PROCEDURE — 25010000002 HYDROMORPHONE PER 4 MG

## 2025-03-03 PROCEDURE — 25010000002 ENOXAPARIN PER 10 MG

## 2025-03-03 PROCEDURE — 99024 POSTOP FOLLOW-UP VISIT: CPT | Performed by: NURSE PRACTITIONER

## 2025-03-03 RX ORDER — HYDROMORPHONE HYDROCHLORIDE 1 MG/ML
0.5 INJECTION, SOLUTION INTRAMUSCULAR; INTRAVENOUS; SUBCUTANEOUS EVERY 4 HOURS PRN
Status: DISCONTINUED | OUTPATIENT
Start: 2025-03-03 | End: 2025-03-04

## 2025-03-03 RX ADMIN — SENNOSIDES AND DOCUSATE SODIUM 2 TABLET: 50; 8.6 TABLET ORAL at 08:23

## 2025-03-03 RX ADMIN — METHOCARBAMOL TABLETS 1000 MG: 500 TABLET, COATED ORAL at 12:08

## 2025-03-03 RX ADMIN — PANTOPRAZOLE SODIUM 40 MG: 40 TABLET, DELAYED RELEASE ORAL at 08:23

## 2025-03-03 RX ADMIN — CLONAZEPAM 0.5 MG: 0.5 TABLET ORAL at 20:50

## 2025-03-03 RX ADMIN — OXYCODONE HYDROCHLORIDE 10 MG: 5 TABLET ORAL at 16:41

## 2025-03-03 RX ADMIN — METHOCARBAMOL TABLETS 1000 MG: 500 TABLET, COATED ORAL at 18:40

## 2025-03-03 RX ADMIN — BUSPIRONE HYDROCHLORIDE 15 MG: 15 TABLET ORAL at 08:23

## 2025-03-03 RX ADMIN — BUSPIRONE HYDROCHLORIDE 15 MG: 15 TABLET ORAL at 20:50

## 2025-03-03 RX ADMIN — METHOCARBAMOL TABLETS 1000 MG: 500 TABLET, COATED ORAL at 00:02

## 2025-03-03 RX ADMIN — OXYCODONE HYDROCHLORIDE 10 MG: 5 TABLET ORAL at 08:23

## 2025-03-03 RX ADMIN — CLONAZEPAM 0.5 MG: 0.5 TABLET ORAL at 15:16

## 2025-03-03 RX ADMIN — PRAMIPEXOLE DIHYDROCHLORIDE 0.12 MG: 0.25 TABLET ORAL at 18:40

## 2025-03-03 RX ADMIN — METHOCARBAMOL TABLETS 1000 MG: 500 TABLET, COATED ORAL at 06:49

## 2025-03-03 RX ADMIN — Medication 10 ML: at 20:41

## 2025-03-03 RX ADMIN — ESCITALOPRAM 10 MG: 20 TABLET, FILM COATED ORAL at 20:50

## 2025-03-03 RX ADMIN — OXYCODONE HYDROCHLORIDE 10 MG: 5 TABLET ORAL at 20:50

## 2025-03-03 RX ADMIN — CETIRIZINE HYDROCHLORIDE 10 MG: 10 TABLET, FILM COATED ORAL at 08:23

## 2025-03-03 RX ADMIN — OXYCODONE HYDROCHLORIDE 10 MG: 5 TABLET ORAL at 12:08

## 2025-03-03 RX ADMIN — GABAPENTIN 800 MG: 400 CAPSULE ORAL at 08:23

## 2025-03-03 RX ADMIN — HYDROMORPHONE HYDROCHLORIDE 0.5 MG: 1 INJECTION, SOLUTION INTRAMUSCULAR; INTRAVENOUS; SUBCUTANEOUS at 04:18

## 2025-03-03 RX ADMIN — CLONAZEPAM 0.5 MG: 0.5 TABLET ORAL at 08:23

## 2025-03-03 RX ADMIN — ENOXAPARIN SODIUM 40 MG: 100 INJECTION SUBCUTANEOUS at 15:16

## 2025-03-03 RX ADMIN — GABAPENTIN 800 MG: 400 CAPSULE ORAL at 20:50

## 2025-03-03 RX ADMIN — Medication 10 ML: at 08:23

## 2025-03-03 RX ADMIN — PANTOPRAZOLE SODIUM 40 MG: 40 TABLET, DELAYED RELEASE ORAL at 20:50

## 2025-03-03 NOTE — THERAPY TREATMENT NOTE
Patient Name: Rafael Kat  : 1970    MRN: [id number]                              Today's Date: 3/3/2025       Admit Date: 2025    Visit Dx:     ICD-10-CM ICD-9-CM   1. History of fusion of cervical spine  Z98.1 V45.4   2. Spinal stenosis of cervicothoracic region  M48.03 723.0   3. Weakness of both hands  R29.898 729.89   4. Weakness of both arms  R29.898 729.89     Patient Active Problem List   Diagnosis    Degeneration of intervertebral disc of mid-cervical region    Chronic pain    Cervical disc disorder with radiculopathy of mid-cervical region    DDD (degenerative disc disease), lumbar    Other cervical disc degeneration, high cervical region    Neck pain    Pain in thoracic spine    Muscle spasm of right shoulder    History of fusion of cervical spine    Spinal stenosis of lumbar region with neurogenic claudication    Tear of right acetabular labrum    Spinal instability of lumbar region    Spondylolisthesis at L3-L4 level    Muscle ache    Foraminal stenosis of cervical region    Cervical radiculopathy at C7    Bilateral arm pain    Chronic obstructive pulmonary disease    Dysphagia    Spinal stenosis of cervicothoracic region    Weakness of both hands    Weakness of both arms    History of lumbar surgery    Gastroesophageal reflux disease    Screening for colorectal cancer    Cervical stenosis of spine     Past Medical History:   Diagnosis Date    Anesthesia complication     PT STATES HE IS SLOW TO WAKE UP    Arthritis     COPD (chronic obstructive pulmonary disease)     STATES WHEEZES AT NIGHT AND DOES USE NIGHTLY INHALER    DDD (degenerative disc disease), cervical     DDD (degenerative disc disease), lumbar     PAIN IN RIGHT BUTTOCK AND DOWN AT TIMES RIGHT LEG, WEAKNESS RIGHT LEG AT TIMES    Dysphagia     STARTED AFTER HIS CERVICAL NECK SURGERY    GERD (gastroesophageal reflux disease)     History of COVID-19     Neck pain     Psoriasis     Shoulder pain, bilateral     Skin cancer      BASAL/SQUAMOUS     Past Surgical History:   Procedure Laterality Date    ANTERIOR CHANNEL VERTEBRECTOMY/CORPECTOMY Bilateral 03/29/2016    Procedure: C4-6 ANTERIOR CHANNEL VERTEBRECTOMY/CORPECTOMY WITH INSTRUMENTATION;  Surgeon: Bert Collado MD;  Location: [facility] MAIN OR;  Service:     CERVICAL DISCECTOMY POSTERIOR FUSION WITH BRAIN LAB N/A 2/25/2025    Procedure: Posterior cervical seven/thoracic one decompression, posterior cervical three/four/five/six/seven, thoracic one/two/three fusion with screws/rods/crosslinks with Stealth spinal navigation;  Surgeon: Bert Collado MD;  Location: [facility] MAIN OR;  Service: Neurosurgery;  Laterality: N/A;    COLONOSCOPY      COLONOSCOPY N/A 2/14/2025    Procedure: COLONOSCOPY into cecum with hot snare polypectomy, Resolutions clips x2, Tattoo of transverse polyp area;  Surgeon: Gonzalo Rodriguez MD;  Location: [facility] ENDOSCOPY;  Service: Gastroenterology;  Laterality: N/A;  Pre: Screening  Post: Polyp, Suboptimal prep    ELBOW PROCEDURE Right     UNCLEAR:  RIGHT ELBOW AREA    ENDOSCOPY N/A 2/14/2025    Procedure: ESOPHAGOGASTRODUODENOSCOPY with biopsies;  Surgeon: Gonzalo Rodriguez MD;  Location: [facility] ENDOSCOPY;  Service: Gastroenterology;  Laterality: N/A;  Pre: GERD, Dysphagia  Post: Gastritis and Esophagitis    FINGER SURGERY Left     INDEX FINGER    LUMBAR EPIDURAL INJECTION      STATES X3    LUMBAR LAMINECTOMY DISCECTOMY DECOMPRESSION Bilateral 11/28/2023    Procedure: Open bilateral lumbar decompression lumbar one/two, lumbar two/three, lumbar three/four;  Surgeon: Bert Collado MD;  Location: [facility] OR;  Service: Neurosurgery;  Laterality: Bilateral;    MENISCECTOMY Right     UPPER GASTROINTESTINAL ENDOSCOPY  03/2021      General Information       Row Name 03/03/25 0951          Physical Therapy Time and Intention    Document Type therapy note (daily note)  -ST     Mode of Treatment individual therapy;physical therapy  -ST       Row Name  03/03/25 0951          General Information    Patient Profile Reviewed yes  -ST     Existing Precautions/Restrictions fall  soft collar for ambulation  -ST       Row Name 03/03/25 0951          Cognition    Orientation Status (Cognition) oriented x 4  -ST       Row Name 03/03/25 0951          Safety Issues/Impairments Affecting Functional Mobility    Impairments Affecting Function (Mobility) balance;endurance/activity tolerance;strength;pain  -ST     Comment, Safety Issues/Impairments (Mobility) nonskid socks, gait belt donned  -ST               User Key  (r) = Recorded By, (t) = Taken By, (c) = Cosigned By      Initials Name Provider Type    ST Tigist Mcpherson, PT Physical Therapist                   Mobility       Row Name 03/03/25 0952          Bed Mobility    Bed Mobility sit-supine  -ST     Supine-Sit [place] (Bed Mobility) contact guard  -ST     Sit-Supine [place] (Bed Mobility) standby assist  -ST     Assistive Device (Bed Mobility) head of bed elevated  -ST     Comment, (Bed Mobility) wife assists pt to EOB  -ST       Row Name 03/03/25 0952          Sit-Stand Transfer    Sit-Stand [place] (Transfers) standby assist  -ST       Row Name 03/03/25 0952          Gait/Stairs (Locomotion)    [place] Level (Gait) contact guard;standby assist  -ST     Distance in Feet (Gait) 400  -ST     Deviations/Abnormal Patterns (Gait) stride length decreased  -ST     Bilateral Gait Deviations decreased arm swing;heel strike decreased  -ST     [place] Level (Stairs) stand by assist  -ST     Handrail Location (Stairs) right side (ascending);left side (descending)  -ST     Number of Steps (Stairs) 3  -ST     Ascending Technique (Stairs) step-to-step  -ST     Descending Technique (Stairs) step-to-step  -ST     Comment, (Gait/Stairs) shuffled pattern noted, improving arm swing today - overall stiff and guarded  -ST               User Key  (r) = Recorded By, (t) = Taken By, (c) = Cosigned By      Initials  Name Provider Type    Tigist Pyle, EMILY Physical Therapist                   Obj/Interventions       Row Name 03/03/25 0953          Balance    Comment, Balance improving balance progressed to SBA today and tolerated 3 step negotiation  -ST               User Key  (r) = Recorded By, (t) = Taken By, (c) = Cosigned By      Initials Name Provider Type    ST Tigist Mcpherson, PT Physical Therapist                   Goals/Plan    No documentation.                  Clinical Impression       Row Name 03/03/25 0953          Pain    Pain Location neck  -ST     Pain Side/Orientation upper  -ST     Pain Management Interventions exercise or physical activity utilized  -ST     Response to Pain Interventions activity participation with tolerable pain  -ST     Pre/Posttreatment Pain Comment does not rate but guarded and stiff mobility noted with grimacing during position changes  -ST       Row Name 03/03/25 0953          Plan of Care Review    Plan of Care Reviewed With patient  -ST     Progress improving  -ST     Outcome Evaluation Pt seen for PT this AM - tolerates increased activity and balance progressing toward SBA. Pt with improved arm swing today but continues wth shuffled pattern, no LOB. tolerates 3 step negotiation with 1 UE for balance. Adjusted soft cervical collar for improved comfort/fit. Pt reports getting up in room with spouse assist, encouraged pt to continue mobilizing throughout the day with family. Plans home with assist.  -ST       Row Name 03/03/25 0953          Therapy Assessment/Plan (PT)    Rehab Potential (PT) good  -ST     Therapy Frequency (PT) 5 times/wk  -ST       Row Name 03/03/25 0953          Positioning and Restraints    Pre-Treatment Position in bed  -ST     Post Treatment Position bed  -ST     In Bed notified nsg;supine;call light within reach;encouraged to call for assist;with family/caregiver  -ST               User Key  (r) = Recorded By, (t) = Taken By, (c) = Cosigned By       Initials Name Provider Type    Tigist Pyle PT Physical Therapist                   Outcome Measures       Row Name 03/03/25 0956          How much help from another person do you currently need...    Turning from your back to your side while in flat bed without using bedrails? 4  -ST     Moving from lying on back to sitting on the side of a flat bed without bedrails? 3  -ST     Moving to and from a bed to a chair (including a wheelchair)? 3  -ST     Standing up from a chair using your arms (e.g., wheelchair, bedside chair)? 4  -ST     Climbing 3-5 steps with a railing? 3  -ST     To walk in hospital room? 3  -ST     AM-PAC 6 Clicks Score (PT) 20  -ST               User Key  (r) = Recorded By, (t) = Taken By, (c) = Cosigned By      Initials Name Provider Type    Tigist Pyle PT Physical Therapist                                 Physical Therapy Education       Title: PT OT SLP Therapies (In Progress)       Topic: Physical Therapy (In Progress)       Point: Mobility training (Done)       Learning Progress Summary            Patient Acceptance, E,TB, VU,NR by  at 3/3/2025 0956    Acceptance, E, VU by EM at 3/1/2025 1548    Acceptance, E, VU by EM at 2/28/2025 1647    Acceptance, E, NR by EM at 2/27/2025 1319    Acceptance, E,TB, VU by TITA at 2/26/2025 2323    Acceptance, E, VU by EM at 2/26/2025 1622   Family Acceptance, E,TB, VU,NR by ST at 3/3/2025 0956   Significant Other Acceptance, E, VU by EM at 3/1/2025 1548    Acceptance, E, VU by EM at 2/28/2025 1647                      Point: Home exercise program (In Progress)       Learning Progress Summary            Patient Acceptance, E, NR by EM at 2/27/2025 1319    Acceptance, E,TB, VU by TITA at 2/26/2025 2323                      Point: Body mechanics (Done)       Learning Progress Summary            Patient Acceptance, E,TB, VU,NR by ST at 3/3/2025 0956    Acceptance, E,TB, VU by TITA at 2/26/2025 2323   Family Acceptance, E,TB, VU,NR by  at  3/3/2025 0956                      Point: Precautions (Done)       Learning Progress Summary            Patient Acceptance, E,TB, VU,NR by  at 3/3/2025 0956    Acceptance, E,TB, VU by  at 2/26/2025 2323   Family Acceptance, E,TB, VU,NR by  at 3/3/2025 0956                                      User Key       Initials Effective Dates Name Provider Type Discipline    EM 06/16/21 -  Celeste Crook PT Physical Therapist PT     06/09/23 -  Thelma Ferguson, RN Registered Nurse Nurse     09/22/22 -  Tigist Mcpherson PT Physical Therapist PT                  PT Recommendation and Plan     Progress: improving  Outcome Evaluation: Pt seen for PT this AM - tolerates increased activity and balance progressing toward SBA. Pt with improved arm swing today but continues wth shuffled pattern, no LOB. tolerates 3 step negotiation with 1 UE for balance. Adjusted soft cervical collar for improved comfort/fit. Pt reports getting up in room with spouse assist, encouraged pt to continue mobilizing throughout the day with family. Plans home with assist.     Time Calculation:         PT Charges       Row Name 03/03/25 0956             Time Calculation    Start Time 0817  -ST      Stop Time 0845  -ST      Time Calculation (min) 28 min  -ST      PT Received On 03/03/25  -ST      PT - Next Appointment 03/04/25  -ST         Time Calculation- PT    Total Timed Code Minutes- PT 28 minute(s)  -ST         Timed Charges    14976 - PT Therapeutic Activity Minutes 28  -ST         Total Minutes    Timed Charges Total Minutes 28  -ST       Total Minutes 28  -ST                User Key  (r) = Recorded By, (t) = Taken By, (c) = Cosigned By      Initials Name Provider Type    ST Tigist Mcpherson, PT Physical Therapist                  Therapy Charges for Today       Code Description Service Date Service Provider Modifiers Qty    [id number] HC PT THERAPEUTIC ACT EA 15 MIN 3/3/2025 Tigist Mcpherson, PT GP 2            PT  G-Codes  AM-PAC 6 Clicks Score (PT): 20  PT Discharge Summary  Anticipated Discharge Disposition (PT): home with assist, home with outpatient therapy services    Tigist Mcpherson, PT  3/3/2025

## 2025-03-03 NOTE — PLAN OF CARE
Goal Outcome Evaluation:  Plan of Care Reviewed With: patient        Progress: improving  Outcome Evaluation: Pt seen for PT this AM - tolerates increased activity and balance progressing toward SBA. Pt with improved arm swing today but continues wth shuffled pattern, no LOB. tolerates 3 step negotiation with 1 UE for balance. Adjusted soft cervical collar for improved comfort/fit. Pt reports getting up in room with spouse assist, encouraged pt to continue mobilizing throughout the day with family. Plans home with assist.    Anticipated Discharge Disposition (PT): home with assist, home with outpatient therapy services

## 2025-03-03 NOTE — CASE MANAGEMENT/SOCIAL WORK
Continued Stay Note  [facility]     Patient Name: Rafael Kat  MRN: [id number]  Today's Date: 3/3/2025    Admit Date: 2/25/2025    Plan: Home w/ OP PT   Discharge Plan       Row Name 03/03/25 1655       Plan    Plan Home w/ OP PT    Plan Comments Patient walked 400ft with PT. CCP spoke with patient at bedside who states plan is home with OP PT. No accepting  agency. Patient states his family will transport him home. PHILL, LESLEE                   Discharge Codes    No documentation.                 Expected Discharge Date and Time       Expected Discharge Date Expected Discharge Time    Mar 4, 2025               LESLEE Vallejo

## 2025-03-03 NOTE — PROGRESS NOTES
The patient is again resting comfortably today wife reports that he continues to have significant relief from his anxiety and panic symptoms with initiation of scheduled Klonopin.

## 2025-03-03 NOTE — PROGRESS NOTES
LOS: 6 days   Patient Care Team:  Provider, No Known as PCP - Bert Moe MD as Surgeon (Neurosurgery)    Post Op follow up visit. Day 6 s/p C7/T1 decompression with screws/rods/crosslinks for fusion C3 - T3 for cervical myelopathy; previous fusion with adjacent level stenosis.     Subjective     Interval History:     Pain symptoms getting better.  Having significant left forearm pain related to IV that had infiltrated and has since been removed.  Denies any fever or chills.    History taken from: patient chart    Objective        Vital Signs  Temp:  [98.1 °F (36.7 °C)-98.8 °F (37.1 °C)] 98.1 °F (36.7 °C)  Heart Rate:  [75-92] 92  Resp:  [16] 16  BP: (119-134)/(63-78) 134/78    Physical Exam:    AA&O x 3.   Posterior cervical-thoracic incision is approximated with Steri-Strips intact.  Left forearm tenderness at old IV site with palpable rope.  No evidence of erythema however area is warm tender to touch.  No calf swelling or tenderness to bilateral palpation.  Wearing bilateral SCDs.  Voiding without difficulty.     Results Review:     I reviewed the patient's new clinical results.      Assessment & Plan       History of fusion of cervical spine    Dysphagia    Spinal stenosis of cervicothoracic region    Weakness of both hands    Weakness of both arms    Gastroesophageal reflux disease    Cervical stenosis of spine    POD 6 bilateral C7/T1 decompression with C3-T3 onlay posterior lateral fusion with bilateral lateral mass screws C3-C6 and bilateral T1, T2, T3 pedicle screws and placement of bilateral rods from C3-T3 with cross-link  Cervical myelopathy    Left forearm pain/swelling related to IV site which has since been removed today.  Check left upper extremity Doppler  Warm compresses and elevation to left forearm.  Continue Lovenox  Dressing changes and wound care to posterior cervical thoracic incision.  Continue to mobilize    Patient has been evaluated by PT. gait and balance improving.   Recommends home with home health.    Donita Van, APRN  03/03/25  19:35 EST

## 2025-03-03 NOTE — PLAN OF CARE
Goal Outcome Evaluation:  Plan of Care Reviewed With: patient        Progress: improving    A/Ox4. O2 at 1L/min per NC at bedtime. Denies episodes of panic attacks. No s/s distress. posterior C7-T1 decompression with posterior C3-T3 fusion. Pain controlled w/ scheduled Robaxin and PRN Roxicodone. On Lovenox daily. SLIV. Assist x1. Spouse at bedside. Voiding per urinal. Last BM on 3/2. SCD's on BLE. Needs attended.

## 2025-03-03 NOTE — PAYOR COMM NOTE
"Ricardo Kat (54 y.o. Male)    PLEASE SEE ATTACHED FOR CLINICAL UPDATES    REF#[id number]  THANK YOU  AYDE STEINER RN/ DEPT   [facility]  PH: [phone]  FAX:  [phone]     Date of Birth   1970    Social Security Number       Address   [address] [place] [address]    Home Phone   [phone]    MRN   [id number]       Tenriism   None    Marital Status                               Admission Date   2/25/25    Admission Type   Elective    Admitting Provider   Bert Collado MD    Attending Provider   Bert Collado MD    Department, Room/Bed   [address], P794/1       Discharge Date       Discharge Disposition       Discharge Destination                                 Attending Provider: Bert Collado MD    Allergies: Bacitracin-polymyxin B, Chlorhexidine, Jdqdh-owimk-nooacjo-pramoxine    Isolation: None   Infection: None   Code Status: CPR    Ht: 175.3 cm (69\")   Wt: 95.5 kg (210 lb 8.6 oz)    Admission Cmt: None   Principal Problem: None                  Active Insurance as of 2/25/2025       Primary Coverage       Payor Plan Insurance Group Employer/Plan Group    [facility] [facility] [id number]       Payor Plan Address Payor Plan Phone Number Payor Plan Fax Number Effective Dates    [address] [address]   1/1/2021 - None Entered    [facility] [address]         Subscriber Name Subscriber Birth Date Member ID       RICARDO KAT 1970 [id number]                     Emergency Contacts        (Rel.) Home Phone Work Phone Mobile Phone    Monika Kat (Spouse) [phone] -- [phone]              Shorter: NPI [id number]  Tax ID [id number]  Medication Administration Report for Ricardo Kat as of 3/2/25 through 3/3/25     Legend:    Given Hold Not Given Due Canceled Entry Other Actions    Time Time (Time) Time Time-Action         Discontinued     Completed    "  Future     MAR Hold     Linked             Medications 03/02/25 03/03/25      acetaminophen (TYLENOL) tablet 650 mg  Dose: 650 mg  Freq: Every 4 Hours PRN Route: PO  PRN Reason: Mild Pain  Start: 02/25/25 1758     Admin Instructions:   If given for fever, use fever parameter: fever greater than 100.4 °F  Based on patient request - if ordered for moderate or severe pain, provider allows for administration of a medication prescribed for a lower pain scale.    Do not exceed 4 grams of acetaminophen in a 24 hr period. Max dose of 2gm for AST/ALT greater than 120 units/L.    If given for pain, use the following pain scale:   Mild Pain = Pain Score of 1-3, CPOT 1-2  Moderate Pain = Pain Score of 4-6, CPOT 3-4  Severe Pain = Pain Score of 7-10, CPOT 5-8          Or   acetaminophen (TYLENOL) 160 MG/5ML oral solution 650 mg  Dose: 650 mg  Freq: Every 4 Hours PRN Route: PO  PRN Reason: Mild Pain  Start: 02/25/25 1758     Admin Instructions:   If given for fever, use fever parameter: fever greater than 100.4 °F  Based on patient request - if ordered for moderate or severe pain, provider allows for administration of a medication prescribed for a lower pain scale.    Do not exceed 4 grams of acetaminophen in a 24 hr period. Max dose of 2gm for AST/ALT greater than 120 units/L.    If given for pain, use the following pain scale:   Mild Pain = Pain Score of 1-3, CPOT 1-2  Moderate Pain = Pain Score of 4-6, CPOT 3-4  Severe Pain = Pain Score of 7-10, CPOT 5-8          Or   acetaminophen (TYLENOL) suppository 650 mg  Dose: 650 mg  Freq: Every 4 Hours PRN Route: RE  PRN Reason: Mild Pain  Start: 02/25/25 1758     Admin Instructions:   If given for fever, use fever parameter: fever greater than 100.4 °F  Based on patient request - if ordered for moderate or severe pain, provider allows for administration of a medication prescribed for a lower pain scale.    Do not exceed 4 grams of acetaminophen in a 24 hr period. Max dose of 2gm for  AST/ALT greater than 120 units/L.    If given for pain, use the following pain scale:   Mild Pain = Pain Score of 1-3, CPOT 1-2  Moderate Pain = Pain Score of 4-6, CPOT 3-4  Severe Pain = Pain Score of 7-10, CPOT 5-8          albuterol sulfate HFA (PROVENTIL HFA;VENTOLIN HFA;PROAIR HFA) inhaler 2 puff  Dose: 2 puff  Freq: Every 4 Hours PRN Route: IN  PRN Reason: Wheezing  Start: 02/25/25 1758     Admin Instructions:   Include Respiratory Treatment Education   Shake well.           sennosides-docusate (PERICOLACE) 8.6-50 MG per tablet 2 tablet  Dose: 2 tablet  Freq: 2 Times Daily Route: PO  Start: 02/25/25 2100     Admin Instructions:   HOLD MEDICATION IF PATIENT HAS HAD BOWEL MOVEMENT. Start bowel management regimen if patient has not had a bowel movement after 12 hours.      0842-Given     (2150)-Not Given           0823-Given     2100             And   polyethylene glycol (MIRALAX) packet 17 g  Dose: 17 g  Freq: Daily PRN Route: PO  PRN Reason: Constipation  PRN Comment: Use if senna-docusate is ineffective  Start: 02/25/25 1758     Admin Instructions:   Use if no bowel movement after 12 hours. Mix in 6-8 ounces of water.  Use 4-8 ounces of water, tea, or juice for each 17 gram dose.          And   bisacodyl (DULCOLAX) EC tablet 5 mg  Dose: 5 mg  Freq: Daily PRN Route: PO  PRN Reason: Constipation  PRN Comment: Use if polyethylene glycol is ineffective  Start: 02/25/25 1758     Admin Instructions:   Use if no bowel movement after 12 hours.  Swallow whole. Do not crush, split, or chew tablet.      0843-Given               And   bisacodyl (DULCOLAX) suppository 10 mg  Dose: 10 mg  Freq: Daily PRN Route: RE  PRN Reason: Constipation  PRN Comment: Use if bisacodyl oral is ineffective  Start: 02/25/25 1758     Admin Instructions:   Use if no bowel movement after 12 hours.  Hold for diarrhea      (0843)-Not Given               busPIRone (BUSPAR) tablet 15 mg  Dose: 15 mg  Freq: 2 Times Daily Route: PO  Start: 02/27/25  2100     Admin Instructions:   Caution: Look alike/sound alike drug alert. Take with food.  Avoid grapefruit juice.      0843-Given     2034-Given           0823-Given     2100             calcium carbonate (TUMS) chewable tablet 500 mg (200 mg elemental)  Dose: 1 tablet  Freq: 3 Times Daily PRN Route: PO  PRN Reasons: Indigestion,Heartburn  Start: 03/02/25 1009     Admin Instructions:   One tablet contains 200 mg elemental calcium.  Take with food.      1059-Given               cetirizine (zyrTEC) tablet 10 mg  Dose: 10 mg  Freq: Daily Route: PO  Start: 02/25/25 1845      0843-Given            0823-Given              clonazePAM (KlonoPIN) tablet 0.5 mg  Dose: 0.5 mg  Freq: 3 times daily Route: PO  Start: 02/27/25 1500 End: 03/06/25 1459     Admin Instructions:   Caution: Look alike/sound alike drug alert. Please read the label.  Group 2 ([place]) Hazardous Drug - Reproductive Risk Only - See Handling Guide      0843-Given     1616-Given     2036-Given          0823-Given     1516-Given     2100            Enoxaparin Sodium (LOVENOX) syringe 40 mg  Dose: 40 mg  Freq: Every 24 Hours Route: SC  Indications of Use: PROPHYLAXIS OF VENOUS THROMBOEMBOLISM  Start: 02/27/25 1430     Admin Instructions:   Give subcutaneous in abdomen only. Do not massage site after injection.      1616-Given            1516-Given              escitalopram (LEXAPRO) tablet 10 mg  Dose: 10 mg  Freq: Nightly Route: PO  Start: 02/27/25 2100     Admin Instructions:   Caution: Look alike/sound alike drug alert.      2036-Given            2100              gabapentin (NEURONTIN) capsule 800 mg  Dose: 800 mg  Freq: Every 12 Hours Scheduled Route: PO  Start: 02/25/25 2100     Admin Instructions:         0842-Given     2036-Given           0823-Given     2100             HYDROmorphone (DILAUDID) injection 0.5 mg  Dose: 0.5 mg  Freq: Every 2 Hours PRN Route: IV  PRN Reason: Severe Pain  Start: 02/28/25 0909 End: 03/05/25 0908     Admin Instructions:    Based on patient request - if ordered for moderate or severe pain, provider allows for administration of a medication prescribed for a lower pain scale.  If given for pain, use the following pain scale:  Mild Pain = Pain Score of 1-3, CPOT 1-2  Moderate Pain = Pain Score of 4-6, CPOT 3-4  Severe Pain = Pain Score of 7-10, CPOT 5-8      0224-Given     0843-Given     1851-Given          0418-Given              ipratropium-albuterol (DUO-NEB) nebulizer solution 3 mL  Dose: 3 mL  Freq: Every 4 Hours PRN Route: NEBULIZATION  PRN Reason: Shortness of Air  Start: 02/27/25 1130     Admin Instructions:   Include Respiratory Treatment Education          methocarbamol (ROBAXIN) tablet 1,000 mg  Dose: 1,000 mg  Freq: Every 6 Hours Scheduled Route: PO  Start: 02/27/25 1200      0614-Given     1100-Given     1803-Given          0002-Given     0649-Given     1208-Given     1800           naloxone (NARCAN) injection 0.1 mg  Dose: 0.1 mg  Freq: Every 5 Minutes PRN Route: IV  PRN Reasons: Opioid Reversal,Respiratory Depression  PRN Comment: see administration instructions  Start: 02/25/25 1758     Admin Instructions:   For respiratory rate less than 8 per minute and if the patient is difficult arouse:  D/C PCA.  Give naloxone (NARCAN) 0.1 mg slow IV push.  May repeat x 1 if needed in 5 minutes. Notify physician.    Mix naloxone 0.4 mg/1 mL ampule in 9 mL normal saline.           ondansetron ODT (ZOFRAN-ODT) disintegrating tablet 4 mg  Dose: 4 mg  Freq: Every 6 Hours PRN Route: PO  PRN Reasons: Nausea,Vomiting  Start: 02/25/25 1758     Admin Instructions:   If BOTH ondansetron (ZOFRAN) and promethazine (PHENERGAN) are ordered use ondansetron first and THEN promethazine IF ondansetron is ineffective.  Place on tongue and allow to dissolve.          Or   ondansetron (ZOFRAN) injection 4 mg  Dose: 4 mg  Freq: Every 6 Hours PRN Route: IV  PRN Reasons: Nausea,Vomiting  Start: 02/25/25 1758     Admin Instructions:   If BOTH ondansetron  (ZOFRAN) and promethazine (PHENERGAN) are ordered use ondansetron first and THEN promethazine IF ondansetron is ineffective.          oxyCODONE (ROXICODONE) immediate release tablet 10 mg  Dose: 10 mg  Freq: Every 4 Hours PRN Route: PO  PRN Reason: Moderate Pain  PRN Comment: Breakthrough Pain (4-10)  Indications Comment: Breakthrough Pain  Start: 02/25/25 1758 End: 03/07/25 1346     Admin Instructions:   May be given for breakthrough pain (pain experienced despite patient reaching lockout on PCA) of 4-10.      If given for pain, use the following pain scale:  Mild Pain = Pain Score of 1-3, CPOT 1-2  Moderate Pain = Pain Score of 4-6, CPOT 3-4  Severe Pain = Pain Score of 7-10, CPOT 5-8      0614-Given     1154-Given     1616-Given     2218-Given         0823-Given     1208-Given     1641-Given            pantoprazole (PROTONIX) EC tablet 40 mg  Dose: 40 mg  Freq: 2 Times Daily Route: PO  Start: 02/25/25 2100     Admin Instructions:   Do not crush or chew the capsules or tablets. The drug may not work as designed if the capsule or tablet is crushed or chewed. Swallow whole.  Swallow whole; do not crush, split, or chew.      0843-Given     2036-Given           0823-Given     2100             pramipexole (MIRAPEX) tablet 0.125 mg  Dose: 0.125 mg  Freq: Nightly Route: PO  Start: 02/27/25 1900     Admin Instructions:   Give to patient 2-3 hours before bedtime.      1803-Given            1900              sodium chloride 0.9 % flush 10 mL  Dose: 10 mL  Freq: As Needed Route: IV  PRN Reason: Line Care  Start: 02/25/25 1758          sodium chloride 0.9 % flush 10 mL  Dose: 10 mL  Freq: Every 12 Hours Scheduled Route: IV  Start: 02/25/25 2100      0844-Given     2150-Canceled Entry           0823-Given     2100             sodium chloride 0.9 % infusion 40 mL  Dose: 40 mL  Freq: As Needed Route: IV  PRN Reason: Line Care  Start: 02/25/25 1758     Admin Instructions:   Following administration of an IV intermittent  medication, flush line with 40mL NS at 100mL/hr.          zolpidem (AMBIEN) tablet 10 mg  Dose: 10 mg  Freq: Nightly PRN Route: PO  PRN Reason: Sleep  Start: 02/25/25 1758     Admin Instructions:             Completed Medications  Medications 03/02/25 03/03/25      ceFAZolin 2000 mg IVPB in 100 mL NS (MBP)  Dose: 2,000 mg  Freq: Every 8 Hours Route: IV  Indications of Use: PERIOPERATIVE PHARMACOPROPHYLAXIS  Start: 02/25/25 1845 End: 02/26/25 1322     Admin Instructions:   Time first dose from pre-op dose.  Caution: Look alike/sound alike drug alert          ceFAZolin 2000 mg IVPB in 100 mL NS (MBP)  Dose: 2 g  Freq: Once Route: IV  Indications of Use: PERIOPERATIVE PHARMACOPROPHYLAXIS  Start: 02/25/25 0644 End: 02/25/25 0839     Admin Instructions:   Administer within 1 hour of surgical incision. Redose 4 hours from pre-op dose if procedure ongoing or >1.5 L blood loss.  Caution: Look alike/sound alike drug alert          famotidine (PEPCID) injection 20 mg  Dose: 20 mg  Freq: Once Route: IV  Start: 02/25/25 0707 End: 02/25/25 0747     Admin Instructions:   Give IV push over 2 minutes.          fentaNYL citrate (PF) (SUBLIMAZE) injection 50 mcg  Dose: 50 mcg  Freq: Once As Needed Route: IV  PRN Reason: Severe Pain  Start: 02/25/25 0713 End: 02/25/25 0759     Admin Instructions:   Maximum total dose of fentanyl is 50 mcg without additional orders from physician. May be used for preoperative pain or procedural sedation.  If given for pain, use the following pain scale:  Mild Pain = Pain Score of 1-3, CPOT 1-2  Moderate Pain = Pain Score of 4-6, CPOT 3-4  Severe Pain = Pain Score of 7-10, CPOT 5-8          ferric gluconate (FERRLECIT) 250 MG in sodium chloride 0.9% 250 mL IVPB  Dose: 250 mg  Freq: Daily Route: IV  Start: 02/27/25 0915 End: 03/02/25 1043     Admin Instructions:   Not to exceed 2.1 mg/min     Order specific questions:   Please Select Indication for Intravenous Iron Therapy (Criteria can be seen in report  to the left) Diagnosed iron deficiency anemia with severe intolerance to oral iron or has failed oral iron or have documented history of malabsorption      0843-New Bag               HYDROcodone-acetaminophen (NORCO) 5-325 MG per tablet 1 tablet  Dose: 1 tablet  Freq: Once As Needed Route: PO  PRN Reason: Moderate Pain  Start: 02/25/25 1505 End: 02/25/25 1700     Admin Instructions:   Based on patient request - if ordered for moderate or severe pain, provider allows for administration of a medication prescribed for a lower pain scale.  [ANEESH]    Do not exceed 4 grams of acetaminophen in a 24 hr period. Max dose of 2gm for AST/ALT greater than 120 units/L        If given for pain, use the following pain scale:   Mild Pain = Pain Score of 1-3, CPOT 1-2  Moderate Pain = Pain Score of 4-6, CPOT 3-4  Severe Pain = Pain Score of 7-10, CPOT 5-8          Methocarbamol (ROBAXIN) injection 1,000 mg  Dose: 1,000 mg  Freq: Once Route: IV  Start: 02/25/25 1506 End: 02/25/25 1606     Admin Instructions:   1. For IV Admin: Give while recumbent maintain position for 15-30 min. Give slow IV push over 5 min not to exceed 3mL/min 2. For IM Admin: a max of 5mL can be administered into each gluteal region.          Discontinued Medications  Medications 03/02/25 03/03/25      Atropine Sulfate (PF) injection 0.4 mg  Dose: 0.4 mg  Freq: Once As Needed Route: IV  PRN Reason: Bradycardia  PRN Comment: symptomatic bradycardia (hypotension, dizziness, confusion). Notify attending anesthesiologist.  Start: 02/25/25 1505 End: 02/25/25 1751          bupivacaine (PF) (MARCAINE) 0.25 % injection  Freq: As Needed  Start: 02/25/25 1408 End: 02/25/25 1449          bupivacaine-EPINEPHrine PF (MARCAINE w/EPI) 0.25% -[id number] injection  Freq: As Needed  Start: 02/25/25 0825 End: 02/25/25 1449          busPIRone (BUSPAR) tablet 10 mg  Dose: 10 mg  Freq: 2 Times Daily Route: PO  Start: 02/25/25 2100 End: 02/27/25 1302     Admin Instructions:   Caution: Look  alike/sound alike drug alert. Take with food.  Avoid grapefruit juice.          clonazePAM (KlonoPIN) tablet 0.5 mg  Dose: 0.5 mg  Freq: Every 8 Hours PRN Route: PO  PRN Reason: Anxiety  Start: 02/26/25 0843 End: 02/27/25 1302     Admin Instructions:   Caution: Look alike/sound alike drug alert. Please read the label.  Group 2 ([place]) Hazardous Drug - Reproductive Risk Only - See Handling Guide          diphenhydrAMINE (BENADRYL) injection 12.5 mg  Dose: 12.5 mg  Freq: Every 15 Minutes PRN Route: IV  PRN Reason: Itching  PRN Comment: May repeat x 1  Start: 02/25/25 1505 End: 02/25/25 1751     Admin Instructions:   Caution: Look alike/sound alike drug alert. This med may be ordered in other forms and routes. Before giving verify the last time the drug was given by any route/form.          ePHEDrine injection 5 mg  Dose: 5 mg  Freq: Once As Needed Route: IV  PRN Comment: symptomatic hypotension - Notify attending anesthesiologist if this needs to be given  Start: 02/25/25 1505 End: 02/25/25 1751     Admin Instructions:   Caution: Look alike/sound alike drug alert   Dilute with NS to 5-10 mg/mL.  Central line preferred, if unavailable use large bore IV access with frequent nurse monitoring of IV site.          famotidine (PEPCID) injection 20 mg  Dose: 20 mg  Freq: Once Route: IV  Start: 02/25/25 0715 End: 02/25/25 0813     Admin Instructions:   Give IV push over 2 minutes.          fentaNYL citrate (PF) (SUBLIMAZE) injection 50 mcg  Dose: 50 mcg  Freq: Every 5 Minutes PRN Route: IV  PRN Reason: Severe Pain  Start: 02/25/25 1505 End: 02/25/25 1751     Admin Instructions:   Maximum total dose of fentanyl is 200 mcg.  If given for pain, use the following pain scale:  Mild Pain = Pain Score of 1-3, CPOT 1-2  Moderate Pain = Pain Score of 4-6, CPOT 3-4  Severe Pain = Pain Score of 7-10, CPOT 5-8          fentaNYL citrate (PF) (SUBLIMAZE) injection 50 mcg  Dose: 50 mcg  Freq: Once As Needed Route: IV  PRN Reason: Severe  Pain  Start: 02/25/25 0657 End: 02/25/25 0813     Admin Instructions:   Maximum total dose of fentanyl is 50 mcg without additional orders from physician. May be used for preoperative pain or procedural sedation.  If given for pain, use the following pain scale:  Mild Pain = Pain Score of 1-3, CPOT 1-2  Moderate Pain = Pain Score of 4-6, CPOT 3-4  Severe Pain = Pain Score of 7-10, CPOT 5-8          flumazenil (ROMAZICON) injection 0.2 mg  Dose: 0.2 mg  Freq: As Needed Route: IV  PRN Comment: for benzodiazepine induced unresponsiveness or sedation  Start: 02/25/25 1505 End: 02/25/25 1751     Admin Instructions:   Notify Anesthesia if given  ** give IV over 15-30 seconds **          hydrALAZINE (APRESOLINE) injection 5 mg  Dose: 5 mg  Freq: Every 10 Minutes PRN Route: IV  PRN Reason: High Blood Pressure  PRN Comment: for systolic blood pressure greater than 180 mmHg or diastolic blood pressure greater than 105 mmHg  Start: 02/25/25 1505 End: 02/25/25 1751     Admin Instructions:   Up to 20 mg. If labetalol and hydralazine are both ordered, use labetalol first.  Caution: Look alike/sound alike drug alert          HYDROmorphone (DILAUDID) injection 0.5 mg  Dose: 0.5 mg  Freq: Every 5 Minutes PRN Route: IV  PRN Reason: Moderate Pain  Start: 02/25/25 1505 End: 02/25/25 1751     Admin Instructions:   Maximum total dose of hydromorphone is 2 mg.  If given for pain, use the following pain scale:  Mild Pain = Pain Score of 1-3, CPOT 1-2  Moderate Pain = Pain Score of 4-6, CPOT 3-4  Severe Pain = Pain Score of 7-10, CPOT 5-8          HYDROmorphone (DILAUDID) PCA 0.2 mg/ml 50 mL syringe  Loading Dose: 0.4 mg  PCA Dose: 0.2 mg  Lockout Interval: 7 Minutes  Basal Rate/Continuous Dose: 0.1 mg/hr  One Hour Dose Limit (Max Limit): 1 mg  Freq: Continuous Route: IV  Start: 02/25/25 1506 End: 02/28/25 0910     Admin Instructions:   Based on patient request - if ordered for moderate or severe pain, provider allows for administration of  a medication prescribed for a lower pain scale.  Please ensure carrier fluids are initiated to maintain IV patency while on PCA pump if no other IV fluid is present.  If given for pain, use the following pain scale:  Mild Pain = Pain Score of 1-3, CPOT 1-2  Moderate Pain = Pain Score of 4-6, CPOT 3-4  Severe Pain = Pain Score of 7-10, CPOT 5-8          ipratropium-albuterol (DUO-NEB) nebulizer solution 3 mL  Dose: 3 mL  Freq: Once As Needed Route: NEBULIZATION  PRN Reasons: Wheezing,Shortness of Air  PRN Comment: bronchospasm  Start: 02/25/25 1505 End: 02/25/25 1751     Admin Instructions:   Notify Anesthesia if minineb is given          ipratropium-albuterol (DUO-NEB) nebulizer solution 3 mL  Dose: 3 mL  Freq: 4 Times Daily - RT Route: NEBULIZATION  Start: 02/25/25 0830 End: 02/27/25 1125     Admin Instructions:   Include Respiratory Treatment Education          ipratropium-albuterol (DUO-NEB) nebulizer solution 3 mL  Dose: 3 mL  Freq: 4 Times Daily - RT Route: NEBULIZATION  Start: 02/25/25 0830 End: 02/25/25 0716     Admin Instructions:   Include Respiratory Treatment Education          labetalol (NORMODYNE,TRANDATE) injection 5 mg  Dose: 5 mg  Freq: Every 5 Minutes PRN Route: IV  PRN Reason: High Blood Pressure  PRN Comment: for systolic blood pressure greater than 180 mmHg or diastolic blood pressure greater than 105 mmHg  Start: 02/25/25 1505 End: 02/25/25 1751     Admin Instructions:   Hold for heart rate less than 60.    If labetalol and hydralazine are both ordered, use labetalol first. Maximum dose of labetalol is 20mg IV while in PACU, notify anesthesiologist for further orders if needed.  Give IV Push over 2 minutes.          lactated ringers infusion  Rate: 75 mL/hr Dose: 75 mL/hr  Freq: Continuous Route: IV  Start: 02/25/25 1845 End: 02/27/25 1018          lactated ringers infusion  Rate: 9 mL/hr Dose: 9 mL/hr  Freq: Continuous Route: IV  Start: 02/25/25 0715 End: 02/25/25 0814          lactated ringers  infusion  Rate: 9 mL/hr Dose: 9 mL/hr  Freq: Continuous Route: IV  Start: 02/25/25 0707 End: 02/26/25 0723          lidocaine (XYLOCAINE) 1 % injection 0.5 mL  Dose: 0.5 mL  Freq: Once As Needed Route: ID  PRN Comment: IV Start  Start: 02/25/25 0713 End: 02/25/25 1502          lidocaine (XYLOCAINE) 1 % injection 0.5 mL  Dose: 0.5 mL  Freq: Once As Needed Route: ID  PRN Comment: IV Start  Start: 02/25/25 0657 End: 02/25/25 1502          methocarbamol (ROBAXIN) tablet 750 mg  Dose: 750 mg  Freq: 4 Times Daily PRN Route: PO  PRN Reason: Muscle Spasms  Start: 02/25/25 1758 End: 02/27/25 1019          midazolam (VERSED) injection 1 mg  Dose: 1 mg  Freq: Every 5 Minutes PRN Route: IV  PRN Comment: Anxiety prophylaxis, Pre-op comfort  Start: 02/25/25 0713 End: 02/25/25 0813     Admin Instructions:   May repeat dose in 5 minutes one time then contact provider for additional orders.            midazolam (VERSED) injection 1 mg  Dose: 1 mg  Freq: Every 5 Minutes PRN Route: IV  PRN Comment: Anxiety prophylaxis, Pre-op comfort  Start: 02/25/25 0657 End: 02/25/25 1502     Admin Instructions:   May repeat dose in 5 minutes one time then contact provider for additional orders.            naloxone (NARCAN) injection 0.2 mg  Dose: 0.2 mg  Freq: As Needed Route: IV  PRN Reasons: Opioid Reversal,Respiratory Depression  PRN Comment: unresponsiveness, decrease oxygen saturation  Indications of Use: ACUTE RESPIRATORY FAILURE,OPIOID-INDUCED RESPIRATORY DEPRESSION  Start: 02/25/25 1505 End: 02/25/25 1751     Admin Instructions:   Notify Anesthesia if given          ondansetron (ZOFRAN) injection 4 mg  Dose: 4 mg  Freq: Once As Needed Route: IV  PRN Reasons: Nausea,Vomiting  Indications of Use: POSTOPERATIVE NAUSEA AND VOMITING  Start: 02/25/25 1505 End: 02/25/25 1751     Admin Instructions:   If BOTH ondansetron (ZOFRAN) and promethazine (PHENERGAN) are ordered use ondansetron first and THEN promethazine IF ondansetron is ineffective.           oxyCODONE-acetaminophen (PERCOCET) 7.5-325 MG per tablet 1 tablet  Dose: 1 tablet  Freq: Every 4 Hours PRN Route: PO  PRN Reason: Severe Pain  Start: 02/25/25 1505 End: 02/25/25 1751     Admin Instructions:   [ANEESH]    Do not exceed 4 grams of acetaminophen in a 24 hr period. Max dose of 2gm for AST/ALT greater than 120 units/L        If given for pain, use the following pain scale:   Mild Pain = Pain Score of 1-3, CPOT 1-2  Moderate Pain = Pain Score of 4-6, CPOT 3-4  Severe Pain = Pain Score of 7-10, CPOT 5-8          Pharmacy Consult  Freq: Continuous PRN Route: XX  PRN Reason: Consult  Start: 02/25/25 1758 End: 02/28/25 1359     Admin Instructions:   Pharmacist to discontinue PRN opioid pain medications outside of the PCA order set or not associated with breakthrough pain at connection of PCA. If there is no basal rate on PCA, long-acting opioids may be continued. Scheduled opioids for pain are allowed while weaning patient off of PCA but PRN opioids for pain are not. If PCA is only providing basal rate PRN meds may remain.     Order specific questions:   Consult for Pharmacist to review/discontinue PRN opioid pain medications at connection of PCA.          Pharmacy to Dose enoxaparin (LOVENOX)  Freq: Continuous PRN Route: XX  PRN Reason: Consult  Start: 02/27/25 1307 End: 02/27/25 1317     Order specific questions:   Indication of use VTE Prophylaxis           promethazine (PHENERGAN) suppository 25 mg  Dose: 25 mg  Freq: Once As Needed Route: RE  PRN Reasons: Nausea,Vomiting  Start: 02/25/25 1505 End: 02/25/25 1751     Admin Instructions:   If BOTH ondansetron (ZOFRAN) and promethazine (PHENERGAN) are ordered use ondansetron first and THEN promethazine IF ondansetron is ineffective.          Or   promethazine (PHENERGAN) tablet 25 mg  Dose: 25 mg  Freq: Once As Needed Route: PO  PRN Reasons: Nausea,Vomiting  Start: 02/25/25 1505 End: 02/25/25 1751     Admin Instructions:   If BOTH ondansetron (ZOFRAN) and  promethazine (PHENERGAN) are ordered use ondansetron first and THEN promethazine IF ondansetron is ineffective.            sodium chloride 0.9 % flush 3 mL  Dose: 3 mL  Freq: Every 12 Hours Scheduled Route: IV  Start: 02/25/25 0900 End: 02/25/25 1502          sodium chloride 0.9 % flush 3 mL  Dose: 3 mL  Freq: Every 12 Hours Scheduled Route: IV  Start: 02/25/25 0900 End: 02/25/25 1502          sodium chloride 0.9 % flush 3-10 mL  Dose: 3-10 mL  Freq: As Needed Route: IV  PRN Reason: Line Care  Start: 02/25/25 0713 End: 02/25/25 1502          sodium chloride 0.9 % flush 3-10 mL  Dose: 3-10 mL  Freq: As Needed Route: IV  PRN Reason: Line Care  Start: 02/25/25 0657 End: 02/25/25 1502          sodium chloride 0.9 % infusion  Rate: 30 mL/hr Dose: 30 mL/hr  Freq: Continuous PRN Route: IV  PRN Comment: To maintain IV patency while on PCA pump if no other IV fluid present  Start: 02/25/25 1758 End: 02/27/25 1757          sodium chloride 1,000 mL with ceFAZolin 2 g irrigation  Freq: As Needed  Start: 02/25/25 1145 End: 02/25/25 1449          thrombin (THROMBIN-JMI) spray kit  Freq: As Needed  Start: 02/25/25 1145 End: 02/25/25 1449                 Physician Progress Notes (last 72 hours)        Jordan Mcneal III, MD at 03/03/25 1328          The patient is again resting comfortably today wife reports that he continues to have significant relief from his anxiety and panic symptoms with initiation of scheduled Klonopin.    Electronically signed by Jordan Mcneal III, MD at 03/03/25 1329       Indigo Tristan APRN at 03/02/25 0810          [facility] NEUROSURGERY CERVICAL PROGRESS NOTE      CC: POD 5, sp posterior C7-T1 decompression with posterior C3-T3 fusion       Subjective     Interval History:      ROS:  Constitutional: No fever, chills  Neck: +neck pain  GI: No nausea, vomiting, no swallow difficulties  Neuro: No numbness, tingling, or weakness,  no balance difficulties  : no difficulty voiding,  no incontinence    Objective     Vital signs in last 24 hours:  Temp:  [98.2 °F (36.8 °C)-98.8 °F (37.1 °C)] 98.7 °F (37.1 °C)  Heart Rate:  [78-82] 81  Resp:  [18] 18  BP: (117-130)/(72-85) 117/81    Intake/Output this shift:  No intake/output data recorded.    LABS:  Results from last 7 days   Lab Units 03/02/25  0427 03/01/25  0502 02/28/25  0535   WBC 10*3/mm3 9.56 10.01 14.33*   HEMOGLOBIN g/dL 10.5* 10.3* 10.5*   HEMATOCRIT % 31.4* 30.9* 32.7*   PLATELETS 10*3/mm3 261 210 178      Results from last 7 days   Lab Units 03/01/25  0502 02/28/25  0535 02/27/25  0537   SODIUM mmol/L 135* 135* 136   POTASSIUM mmol/L 4.1 3.9 3.7   CHLORIDE mmol/L 97* 99 100   CO2 mmol/L 33.2* 27.7 28.7   BUN mg/dL 12 11 13   CREATININE mg/dL 1.01 0.80 0.99   CALCIUM mg/dL 8.6 8.3* 7.9*   GLUCOSE mg/dL 119* 146* 114*      2/27/25 Respiratory Panel Negative   2/27/25 Procalcitonin 0.21  2/27/25 Blood cultures No growth 2 days   2/27/25 Lactic Acid 1.7  2/27/25 Folate 5.51  2/27/25 Vitamin B12 263    IMAGING STUDIES:  No new imaging     I personally viewed and interpreted the patient's chart.    Meds reviewed/changed: Yes  Buspar 15mg BID  Zyrtec 10mg daily   Clonazepam 0.5mg TID  Lovenox 40mg daily   Lexapro 10mg daily   Gabapentin 800 mg BID  Robaxin 1000 mg every 6 hours   Protonix 40mg BID  Mirapex 0.125 mg at night  Pericolace BID  Ferric Glyconate 250mg daily x 4 doses   Oxycodone 10mg every 4 hours prn-1 dose/12 hours  Dilaudid 0.5mg every 2 hours prn-1 dose/12 hours     Physical Exam:  General:                                 Awake, alert.  Neck:                                      Posterior cervical incision well approximated. No erythema, no edema, no drainage   Motor:                                     Normal muscle strength, bulk and tone in upper and lower extremities.  No fasciculations, rigidity, spasticity, or abnormal movements.  Reflexes:                                No Gutiérrez, no clonus  Sensation:                  "             Normal to light touch  Station and Gait:                   Per PT note, patient performed bed mobility with supervision, sit to stand with SBA, ambulated in hallway with CGA/Celia with slow guarded gait mechanics, shaking at times reporting \"cold chills\". Patient gait steadier than before but very slow and guarded with no arm swing or trunk cervical rotation.   Extremities:                           SCD in place      Assessment & Plan     ASSESSMENT:      History of fusion of cervical spine    Dysphagia    Spinal stenosis of cervicothoracic region    Weakness of both hands    Weakness of both arms    Gastroesophageal reflux disease    Cervical stenosis of spine    The patient is POD 5, sp posterior C7-T1 decompression with posterior C3-T3 fusion   The patient reports improvement with pain. He is receiving the fourth iron infusion this morning.      He did have leukocytosis at the beginning of the week but this has since resolved. He has been afebrile. Pulmonology and [facility] have both signed off.      He is working with PT. A PM&R consult was placed but is still pending.       PLAN:   Soft collar for comfort  Mobilize with PT  Utilize ice as needed  Lovenox for VTE prophylaxis   Iron infusion   PM & R consult still pending  Case management following for discharge planning    I discussed the patient's findings and my recommendations with patient and family         LOS: 5 days       HERON Stone  3/2/2025  08:18 EST        Electronically signed by Indigo Tristan APRN at 03/02/25 1347       Radha Prabhakar APRN at 03/01/25 1344       Attestation signed by Jordan Mcneal III, MD at 03/03/25 1107    I have reviewed this documentation and agree.                  Patient seen, chart reviewed and discussed with staff. Per staff, patient has been cooperative, compliant with medications and no behavioral problems noted.   Patient found lying in bed, dressed in hospital gown, and appears luis be " in no apparent distress. Wife is at bedside and adds collateral information for interview.   He was polite and cooperative with the interview process and is alert and oriented x 4. His mood is mildly anxious and affect slightly blunted. He denies thoughts of suicidal or homicidal ideation or auditory or visual hallucinations.    Both Mr. Kat and wife state that he continues to do better on scheduled klonopin for anxiety and panic. He had brief episode yesterday of panic onset that lasted approximately 30 seconds per wife in which he became short of breath, his heart was racing, and he felt panicked, however it quickly went away.   He is tolerating the initiation of Lexapro and increase of Buspar.   We will continue these medications as ordered for now. Psychiatry will continue to follow.        Electronically signed by Jordan Mcneal III, MD at 25 1107       Adonay Greene MD at 25 1221              Name: Rafael Kat ADMIT: 2025   : 1970  PCP: Provider, No Known    MRN: [id number] LOS: 4 days   AGE/SEX: 54 y.o. male  ROOM: [facility]     Subjective     Examined at bedside.     Objective   Objective   Vital Signs  Temp:  [97.5 °F (36.4 °C)-98.9 °F (37.2 °C)] 98.5 °F (36.9 °C)  Heart Rate:  [77-86] 78  Resp:  [16-18] 18  BP: (119-128)/(72-84) 121/72  SpO2:  [91 %-98 %] 91 %  on  Flow (L/min) (Oxygen Therapy):  [2] 2;   Device (Oxygen Therapy): room air  Body mass index is 31.09 kg/m².  Physical Exam  Constitutional:       General: He is not in acute distress.     Appearance: He is ill-appearing.   HENT:      Head: Normocephalic and atraumatic.   Eyes:      Extraocular Movements: Extraocular movements intact.      Pupils: Pupils are equal, round, and reactive to light.   Cardiovascular:      Rate and Rhythm: Normal rate and regular rhythm.   Pulmonary:      Effort: Pulmonary effort is normal. No respiratory distress.      Comments: Diminished at the bases  Abdominal:       "General: There is no distension.      Palpations: Abdomen is soft.      Tenderness: There is no abdominal tenderness.   Musculoskeletal:      Comments: Midline upper back incision is bandaged   Neurological:      Mental Status: He is alert and oriented to person, place, and time.         Results Review     I reviewed the patient's new clinical results.  Results from last 7 days   Lab Units 03/01/25  0502 02/28/25  0535 02/27/25  0537 02/26/25  0607   WBC 10*3/mm3 10.01 14.33* 15.57* 21.03*   HEMOGLOBIN g/dL 10.3* 10.5* 10.5* 12.5*   PLATELETS 10*3/mm3 210 178 179 236     Results from last 7 days   Lab Units 03/01/25  0502 02/28/25  0535 02/27/25  0537 02/26/25  0607   SODIUM mmol/L 135* 135* 136 138   POTASSIUM mmol/L 4.1 3.9 3.7 4.2   CHLORIDE mmol/L 97* 99 100 102   CO2 mmol/L 33.2* 27.7 28.7 26.8   BUN mg/dL 12 11 13 18   CREATININE mg/dL 1.01 0.80 0.99 1.05   GLUCOSE mg/dL 119* 146* 114* 132*   Estimated Creatinine Clearance: 93.1 mL/min (by C-G formula based on SCr of 1.01 mg/dL).    Results from last 7 days   Lab Units 03/01/25  0502 02/28/25  0535 02/27/25  0537 02/26/25  0607   CALCIUM mg/dL 8.6 8.3* 7.9* 8.2*     Results from last 7 days   Lab Units 02/27/25  1626   PROCALCITONIN ng/mL 0.21   LACTATE mmol/L 1.7     COVID19   Date Value Ref Range Status   02/27/2025 Not Detected Not Detected - Ref. Range Final     No results found for: \"HGBA1C\", \"POCGLU\"    Results for orders placed or performed during the hospital encounter of 02/25/25   Blood Culture - Blood, Hand, Left    Specimen: Hand, Left; Blood   Result Value Ref Range    Blood Culture No growth at 24 hours          XR Spine Cervical 2 or 3 View, XR Spine Thoracic 2 View  Narrative: XR SPINE CERVICAL 2 OR 3 VW-, XR SPINE THORACIC 2 VW-     INDICATIONS: Postoperative evaluation     TECHNIQUE: 3 views of the cervical spine, 3 views of the thoracic spine     COMPARISON: 1/15/2025     FINDINGS:     Anterior cervical and posterior cervicothoracic spinal " fusion hardware  appears intact. No acute fracture or malalignment is noted. In the  thoracic spine, multilevel endplate spurring and mid to lower thoracic  disc space narrowing are noted.     Impression:    Postsurgical and degenerative changes.           This report was finalized on 2/26/2025 12:07 PM by Dr. Dejon Adair M.D on Workstation: [id number]     XR Chest 1 View  Narrative: XR CHEST 1 VW-        INDICATION: Leukocytosis     COMPARISON: Chest radiograph April 4, 2016     TECHNIQUE: 1 view chest     FINDINGS:      No focal opacity. No effusions. Normal mediastinal contour. Heart is  normal in size. Anterior cervical discectomy and fusion. Interval  posterior cervical thoracic fusion with keya and pedicle screws and  suspected laminectomies.     Impression: No acute cardiopulmonary process     This report was finalized on 2/26/2025 10:54 AM by Dr. Gonzalo Brennan M.D on Workstation: [id number]       Scheduled Medications  busPIRone, 15 mg, Oral, BID  cetirizine, 10 mg, Oral, Daily  clonazePAM, 0.5 mg, Oral, TID  enoxaparin sodium, 40 mg, Subcutaneous, Q24H  escitalopram, 10 mg, Oral, Nightly  ferric gluconate, 250 mg, Intravenous, Daily  gabapentin, 800 mg, Oral, Q12H  methocarbamol, 1,000 mg, Oral, Q6H  pantoprazole, 40 mg, Oral, BID  pramipexole, 0.125 mg, Oral, Nightly  senna-docusate sodium, 2 tablet, Oral, BID  sodium chloride, 10 mL, Intravenous, Q12H    Infusions     Diet  Diet: Regular/House; Fluid Consistency: Thin (IDDSI 0)      Assessment/Plan     Active Hospital Problems    Diagnosis  POA    Cervical stenosis of spine [M48.02]  Yes    Gastroesophageal reflux disease [K21.9]  Yes    Weakness of both hands [R29.898]  Unknown    Weakness of both arms [R29.898]  Unknown    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]  Unknown    Dysphagia [R13.10]  Yes    History of fusion of cervical spine [Z98.1]  Not Applicable      Resolved Hospital Problems   No resolved problems to display.        54 y.o. male admitted with <principal problem not specified>.    Fever  Resolved.  Infectious workup is negative    Leukocytosis  Improving     Anxiety   PRN clonazepam    Spinal stenosis  Status post surgical intervention.  Management per primary    Asthma  Currently on DuoNebs. Pulm following     GERD  PPI     Hypertension  Not currently on any antihypertensives. Monitor for now, likely related to pain     Discussed with patient and family.    Patient is afebrile.  Vitals are normal.  Leukocytosis has resolved.  From medical standpoint he stable for discharge.  I have nothing more to add and I will sign off.  Please reconsult should any needs arise.    Adonay Greene MD  [facility]  03/01/25  12:21 EST            Electronically signed by Adonay Greene MD at 03/01/25 1223       Indigo Tristan APRN at 03/01/25 1013       Attestation signed by Tiffanie Adams MD at 03/02/25 1114    I have reviewed this documentation and agree.                  [facility] NEUROSURGERY CERVICAL PROGRESS NOTE      CC: POD 4, sp posterior C7-T1 decompression with posterior C3-T3 fusion       Subjective     Interval History:  No significant events overnight. Appetite slightly improved.     ROS:  Constitutional: No fever, chills  Neck: +neck pain  GI: No nausea, vomiting, no swallow difficulties  Neuro: No numbness, tingling, or weakness,  no balance difficulties  : no difficulty voiding, no incontinence    Objective     Vital signs in last 24 hours:  Temp:  [97.5 °F (36.4 °C)-98.9 °F (37.2 °C)] 98.5 °F (36.9 °C)  Heart Rate:  [77-86] 78  Resp:  [16-18] 18  BP: (119-130)/(72-84) 121/72    Intake/Output this shift:  I/O this shift:  In: 240 [P.O.:240]  Out: -     LABS:  Results from last 7 days   Lab Units 03/01/25  0502 02/28/25  0535 02/27/25  0537   WBC 10*3/mm3 10.01 14.33* 15.57*   HEMOGLOBIN g/dL 10.3* 10.5* 10.5*   HEMATOCRIT % 30.9* 32.7* 31.5*   PLATELETS 10*3/mm3 210 178 179      Results from last 7  "days   Lab Units 03/01/25  0502 02/28/25  0535 02/27/25  0537   SODIUM mmol/L 135* 135* 136   POTASSIUM mmol/L 4.1 3.9 3.7   CHLORIDE mmol/L 97* 99 100   CO2 mmol/L 33.2* 27.7 28.7   BUN mg/dL 12 11 13   CREATININE mg/dL 1.01 0.80 0.99   CALCIUM mg/dL 8.6 8.3* 7.9*   GLUCOSE mg/dL 119* 146* 114*      2/27/25 Blood cultures No growth 24 hours   2/27/25 Lactic Acid 1.7  2/27/25 Folate 5.51  2/27/25 Vitamin B12 263    IMAGING STUDIES:  No new imaging     I personally viewed and interpreted the patient's chart.    Meds reviewed/changed: Yes  Buspar 15mg BID  Zyrtec 10mg daily   Clonazepam 0.5mg TID  Lovenox 40mg daily   Protonix 40mg BID  Mirapex 0.125 mg at night  Pericolace BID  Ferric Glyconate 250mg daily x 4 doses   Oxycodone 10mg every 4 hours prn-1 dose/12 hours  Dilaudid 0.5mg every 2 hours prn-2 doses/12 hours     Physical Exam:    General:   Awake, alert.  Neck:    Posterior cervical incision well approximated. No erythema, no edema, no drainage   Motor:    Normal muscle strength, bulk and tone in upper and lower extremities.  No fasciculations, rigidity, spasticity, or abnormal movements.  Reflexes:   No Gutiérrez, no clonus  Sensation:   Normal to light touch  Station and Gait:             Per PT note, patient performed bed mobility with supervision, sit to stand with SBA, ambulated in hallway with CGA/Celia with slow guarded gait mechanics, shaking at times reporting \"cold chills\". Patient gait steadier than before but very slow and guarded with no arm swing or trunk cervical rotation.   Extremities:   SCD in place    Assessment & Plan     ASSESSMENT:      History of fusion of cervical spine    Dysphagia    Spinal stenosis of cervicothoracic region    Weakness of both hands    Weakness of both arms    Gastroesophageal reflux disease    Cervical stenosis of spine    The patient is POD 4, sp posterior C7-T1 decompression with posterior C3-T3 fusion. The patient reports improvement with pain. His appetite has " also improved somewhat. He is receiving an iron infusion this morning.     He did have leukocytosis at the beginning of the week but this has since resolved. He has been afebrile. Pulmonology and [facility] have both signed off.     He is working with PT. A PM&R consult was placed but is still pending.     PLAN:   Soft collar for comfort  Mobilize with PT  Utilize ice as needed  Lovenox for VTE prophylaxis   Iron infusion   PM & R consult still pending  Case management following for discharge planning    I discussed the patient's findings and my recommendations with patient and family         LOS: 4 days       Indigo Tristan, APRN  3/1/2025  10:13 EST        Electronically signed by Tiffanie Adams MD at 03/02/25 1112

## 2025-03-04 ENCOUNTER — READMISSION MANAGEMENT (OUTPATIENT)
Dept: CALL CENTER | Facility: HOSPITAL | Age: 55
End: 2025-03-04
Payer: COMMERCIAL

## 2025-03-04 ENCOUNTER — APPOINTMENT (OUTPATIENT)
Dept: CARDIOLOGY | Facility: HOSPITAL | Age: 55
DRG: 448 | End: 2025-03-04
Payer: COMMERCIAL

## 2025-03-04 VITALS
BODY MASS INDEX: 31.18 KG/M2 | HEIGHT: 69 IN | HEART RATE: 74 BPM | WEIGHT: 210.54 LBS | SYSTOLIC BLOOD PRESSURE: 120 MMHG | DIASTOLIC BLOOD PRESSURE: 79 MMHG | RESPIRATION RATE: 18 BRPM | TEMPERATURE: 97.8 F | OXYGEN SATURATION: 94 %

## 2025-03-04 PROBLEM — I80.8 SUPERFICIAL THROMBOPHLEBITIS OF LEFT UPPER EXTREMITY: Status: ACTIVE | Noted: 2025-03-04

## 2025-03-04 LAB
BACTERIA SPEC AEROBE CULT: NORMAL
BACTERIA SPEC AEROBE CULT: NORMAL
BH CV UPPER VENOUS LEFT AXILLARY AUGMENT: NORMAL
BH CV UPPER VENOUS LEFT AXILLARY COMPRESS: NORMAL
BH CV UPPER VENOUS LEFT AXILLARY PHASIC: NORMAL
BH CV UPPER VENOUS LEFT AXILLARY SPONT: NORMAL
BH CV UPPER VENOUS LEFT BASILIC FOREARM COMPRESS: NORMAL
BH CV UPPER VENOUS LEFT BASILIC UPPER COMPRESS: NORMAL
BH CV UPPER VENOUS LEFT BRACHIAL COMPRESS: NORMAL
BH CV UPPER VENOUS LEFT CEPHALIC FOREARM COLOR: 1
BH CV UPPER VENOUS LEFT CEPHALIC FOREARM COMPRESS: NORMAL
BH CV UPPER VENOUS LEFT CEPHALIC FOREARM THROMBUS: NORMAL
BH CV UPPER VENOUS LEFT CEPHALIC UPPER COMPRESS: NORMAL
BH CV UPPER VENOUS LEFT INTERNAL JUGULAR AUGMENT: NORMAL
BH CV UPPER VENOUS LEFT INTERNAL JUGULAR COMPRESS: NORMAL
BH CV UPPER VENOUS LEFT INTERNAL JUGULAR PHASIC: NORMAL
BH CV UPPER VENOUS LEFT INTERNAL JUGULAR SPONT: NORMAL
BH CV UPPER VENOUS LEFT RADIAL COMPRESS: NORMAL
BH CV UPPER VENOUS LEFT SUBCLAVIAN AUGMENT: NORMAL
BH CV UPPER VENOUS LEFT SUBCLAVIAN COMPRESS: NORMAL
BH CV UPPER VENOUS LEFT SUBCLAVIAN PHASIC: NORMAL
BH CV UPPER VENOUS LEFT SUBCLAVIAN SPONT: NORMAL
BH CV UPPER VENOUS LEFT ULNAR COMPRESS: NORMAL
BH CV UPPER VENOUS RIGHT INTERNAL JUGULAR AUGMENT: NORMAL
BH CV UPPER VENOUS RIGHT INTERNAL JUGULAR COMPRESS: NORMAL
BH CV UPPER VENOUS RIGHT INTERNAL JUGULAR PHASIC: NORMAL
BH CV UPPER VENOUS RIGHT INTERNAL JUGULAR SPONT: NORMAL
BH CV UPPER VENOUS RIGHT SUBCLAVIAN AUGMENT: NORMAL
BH CV UPPER VENOUS RIGHT SUBCLAVIAN COMPRESS: NORMAL
BH CV UPPER VENOUS RIGHT SUBCLAVIAN PHASIC: NORMAL
BH CV UPPER VENOUS RIGHT SUBCLAVIAN SPONT: NORMAL
BH CV VAS PRELIMINARY FINDINGS SCRIPTING: 1

## 2025-03-04 PROCEDURE — 93971 EXTREMITY STUDY: CPT

## 2025-03-04 PROCEDURE — 93971 EXTREMITY STUDY: CPT | Performed by: SURGERY

## 2025-03-04 PROCEDURE — 25010000002 HYDROMORPHONE PER 4 MG: Performed by: NURSE PRACTITIONER

## 2025-03-04 RX ORDER — ESCITALOPRAM OXALATE 10 MG/1
10 TABLET ORAL NIGHTLY
Qty: 30 TABLET | Refills: 0 | Status: SHIPPED | OUTPATIENT
Start: 2025-03-04

## 2025-03-04 RX ORDER — CEPHALEXIN 500 MG/1
500 CAPSULE ORAL EVERY 6 HOURS SCHEDULED
Status: DISCONTINUED | OUTPATIENT
Start: 2025-03-04 | End: 2025-03-04 | Stop reason: HOSPADM

## 2025-03-04 RX ORDER — CLONAZEPAM 0.5 MG/1
0.5 TABLET ORAL 3 TIMES DAILY
Qty: 15 TABLET | Refills: 0 | Status: SHIPPED | OUTPATIENT
Start: 2025-03-04 | End: 2025-03-10

## 2025-03-04 RX ORDER — HYDROCODONE BITARTRATE AND ACETAMINOPHEN 7.5; 325 MG/1; MG/1
1 TABLET ORAL EVERY 4 HOURS PRN
Status: DISCONTINUED | OUTPATIENT
Start: 2025-03-04 | End: 2025-03-04 | Stop reason: HOSPADM

## 2025-03-04 RX ORDER — BUSPIRONE HYDROCHLORIDE 15 MG/1
15 TABLET ORAL 2 TIMES DAILY
Qty: 20 TABLET | Refills: 0 | Status: SHIPPED | OUTPATIENT
Start: 2025-03-04

## 2025-03-04 RX ORDER — HYDROCODONE BITARTRATE AND ACETAMINOPHEN 7.5; 325 MG/1; MG/1
1 TABLET ORAL EVERY 4 HOURS PRN
Qty: 30 TABLET | Refills: 0 | Status: SHIPPED | OUTPATIENT
Start: 2025-03-04 | End: 2025-03-12 | Stop reason: HOSPADM

## 2025-03-04 RX ORDER — CEPHALEXIN 500 MG/1
500 CAPSULE ORAL EVERY 6 HOURS SCHEDULED
Qty: 28 CAPSULE | Refills: 0 | Status: SHIPPED | OUTPATIENT
Start: 2025-03-04 | End: 2025-03-12 | Stop reason: HOSPADM

## 2025-03-04 RX ADMIN — GABAPENTIN 800 MG: 400 CAPSULE ORAL at 08:20

## 2025-03-04 RX ADMIN — CEPHALEXIN 500 MG: 500 CAPSULE ORAL at 14:20

## 2025-03-04 RX ADMIN — METHOCARBAMOL TABLETS 1000 MG: 500 TABLET, COATED ORAL at 00:56

## 2025-03-04 RX ADMIN — BUSPIRONE HYDROCHLORIDE 15 MG: 15 TABLET ORAL at 08:20

## 2025-03-04 RX ADMIN — METHOCARBAMOL TABLETS 1000 MG: 500 TABLET, COATED ORAL at 05:17

## 2025-03-04 RX ADMIN — OXYCODONE HYDROCHLORIDE 10 MG: 5 TABLET ORAL at 10:22

## 2025-03-04 RX ADMIN — HYDROCODONE BITARTRATE AND ACETAMINOPHEN 1 TABLET: 7.5; 325 TABLET ORAL at 14:20

## 2025-03-04 RX ADMIN — PANTOPRAZOLE SODIUM 40 MG: 40 TABLET, DELAYED RELEASE ORAL at 08:20

## 2025-03-04 RX ADMIN — CETIRIZINE HYDROCHLORIDE 10 MG: 10 TABLET, FILM COATED ORAL at 08:20

## 2025-03-04 RX ADMIN — OXYCODONE HYDROCHLORIDE 10 MG: 5 TABLET ORAL at 00:56

## 2025-03-04 RX ADMIN — SENNOSIDES AND DOCUSATE SODIUM 2 TABLET: 50; 8.6 TABLET ORAL at 08:20

## 2025-03-04 RX ADMIN — OXYCODONE HYDROCHLORIDE 10 MG: 5 TABLET ORAL at 05:17

## 2025-03-04 RX ADMIN — Medication 10 ML: at 08:20

## 2025-03-04 RX ADMIN — CLONAZEPAM 0.5 MG: 0.5 TABLET ORAL at 08:20

## 2025-03-04 RX ADMIN — CLONAZEPAM 0.5 MG: 0.5 TABLET ORAL at 14:20

## 2025-03-04 RX ADMIN — METHOCARBAMOL TABLETS 1000 MG: 500 TABLET, COATED ORAL at 14:20

## 2025-03-04 RX ADMIN — HYDROMORPHONE HYDROCHLORIDE 0.5 MG: 1 INJECTION, SOLUTION INTRAMUSCULAR; INTRAVENOUS; SUBCUTANEOUS at 08:46

## 2025-03-04 NOTE — PROGRESS NOTES
"Nutrition Services    Patient Name:  Rafael Kat  YOB: 1970  MRN: [id number]  Admit Date:  2/25/2025    Assessment Date:  03/04/25    NUTRITION SCREENING      Reason for Encounter Follow-up Protocol    Diagnosis/Problem Cervical stenosis of spine,(2/25/25) s/p s/p posterior C7-T1 decompression with posterior C3-T3 fusion, GERD, dysphagia  Pt with improved appetite and po intake of meals and ONS.        PO Diet Diet: Regular/House; Fluid Consistency: Thin (IDDSI 0)   Supplements Boost Plus, BID   PO Intake % %       Medications MAR reviewed by RD   Labs  Listed below, reviewed   Physical Findings A/Ox4, anxiety, dental appliance, obese   GI Function Last BM 3/3   Skin Status Surgical incision cervial spine       Height  Weight  BMI  Weight Trend     Height: 175.3 cm (69\")  Weight: 95.5 kg (210 lb 8.6 oz) (02/25/25 1800)  Body mass index is 31.09 kg/m². (Obese)  Stable       Nutrition Problem (PES) Nutrition appropriate for condition at this time as evidence by adequate po intake of meals/ONS.       Intervention/Plan Pt now tolerating adequate po intake of Regular diet and Boost supplements. Last BM 3/3. Noted plans for possible d/c home today.    RD to follow up per protocol.     Results from last 7 days   Lab Units 03/01/25  0502 02/28/25  0535 02/27/25  0537   SODIUM mmol/L 135* 135* 136   POTASSIUM mmol/L 4.1 3.9 3.7   CHLORIDE mmol/L 97* 99 100   CO2 mmol/L 33.2* 27.7 28.7   BUN mg/dL 12 11 13   CREATININE mg/dL 1.01 0.80 0.99   CALCIUM mg/dL 8.6 8.3* 7.9*   GLUCOSE mg/dL 119* 146* 114*     Results from last 7 days   Lab Units 03/02/25  0427   HEMOGLOBIN g/dL 10.5*   HEMATOCRIT % 31.4*     Lab Results   Component Value Date    HGBA1C 6.1 (H) 09/25/2023         Electronically signed by:  Hailee Aguilar RD  03/04/25 10:46 EST  "

## 2025-03-04 NOTE — PLAN OF CARE
Goal Outcome Evaluation:           Progress: improving  Outcome Evaluation: vss, nvi, dressing changed this shift, up assist of wife, voiding per BRP, BM 3/2/25, plan for dopplar of LUE r/o clot from IV site infiltration, plan to DC home tomorrow

## 2025-03-04 NOTE — DISCHARGE INSTRUCTIONS
[facility]  [address], [address]  [place] [address]  Phone:  [phone]  Fax:  [phone]    Dr. Bert Collado MD          Posterior Cervical Surgery Post-Operative Instructions        No lifting overhead, although you may place your arms above your head.  Walking is allowed and encouraged.  There are no restrictions on sitting or climbing stairs, but refrain from overly strenuous activity.  Please do not drive until the first visit, although you may be driven.  In general, activity restrictions will be lessened following the first visit.    You may resume your usual diet as you tolerate    No lifting > than 5 lbs. Please be cautious of pulling yourself up using your arms, lifting objects or hugging your family members. These movements can place strain on the incision and increase the risk of wound breakdown.    If you were given a cervical collar, please wear it at all times when possible except in the shower. The cervical collar can help support a fusion construct as well as your muscles and also acts as a reminder to not move your neck excessively.    Refrain from any sexual activity until after your first evaluation at the office.     Remove your dressing the day after you return home from surgery and leave it off.  If the wound is rubbing on or irritated by clothing you may apply a dressing LOOSELY over the incision. Avoid lying directly on the back of your neck excessively. It is important for there to be some airflow to the incision for healing purposes.     You may shower and pat the incision dry, but do not soak your wound in water such as a swimming pool, hot tub or lake. Keep your incision clean and dry at all times.  There may be Steri-Strips across the incision line.  The Steri-Strips may begin to loosen over time.  Just allow them to fall off naturally.  Call Dr. Collado's office for temperature of 101 degrees or greater, chills or increased pain, swelling,  drainage or redness at the incision line.     8.   Avoid direct sunlight to the wound for at least three (3) months.     9. You will leave the hospital with prescriptions for pain medicine and a muscle relaxer.  These       medications must be taken as prescribed only.  A hospital policy prevents us from prescribing       more than 3-6 days worth of pain medications, so please call into the office for refills if needed.      Please allow for 72 hours to refill the pain medication and note that it may not be possible to       refill pain medications over the weekend, so please contact our office prior to the weekend.       After your first week post surgery, If tolerable, please wean yourself from the narcotic pain       medications slowly but it is important that you do not stop taking the narcotics all at once.        10. You were seen at the hospital by a psychiatrist who prescribed medications to help with severe            anxiety and panic symptoms. These medications will be continued with a very limited supply.             Our office will not refill these medications for chronic use. The medications will need to be             managed by your primary care provider. It is imperative that you contact you primary care             provider as soon as you return home to arrange a follow up appointment or make contact with             your provider to ensure that these medications will be continued.         11. You had a doppler study performed to the L UE for pain, swelling and tenderness at an old IV             site that revealed superficial thrombophlebitis. This should improve over the next several days             with the following recommendations: 1) keep the arm elevated at all times on a pillow 2) apply             moist hip pack that to site on for 20 min off for 30 minutes on rotating basis until swelling and            tenderness resolves. 3) take the prescribed antibiotic every 6 hours and finish the  entire              prescription (7 days).         12. You have an appointment in Dr. Collado's office with a Nurse Practitioner 3/11/2025 - your             arm and surgical incision will be evaluated at that time. Call the office at [phone] if the arm             swelling and/or pain worsens, the site becomes more red and irritated or you begin running a             fever of 101 or >.                  13. Notify the office if there are any problems, such as:    Excessive neck or arm pain  Persistent temperature of 101.5° F (38.6 ° C) or greater that is not relieved by Tylenol.  Excessive bleeding, redness, heat, swelling or pus around the incision   If you cannot swallow, have difficulty breathing, have chest pain or shortness of breath, call 911.   Your pain is not controlled with medication  You seem to be getting worse rather than better    Thank you.

## 2025-03-04 NOTE — PLAN OF CARE
Goal Outcome Evaluation:  Plan of Care Reviewed With: patient        Progress: improving  Outcome Evaluation: Pt remains stable. Up with SBA, wife assisting with most activity. Voiding well. Had BM yesterday. Dressing changed last night and painted with betadine. Angie PRN for pain. Heat applied to swelling on LFA. Hoping to have doppler on that L arm today and possibly dc home.

## 2025-03-04 NOTE — PLAN OF CARE
Goal Outcome Evaluation:  Plan of Care Reviewed With: patient        Progress: improving  Outcome Evaluation: Patient discharging home today with spouse and OP PT. Dressing changed. Educated on dressing care. Family to transport. Keflex started, educated on admin. Patient to follow up on 3/11. Meds from pharmacy.

## 2025-03-04 NOTE — PROGRESS NOTES
The patient is in good spirits today and continues to report that his anxiety has improved greatly with scheduled Klonopin.  I have recommended that he continue this medication following discharge to be taken on a scheduled rather than as needed basis.  I have also recommended that he continue Lexapro as well as his increased BuSpar dose following discharge.  These medications should be able to be provided by primary care physician.

## 2025-03-04 NOTE — DISCHARGE SUMMARY
Rafael Kat  1970    Patient Care Team:  Provider, No Known as PCP - Bert Moe MD as Surgeon (Neurosurgery)    Date of Admit: 2/25/2025    Date of Discharge:  3/4/2025    Discharge Diagnosis:  History of fusion of cervical spine    Dysphagia    Spinal stenosis of cervicothoracic region    Weakness of both hands    Weakness of both arms    Gastroesophageal reflux disease    Cervical stenosis of spine    Superficial thrombophlebitis of left upper extremity      Procedures Performed  Procedure(s):  Posterior cervical seven/thoracic one decompression, posterior cervical three/four/five/six/seven, thoracic one/two/three fusion with screws/rods/crosslinks with Stealth spinal navigation       Complications: none    Consultants:   Consults       Date and Time Order Name Status Description    2/26/2025 11:52 AM Inpatient Psychiatrist Consult Completed     2/25/2025  5:59 PM Inpatient Physical Medicine Rehab Consult      2/25/2025  5:59 PM Inpatient Pulmonology Consult Completed     2/25/2025  5:59 PM Inpatient Hospitalist Consult Completed             Condition on Discharge: stable    Discharge disposition: home    Pertinent Test Results:     Left UE venous doppler performed 3/04/2025 to evaluate painful rope-like swelling to left forearm where a previous IV had been placed. The doppler revealed acute superficial thrombophlebitis to the left cephalic forearm vein.  No evidence of deep venous thrombosis noted in the left upper extremity.    Postoperative cervical spine x-rays and thoracic spine x-rays performed February 26, 2025 revealed intact previous anterior cervical fusion hardware and intact new posterior cervical thoracic spinal fusion hardware.  No evidence of malalignment or fracture in the cervical or thoracic spine.    Brief HPI: This is a 54-year-old male well-known to Dr. Collado.  He has had previous lumbar decompression surgery with Dr. Collado as well as previous C4, C5, C6  corpectomy with cage and plate anteriorly for fusion from C3-C7 years ago by Dr. Collado for severe stenosis and cervical myelopathy.  He has had issues with lower extremity spasticity and takes baclofen at home on a routine basis.  Ultimately he did experience improvement in symptoms and was doing well until he began noticing worsening pain in both of his arms with weakness in his hands and his arms.  Imaging revealed adjacent level stenosis at C7-T1 which was consistent with the location of his pain.  He had tried conservative measures including physical therapy, medicines epidural blocks with no relief.  Due to the progression of motor deficit in his upper extremities he was brought to the operating room for the above-stated elective surgical procedure.  Please see admission H&P for further details.    Hospital Course: As expected, the patient had severe difficulty with pain control initially.  He had been treated with a Dilaudid PCA pump as well as oxycodone as needed.  He was also given the home dose baclofen for spasm relief as well as some Robaxin.  He had some issues with worsening anxiety and was requiring oxygen for which a pulmonary consult was obtained the day after his surgery.  He was maintained on home dose gabapentin and the baclofen had been restarted as well.  The patient has issues with chronic anxiety and panic disorder.  He prescribed some iron infusions due to low ferritin level in the hopes that this would also help with the restless feeling in his legs.  The patient completed all 4 doses.  A psychiatry consult was also requested by the pulmonologist for the history of panic disorder and anxiety.  Dr. Mcneal with psychiatry evaluated the patient on February 27, 2025.  He started the patient on a nightly dose of Lexapro 10 mg and started clonazepam 0.5 mg p.o. every 8 hours.  Dr. Mcneal also increased the home dose BuSpar from 10 mg to 15 mg twice daily.  The patient has really  done well with these medications although he did experience a brief episode of anxiety with some mild associated symptoms of panic.  Per the psychiatry note March 3, 2025.  Dr. Mcneal continued to follow the patient through his hospital stay up until yesterday.  The patient has continued to do well on the medications recommended by psychiatry.  Dr. Mcneal made no changes to the medication regimen and also felt that the patient was doing well from a psychiatric standpoint.     Given the patient's significant improvement in anxiety and panic symptoms with the regimen recommended by the psychiatrist, Dr. Mcneal, I informed the patient that I would be giving him a very limited supply of these medications.  He is aware that he will need to speak with the primary care provider about continuing his medications after discharge.  He is also aware that he should discontinue the home dose Ambien that he had been taking at night and follow the new regimen as listed in the discharge after visit summary instructions.  A list of the office instructions are also included here and in the after visit summary for the patient to review at home.    The patient was evaluated by physical therapy.  He is been ambulating with contact-guard assistance/minimal assistance in the hallways.  He had some postoperative leukocytosis, likely reactional which has since resolved.  Chest x-ray performed during this hospital stay was negative for any acute cardiopulmonary process.    Yesterday, the patient had complained of some significant tenderness to an old IV site in the left forearm.  The patient had been receiving medications through the IV and had complained about pain and discomfort in this site up until the time the IV was removed yesterday.  The patient denied any chest pain or shortness of breath.  He denied any feelings of chills or fever.  There was no drainage from the old IV site however there was palpable localized  swelling almost ropelike in appearance with very mild erythema.  An order was placed for a K-pad and to keep the arm elevated at all times.  Venous Doppler performed on the left upper extremity today revealed acute superficial thrombophlebitis in the left forearm cephalic vein.  No acute DVT.  Patient has been placed on 7-day course of Keflex 4 times daily.  He was instructed to keep the left arm elevated at all times and apply moist heat to the site on for 20 minutes off for 30 minutes until the pain at the site resolves.  If he begins to notice fever greater than 101 degrees, progression in the swelling and redness at the site of the left forearm or drainage, he was instructed to call our office. The surgical drain was removed several days ago.  The edges of the posterior cervical thoracic incision are well-approximated.  Steri-Strips intact.  The patient was also instructed to keep this area clean and dry at all times.  Avoid laying on the incision excessively and allow air to flow to the incision is much as possible.  Instructed to call the office if he notices any incisional redness, swelling, or drainage from his incision increased pain or temperature greater than 101 degrees.    It was determined that the patient was doing well enough with physical therapy and was exhibiting no signs of concern for safety at home therefore the plan was to transition the patient home with his wife.  According to the [facility] note, the patient does not qualify for any home health services.  We will discuss transitioning to outpatient physical therapy after he is evaluated in the office.    I discussed all of the above with Dr. Collado by telephone including the continuance of psychiatric medications for a limited time.  He will be seen in our office in 1 week and is aware that he must keep this appointment so that we can reevaluate both the left forearm superficial thrombophlebitis and the incision.  He will be discharged home  today.  Both the patient, his wife and Dr. Collado in agreement with the plan.      Discharge Physical Exam:    Temp:  [97.8 °F (36.6 °C)-99.8 °F (37.7 °C)] 97.8 °F (36.6 °C)  Heart Rate:  [74-92] 74  Resp:  [16-20] 20  BP: (118-134)/(71-79) 120/79    Current labs:  Lab Results (last 24 hours)       ** No results found for the last 24 hours. **          Discharge Medications  MCKAY has been reviewed and narcotic consent is on file in the patient's chart.     Your medication list        START taking these medications        Instructions Last Dose Given Next Dose Due   cephalexin 500 MG capsule  Commonly known as: KEFLEX      Take 1 capsule by mouth Every 6 (Six) Hours for 28 doses. Indications: left forearm superficial thrombophlebitis       clonazePAM 0.5 MG tablet  Commonly known as: KlonoPIN      Take 1 tablet by mouth 3 times a day for 5 days. Indications: Panic Disorder, This medication was prescribed by the hospital psychiatrist for severe panic disorder and anxiety with success. The patient is awrare that he is being provided w limited supply and will need to see further refills from his PCP       escitalopram 10 MG tablet  Commonly known as: LEXAPRO      Take 1 tablet by mouth Every Night. Indications: Panic Disorder, prescribed by psychiatrist who saw patient in hospital for severe panic and anxiety - patient aware that he was prescribed a limited supply and will need to speak with PCP for further refill       HYDROcodone-acetaminophen 7.5-325 MG per tablet  Commonly known as: NORCO      Take 1 tablet by mouth Every 4 (Four) Hours As Needed for Severe Pain for up to 5 days.       naloxone 4 MG/0.1ML nasal spray  Commonly known as: NARCAN      Call 911. Don't prime. [place] in 1 nostril for overdose. Repeat in 2-3 minutes in other nostril if no or minimal breathing/responsiveness.              CHANGE how you take these medications        Instructions Last Dose Given Next Dose Due   busPIRone 15 MG  tablet  Commonly known as: BUSPAR  What changed:   medication strength  how much to take      Take 1 tablet by mouth 2 (Two) Times a Day. Indications: Anxiety Disorder, This medication dosage was increased by the hospital psychiatrist who saw the patient for severe anxiety/panic with success.  The patient understands he is being given a limited supply of this medication and that further refills will need to come from his PCP.  PCP to refill       docusate sodium 100 MG capsule  What changed:   when to take this  reasons to take this      Take 1 capsule by mouth Daily.       lidocaine 5 %  Commonly known as: LIDODERM  What changed:   when to take this  reasons to take this      PLACE 1 PATCH ON THE SKIN AS DIRECTED BY PROVIDER DAILY. REMOVE & DISCARD PATCH WITHIN 12 HOURS OR AS DIRECTED BY MD Martinez 8.6-50 MG per tablet  Generic drug: sennosides-docusate  What changed:   when to take this  reasons to take this      Take 2 tablets by mouth Every Night.              CONTINUE taking these medications        Instructions Last Dose Given Next Dose Due   baclofen 20 MG tablet  Commonly known as: LIORESAL      Take 1 tablet by mouth 2 (Two) Times a Day.       cetirizine 10 MG tablet  Commonly known as: zyrTEC      Take 1 tablet by mouth Daily.       cholecalciferol 25 MCG (1000 UT) tablet  Commonly known as: VITAMIN D3      Take 1 tablet by mouth Daily.       Diclofenac Sodium 1 % gel gel  Commonly known as: VOLTAREN      Apply 4 g topically to the appropriate area as directed As Needed.       FeroSul 325 (65 Fe) MG tablet  Generic drug: ferrous sulfate      Take 1 tablet by mouth Daily With Breakfast. PT HAS NOT HAD FOR 4 DAYS       gabapentin 800 MG tablet  Commonly known as: NEURONTIN      Take 1 tablet by mouth 3 (Three) Times a Day. TYPICALLY TAKES TWICE DAILY       mometasone 0.1 % cream  Commonly known as: ELOCON      1 Application As Needed.       NON FORMULARY      Every Night. STATES DOES USE NIGHT  INHALER:  INSTRUCTED TO BRING THIS DAY OF SURGEY       pantoprazole 40 MG EC tablet  Commonly known as: PROTONIX      Take 1 tablet by mouth 2 (Two) Times a Day.       polyethylene glycol 17 GM/SCOOP powder  Commonly known as: MIRALAX      Dissolve 17 g (1 capful) in liquid and drink by mouth 2 (Two) Times a Day As Needed for constipation       Potassium 75 MG tablet      Take  by mouth As Needed. DURING THE SUMMER WHEN SWEATS MORE       Unable to find      1 each 1 (One) Time. INHALER NEW INSTRUCTED PT TO BRING WITH HIM       Ventolin  (90 Base) MCG/ACT inhaler  Generic drug: albuterol sulfate HFA      Inhale 2 puffs Every 4 (Four) Hours As Needed.              STOP taking these medications      acetaminophen 500 MG tablet  Commonly known as: TYLENOL        celecoxib 200 MG capsule  Commonly known as: CeleBREX        zolpidem 10 MG tablet  Commonly known as: AMBIEN                  Where to Get Your Medications        These medications were sent to [place] [id number]      Hours: Monday to Friday 7 AM to 6 PM, Saturday & Sunday 8 AM to 4:30 PM (Closed 12 PM to 12:30 PM) Phone: [phone]   busPIRone 15 MG tablet  cephalexin 500 MG capsule  clonazePAM 0.5 MG tablet  escitalopram 10 MG tablet  HYDROcodone-acetaminophen 7.5-325 MG per tablet  naloxone 4 MG/0.1ML nasal spray         Discharge Diet:     Diet Order   Procedures    Diet: Regular/House; Fluid Consistency: Thin (IDDSI 0)       Activity at Discharge:       Call for: questions or concerns    Follow-up Appointments  Future Appointments   Date Time Provider Department Center   3/11/2025  2:15 PM Susannah Dobson APRN [facility]      Follow-up Information       Bert Collado MD Follow up in 38 day(s).    Specialty: Neurosurgery  Why: You will be seeing HERON Mendoza for wound check and post op evaluation, For wound re-check March 11,2025 at 2:15 pm.  Contact information:  [facility]  [address]  [facility] [id number] [phone]               Tito Noonan MD Follow up in 41 day(s).    Specialty: Internal Medicine  Why: Keep April 14th appointment at 9:40 am - contact HERON Lao for medication refills or medical needs prior to above appointment date.  Contact information:  [address]  [facility] [id number] [phone]               Provider, No Known .    Why: Contact HERON Lao for f/u appointment or to refill your newly prescribed mediations by hospital psychiatry for Klonopin 0.5 mg TID, Lexapro 10 mg at HS, increase Buspar to 15 mg BID. Current RX is limited supply.STOP home AMBIEN.  Contact information:  [facility] [id number] [phone]                           Additional Instructions for the Follow-ups that You Need to Schedule       Call MD With Problems / Concerns   As directed      Instructions: send list attached in AVS home with patient.    Order Comments: Instructions: send list attached in AVS home with patient.         Discharge Follow-up with PCP   As directed       Currently Documented PCP:    Provider, No Known    PCP Phone Number:    [phone]     Follow Up Details: Contact HERON Lao for f/u appointment or to refill your newly prescribed mediations by [facility] psychiatry for Klonopin 0.5 mg TID, Lexapro 10 mg at HS, increase Buspar to 15 mg BID. Current RX is limited supply.STOP home AMBIEN.        Discharge Follow-up with Specialty: Keep appointment in Dr. Collado's office with nurse practitioner March 11th for re-evaluation of L LE superficial thrombophlebitis and for incision check.   As directed      Specialty: Keep appointment in Dr. Collado's office with nurse practitioner March 11th for re-evaluation of L LE superficial thrombophlebitis and for incision check.   Follow Up Details: March 11th              I discussed the discharge instructions with patient and family    Donita Van,  "APRN  03/04/25  13:23 EST      \"Dictated utilizing Dragon dictation\".          [facility] Neurological Surgery  [address], [address] [place] [address]  Phone:  [phone]  Fax:  [phone]    Dr. Bert Collado MD      Posterior Cervical Surgery Post-Operative Instructions      No lifting overhead, although you may place your arms above your head.  Walking is allowed and encouraged.  There are no restrictions on sitting or climbing stairs, but refrain from overly strenuous activity.  Please do not drive until the first visit, although you may be driven.  In general, activity restrictions will be lessened following the first visit.    You may resume your usual diet as you tolerate    No lifting > than 5 lbs. Please be cautious of pulling yourself up using your arms, lifting objects or hugging your family members. These movements can place strain on the incision and increase the risk of wound breakdown.    If you were given a cervical collar, please wear it at all times when possible except in the shower. The cervical collar can help support a fusion construct as well as your muscles and also acts as a reminder to not move your neck excessively.    Refrain from any sexual activity until after your first evaluation at the office.     Remove your dressing the day after you return home from surgery and leave it off.  If the wound is rubbing on or irritated by clothing you may apply a dressing LOOSELY over the incision. Avoid lying directly on the back of your neck excessively. It is important for there to be some airflow to the incision for healing purposes.     You may shower and pat the incision dry, but do not soak your wound in water such as a swimming pool, hot tub or lake. Keep your incision clean and dry at all times.  There may be Steri-Strips across the incision line.  The Steri-Strips may begin to loosen over time.  Just allow them to fall off naturally.  Call Dr. Collado's office for " temperature of 101 degrees or greater, chills or increased pain, swelling, drainage or redness at the incision line.     8.   Avoid direct sunlight to the wound for at least three (3) months.     9. You will leave the hospital with prescriptions for pain medicine and a muscle relaxer.  These       medications must be taken as prescribed only.  A hospital policy prevents us from prescribing       more than 3-6 days worth of pain medications, so please call into the office for refills if needed.      Please allow for 72 hours to refill the pain medication and note that it may not be possible to       refill pain medications over the weekend, so please contact our office prior to the weekend.       After your first week post surgery, If tolerable, please wean yourself from the narcotic pain       medications slowly but it is important that you do not stop taking the narcotics all at once.        10. You were seen at the hospital by a psychiatrist who prescribed medications to help with severe            anxiety and panic symptoms. These medications will be continued with a very limited supply.             Our office will not refill these medications for chronic use. The medications will need to be             managed by your primary care provider. It is imperative that you contact you primary care             provider as soon as you return home to arrange a follow up appointment or make contact with             your provider to ensure that these medications will be continued.         11. You had a doppler study performed to the L UE for pain, swelling and tenderness at an old IV             site that revealed superficial thrombophlebitis. This should improve over the next several days             with the following recommendations: 1) keep the arm elevated at all times on a pillow 2) apply             moist hip pack that to site on for 20 min off for 30 minutes on rotating basis until swelling and            tenderness  resolves. 3) take the prescribed antibiotic every 6 hours and finish the entire              prescription (7 days).         12. You have an appointment in Dr. Collado's office with a Nurse Practitioner 3/11/2025 - your             arm and surgical incision will be evaluated at that time. Call the office at [phone] if the arm             swelling and/or pain worsens, the site becomes more red and irritated or you begin running a             fever of 101 or >.                  13. Notify the office if there are any problems, such as:    Excessive neck or arm pain  Persistent temperature of 101.5° F (38.6 ° C) or greater that is not relieved by Tylenol.  Excessive bleeding, redness, heat, swelling or pus around the incision   If you cannot swallow, have difficulty breathing, have chest pain or shortness of breath, call 911.   Your pain is not controlled with medication  You seem to be getting worse rather than better    Thank you.

## 2025-03-05 NOTE — PAYOR COMM NOTE
"Ricardo Chen (54 y.o. Male)    PLEASE SEE ATTACHED FOR DC NOTICE   REF#[id number]   THANK YOU  AYDE STEINER RN/UM DEPT   [facility]  PH: [phone]  FAX:  [phone]    Date of Birth   1970    Social Security Number       Address   [address] [place] [address]    Home Phone   [phone]    MRN   [id number]       Hinduism   None    Marital Status                               Admission Date   2/25/25    Admission Type   Elective    Admitting Provider   Bert Collado MD    Attending Provider       Department, Room/Bed   [facility] [address], P794/1       Discharge Date   3/4/2025    Discharge Disposition   Home or Self Care    Discharge Destination                                 Attending Provider: (none)   Allergies: Bacitracin-polymyxin B, Chlorhexidine, Jxepg-fzlbl-lqmiazp-pramoxine    Isolation: None   Infection: None   Code Status: Prior    Ht: 175.3 cm (69\")   Wt: 95.5 kg (210 lb 8.6 oz)    Admission Cmt: None   Principal Problem: None                  Active Insurance as of 2/25/2025       Primary Coverage       Payor Plan Insurance Group Employer/Plan Group    [employer] BY [employer] [id number]       Payor Plan Address Payor Plan Phone Number Payor Plan Fax Number Effective Dates    [address] [address]   1/1/2021 - None Entered    [facility] [id number]         Subscriber Name Subscriber Birth Date Member ID       RICARDO CHEN 1970 [id number]                     Emergency Contacts        (Rel.) Home Phone Work Phone Mobile Phone    Monika Chen (Spouse) [phone] -- [phone]              [place]: NPI [id number]  Tax ID [id number]     Discharge Summary        Donita Van, APRN at 03/04/25 1321            Ricardo Chen  1970    Patient Care Team:  Provider, No Known as PCP - General  Bert Collado MD as Surgeon (Neurosurgery)    Date of Admit: " 2/25/2025    Date of Discharge:  3/4/2025    Discharge Diagnosis:  History of fusion of cervical spine    Dysphagia    Spinal stenosis of cervicothoracic region    Weakness of both hands    Weakness of both arms    Gastroesophageal reflux disease    Cervical stenosis of spine    Superficial thrombophlebitis of left upper extremity      Procedures Performed  Procedure(s):  Posterior cervical seven/thoracic one decompression, posterior cervical three/four/five/six/seven, thoracic one/two/three fusion with screws/rods/crosslinks with Stealth spinal navigation       Complications: none    Consultants:   Consults       Date and Time Order Name Status Description    2/26/2025 11:52 AM Inpatient Psychiatrist Consult Completed     2/25/2025  5:59 PM Inpatient Physical Medicine Rehab Consult      2/25/2025  5:59 PM Inpatient Pulmonology Consult Completed     2/25/2025  5:59 PM Inpatient Hospitalist Consult Completed             Condition on Discharge: stable    Discharge disposition: home    Pertinent Test Results:     Left UE venous doppler performed 3/04/2025 to evaluate painful rope-like swelling to left forearm where a previous IV had been placed. The doppler revealed acute superficial thrombophlebitis to the left cephalic forearm vein.  No evidence of deep venous thrombosis noted in the left upper extremity.    Postoperative cervical spine x-rays and thoracic spine x-rays performed February 26, 2025 revealed intact previous anterior cervical fusion hardware and intact new posterior cervical thoracic spinal fusion hardware.  No evidence of malalignment or fracture in the cervical or thoracic spine.    Brief HPI: This is a 54-year-old male well-known to Dr. Collado.  He has had previous lumbar decompression surgery with Dr. Collado as well as previous C4, C5, C6 corpectomy with cage and plate anteriorly for fusion from C3-C7 years ago by Dr. Collado for severe stenosis and cervical myelopathy.  He has had issues  with lower extremity spasticity and takes baclofen at home on a routine basis.  Ultimately he did experience improvement in symptoms and was doing well until he began noticing worsening pain in both of his arms with weakness in his hands and his arms.  Imaging revealed adjacent level stenosis at C7-T1 which was consistent with the location of his pain.  He had tried conservative measures including physical therapy, medicines epidural blocks with no relief.  Due to the progression of motor deficit in his upper extremities he was brought to the operating room for the above-stated elective surgical procedure.  Please see admission H&P for further details.    Hospital Course: As expected, the patient had severe difficulty with pain control initially.  He had been treated with a Dilaudid PCA pump as well as oxycodone as needed.  He was also given the home dose baclofen for spasm relief as well as some Robaxin.  He had some issues with worsening anxiety and was requiring oxygen for which a pulmonary consult was obtained the day after his surgery.  He was maintained on home dose gabapentin and the baclofen had been restarted as well.  The patient has issues with chronic anxiety and panic disorder.  He prescribed some iron infusions due to low ferritin level in the hopes that this would also help with the restless feeling in his legs.  The patient completed all 4 doses.  A psychiatry consult was also requested by the pulmonologist for the history of panic disorder and anxiety.  Dr. Mcneal with psychiatry evaluated the patient on February 27, 2025.  He started the patient on a nightly dose of Lexapro 10 mg and started clonazepam 0.5 mg p.o. every 8 hours.  Dr. Mcneal also increased the home dose BuSpar from 10 mg to 15 mg twice daily.  The patient has really done well with these medications although he did experience a brief episode of anxiety with some mild associated symptoms of panic.  Per the psychiatry note  March 3, 2025.  Dr. Mcneal continued to follow the patient through his hospital stay up until yesterday.  The patient has continued to do well on the medications recommended by psychiatry.  Dr. Mcneal made no changes to the medication regimen and also felt that the patient was doing well from a psychiatric standpoint.     Given the patient's significant improvement in anxiety and panic symptoms with the regimen recommended by the psychiatrist, Dr. Mcneal, I informed the patient that I would be giving him a very limited supply of these medications.  He is aware that he will need to speak with the primary care provider about continuing his medications after discharge.  He is also aware that he should discontinue the home dose Ambien that he had been taking at night and follow the new regimen as listed in the discharge after visit summary instructions.  A list of the office instructions are also included here and in the after visit summary for the patient to review at home.    The patient was evaluated by physical therapy.  He is been ambulating with contact-guard assistance/minimal assistance in the hallways.  He had some postoperative leukocytosis, likely reactional which has since resolved.  Chest x-ray performed during this hospital stay was negative for any acute cardiopulmonary process.    Yesterday, the patient had complained of some significant tenderness to an old IV site in the left forearm.  The patient had been receiving medications through the IV and had complained about pain and discomfort in this site up until the time the IV was removed yesterday.  The patient denied any chest pain or shortness of breath.  He denied any feelings of chills or fever.  There was no drainage from the old IV site however there was palpable localized swelling almost ropelike in appearance with very mild erythema.  An order was placed for a K-pad and to keep the arm elevated at all times.  Venous Doppler  performed on the left upper extremity today revealed acute superficial thrombophlebitis in the left forearm cephalic vein.  No acute DVT.  Patient has been placed on 7-day course of Keflex 4 times daily.  He was instructed to keep the left arm elevated at all times and apply moist heat to the site on for 20 minutes off for 30 minutes until the pain at the site resolves.  If he begins to notice fever greater than 101 degrees, progression in the swelling and redness at the site of the left forearm or drainage, he was instructed to call our office. The surgical drain was removed several days ago.  The edges of the posterior cervical thoracic incision are well-approximated.  Steri-Strips intact.  The patient was also instructed to keep this area clean and dry at all times.  Avoid laying on the incision excessively and allow air to flow to the incision is much as possible.  Instructed to call the office if he notices any incisional redness, swelling, or drainage from his incision increased pain or temperature greater than 101 degrees.    It was determined that the patient was doing well enough with physical therapy and was exhibiting no signs of concern for safety at home therefore the plan was to transition the patient home with his wife.  According to the [facility] note, the patient does not qualify for any home health services.  We will discuss transitioning to outpatient physical therapy after he is evaluated in the office.    I discussed all of the above with Dr. Collado by telephone including the continuance of psychiatric medications for a limited time.  He will be seen in our office in 1 week and is aware that he must keep this appointment so that we can reevaluate both the left forearm superficial thrombophlebitis and the incision.  He will be discharged home today.  Both the patient, his wife and Dr. Collado in agreement with the plan.      Discharge Physical Exam:    Temp:  [97.8 °F (36.6 °C)-99.8 °F (37.7  °C)] 97.8 °F (36.6 °C)  Heart Rate:  [74-92] 74  Resp:  [16-20] 20  BP: (118-134)/(71-79) 120/79    Current labs:  Lab Results (last 24 hours)       ** No results found for the last 24 hours. **          Discharge Medications  MCKAY has been reviewed and narcotic consent is on file in the patient's chart.     Your medication list        START taking these medications        Instructions Last Dose Given Next Dose Due   cephalexin 500 MG capsule  Commonly known as: KEFLEX      Take 1 capsule by mouth Every 6 (Six) Hours for 28 doses. Indications: left forearm superficial thrombophlebitis       clonazePAM 0.5 MG tablet  Commonly known as: KlonoPIN      Take 1 tablet by mouth 3 times a day for 5 days. Indications: Panic Disorder, This medication was prescribed by the hospital psychiatrist for severe panic disorder and anxiety with success. The patient is awrare that he is being provided w limited supply and will need to see further refills from his PCP       escitalopram 10 MG tablet  Commonly known as: LEXAPRO      Take 1 tablet by mouth Every Night. Indications: Panic Disorder, prescribed by psychiatrist who saw patient in hospital for severe panic and anxiety - patient aware that he was prescribed a limited supply and will need to speak with PCP for further refill       HYDROcodone-acetaminophen 7.5-325 MG per tablet  Commonly known as: NORCO      Take 1 tablet by mouth Every 4 (Four) Hours As Needed for Severe Pain for up to 5 days.       naloxone 4 MG/0.1ML nasal spray  Commonly known as: NARCAN      Call 911. Don't prime. [place] in 1 nostril for overdose. Repeat in 2-3 minutes in other nostril if no or minimal breathing/responsiveness.              CHANGE how you take these medications        Instructions Last Dose Given Next Dose Due   busPIRone 15 MG tablet  Commonly known as: BUSPAR  What changed:   medication strength  how much to take      Take 1 tablet by mouth 2 (Two) Times a Day. Indications: Anxiety  Disorder, This medication dosage was increased by the hospital psychiatrist who saw the patient for severe anxiety/panic with success.  The patient understands he is being given a limited supply of this medication and that further refills will need to come from his PCP.  PCP to refill       docusate sodium 100 MG capsule  What changed:   when to take this  reasons to take this      Take 1 capsule by mouth Daily.       lidocaine 5 %  Commonly known as: LIDODERM  What changed:   when to take this  reasons to take this      PLACE 1 PATCH ON THE SKIN AS DIRECTED BY PROVIDER DAILY. REMOVE & DISCARD PATCH WITHIN 12 HOURS OR AS DIRECTED BY MD Martinez 8.6-50 MG per tablet  Generic drug: sennosides-docusate  What changed:   when to take this  reasons to take this      Take 2 tablets by mouth Every Night.              CONTINUE taking these medications        Instructions Last Dose Given Next Dose Due   baclofen 20 MG tablet  Commonly known as: LIORESAL      Take 1 tablet by mouth 2 (Two) Times a Day.       cetirizine 10 MG tablet  Commonly known as: zyrTEC      Take 1 tablet by mouth Daily.       cholecalciferol 25 MCG (1000 UT) tablet  Commonly known as: VITAMIN D3      Take 1 tablet by mouth Daily.       Diclofenac Sodium 1 % gel gel  Commonly known as: VOLTAREN      Apply 4 g topically to the appropriate area as directed As Needed.       FeroSul 325 (65 Fe) MG tablet  Generic drug: ferrous sulfate      Take 1 tablet by mouth Daily With Breakfast. PT HAS NOT HAD FOR 4 DAYS       gabapentin 800 MG tablet  Commonly known as: NEURONTIN      Take 1 tablet by mouth 3 (Three) Times a Day. TYPICALLY TAKES TWICE DAILY       mometasone 0.1 % cream  Commonly known as: ELOCON      1 Application As Needed.       NON FORMULARY      Every Night. STATES DOES USE NIGHT INHALER:  INSTRUCTED TO BRING THIS DAY OF SURGEY       pantoprazole 40 MG EC tablet  Commonly known as: PROTONIX      Take 1 tablet by mouth 2 (Two) Times a Day.        polyethylene glycol 17 GM/SCOOP powder  Commonly known as: MIRALAX      Dissolve 17 g (1 capful) in liquid and drink by mouth 2 (Two) Times a Day As Needed for constipation       Potassium 75 MG tablet      Take  by mouth As Needed. DURING THE SUMMER WHEN SWEATS MORE       Unable to find      1 each 1 (One) Time. INHALER NEW INSTRUCTED PT TO BRING WITH HIM       Ventolin  (90 Base) MCG/ACT inhaler  Generic drug: albuterol sulfate HFA      Inhale 2 puffs Every 4 (Four) Hours As Needed.              STOP taking these medications      acetaminophen 500 MG tablet  Commonly known as: TYLENOL        celecoxib 200 MG capsule  Commonly known as: CeleBREX        zolpidem 10 MG tablet  Commonly known as: AMBIEN                  Where to Get Your Medications        These medications were sent to [facility]  [address], [place] [id number]      Hours: Monday to Friday 7 AM to 6 PM, Saturday & Sunday 8 AM to 4:30 PM (Closed 12 PM to 12:30 PM) Phone: [phone]   busPIRone 15 MG tablet  cephalexin 500 MG capsule  clonazePAM 0.5 MG tablet  escitalopram 10 MG tablet  HYDROcodone-acetaminophen 7.5-325 MG per tablet  naloxone 4 MG/0.1ML nasal spray         Discharge Diet:     Diet Order   Procedures    Diet: Regular/House; Fluid Consistency: Thin (IDDSI 0)       Activity at Discharge:       Call for: questions or concerns    Follow-up Appointments  Future Appointments   Date Time Provider Department Center   3/11/2025  2:15 PM Susannah Dobson APRN [facility]      Follow-up Information       Bert Collado MD Follow up in 38 day(s).    Specialty: Neurosurgery  Why: You will be seeing HERON Mendoza for wound check and post op evaluation, For wound re-check March 11,2025 at 2:15 pm.  Contact information:  [address]  [facility] [address]  [facility] [address] [phone]               Tito Noonan MD Follow up in 41 day(s).    Specialty: Internal Medicine  Why: Keep April 14th  "appointment at 9:40 am - contact HERON Lao for medication refills or medical needs prior to above appointment date.  Contact information:  [address] [facility] [id number] [phone]               Provider, No Known .    Why: Contact HERON Lao for f/u appointment or to refill your newly prescribed mediations by [facility] psychiatry for Klonopin 0.5 mg TID, Lexapro 10 mg at HS, increase Buspar to 15 mg BID. Current RX is limited supply.STOP home AMBIEN.  Contact information:  [place] [id number] [phone]                           Additional Instructions for the Follow-ups that You Need to Schedule       Call MD With Problems / Concerns   As directed      Instructions: send list attached in AVS home with patient.    Order Comments: Instructions: send list attached in AVS home with patient.         Discharge Follow-up with PCP   As directed       Currently Documented PCP:    Provider, No Known    PCP Phone Number:    [phone]     Follow Up Details: Contact HERON Lao for f/u appointment or to refill your newly prescribed mediations by [facility] psychiatry for Klonopin 0.5 mg TID, Lexapro 10 mg at HS, increase Buspar to 15 mg BID. Current RX is limited supply.STOP home AMBIEN.        Discharge Follow-up with Specialty: Keep appointment in Dr. Collado's office with nurse practitioner March 11th for re-evaluation of L LE superficial thrombophlebitis and for incision check.   As directed      Specialty: Keep appointment in Dr. Collado's office with nurse practitioner March 11th for re-evaluation of L LE superficial thrombophlebitis and for incision check.   Follow Up Details: March 11th              I discussed the discharge instructions with patient and family    Donita Van, APRN  03/04/25  13:23 EST      \"Dictated utilizing Dragon dictation\".          [facility] Neurological Surgery  [facility], [address]  [place] [address]  Phone:  " [phone]  Fax:  [phone]    Dr. Bert Collado MD      Posterior Cervical Surgery Post-Operative Instructions      No lifting overhead, although you may place your arms above your head.  Walking is allowed and encouraged.  There are no restrictions on sitting or climbing stairs, but refrain from overly strenuous activity.  Please do not drive until the first visit, although you may be driven.  In general, activity restrictions will be lessened following the first visit.    You may resume your usual diet as you tolerate    No lifting > than 5 lbs. Please be cautious of pulling yourself up using your arms, lifting objects or hugging your family members. These movements can place strain on the incision and increase the risk of wound breakdown.    If you were given a cervical collar, please wear it at all times when possible except in the shower. The cervical collar can help support a fusion construct as well as your muscles and also acts as a reminder to not move your neck excessively.    Refrain from any sexual activity until after your first evaluation at the office.     Remove your dressing the day after you return home from surgery and leave it off.  If the wound is rubbing on or irritated by clothing you may apply a dressing LOOSELY over the incision. Avoid lying directly on the back of your neck excessively. It is important for there to be some airflow to the incision for healing purposes.     You may shower and pat the incision dry, but do not soak your wound in water such as a swimming pool, hot tub or lake. Keep your incision clean and dry at all times.  There may be Steri-Strips across the incision line.  The Steri-Strips may begin to loosen over time.  Just allow them to fall off naturally.  Call Dr. Collado's office for temperature of 101 degrees or greater, chills or increased pain, swelling, drainage or redness at the incision line.     8.   Avoid direct sunlight to the wound for at least  three (3) months.     9. You will leave the hospital with prescriptions for pain medicine and a muscle relaxer.  These       medications must be taken as prescribed only.  A hospital policy prevents us from prescribing       more than 3-6 days worth of pain medications, so please call into the office for refills if needed.      Please allow for 72 hours to refill the pain medication and note that it may not be possible to       refill pain medications over the weekend, so please contact our office prior to the weekend.       After your first week post surgery, If tolerable, please wean yourself from the narcotic pain       medications slowly but it is important that you do not stop taking the narcotics all at once.        10. You were seen at the hospital by a psychiatrist who prescribed medications to help with severe            anxiety and panic symptoms. These medications will be continued with a very limited supply.             Our office will not refill these medications for chronic use. The medications will need to be             managed by your primary care provider. It is imperative that you contact you primary care             provider as soon as you return home to arrange a follow up appointment or make contact with             your provider to ensure that these medications will be continued.         11. You had a doppler study performed to the L UE for pain, swelling and tenderness at an old IV             site that revealed superficial thrombophlebitis. This should improve over the next several days             with the following recommendations: 1) keep the arm elevated at all times on a pillow 2) apply             moist hip pack that to site on for 20 min off for 30 minutes on rotating basis until swelling and            tenderness resolves. 3) take the prescribed antibiotic every 6 hours and finish the entire              prescription (7 days).         12. You have an appointment in Dr. Collado's  office with a Nurse Practitioner 3/11/2025 - your             arm and surgical incision will be evaluated at that time. Call the office at [phone] if the arm             swelling and/or pain worsens, the site becomes more red and irritated or you begin running a             fever of 101 or >.                  13. Notify the office if there are any problems, such as:    Excessive neck or arm pain  Persistent temperature of 101.5° F (38.6 ° C) or greater that is not relieved by Tylenol.  Excessive bleeding, redness, heat, swelling or pus around the incision   If you cannot swallow, have difficulty breathing, have chest pain or shortness of breath, call 911.   Your pain is not controlled with medication  You seem to be getting worse rather than better    Thank you.    Electronically signed by Donita Van APRN at 03/04/25 9893

## 2025-03-05 NOTE — OUTREACH NOTE
Prep Survey      Flowsheet Row Responses   [facility]ab[facility] facility patient discharged from? [facility]   Is LACE score < 7 ? No   Eligibility Readm Mgmt   Discharge diagnosis Posterior cervical thoracic one decompression   Does the patient have one of the following disease processes/diagnoses(primary or secondary)? General Surgery   Does the patient have Home health ordered? No   Is there a DME ordered? No   Prep survey completed? Yes            SURENDRA CERON - Registered Nurse

## 2025-03-05 NOTE — SIGNIFICANT NOTE
Case Management Discharge Note      Final Note: (P) Home w/ OP PT         Selected Continued Care - Discharged on 3/4/2025 Admission date: 2/25/2025 - Discharge disposition: Home or Self Care      Destination    No services have been selected for the patient.                Durable Medical Equipment    No services have been selected for the patient.                Dialysis/Infusion    No services have been selected for the patient.                Home Medical Care    No services have been selected for the patient.                Therapy    No services have been selected for the patient.                Community Resources    No services have been selected for the patient.                Community & DME    No services have been selected for the patient.                         Final Discharge Disposition Code: (P) 01 - home or self-care

## 2025-03-10 ENCOUNTER — APPOINTMENT (OUTPATIENT)
Dept: MRI IMAGING | Facility: HOSPITAL | Age: 55
End: 2025-03-10
Payer: COMMERCIAL

## 2025-03-10 ENCOUNTER — READMISSION MANAGEMENT (OUTPATIENT)
Dept: CALL CENTER | Facility: HOSPITAL | Age: 55
End: 2025-03-10
Payer: COMMERCIAL

## 2025-03-10 ENCOUNTER — APPOINTMENT (OUTPATIENT)
Dept: GENERAL RADIOLOGY | Facility: HOSPITAL | Age: 55
End: 2025-03-10
Payer: COMMERCIAL

## 2025-03-10 ENCOUNTER — HOSPITAL ENCOUNTER (INPATIENT)
Facility: HOSPITAL | Age: 55
LOS: 1 days | Discharge: HOME | End: 2025-03-12
Attending: EMERGENCY MEDICINE | Admitting: INTERNAL MEDICINE
Payer: COMMERCIAL

## 2025-03-10 DIAGNOSIS — R29.898 WEAKNESS OF UPPER EXTREMITY: Primary | ICD-10-CM

## 2025-03-10 DIAGNOSIS — M54.2 NECK PAIN: ICD-10-CM

## 2025-03-10 DIAGNOSIS — M54.6 PAIN IN THORACIC SPINE: ICD-10-CM

## 2025-03-10 DIAGNOSIS — M54.6 ACUTE BILATERAL THORACIC BACK PAIN: ICD-10-CM

## 2025-03-10 DIAGNOSIS — Z98.1 STATUS POST CERVICAL SPINAL FUSION: ICD-10-CM

## 2025-03-10 PROBLEM — G89.29 ACUTE ON CHRONIC BACK PAIN: Status: ACTIVE | Noted: 2025-03-10

## 2025-03-10 PROBLEM — M54.9 UPPER BACK PAIN: Status: ACTIVE | Noted: 2025-03-10

## 2025-03-10 LAB
ALBUMIN SERPL-MCNC: 3.3 G/DL (ref 3.5–5.2)
ALBUMIN/GLOB SERPL: 1.1 G/DL
ALP SERPL-CCNC: 83 U/L (ref 39–117)
ALT SERPL W P-5'-P-CCNC: 22 U/L (ref 1–41)
ANION GAP SERPL CALCULATED.3IONS-SCNC: 10.8 MMOL/L (ref 5–15)
AST SERPL-CCNC: 21 U/L (ref 1–40)
BASOPHILS # BLD AUTO: 0.04 10*3/MM3 (ref 0–0.2)
BASOPHILS NFR BLD AUTO: 0.4 % (ref 0–1.5)
BILIRUB SERPL-MCNC: <0.2 MG/DL (ref 0–1.2)
BUN SERPL-MCNC: 16 MG/DL (ref 6–20)
BUN/CREAT SERPL: 16.3 (ref 7–25)
CALCIUM SPEC-SCNC: 8.6 MG/DL (ref 8.6–10.5)
CHLORIDE SERPL-SCNC: 107 MMOL/L (ref 98–107)
CO2 SERPL-SCNC: 23.2 MMOL/L (ref 22–29)
CREAT SERPL-MCNC: 0.98 MG/DL (ref 0.76–1.27)
DEPRECATED RDW RBC AUTO: 46.4 FL (ref 37–54)
EGFRCR SERPLBLD CKD-EPI 2021: 90.5 ML/MIN/1.73
EOSINOPHIL # BLD AUTO: 0.25 10*3/MM3 (ref 0–0.4)
EOSINOPHIL NFR BLD AUTO: 2.7 % (ref 0.3–6.2)
ERYTHROCYTE [DISTWIDTH] IN BLOOD BY AUTOMATED COUNT: 13.6 % (ref 12.3–15.4)
GLOBULIN UR ELPH-MCNC: 2.9 GM/DL
GLUCOSE SERPL-MCNC: 120 MG/DL (ref 65–99)
HCT VFR BLD AUTO: 36.6 % (ref 37.5–51)
HGB BLD-MCNC: 11.9 G/DL (ref 13–17.7)
IMM GRANULOCYTES # BLD AUTO: 0.16 10*3/MM3 (ref 0–0.05)
IMM GRANULOCYTES NFR BLD AUTO: 1.7 % (ref 0–0.5)
LYMPHOCYTES # BLD AUTO: 2.2 10*3/MM3 (ref 0.7–3.1)
LYMPHOCYTES NFR BLD AUTO: 23.6 % (ref 19.6–45.3)
MCH RBC QN AUTO: 30.1 PG (ref 26.6–33)
MCHC RBC AUTO-ENTMCNC: 32.5 G/DL (ref 31.5–35.7)
MCV RBC AUTO: 92.4 FL (ref 79–97)
MONOCYTES # BLD AUTO: 0.62 10*3/MM3 (ref 0.1–0.9)
MONOCYTES NFR BLD AUTO: 6.6 % (ref 5–12)
NEUTROPHILS NFR BLD AUTO: 6.07 10*3/MM3 (ref 1.7–7)
NEUTROPHILS NFR BLD AUTO: 65 % (ref 42.7–76)
NRBC BLD AUTO-RTO: 0 /100 WBC (ref 0–0.2)
PLATELET # BLD AUTO: 424 10*3/MM3 (ref 140–450)
PMV BLD AUTO: 10.3 FL (ref 6–12)
POTASSIUM SERPL-SCNC: 3.7 MMOL/L (ref 3.5–5.2)
PROT SERPL-MCNC: 6.2 G/DL (ref 6–8.5)
RBC # BLD AUTO: 3.96 10*6/MM3 (ref 4.14–5.8)
SODIUM SERPL-SCNC: 141 MMOL/L (ref 136–145)
WBC NRBC COR # BLD AUTO: 9.34 10*3/MM3 (ref 3.4–10.8)

## 2025-03-10 PROCEDURE — 25010000002 HYDROMORPHONE PER 4 MG: Performed by: EMERGENCY MEDICINE

## 2025-03-10 PROCEDURE — 85025 COMPLETE CBC W/AUTO DIFF WBC: CPT | Performed by: PHYSICIAN ASSISTANT

## 2025-03-10 PROCEDURE — 72156 MRI NECK SPINE W/O & W/DYE: CPT

## 2025-03-10 PROCEDURE — G0378 HOSPITAL OBSERVATION PER HR: HCPCS

## 2025-03-10 PROCEDURE — 72040 X-RAY EXAM NECK SPINE 2-3 VW: CPT

## 2025-03-10 PROCEDURE — 72070 X-RAY EXAM THORAC SPINE 2VWS: CPT

## 2025-03-10 PROCEDURE — 80053 COMPREHEN METABOLIC PANEL: CPT | Performed by: PHYSICIAN ASSISTANT

## 2025-03-10 PROCEDURE — A9577 INJ MULTIHANCE: HCPCS | Performed by: EMERGENCY MEDICINE

## 2025-03-10 PROCEDURE — 25010000002 ONDANSETRON PER 1 MG: Performed by: PHYSICIAN ASSISTANT

## 2025-03-10 PROCEDURE — 99024 POSTOP FOLLOW-UP VISIT: CPT

## 2025-03-10 PROCEDURE — 99285 EMERGENCY DEPT VISIT HI MDM: CPT

## 2025-03-10 PROCEDURE — 25010000002 HYDROMORPHONE 1 MG/ML SOLUTION: Performed by: PHYSICIAN ASSISTANT

## 2025-03-10 PROCEDURE — 25510000002 GADOBENATE DIMEGLUMINE 529 MG/ML SOLUTION: Performed by: EMERGENCY MEDICINE

## 2025-03-10 PROCEDURE — 96376 TX/PRO/DX INJ SAME DRUG ADON: CPT

## 2025-03-10 PROCEDURE — 72157 MRI CHEST SPINE W/O & W/DYE: CPT

## 2025-03-10 PROCEDURE — 25010000002 DIAZEPAM PER 5 MG: Performed by: EMERGENCY MEDICINE

## 2025-03-10 RX ORDER — FERROUS SULFATE 325(65) MG
325 TABLET ORAL
Status: DISCONTINUED | OUTPATIENT
Start: 2025-03-10 | End: 2025-03-12 | Stop reason: HOSPADM

## 2025-03-10 RX ORDER — ACETAMINOPHEN 650 MG/1
650 SUPPOSITORY RECTAL EVERY 4 HOURS PRN
Status: DISCONTINUED | OUTPATIENT
Start: 2025-03-10 | End: 2025-03-12 | Stop reason: HOSPADM

## 2025-03-10 RX ORDER — HYDROMORPHONE HYDROCHLORIDE 1 MG/ML
0.5 INJECTION, SOLUTION INTRAMUSCULAR; INTRAVENOUS; SUBCUTANEOUS
Status: DISCONTINUED | OUTPATIENT
Start: 2025-03-10 | End: 2025-03-11

## 2025-03-10 RX ORDER — BUSPIRONE HYDROCHLORIDE 15 MG/1
15 TABLET ORAL 2 TIMES DAILY
Status: DISCONTINUED | OUTPATIENT
Start: 2025-03-10 | End: 2025-03-12 | Stop reason: HOSPADM

## 2025-03-10 RX ORDER — ACETAMINOPHEN 160 MG/5ML
650 SOLUTION ORAL EVERY 4 HOURS PRN
Status: DISCONTINUED | OUTPATIENT
Start: 2025-03-10 | End: 2025-03-12 | Stop reason: HOSPADM

## 2025-03-10 RX ORDER — LIDOCAINE 4 G/G
1 PATCH TOPICAL
Status: DISCONTINUED | OUTPATIENT
Start: 2025-03-10 | End: 2025-03-12 | Stop reason: HOSPADM

## 2025-03-10 RX ORDER — OXYCODONE AND ACETAMINOPHEN 7.5; 325 MG/1; MG/1
1 TABLET ORAL EVERY 6 HOURS PRN
Refills: 0 | Status: DISCONTINUED | OUTPATIENT
Start: 2025-03-10 | End: 2025-03-11

## 2025-03-10 RX ORDER — PANTOPRAZOLE SODIUM 40 MG/1
40 TABLET, DELAYED RELEASE ORAL 2 TIMES DAILY
Status: DISCONTINUED | OUTPATIENT
Start: 2025-03-10 | End: 2025-03-12 | Stop reason: HOSPADM

## 2025-03-10 RX ORDER — GABAPENTIN 400 MG/1
800 CAPSULE ORAL EVERY 8 HOURS SCHEDULED
Status: DISCONTINUED | OUTPATIENT
Start: 2025-03-10 | End: 2025-03-12 | Stop reason: HOSPADM

## 2025-03-10 RX ORDER — ASPIRIN 81 MG/1
81 TABLET, CHEWABLE ORAL DAILY
Status: DISCONTINUED | OUTPATIENT
Start: 2025-03-10 | End: 2025-03-12 | Stop reason: HOSPADM

## 2025-03-10 RX ORDER — CEPHALEXIN 500 MG/1
500 CAPSULE ORAL EVERY 6 HOURS SCHEDULED
Status: DISPENSED | OUTPATIENT
Start: 2025-03-10 | End: 2025-03-11

## 2025-03-10 RX ORDER — BISACODYL 5 MG/1
5 TABLET, DELAYED RELEASE ORAL DAILY PRN
Status: DISCONTINUED | OUTPATIENT
Start: 2025-03-10 | End: 2025-03-12 | Stop reason: HOSPADM

## 2025-03-10 RX ORDER — CHOLECALCIFEROL (VITAMIN D3) 25 MCG
1000 TABLET ORAL DAILY
Status: DISCONTINUED | OUTPATIENT
Start: 2025-03-10 | End: 2025-03-12 | Stop reason: HOSPADM

## 2025-03-10 RX ORDER — POLYETHYLENE GLYCOL 3350 17 G/17G
17 POWDER, FOR SOLUTION ORAL DAILY PRN
Status: DISCONTINUED | OUTPATIENT
Start: 2025-03-10 | End: 2025-03-12 | Stop reason: HOSPADM

## 2025-03-10 RX ORDER — SODIUM CHLORIDE 0.9 % (FLUSH) 0.9 %
10 SYRINGE (ML) INJECTION EVERY 12 HOURS SCHEDULED
Status: DISCONTINUED | OUTPATIENT
Start: 2025-03-10 | End: 2025-03-12 | Stop reason: HOSPADM

## 2025-03-10 RX ORDER — CETIRIZINE HYDROCHLORIDE 10 MG/1
10 TABLET ORAL DAILY
Status: DISCONTINUED | OUTPATIENT
Start: 2025-03-10 | End: 2025-03-12 | Stop reason: HOSPADM

## 2025-03-10 RX ORDER — SODIUM CHLORIDE 0.9 % (FLUSH) 0.9 %
10 SYRINGE (ML) INJECTION AS NEEDED
Status: DISCONTINUED | OUTPATIENT
Start: 2025-03-10 | End: 2025-03-12 | Stop reason: HOSPADM

## 2025-03-10 RX ORDER — HYDROMORPHONE HYDROCHLORIDE 1 MG/ML
0.5 INJECTION, SOLUTION INTRAMUSCULAR; INTRAVENOUS; SUBCUTANEOUS ONCE
Refills: 0 | Status: COMPLETED | OUTPATIENT
Start: 2025-03-10 | End: 2025-03-10

## 2025-03-10 RX ORDER — ASPIRIN 81 MG/1
81 TABLET, CHEWABLE ORAL DAILY
COMMUNITY

## 2025-03-10 RX ORDER — NALOXONE HCL 0.4 MG/ML
0.4 VIAL (ML) INJECTION
Status: DISCONTINUED | OUTPATIENT
Start: 2025-03-10 | End: 2025-03-11

## 2025-03-10 RX ORDER — ONDANSETRON 4 MG/1
4 TABLET, ORALLY DISINTEGRATING ORAL EVERY 6 HOURS PRN
Status: DISCONTINUED | OUTPATIENT
Start: 2025-03-10 | End: 2025-03-12 | Stop reason: HOSPADM

## 2025-03-10 RX ORDER — ACETAMINOPHEN 325 MG/1
650 TABLET ORAL EVERY 4 HOURS PRN
Status: DISCONTINUED | OUTPATIENT
Start: 2025-03-10 | End: 2025-03-12 | Stop reason: HOSPADM

## 2025-03-10 RX ORDER — NALOXONE HCL 0.4 MG/ML
0.4 VIAL (ML) INJECTION
Status: DISCONTINUED | OUTPATIENT
Start: 2025-03-10 | End: 2025-03-10

## 2025-03-10 RX ORDER — ESCITALOPRAM OXALATE 5 MG/1
10 TABLET ORAL NIGHTLY
Status: DISCONTINUED | OUTPATIENT
Start: 2025-03-10 | End: 2025-03-12 | Stop reason: HOSPADM

## 2025-03-10 RX ORDER — ALBUTEROL SULFATE 0.83 MG/ML
2.5 SOLUTION RESPIRATORY (INHALATION) EVERY 6 HOURS PRN
Status: DISCONTINUED | OUTPATIENT
Start: 2025-03-10 | End: 2025-03-11

## 2025-03-10 RX ORDER — SODIUM CHLORIDE 9 MG/ML
40 INJECTION, SOLUTION INTRAVENOUS AS NEEDED
Status: DISCONTINUED | OUTPATIENT
Start: 2025-03-10 | End: 2025-03-12 | Stop reason: HOSPADM

## 2025-03-10 RX ORDER — DIAZEPAM 10 MG/2ML
2.5 INJECTION, SOLUTION INTRAMUSCULAR; INTRAVENOUS ONCE
Status: COMPLETED | OUTPATIENT
Start: 2025-03-10 | End: 2025-03-10

## 2025-03-10 RX ORDER — DIAZEPAM 5 MG/1
5 TABLET ORAL 3 TIMES DAILY
Status: DISCONTINUED | OUTPATIENT
Start: 2025-03-10 | End: 2025-03-11

## 2025-03-10 RX ORDER — ONDANSETRON 2 MG/ML
4 INJECTION INTRAMUSCULAR; INTRAVENOUS EVERY 6 HOURS PRN
Status: DISCONTINUED | OUTPATIENT
Start: 2025-03-10 | End: 2025-03-12 | Stop reason: HOSPADM

## 2025-03-10 RX ORDER — ONDANSETRON 2 MG/ML
4 INJECTION INTRAMUSCULAR; INTRAVENOUS ONCE
Status: COMPLETED | OUTPATIENT
Start: 2025-03-10 | End: 2025-03-10

## 2025-03-10 RX ORDER — AMOXICILLIN 250 MG
2 CAPSULE ORAL 2 TIMES DAILY PRN
Status: DISCONTINUED | OUTPATIENT
Start: 2025-03-10 | End: 2025-03-12 | Stop reason: HOSPADM

## 2025-03-10 RX ORDER — HYDROMORPHONE HYDROCHLORIDE 1 MG/ML
0.5 INJECTION, SOLUTION INTRAMUSCULAR; INTRAVENOUS; SUBCUTANEOUS
Refills: 0 | Status: DISCONTINUED | OUTPATIENT
Start: 2025-03-10 | End: 2025-03-10

## 2025-03-10 RX ORDER — BISACODYL 10 MG
10 SUPPOSITORY, RECTAL RECTAL DAILY PRN
Status: DISCONTINUED | OUTPATIENT
Start: 2025-03-10 | End: 2025-03-12 | Stop reason: HOSPADM

## 2025-03-10 RX ADMIN — BUSPIRONE HYDROCHLORIDE 15 MG: 15 TABLET ORAL at 20:06

## 2025-03-10 RX ADMIN — CETIRIZINE HYDROCHLORIDE 10 MG: 10 TABLET, FILM COATED ORAL at 10:13

## 2025-03-10 RX ADMIN — CEPHALEXIN 500 MG: 500 CAPSULE ORAL at 21:15

## 2025-03-10 RX ADMIN — HYDROMORPHONE HYDROCHLORIDE 1 MG: 1 INJECTION, SOLUTION INTRAMUSCULAR; INTRAVENOUS; SUBCUTANEOUS at 18:17

## 2025-03-10 RX ADMIN — CEPHALEXIN 500 MG: 500 CAPSULE ORAL at 15:45

## 2025-03-10 RX ADMIN — OXYCODONE HYDROCHLORIDE AND ACETAMINOPHEN 1 TABLET: 7.5; 325 TABLET ORAL at 10:13

## 2025-03-10 RX ADMIN — DIAZEPAM 2.5 MG: 5 INJECTION INTRAMUSCULAR; INTRAVENOUS at 07:02

## 2025-03-10 RX ADMIN — Medication 1000 UNITS: at 10:13

## 2025-03-10 RX ADMIN — BUSPIRONE HYDROCHLORIDE 15 MG: 15 TABLET ORAL at 10:12

## 2025-03-10 RX ADMIN — Medication 10 ML: at 10:14

## 2025-03-10 RX ADMIN — CEPHALEXIN 500 MG: 500 CAPSULE ORAL at 10:13

## 2025-03-10 RX ADMIN — GABAPENTIN 800 MG: 400 CAPSULE ORAL at 21:15

## 2025-03-10 RX ADMIN — LIDOCAINE PAIN RELIEF 1 PATCH: 560 PATCH TOPICAL at 14:49

## 2025-03-10 RX ADMIN — HYDROMORPHONE HYDROCHLORIDE 0.5 MG: 1 INJECTION, SOLUTION INTRAMUSCULAR; INTRAVENOUS; SUBCUTANEOUS at 21:20

## 2025-03-10 RX ADMIN — ONDANSETRON 4 MG: 2 INJECTION, SOLUTION INTRAMUSCULAR; INTRAVENOUS at 05:10

## 2025-03-10 RX ADMIN — Medication 10 ML: at 20:08

## 2025-03-10 RX ADMIN — DIAZEPAM 5 MG: 5 TABLET ORAL at 20:06

## 2025-03-10 RX ADMIN — FERROUS SULFATE TAB 325 MG (65 MG ELEMENTAL FE) 325 MG: 325 (65 FE) TAB at 10:13

## 2025-03-10 RX ADMIN — GADOBENATE DIMEGLUMINE 20 ML: 529 INJECTION, SOLUTION INTRAVENOUS at 09:42

## 2025-03-10 RX ADMIN — PANTOPRAZOLE SODIUM 40 MG: 40 TABLET, DELAYED RELEASE ORAL at 10:13

## 2025-03-10 RX ADMIN — PANTOPRAZOLE SODIUM 40 MG: 40 TABLET, DELAYED RELEASE ORAL at 20:07

## 2025-03-10 RX ADMIN — GABAPENTIN 800 MG: 400 CAPSULE ORAL at 10:13

## 2025-03-10 RX ADMIN — HYDROMORPHONE HYDROCHLORIDE 0.5 MG: 1 INJECTION, SOLUTION INTRAMUSCULAR; INTRAVENOUS; SUBCUTANEOUS at 05:10

## 2025-03-10 RX ADMIN — DIAZEPAM 5 MG: 5 TABLET ORAL at 14:49

## 2025-03-10 RX ADMIN — HYDROMORPHONE HYDROCHLORIDE 0.5 MG: 1 INJECTION, SOLUTION INTRAMUSCULAR; INTRAVENOUS; SUBCUTANEOUS at 15:36

## 2025-03-10 NOTE — NURSING NOTE
IV in right wrist flushing but patient complained of it hurting.  Patient said he ask ER nurse to remove it, but she refused.  IV removed per patient request.

## 2025-03-10 NOTE — H&P
[facility]   HISTORY AND PHYSICAL    Patient Name: Rafael Kat  : 1970  MRN: [id number]  Primary Care Physician:  Provider, No Known  Date of admission: 3/10/2025    Subjective   Subjective     Chief Complaint:   Chief Complaint   Patient presents with    Back Pain         HPI:    Rafael Kat is a 54 y.o. male with a past medical history including but not limited to COPD, GERD, anxiety, and spinal stenosis of the cervicothoracic region who recently underwent posterior C7-T1 decompression, posterior C3-7, T1-3 fusion on  with Dr. Collado who presents to [facility] ER with worsening back pain.  Patient states he was discharged about 6 days ago.  He states Saturday morning he was trying to lift himself up off the couch and felt a pop in his back.  He said since then he has had difficulty controlling his pain at home.  He states that he used a heating pad and some ice packs and was able to fall asleep last night but woke up this morning around 1 AM with pain significantly worsened.  He states it feels like a hot knife in his back that radiates around both sides at the mid thoracic.  Does report he noted some weakness in his arms as well as some spasming and having issues dropping things.  He denies fevers.  Denies lightheadedness and dizziness.  Denies abdominal pain and nausea.    Review of Systems   All systems were reviewed and negative except for: what is mentioned above in the HPI.     Personal History     Past Medical History:   Diagnosis Date    Anesthesia complication     PT STATES HE IS SLOW TO WAKE UP    Arthritis     COPD (chronic obstructive pulmonary disease)     STATES WHEEZES AT NIGHT AND DOES USE NIGHTLY INHALER    DDD (degenerative disc disease), cervical     DDD (degenerative disc disease), lumbar     PAIN IN RIGHT BUTTOCK AND DOWN AT TIMES RIGHT LEG, WEAKNESS RIGHT LEG AT TIMES    Dysphagia     STARTED AFTER HIS CERVICAL NECK SURGERY    GERD  (gastroesophageal reflux disease)     History of COVID-19     Neck pain     Psoriasis     Shoulder pain, bilateral     Skin cancer     BASAL/SQUAMOUS       Past Surgical History:   Procedure Laterality Date    ANTERIOR CHANNEL VERTEBRECTOMY/CORPECTOMY Bilateral 03/29/2016    Procedure: C4-6 ANTERIOR CHANNEL VERTEBRECTOMY/CORPECTOMY WITH INSTRUMENTATION;  Surgeon: Bert Collado MD;  Location: [facility] OR;  Service:     CERVICAL DISCECTOMY POSTERIOR FUSION WITH BRAIN LAB N/A 2/25/2025    Procedure: Posterior cervical seven/thoracic one decompression, posterior cervical three/four/five/six/seven, thoracic one/two/three fusion with screws/rods/crosslinks with Stealth spinal navigation;  Surgeon: Bert Collado MD;  Location: [facility] MAIN OR;  Service: Neurosurgery;  Laterality: N/A;    COLONOSCOPY      COLONOSCOPY N/A 2/14/2025    Procedure: COLONOSCOPY into cecum with hot snare polypectomy, Resolutions clips x2, Tattoo of transverse polyp area;  Surgeon: Gonzalo Rodriguez MD;  Location: [facility] ENDOSCOPY;  Service: Gastroenterology;  Laterality: N/A;  Pre: Screening  Post: Polyp, Suboptimal prep    ELBOW PROCEDURE Right     UNCLEAR:  RIGHT ELBOW AREA    ENDOSCOPY N/A 2/14/2025    Procedure: ESOPHAGOGASTRODUODENOSCOPY with biopsies;  Surgeon: Gonzalo Rodriguez MD;  Location: [facility] ENDOSCOPY;  Service: Gastroenterology;  Laterality: N/A;  Pre: GERD, Dysphagia  Post: Gastritis and Esophagitis    FINGER SURGERY Left     INDEX FINGER    LUMBAR EPIDURAL INJECTION      STATES X3    LUMBAR LAMINECTOMY DISCECTOMY DECOMPRESSION Bilateral 11/28/2023    Procedure: Open bilateral lumbar decompression lumbar one/two, lumbar two/three, lumbar three/four;  Surgeon: Bert Collado MD;  Location: [facility] OR;  Service: Neurosurgery;  Laterality: Bilateral;    MENISCECTOMY Right     UPPER GASTROINTESTINAL ENDOSCOPY  03/2021       Family History: family history is not on file. Otherwise pertinent FHx was reviewed  and not pertinent to current issue.    Social History:  reports that he quit smoking about 15 years ago. His smoking use included cigarettes. He started smoking about 35 years ago. He has a 40 pack-year smoking history. He has been exposed to tobacco smoke. He has never used smokeless tobacco. He reports current drug use. Frequency: 14.00 times per week. Drug: Marijuana. He reports that he does not drink alcohol.    Home Medications:  Diclofenac Sodium, NON FORMULARY, Potassium, Unable to find, albuterol sulfate HFA, baclofen, busPIRone, cephalexin, cetirizine, cholecalciferol, clonazePAM, docusate sodium, escitalopram, ferrous sulfate, gabapentin, lidocaine, mometasone, naloxone, pantoprazole, polyethylene glycol, and sennosides-docusate    Allergies:  Allergies   Allergen Reactions    Bacitracin-Polymyxin B Other (See Comments) and Rash    Chlorhexidine Rash    Hjlhz-Csuii-Osleebf-Pramoxine Rash       Objective   Objective     Vitals:   Temp:  [98.3 °F (36.8 °C)] 98.3 °F (36.8 °C)  Heart Rate:  [101] 101  Resp:  [18] 18  BP: (126)/(93) 126/93  Physical Exam    Constitutional: 54-year-old male in no acute distress on room air   Eyes: PERRLA, sclerae anicteric, no conjunctival injection   HENT: NCAT, mucous membranes moist   Neck: Supple, no thyromegaly, no lymphadenopathy, trachea midline   Respiratory: Clear to auscultation bilaterally, nonlabored respirations    Cardiovascular: RRR, no murmurs, rubs, or gallops, palpable pedal pulses bilaterally   Gastrointestinal: Positive bowel sounds, soft, nontender, nondistended   Musculoskeletal: No bilateral ankle edema, no clubbing or cyanosis to extremities, surgical incision appears intact the cervix to the mid thoracic with no bleeding or oozing, no surrounding erythema.  There is point tenderness of the mid thoracic spine, there is also diffuse tenderness to the left more than the right   Psychiatric: Appropriate affect, cooperative   Neurologic: Oriented x 3,  strength symmetric in all extremities, Cranial Nerves grossly intact to confrontation, speech clear   Skin: No rashes     Result Review    Result Review:  I have personally reviewed the results from the time of this admission to 3/10/2025 05:43 EDT and agree with these findings:  [x]  Laboratory list / accordion  []  Microbiology  []  Radiology  []  EKG/Telemetry   []  Cardiology/Vascular   []  Pathology  [x]  Old records  []  Other:  Most notable findings include:       Assessment & Plan   Assessment / Plan     Brief Patient Summary:  Rafael Kat is a 54 y.o. male who is being admitted to the observation unit for worsening back pain status post C7-T1 decompression and C3-C7, T1-T3 fusion on February 25, 2025 with Dr. Collado.  Patient was discharged home on 3/4/2025 and had been doing well up until 2 days ago when acute onset of worsening pain happened while he was adjusting posture while sitting on the couch.  In the ER, labs noted glucose at 120, hemoglobin appears stable at 11.9 otherwise fairly unremarkable.  ED spoke with neurosurgery on-call who recommended further evaluation with plain films and MRI, they will follow in consultation    Active Hospital Problems:  Active Hospital Problems    Diagnosis     **Acute on chronic back pain     Cervical stenosis of spine     Cervical disc disorder with radiculopathy of mid-cervical region      Plan:     Acute on chronic back pain  Cervical stenosis of the spine  -Status post C7-T1 decompression and C3-7, T1-3 fusion with Dr. Collado 2/25/2025  -Plain films of the cervical and thoracic spine pending  -MRI of the cervical and thoracic spine ordered  -Neurosurgery consulted  -Analgesics as needed  -Continue gabapentin  -N.p.o.    COPD  -Albuterol as needed    GERD  -PPI    Anxiety  -No acute issues  -Continue home medications once reconciled      VTE Prophylaxis:  Mechanical VTE prophylaxis orders are present.        CODE STATUS:    Code Status (Patient has  no pulse and is not breathing): CPR (Attempt to Resuscitate)  Medical Interventions (Patient has pulse or is breathing): Full Support  Level Of Support Discussed With: Patient    Admission Status:  I believe this patient meets observation status.    75 minutes have been spent by [facility] providers in the care of this patient while under observation status.      Appropriate PPE worn during patient encounter.  Hand hygeine performed before and after seeing the patient.      Electronically signed by Mitra Beaulieu PA-C, 03/10/25, 5:35 AM EDT.

## 2025-03-10 NOTE — ED PROVIDER NOTES
I have personally performed a face-to-face diagnostic evaluation of the patient.  I have reviewed and agree with the care plan as outlined by NP/PA.  My findings are as follows:    HPI:  Patient is a 54 y.o. male who presents with complaints of worsening upper back pain.  Patient had a cervical decompression and fusion on 2/25.  He initially had been doing well after surgery.  Today, went to get up from standing by pushing himself off of the chair and had sharp pain in his upper back.  He feels like he has had some weakness in his hands, right greater than left and feels like he has been clumsy.  He has not noticed any proximal numbness, weakness or tingling.  No difficulty ambulating.  Has not had any bladder or bowel dysfunction.      PE:  Physical Exam  Constitutional:       Appearance: He is not ill-appearing.   Eyes:      Conjunctiva/sclera: Conjunctivae normal.   Neck:      Comments: Posterior incision without any signs of surrounding infection  Pulmonary:      Effort: Pulmonary effort is normal. No respiratory distress.   Musculoskeletal:      Comments: Normal sensation and strength in the bilateral upper and lower extremities  Groin: No saddle anesthesia  Bilateral lower extremities:  Negative straight leg raise.  5 out 5 strength.  Sensation intact to light touch.  2+ reflexes. No clonus.  Negative Babinski sign. 2+ DP and PT pulses, warm and well perfused     Neurological:      Mental Status: He is alert and oriented to person, place, and time.           MDM:  I provided a substantiate portion of the care of this patient. I personally performed the medical decision making.    Medical records are reviewed in [facility] Everywhere, if applicable      Recent Results (from the past 24 hours)   Comprehensive Metabolic Panel    Collection Time: 03/10/25  4:51 AM    Specimen: Blood   Result Value Ref Range    Glucose 120 (H) 65 - 99 mg/dL    BUN 16 6 - 20 mg/dL    Creatinine 0.98 0.76 - 1.27 mg/dL    Sodium  141 136 - 145 mmol/L    Potassium 3.7 3.5 - 5.2 mmol/L    Chloride 107 98 - 107 mmol/L    CO2 23.2 22.0 - 29.0 mmol/L    Calcium 8.6 8.6 - 10.5 mg/dL    Total Protein 6.2 6.0 - 8.5 g/dL    Albumin 3.3 (L) 3.5 - 5.2 g/dL    ALT (SGPT) 22 1 - 41 U/L    AST (SGOT) 21 1 - 40 U/L    Alkaline Phosphatase 83 39 - 117 U/L    Total Bilirubin <0.2 0.0 - 1.2 mg/dL    Globulin 2.9 gm/dL    A/G Ratio 1.1 g/dL    BUN/Creatinine Ratio 16.3 7.0 - 25.0    Anion Gap 10.8 5.0 - 15.0 mmol/L    eGFR 90.5 >60.0 mL/min/1.73   CBC Auto Differential    Collection Time: 03/10/25  4:51 AM    Specimen: Blood   Result Value Ref Range    WBC 9.34 3.40 - 10.80 10*3/mm3    RBC 3.96 (L) 4.14 - 5.80 10*6/mm3    Hemoglobin 11.9 (L) 13.0 - 17.7 g/dL    Hematocrit 36.6 (L) 37.5 - 51.0 %    MCV 92.4 79.0 - 97.0 fL    MCH 30.1 26.6 - 33.0 pg    MCHC 32.5 31.5 - 35.7 g/dL    RDW 13.6 12.3 - 15.4 %    RDW-SD 46.4 37.0 - 54.0 fl    MPV 10.3 6.0 - 12.0 fL    Platelets 424 140 - 450 10*3/mm3    Neutrophil % 65.0 42.7 - 76.0 %    Lymphocyte % 23.6 19.6 - 45.3 %    Monocyte % 6.6 5.0 - 12.0 %    Eosinophil % 2.7 0.3 - 6.2 %    Basophil % 0.4 0.0 - 1.5 %    Immature Grans % 1.7 (H) 0.0 - 0.5 %    Neutrophils, Absolute 6.07 1.70 - 7.00 10*3/mm3    Lymphocytes, Absolute 2.20 0.70 - 3.10 10*3/mm3    Monocytes, Absolute 0.62 0.10 - 0.90 10*3/mm3    Eosinophils, Absolute 0.25 0.00 - 0.40 10*3/mm3    Basophils, Absolute 0.04 0.00 - 0.20 10*3/mm3    Immature Grans, Absolute 0.16 (H) 0.00 - 0.05 10*3/mm3    nRBC 0.0 0.0 - 0.2 /100 WBC     I have reviewed the above labs    This is an overall well-appearing 54-year-old patient who is presenting today secondary to worsening postoperative upper back pain.  He looks well.  He presents afebrile with unremarkable vital signs.  No clinical signs of infection on exam.  Patient was discussed with neurosurgery team, who recommended admission to the observation unit for MRI.  Will obtain x-rays in the meantime.  He was evaluated  with labs, which are reviewed and which are reviewed and overall benign.      Impression:  Final diagnoses:   Weakness of upper extremity   Acute bilateral thoracic back pain   Status post cervical spinal fusion         Disposition:  ED Disposition       ED Disposition   Decision to Admit    Condition   --    Comment   --                Caitlin Hernandez MD  03/10/25 0539

## 2025-03-10 NOTE — OUTREACH NOTE
General Surgery Week 1 Survey      Flowsheet Row Responses   [facility] patient discharged from? [facility]   Does the patient have one of the following disease processes/diagnoses(primary or secondary)? General Surgery   Week 1 attempt successful? No   Unsuccessful attempts Attempt 1   Revoke Readmitted            Marla H - Registered Nurse

## 2025-03-10 NOTE — CONSULTS
[facility] NEUROSURGERY CONSULT NOTE    Patient name: Rafael Kat  Referring Provider:    Holly Graham PA-C     Reason for Consultation: Back pain    Patient Care Team:  Provider, No Known as PCP - Bert Moe MD as Surgeon (Neurosurgery)    Chief complaint: Pain at top of thoracic spine and off to the right    Subjective .     History of present illness:    Patient is a 54 y.o.  male COPD, anxiety, spinal stenosis of the cervical, thoracic and lumbar region, GERD who underwent posterior C7-T1 decompression with posterior C3-T3 fusion with Dr. Collado on February 25, 2025.  He is now POD #13.  He presents with upper thoracic pain.  On Friday night he reported repositioning himself on the couch and experiencing acute pain noted to be just below the inferior part of his incision and off to the right.  Most of the pain appears to be in the right musculature of the upper mid back however the patient's wife states that the pain sometimes goes across the back but never wrapping around the rib cage or into the chest.  He denies feeling weak in the bilateral upper or lower extremities and denies any upper or lower extremity numbness, tingling or pain.  He denies any gait instability, bowel or bladder dysfunction.  He does note a history of restless leg syndrome but states that ever since his surgery he has been having intermittent jerks.  Sometimes they will wake him out of his sleep.  He has dropped beverage glasses over the weekend.  He feels that this is not related to his strength but rather to the involuntary jerks that he is experiencing causing him to open his hands and drop the object within his hand.  Overall he notes that the pain in his back is worsened ever since he first experienced it on late Friday night/early Saturday morning.      Review of Systems  Review of Systems   Gastrointestinal:         Denies bowel issues   Genitourinary:  Negative for enuresis.   Musculoskeletal:   Positive for back pain. Negative for gait problem.   Neurological:  Negative for weakness and numbness.       History  PAST MEDICAL HISTORY  Past Medical History:   Diagnosis Date    Anesthesia complication     PT STATES HE IS SLOW TO WAKE UP    Arthritis     COPD (chronic obstructive pulmonary disease)     STATES WHEEZES AT NIGHT AND DOES USE NIGHTLY INHALER    DDD (degenerative disc disease), cervical     DDD (degenerative disc disease), lumbar     PAIN IN RIGHT BUTTOCK AND DOWN AT TIMES RIGHT LEG, WEAKNESS RIGHT LEG AT TIMES    Dysphagia     STARTED AFTER HIS CERVICAL NECK SURGERY    GERD (gastroesophageal reflux disease)     History of COVID-19     Neck pain     Psoriasis     Shoulder pain, bilateral     Skin cancer     BASAL/SQUAMOUS       PAST SURGICAL HISTORY  Past Surgical History:   Procedure Laterality Date    ANTERIOR CHANNEL VERTEBRECTOMY/CORPECTOMY Bilateral 03/29/2016    Procedure: C4-6 ANTERIOR CHANNEL VERTEBRECTOMY/CORPECTOMY WITH INSTRUMENTATION;  Surgeon: Bert Collado MD;  Location: [facility] OR;  Service:     CERVICAL DISCECTOMY POSTERIOR FUSION WITH BRAIN LAB N/A 2/25/2025    Procedure: Posterior cervical seven/thoracic one decompression, posterior cervical three/four/five/six/seven, thoracic one/two/three fusion with screws/rods/crosslinks with Stealth spinal navigation;  Surgeon: Bert Collado MD;  Location: [facility] OR;  Service: Neurosurgery;  Laterality: N/A;    COLONOSCOPY      COLONOSCOPY N/A 2/14/2025    Procedure: COLONOSCOPY into cecum with hot snare polypectomy, Resolutions clips x2, Tattoo of transverse polyp area;  Surgeon: Gonzalo Rodriguez MD;  Location: [facility] ENDOSCOPY;  Service: Gastroenterology;  Laterality: N/A;  Pre: Screening  Post: Polyp, Suboptimal prep    ELBOW PROCEDURE Right     UNCLEAR:  RIGHT ELBOW AREA    ENDOSCOPY N/A 2/14/2025    Procedure: ESOPHAGOGASTRODUODENOSCOPY with biopsies;  Surgeon: Gonzalo Rodriguez MD;  Location: [facility] ENDOSCOPY;   Service: Gastroenterology;  Laterality: N/A;  Pre: GERD, Dysphagia  Post: Gastritis and Esophagitis    FINGER SURGERY Left     INDEX FINGER    LUMBAR EPIDURAL INJECTION      STATES X3    LUMBAR LAMINECTOMY DISCECTOMY DECOMPRESSION Bilateral 11/28/2023    Procedure: Open bilateral lumbar decompression lumbar one/two, lumbar two/three, lumbar three/four;  Surgeon: Bert Collado MD;  Location: [facility] OR;  Service: Neurosurgery;  Laterality: Bilateral;    MENISCECTOMY Right     UPPER GASTROINTESTINAL ENDOSCOPY  03/2021       FAMILY HISTORY  Family History   Problem Relation Age of Onset    Malig Hyperthermia Neg Hx        SOCIAL HISTORY  Social History     Tobacco Use    Smoking status: Former     Current packs/day: 0.00     Average packs/day: 2.0 packs/day for 20.0 years (40.0 ttl pk-yrs)     Types: Cigarettes     Start date: 1/1/1990     Quit date: 2010     Years since quitting: 15.1     Passive exposure: Past    Smokeless tobacco: Never   Vaping Use    Vaping status: Never Used   Substance Use Topics    Alcohol use: No    Drug use: Yes     Frequency: 14.0 times per week     Types: Marijuana     Comment: 1-2 PER DAY,           Allergies:  Bacitracin-polymyxin b, Chlorhexidine, and [id number]-pramoxine    MEDICATIONS:  Medications Prior to Admission   Medication Sig Dispense Refill Last Dose/Taking    aspirin 81 MG chewable tablet Chew 1 tablet Daily.   3/9/2025 Morning    baclofen (LIORESAL) 20 MG tablet Take 1 tablet by mouth 2 (Two) Times a Day.   3/9/2025 Evening    busPIRone (BUSPAR) 15 MG tablet Take 1 tablet by mouth 2 (Two) Times a Day. Indications: Anxiety Disorder 20 tablet 0 3/9/2025 Evening    cephalexin (KEFLEX) 500 MG capsule Take 1 capsule by mouth Every 6 (Six) Hours for 28 doses. 28 capsule 0 3/9/2025 Evening    cetirizine (zyrTEC) 10 MG tablet Take 1 tablet by mouth Daily.   3/9/2025 Morning    cholecalciferol (VITAMIN D3) 25 MCG (1000 UT) tablet Take 1 tablet by mouth Daily.    3/9/2025 Morning    clonazePAM (KlonoPIN) 0.5 MG tablet Take 1 tablet by mouth 3 times a day for 5 days. Indications: Panic Disorder 15 tablet 0 3/9/2025 Evening    docusate sodium 100 MG capsule Take 1 capsule by mouth Daily.   Past Week Morning    escitalopram (LEXAPRO) 10 MG tablet Take 1 tablet by mouth Every Night. Indications: Panic Disorder 30 tablet 0 3/9/2025 Evening    FeroSul 325 (65 Fe) MG tablet Take 1 tablet by mouth Daily With Breakfast. PT HAS NOT HAD FOR 4 DAYS   3/9/2025 Morning    gabapentin (NEURONTIN) 800 MG tablet Take 1 tablet by mouth 3 (Three) Times a Day. TYPICALLY TAKES TWICE DAILY   3/9/2025 Evening    HYDROcodone-acetaminophen (NORCO) 7.5-325 MG per tablet Take 1 tablet by mouth Every 4 (Four) Hours As Needed for Severe Pain for up to 5 days. 30 tablet 0 3/10/2025 Morning    NON FORMULARY Every Night. STATES DOES USE NIGHT INHALER:  INSTRUCTED TO BRING THIS DAY OF SURGEY   3/10/2025 Morning    pantoprazole (PROTONIX) 40 MG EC tablet Take 1 tablet by mouth 2 (Two) Times a Day.  11 3/9/2025 Evening    polyethylene glycol (MIRALAX) 17 GM/SCOOP powder Dissolve 17 g (1 capful) in liquid and drink by mouth 2 (Two) Times a Day As Needed for constipation 510 g 0 Past Week    sennosides-docusate (PERICOLACE) 8.6-50 MG per tablet Take 2 tablets by mouth Every Night. 20 tablet 0 Past Week Morning    Ventolin  (90 Base) MCG/ACT inhaler Inhale 2 puffs Every 4 (Four) Hours As Needed.   3/9/2025 Morning    Diclofenac Sodium (VOLTAREN) 1 % gel gel Apply 4 g topically to the appropriate area as directed As Needed.   More than a month    lidocaine (LIDODERM) 5 % PLACE 1 PATCH ON THE SKIN AS DIRECTED BY PROVIDER DAILY. REMOVE & DISCARD PATCH WITHIN 12 HOURS OR AS DIRECTED BY MD 30 patch 2 More than a month    mometasone (ELOCON) 0.1 % cream 1 Application As Needed.   More than a month    naloxone (NARCAN) 4 MG/0.1ML nasal spray Call 911. Don't prime. [place] in 1 nostril for overdose. Repeat in 2-3  minutes in other nostril if no or minimal breathing/responsiveness. 2 each 0 Unknown    Potassium 75 MG tablet Take  by mouth As Needed. DURING THE SUMMER WHEN SWEATS MORE   More than a month    Unable to find 1 each 1 (One) Time. INHALER NEW INSTRUCTED PT TO BRING WITH HIM            Current Facility-Administered Medications:     acetaminophen (TYLENOL) tablet 650 mg, 650 mg, Oral, Q4H PRN **OR** acetaminophen (TYLENOL) 160 MG/5ML oral solution 650 mg, 650 mg, Oral, Q4H PRN **OR** acetaminophen (TYLENOL) suppository 650 mg, 650 mg, Rectal, Q4H PRN, Shekhar Ramirez MD    albuterol (PROVENTIL) nebulizer solution 0.083% 2.5 mg/3mL, 2.5 mg, Nebulization, Q6H PRN, Shekhar Ramirez MD    [Held by provider] aspirin chewable tablet 81 mg, 81 mg, Oral, Daily, Shekhar Ramirez MD    sennosides-docusate (PERICOLACE) 8.6-50 MG per tablet 2 tablet, 2 tablet, Oral, BID PRN **AND** polyethylene glycol (MIRALAX) packet 17 g, 17 g, Oral, Daily PRN **AND** bisacodyl (DULCOLAX) EC tablet 5 mg, 5 mg, Oral, Daily PRN **AND** bisacodyl (DULCOLAX) suppository 10 mg, 10 mg, Rectal, Daily PRN, Shekhar Ramirez MD    busPIRone (BUSPAR) tablet 15 mg, 15 mg, Oral, BID, Shekhar Ramirez MD, 15 mg at 03/10/25 1012    cephalexin (KEFLEX) capsule 500 mg, 500 mg, Oral, Q6H, Shekhar Ramirez MD, 500 mg at 03/10/25 1013    cetirizine (zyrTEC) tablet 10 mg, 10 mg, Oral, Daily, Shekhar Ramirez MD, 10 mg at 03/10/25 1013    cholecalciferol (VITAMIN D3) tablet 1,000 Units, 1,000 Units, Oral, Daily, Shekhar Ramirez MD, 1,000 Units at 03/10/25 1013    escitalopram (LEXAPRO) tablet 10 mg, 10 mg, Oral, Nightly, Shekhar Ramirez MD    ferrous sulfate tablet 325 mg, 325 mg, Oral, Daily With Breakfast, Shekhar Ramirez MD, 325 mg at 03/10/25 1013    gabapentin (NEURONTIN) capsule 800 mg, 800 mg, Oral, Q8H, Shekhar Ramirez MD, 800 mg at 03/10/25 1013    HYDROmorphone (DILAUDID) injection 0.5 mg, 0.5 mg, Intravenous, Q2H PRN **AND** naloxone (NARCAN) injection 0.4 mg, 0.4 mg,  Intravenous, Q5 Min PRN, Shekhar Ramirez MD    ondansetron ODT (ZOFRAN-ODT) disintegrating tablet 4 mg, 4 mg, Oral, Q6H PRN **OR** ondansetron (ZOFRAN) injection 4 mg, 4 mg, Intravenous, Q6H PRN, Shekhar Ramirez MD    oxyCODONE-acetaminophen (PERCOCET) 7.5-325 MG per tablet 1 tablet, 1 tablet, Oral, Q6H PRN, Shekhar Ramirez MD, 1 tablet at 03/10/25 1013    pantoprazole (PROTONIX) EC tablet 40 mg, 40 mg, Oral, BID, Shekhar Ramirez MD, 40 mg at 03/10/25 1013    [COMPLETED] Insert Peripheral IV, , , Once **AND** sodium chloride 0.9 % flush 10 mL, 10 mL, Intravenous, PRN, Holly Graham PA-C    sodium chloride 0.9 % flush 10 mL, 10 mL, Intravenous, Q12H, Shekhar Ramirez MD, 10 mL at 03/10/25 1014    sodium chloride 0.9 % flush 10 mL, 10 mL, Intravenous, PRN, Shekhar Ramirez MD    sodium chloride 0.9 % infusion 40 mL, 40 mL, Intravenous, PRN, Shekhar Ramirez MD      Objective     Results Review:  LABS:  Results from last 7 days   Lab Units 03/10/25  0451   WBC 10*3/mm3 9.34   HEMOGLOBIN g/dL 11.9*   HEMATOCRIT % 36.6*   PLATELETS 10*3/mm3 424     Results from last 7 days   Lab Units 03/10/25  0451   SODIUM mmol/L 141   POTASSIUM mmol/L 3.7   CHLORIDE mmol/L 107   CO2 mmol/L 23.2   BUN mg/dL 16   CREATININE mg/dL 0.98   CALCIUM mg/dL 8.6   BILIRUBIN mg/dL <0.2   ALK PHOS U/L 83   ALT (SGPT) U/L 22   AST (SGOT) U/L 21   GLUCOSE mg/dL 120*         DIAGNOSTICS:  Cervical and thoracic XR 3/9/2025:  Shows pedicle screw and vertical rods from C3-T3 without any screw loosening or malalignment.  No apparent fracture in this construct.  However there is an anterior cervical plate from C3-C7.  Corpectomy noted at C4-C6.  One of the C3 screws is broken and head is backed out of position by 3 to 4 mm however this is unchanged from previous imaging.      MRI cervical and thoracic spine with and without contrast 3/10/2025:  FINDINGS: This patient, back in 2016, had a remote prior cervical spine  surgery with corpectomies at C4, C5 and C6  and had anterior cervical  discectomy and fusion procedure from C4 to C7 with anterior plate and  screw fixation from C4 to C7. There is a cylindrical graft extending  from the inferior endplate of C3 to the superior body of C7, and an  anterior plate extending from the anterior superior body of C3 to the  anterior inferior body of C7 anchored by screws to the C3 and C7  vertebrae.     At C7-T1 on 02/25/2025 this patient underwent cervical spine surgery  with bilateral laminectomies at C7 and medial facetectomies at C7-T1.   There was placement of bilateral rods bridging the posterior elements  from C3 to T3 anchored by articular pillar screws bilaterally at C3, C4,  C5, C6 and bilateral pedicle screws at T1, T2 and T3. Preoperatively,  there was posterior endplate spurring and mild canal narrowing, and  there was moderate left and moderate-to-severe right bony foraminal  narrowing at C7-T1. There has been adequate posterior decompression and  there is no residual canal narrowing at C7-T1.  There remains some  uncovertebral joint spurring into the foramina with some residual  mild-to-moderate bilateral foraminal narrowing at C7-T1. I see no canal  narrowing from the skull base to T1. At the level of the remote prior  cervical spine surgery, there is some areas of bony foraminal narrowing  from C3 to C7 that are unchanged. There is a postoperative fluid  collection in the posterior subcutaneous fat of the neck and upper  chest. The fluid collection tracks up to 15 cm in cranial caudal  dimension and maximally measures 5 x 4 cm in medial lateral and anterior  posterior dimension at the C7-T1 and T1 thoracic level. It channels down  to the posterior dura at the C7-T1 level, and may be postoperative  seroma or hematoma although I cannot exclude an infected fluid  collection.     The cervical and thoracic cord is normal in contour and signal  intensity. The conus terminates at the L1 lumbar level which is normal.  Within  the thoracic spine, there is some left paracentral spurring and  bulging disc material that indents the ventral thecal sac and abuts the  left ventral surface of the cord mildly narrowing the left side of the  canal at T6-7. There is some left paracentral spurring and bulging disc  material that abuts the left ventral surface of the cord minimally  narrowing the canal at T7-8. Otherwise, I see no canal or foraminal  narrowing from T3 down to L1.     IMPRESSION:  1. This patient has had extensive remote prior cervical spine surgery  back in 2016 during which there was performance of corpectomies at C4,  C5 and C6 and placement of a cylindrical graft from the inferior  endplate of C4 to the superior body of C7, and anterior plate and screw  fixation from C3 to C7. On 02/25/2025 this patient underwent bilateral  laminectomies at C7 and medial facetectomies at C7-T1.  There was  placement of bilateral posterior element rods from C3 to T3 anchored by  bilateral articular pillar screws at C3, C4, C5 and C6 and bilateral  pedicle screws at T1, T2 and T3. There is postoperative fluid in the  posterior subcutaneous fat of the neck and upper thoracic region that  tracks up to 15 cm in cranial caudal dimension and 5 x 4 mm in medial  lateral and anterior posterior dimension and it channels down to the  posterior margin of the posterior dura at the C7-T1 level. This may be  postoperative seroma hematoma although I cannot exclude an infected  fluid collection and this needs to be correlated clinically.     2. I see no residual canal narrowing from the skull base down to the T3  thoracic level. Since prior surgery, there has been some improvement in  the foraminal narrowing at C7-T1 but there remains mild-to-moderate  bilateral foraminal narrowing at C7-T1. There is also some chronic  residual multilevel foraminal narrowing from C3 to C7, unchanged.     3. Within the thoracic spine there is left paracentral disc  osteophyte  complex that abuts the left ventral surface of the cord mildly narrowing  the left side of the canal at T6-7 and left paracentral spurring  minimally narrowing the left side of the canal at T7-8, otherwise I see  no canal or foraminal narrowing from T3 to L1  Results Review:   I reviewed the patient's new clinical results.  I personally viewed and interpreted the patient's labs, medications, imaging studies and chart.    Vital Signs   Temp:  [98.2 °F (36.8 °C)-98.3 °F (36.8 °C)] 98.2 °F (36.8 °C)  Heart Rate:  [] 61  Resp:  [18] 18  BP: (105-126)/(73-93) 105/79    Physical Exam:  Physical Exam  Vitals reviewed.   Constitutional:       General: He is not in acute distress.     Appearance: Normal appearance. He is not ill-appearing, toxic-appearing or diaphoretic.   HENT:      Head: Normocephalic.      Nose: Nose normal.      Mouth/Throat:      Mouth: Mucous membranes are moist.      Pharynx: Oropharynx is clear.   Eyes:      Extraocular Movements: Extraocular movements intact.      Conjunctiva/sclera: Conjunctivae normal.      Pupils: Pupils are equal, round, and reactive to light.   Cardiovascular:      Rate and Rhythm: Normal rate.   Pulmonary:      Effort: Pulmonary effort is normal.   Musculoskeletal:         General: Normal range of motion.      Cervical back: Normal range of motion.      Lumbar back: Negative right straight leg raise test and negative left straight leg raise test.      Comments: Cervical thoracic incision wound edges are well-approximated without erythema, swelling or drainage.  Steri-Strips in place towards the midportion and bottom of wound.     Skin:     General: Skin is warm.   Neurological:      General: No focal deficit present.      Mental Status: He is oriented to person, place, and time. Mental status is at baseline.      Coordination: Coordination is intact.      Deep Tendon Reflexes:      Reflex Scores:       Tricep reflexes are 2+ on the right side and 2+ on the  left side.       Bicep reflexes are 2+ on the right side and 2+ on the left side.       Brachioradialis reflexes are 2+ on the right side and 2+ on the left side.  Psychiatric:         Mood and Affect: Mood normal.         Speech: Speech normal.         Behavior: Behavior normal.         Thought Content: Thought content normal.         Judgment: Judgment normal.       Neurological Exam  Mental Status  Awake, alert and oriented to person, place and time. Oriented to person, place, time and situation. Oriented to person, place, and time. Recent and remote memory are intact. Speech is normal. Language is fluent with no aphasia. Attention and concentration are normal. Fund of knowledge is appropriate for level of education.    Cranial Nerves  CN III, IV, VI: Extraocular movements intact bilaterally. Pupils equal round and reactive to light bilaterally.    Motor                                               Right                     Left  Deltoid                                   5                          5   Biceps                                   5                          5  Brachioradialis                      5                          5   Triceps                                  5                          5   Pronator                                5                          5   Supinator                              5                           5   Wrist flexor                            5                          5   Wrist extensor                       5                          5   Finger flexor                          5                          5   Finger extensor                     5                          5   Interossei                              5                          5   Iliopsoas                               5                          5   Quadriceps                           5                          5   Hamstring                             5                          5   Gastrocnemius                      5                           5   Anterior tibialis                      5                          5   Posterior tibialis                    5                          5  Ankle dorsiflexor                   5                          5  Ankle plantar flexor              5                           5    Sensory  Sensation is intact to light touch, pinprick, vibration and proprioception in all four extremities.    Reflexes                                            Right                      Left  Brachioradialis                    2+                         2+  Biceps                                 2+                         2+  Triceps                                2+                         2+    Right pathological reflexes: Vincent's absent.  Left pathological reflexes: Vincent's absent.    Coordination    Finger-to-nose, rapid alternating movements and heel-to-shin normal bilaterally without dysmetria.    Gait    Deferred.      Assessment & Plan       Acute on chronic back pain    Cervical disc disorder with radiculopathy of mid-cervical region    Cervical stenosis of spine      Problem List Items Addressed This Visit    None  Visit Diagnoses         Weakness of upper extremity    -  Primary      Acute bilateral thoracic back pain          Status post cervical spinal fusion                 COMORBID CONDITIONS:  COPD, anxiety, spinal stenosis, GERD    54-year-old male 13 days postop from posterior cervical 7-T1 decompression with posterior C3-T3 fusion performed by Dr. Collado.  He notes acute onset right upper mid back pain after position change late Friday night.  He notes pain mainly in the musculature of the upper right back.  He denies any new issues into the arms or legs or any issues with bowel/bladder dysfunction or gait instability.  He does report intermittent jerks which sometimes causes him to drop objects but denies any weakness in the arms or legs.  Exam reflects normal strength  "with absence of any pathologic reflexes.  I discussed the history, exam and radiographic findings with Dr. Collado.  Cervical/thoracic x-rays appear stable and cervical/thoracic MRI's show successful interval decompression.  Fluid signal to be related to expected postop findings rather than any infectious etiology in the presence of normal temperatures and white blood cell count.  He does not appear to be infectious on exam.    He is possibly dealing with a muscle strain in the back.  Neurosurgery recommends maximizing muscle relaxers (adding 5 g of Valium p.o. 3 times daily) and adding lidocaine patches.  Neurosurgery will check back on his status tomorrow.    Nursing to please avoid application of lidocaine patch on or near posterior surgical wound.      PLAN:     Added Valium 5 mg p.o. 3 times daily  Added lidocaine patch  Neurosurgery to check back tomorrow  Soft collar for comfort    I discussed the patient's findings and my recommendations with patient, family, primary care team, and Dr. Collado, and nursing staff    During patient visit, I utilized appropriate personal protective equipment including gloves and mask.  Mask used was standard procedure mask. Appropriate PPE was worn during the entire visit.  Hand hygiene was completed before and after.     Joe Lombardo, APRN  03/10/25  12:41 EDT    \"Dictated utilizing Dragon dictation\".    "

## 2025-03-10 NOTE — ED PROVIDER NOTES
EMERGENCY DEPARTMENT ENCOUNTER  Room Number:  06/06  PCP: Provider, No Known  Independent Historians: Patient and Family      HPI:  Chief Complaint: had concerns including Back Pain.     A complete HPI/ROS/PMH/PSH/SH/FH are unobtainable due to: None    Chronic or social conditions impacting patient care (Social Determinants of Health): None      Context: Rafael Kat is a 54 y.o. male with a medical history of cervical degeneration, muscle spasm, GERD, COPD, psoriasis who presents to the ED c/o acute upper back pain.  Patient reports he underwent cervical decompression and cervical fusion on 2/25.  This was done by Dr. Collado.  Reports he has been doing well after the surgery.  He went to stand up out of a chair yesterday and placed his arms down next to him.  He went to push off to get up and felt a sharp pain in his upper thoracic spine.  Since that time he has had significantly worsened pain.  States he has had weakness in the right upper arm especially and has been dropping things.  His wife called the neurosurgery answering service and was told to come to the emergency department.  No reported fever.  No bowel/bladder incontinence or saddle anesthesia.  Has been able to ambulate without difficulty.  Patient has no other systemic complaints at this time.      Review of prior external notes (non-ED) -and- Review of prior external test results outside of this encounter:  Patient seen in office by gastroenterology on 2/3/2025 for oropharyngeal dysphagia.  Reviewed assessment and plan.  Will schedule patient for EGD and colonoscopy.  Reviewed labs collected on 3/1/2025.  CBC with hemoglobin 10.3, BMP with creatinine 1.01.    Prescription drug monitoring program review:     N/A    PAST MEDICAL HISTORY  Active Ambulatory Problems     Diagnosis Date Noted    Degeneration of intervertebral disc of mid-cervical region 07/22/2016    Chronic pain 07/26/2016    Cervical disc disorder with radiculopathy of  mid-cervical region 01/24/2017    DDD (degenerative disc disease), lumbar 01/24/2017    Other cervical disc degeneration, high cervical region 03/01/2017    Neck pain 03/01/2017    Pain in thoracic spine 05/15/2019    Muscle spasm of right shoulder 05/15/2019    History of fusion of cervical spine 05/15/2019    Spinal stenosis of lumbar region with neurogenic claudication 03/23/2023    Tear of right acetabular labrum 03/23/2023    Spinal instability of lumbar region 11/28/2023    Spondylolisthesis at L3-L4 level 12/12/2023    Muscle ache 01/22/2024    Foraminal stenosis of cervical region 08/29/2024    Cervical radiculopathy at C7 08/29/2024    Bilateral arm pain 08/29/2024    Chronic obstructive pulmonary disease 12/23/2024    Dysphagia 12/23/2024    Spinal stenosis of cervicothoracic region 12/23/2024    Weakness of both hands 01/22/2025    Weakness of both arms 01/22/2025    History of lumbar surgery 01/22/2025    Gastroesophageal reflux disease 02/03/2025    Screening for colorectal cancer 02/03/2025    Cervical stenosis of spine 02/25/2025    Superficial thrombophlebitis of left upper extremity 03/04/2025     Resolved Ambulatory Problems     Diagnosis Date Noted    Cervical spinal stenosis 02/17/2016    Spinal stenosis in cervical region 03/16/2016    Cervical stenosis of spine 03/29/2016    Follow-up examination, following other surgery 04/13/2016    Cervical pain 07/26/2016     Past Medical History:   Diagnosis Date    Anesthesia complication     Arthritis     COPD (chronic obstructive pulmonary disease)     DDD (degenerative disc disease), cervical     GERD (gastroesophageal reflux disease)     History of COVID-19     Psoriasis     Shoulder pain, bilateral     Skin cancer          PAST SURGICAL HISTORY  Past Surgical History:   Procedure Laterality Date    ANTERIOR CHANNEL VERTEBRECTOMY/CORPECTOMY Bilateral 03/29/2016    Procedure: C4-6 ANTERIOR CHANNEL VERTEBRECTOMY/CORPECTOMY WITH INSTRUMENTATION;   Surgeon: Bert Collado MD;  Location: [facility] MAIN OR;  Service:     CERVICAL DISCECTOMY POSTERIOR FUSION WITH BRAIN LAB N/A 2/25/2025    Procedure: Posterior cervical seven/thoracic one decompression, posterior cervical three/four/five/six/seven, thoracic one/two/three fusion with screws/rods/crosslinks with Stealth spinal navigation;  Surgeon: Bert Collado MD;  Location[facility] MAIN OR;  Service: Neurosurgery;  Laterality: N/A;    COLONOSCOPY      COLONOSCOPY N/A 2/14/2025    Procedure: COLONOSCOPY into cecum with hot snare polypectomy, Resolutions clips x2, Tattoo of transverse polyp area;  Surgeon: Gonzalo Rodriguez MD;  Location: [facility] ENDOSCOPY;  Service: Gastroenterology;  Laterality: N/A;  Pre: Screening  Post: Polyp, Suboptimal prep    ELBOW PROCEDURE Right     UNCLEAR:  RIGHT ELBOW AREA    ENDOSCOPY N/A 2/14/2025    Procedure: ESOPHAGOGASTRODUODENOSCOPY with biopsies;  Surgeon: Gonzalo Rodriguez MD;  [facility] ENDOSCOPY;  Service: Gastroenterology;  Laterality: N/A;  Pre: GERD, Dysphagia  Post: Gastritis and Esophagitis    FINGER SURGERY Left     INDEX FINGER    LUMBAR EPIDURAL INJECTION      STATES X3    LUMBAR LAMINECTOMY DISCECTOMY DECOMPRESSION Bilateral 11/28/2023    Procedure: Open bilateral lumbar decompression lumbar one/two, lumbar two/three, lumbar three/four;  Surgeon: Bert Collado MD;  Location: [facility] MAIN OR;  Service: Neurosurgery;  Laterality: Bilateral;    MENISCECTOMY Right     UPPER GASTROINTESTINAL ENDOSCOPY  03/2021         FAMILY HISTORY  Family History   Problem Relation Age of Onset    Malig Hyperthermia Neg Hx          SOCIAL HISTORY  Social History     Socioeconomic History    Marital status:    Tobacco Use    Smoking status: Former     Current packs/day: 0.00     Average packs/day: 2.0 packs/day for 20.0 years (40.0 ttl pk-yrs)     Types: Cigarettes     Start date: 1/1/1990     Quit date: 2010     Years since quitting: 15.1     Passive  exposure: Past    Smokeless tobacco: Never   Vaping Use    Vaping status: Never Used   Substance and Sexual Activity    Alcohol use: No    Drug use: Yes     Frequency: 14.0 times per week     Types: Marijuana     Comment: 1-2 PER DAY,    Sexual activity: Defer         ALLERGIES  Bacitracin-polymyxin b, Chlorhexidine, and Aaxyy-nwhwr-[id number]-pramoxine      REVIEW OF SYSTEMS  Included in HPI  All systems reviewed and negative except for those discussed in HPI.      PHYSICAL EXAM    I have reviewed the triage vital signs and nursing notes.    ED Triage Vitals   Temp Heart Rate Resp BP SpO2   03/10/25 0342 03/10/25 0342 03/10/25 0342 03/10/25 0346 03/10/25 0342   98.3 °F (36.8 °C) 101 18 126/93 92 %      Temp src Heart Rate Source Patient Position BP Location FiO2 (%)   -- -- -- 03/10/25 0346 --      Right arm        Physical Exam  Constitutional:       General: He is not in acute distress.     Appearance: Normal appearance.   HENT:      Head: Normocephalic and atraumatic.      Nose: Nose normal.      Mouth/Throat:      Mouth: Mucous membranes are moist.   Eyes:      Conjunctiva/sclera: Conjunctivae normal.      Pupils: Pupils are equal, round, and reactive to light.   Cardiovascular:      Rate and Rhythm: Normal rate and regular rhythm.      Pulses: Normal pulses.      Heart sounds: Normal heart sounds.      Comments: Distal pulses intact  Pulmonary:      Effort: Pulmonary effort is normal.      Breath sounds: Normal breath sounds.   Abdominal:      General: There is no distension.   Musculoskeletal:         General: Normal range of motion.      Cervical back: Normal range of motion and neck supple.      Comments: There is well-appearing incision over the cervical and upper thoracic spine.  There are Steri-Strips intact over the upper thoracic spine.  No surrounding erythema, warmth, induration.  Patient has tenderness over the upper thoracic spine bilaterally.   Skin:     General: Skin is warm.      Capillary Refill:  Capillary refill takes less than 2 seconds.   Neurological:      General: No focal deficit present.      Mental Status: He is alert and oriented to person, place, and time.      Comments: SILT all 4 extremities.  Motor strength 5/5 all 4 extremities   Psychiatric:         Mood and Affect: Mood normal.             LAB RESULTS  Recent Results (from the past 24 hours)   Comprehensive Metabolic Panel    Collection Time: 03/10/25  4:51 AM    Specimen: Blood   Result Value Ref Range    Glucose 120 (H) 65 - 99 mg/dL    BUN 16 6 - 20 mg/dL    Creatinine 0.98 0.76 - 1.27 mg/dL    Sodium 141 136 - 145 mmol/L    Potassium 3.7 3.5 - 5.2 mmol/L    Chloride 107 98 - 107 mmol/L    CO2 23.2 22.0 - 29.0 mmol/L    Calcium 8.6 8.6 - 10.5 mg/dL    Total Protein 6.2 6.0 - 8.5 g/dL    Albumin 3.3 (L) 3.5 - 5.2 g/dL    ALT (SGPT) 22 1 - 41 U/L    AST (SGOT) 21 1 - 40 U/L    Alkaline Phosphatase 83 39 - 117 U/L    Total Bilirubin <0.2 0.0 - 1.2 mg/dL    Globulin 2.9 gm/dL    A/G Ratio 1.1 g/dL    BUN/Creatinine Ratio 16.3 7.0 - 25.0    Anion Gap 10.8 5.0 - 15.0 mmol/L    eGFR 90.5 >60.0 mL/min/1.73   CBC Auto Differential    Collection Time: 03/10/25  4:51 AM    Specimen: Blood   Result Value Ref Range    WBC 9.34 3.40 - 10.80 10*3/mm3    RBC 3.96 (L) 4.14 - 5.80 10*6/mm3    Hemoglobin 11.9 (L) 13.0 - 17.7 g/dL    Hematocrit 36.6 (L) 37.5 - 51.0 %    MCV 92.4 79.0 - 97.0 fL    MCH 30.1 26.6 - 33.0 pg    MCHC 32.5 31.5 - 35.7 g/dL    RDW 13.6 12.3 - 15.4 %    RDW-SD 46.4 37.0 - 54.0 fl    MPV 10.3 6.0 - 12.0 fL    Platelets 424 140 - 450 10*3/mm3    Neutrophil % 65.0 42.7 - 76.0 %    Lymphocyte % 23.6 19.6 - 45.3 %    Monocyte % 6.6 5.0 - 12.0 %    Eosinophil % 2.7 0.3 - 6.2 %    Basophil % 0.4 0.0 - 1.5 %    Immature Grans % 1.7 (H) 0.0 - 0.5 %    Neutrophils, Absolute 6.07 1.70 - 7.00 10*3/mm3    Lymphocytes, Absolute 2.20 0.70 - 3.10 10*3/mm3    Monocytes, Absolute 0.62 0.10 - 0.90 10*3/mm3    Eosinophils, Absolute 0.25 0.00 - 0.40  10*3/mm3    Basophils, Absolute 0.04 0.00 - 0.20 10*3/mm3    Immature Grans, Absolute 0.16 (H) 0.00 - 0.05 10*3/mm3    nRBC 0.0 0.0 - 0.2 /100 WBC           MEDICATIONS GIVEN IN ER  Medications   sodium chloride 0.9 % flush 10 mL (has no administration in time range)   ondansetron (ZOFRAN) injection 4 mg (4 mg Intravenous Given 3/10/25 0510)   HYDROmorphone (DILAUDID) injection 0.5 mg (0.5 mg Intravenous Given 3/10/25 0510)         ORDERS PLACED DURING THIS VISIT:  Orders Placed This Encounter   Procedures    XR Spine Cervical 2 or 3 View    XR Spine Thoracic 2 View    Comprehensive Metabolic Panel    CBC Auto Differential    Neurosurgery (on-call MD unless specified)    Insert Peripheral IV    Initiate Emergency Department Observation Status    CBC & Differential         OUTPATIENT MEDICATION MANAGEMENT:  Current Facility-Administered Medications Ordered in Epic   Medication Dose Route Frequency Provider Last Rate Last Admin    sodium chloride 0.9 % flush 10 mL  10 mL Intravenous PRN Holly Graham PA-C         Current Outpatient Medications Ordered in Epic   Medication Sig Dispense Refill    baclofen (LIORESAL) 20 MG tablet Take 1 tablet by mouth 2 (Two) Times a Day.      busPIRone (BUSPAR) 15 MG tablet Take 1 tablet by mouth 2 (Two) Times a Day. Indications: Anxiety Disorder 20 tablet 0    cephalexin (KEFLEX) 500 MG capsule Take 1 capsule by mouth Every 6 (Six) Hours for 28 doses. 28 capsule 0    cetirizine (zyrTEC) 10 MG tablet Take 1 tablet by mouth Daily.      cholecalciferol (VITAMIN D3) 25 MCG (1000 UT) tablet Take 1 tablet by mouth Daily.      clonazePAM (KlonoPIN) 0.5 MG tablet Take 1 tablet by mouth 3 times a day for 5 days. Indications: Panic Disorder 15 tablet 0    Diclofenac Sodium (VOLTAREN) 1 % gel gel Apply 4 g topically to the appropriate area as directed As Needed.      docusate sodium 100 MG capsule Take 1 capsule by mouth Daily.      escitalopram (LEXAPRO) 10 MG tablet Take 1 tablet by mouth  Every Night. Indications: Panic Disorder 30 tablet 0    FeroSul 325 (65 Fe) MG tablet Take 1 tablet by mouth Daily With Breakfast. PT HAS NOT HAD FOR 4 DAYS      gabapentin (NEURONTIN) 800 MG tablet Take 1 tablet by mouth 3 (Three) Times a Day. TYPICALLY TAKES TWICE DAILY      lidocaine (LIDODERM) 5 % PLACE 1 PATCH ON THE SKIN AS DIRECTED BY PROVIDER DAILY. REMOVE & DISCARD PATCH WITHIN 12 HOURS OR AS DIRECTED BY MD 30 patch 2    mometasone (ELOCON) 0.1 % cream 1 Application As Needed.      naloxone (NARCAN) 4 MG/0.1ML nasal spray Call 911. Don't prime. [place] in 1 nostril for overdose. Repeat in 2-3 minutes in other nostril if no or minimal breathing/responsiveness. 2 each 0    NON FORMULARY Every Night. STATES DOES USE NIGHT INHALER:  INSTRUCTED TO BRING THIS DAY OF SURGEY      pantoprazole (PROTONIX) 40 MG EC tablet Take 1 tablet by mouth 2 (Two) Times a Day.  11    polyethylene glycol (MIRALAX) 17 GM/SCOOP powder Dissolve 17 g (1 capful) in liquid and drink by mouth 2 (Two) Times a Day As Needed for constipation 510 g 0    Potassium 75 MG tablet Take  by mouth As Needed. DURING THE SUMMER WHEN SWEATS MORE      sennosides-docusate (PERICOLACE) 8.6-50 MG per tablet Take 2 tablets by mouth Every Night. 20 tablet 0    Unable to find 1 each 1 (One) Time. INHALER NEW INSTRUCTED PT TO BRING WITH HIM      Ventolin  (90 Base) MCG/ACT inhaler Inhale 2 puffs Every 4 (Four) Hours As Needed.               PROGRESS, DATA ANALYSIS, CONSULTS, AND MEDICAL DECISION MAKING  All labs have been independently interpreted by me.  All radiology studies have been reviewed by me. All EKG's have been independently viewed and interpreted by me.  Discussion below represents my analysis of pertinent findings related to patient's condition, differential diagnosis, treatment plan and final disposition.    Differential diagnosis includes but is not limited to position of cervical hardware, nerve compression, muscle strain.        ED  Course as of 03/10/25 0524   Mon Mar 10, 2025   0424 Spoke with Dr. Winslow.  Discussed patient presentation and ED findings.  He recommends x-rays and placement in observation unit for MRI with and without contrast. [MP]   0500 WBC: 9.34 [MP]   0500 Hemoglobin(!): 11.9 [MP]   0512 I spoke with Joyce in the observation unit.  Discussed patient presentation and ED findings.  She agrees to admit patient to an ED observation bed. [MP]      ED Course User Index  [MP] Holly Graham PA-C             AS OF 05:24 EDT VITALS:    BP - 126/93  HR - 101  TEMP - 98.3 °F (36.8 °C)  O2 SATS - 92%    COMPLEXITY OF CARE  The patient requires admission.      DIAGNOSIS  Final diagnoses:   Weakness of upper extremity   Acute bilateral thoracic back pain   Status post cervical spinal fusion         DISPOSITION  ED Disposition       ED Disposition   Decision to Admit    Condition   --    Comment   --                Please note that portions of this document were completed with a voice recognition program.    Note Disclaimer: At [facility], we believe that sharing information builds trust and better relationships. You are receiving this note because you recently visited [facility]. It is possible you will see health information before a provider has talked with you about it. This kind of information can be easy to misunderstand. To help you fully understand what it means for your health, we urge you to discuss this note with your provider.     Holly Graham PA-C  03/10/25 0524

## 2025-03-10 NOTE — PROGRESS NOTES
ED OBSERVATION PROGRESS/DISCHARGE SUMMARY    Date of Admission: 3/10/2025   LOS: 0 days   PCP: Provider, No Known    Working diagnosis: Acute on chronic mid back pain, cervical disc disorder, cervical stenosis      Subjective     Hospital Outcome: Pending    Rafael Kat is a 54 y.o. male who is being admitted to the observation unit for worsening back pain status post C7-T1 decompression and C3-C7, T1-T3 fusion on February 25, 2025 with Dr. Collado.  Patient was discharged home on 3/4/2025 and had been doing well up until 2 days ago when acute onset of worsening pain happened while he was adjusting posture while sitting on the couch.  In the ER, labs noted glucose at 120, hemoglobin appears stable at 11.9 otherwise fairly unremarkable.  ED spoke with neurosurgery on-call who recommended further evaluation with plain films and MRI, they will follow in consultation    2:07 PM.  Neurosurgery has evaluated at bedside.  They are waiting on official MRI results of the C-spine and T-spine with and without contrast.    3:15 PM.  Neurosurgery is evaluated.  They evaluated the lidocaine patch, Valium 5 mg 3 times daily, soft collar for comfort.  They went to reevaluate the patient in the morning.       ROS:  General: no fevers, chills  Respiratory: no cough, dyspnea  Cardiovascular: no chest pain, palpitations  Abdomen: No abdominal pain, nausea, vomiting, or diarrhea  Neurologic: No focal weakness    Objective   Physical Exam:  I have reviewed the vital signs.  Temp:  [98.2 °F (36.8 °C)-98.3 °F (36.8 °C)] 98.2 °F (36.8 °C)  Heart Rate:  [] 59  Resp:  [18] 18  BP: (105-126)/(73-93) 105/79  General Appearance:    Alert, cooperative, no distress  Head:    Normocephalic, atraumatic  Eyes:    Sclerae anicteric  Neck:   Supple, no mass  Lungs: Clear to auscultation bilaterally, respirations unlabored  Heart: Regular rate and rhythm, S1 and S2 normal, no murmur, rub or gallop  Abdomen:  Soft, nontender, bowel sounds  active, nondistended  Extremities: No clubbing, cyanosis, or edema to lower extremities  Pulses:  2+ and symmetric in distal lower extremities  Skin: Well-appearing well-approximated incision in the cervical thoracic region.  No drainage present or signs of infection/cellulitis.  Neurologic: Oriented x3, Normal strength to extremities    Results Review:    I have reviewed the labs, radiology results and diagnostic studies.    Results from last 7 days   Lab Units 03/10/25  0451   WBC 10*3/mm3 9.34   HEMOGLOBIN g/dL 11.9*   HEMATOCRIT % 36.6*   PLATELETS 10*3/mm3 424     Results from last 7 days   Lab Units 03/10/25  0451   SODIUM mmol/L 141   POTASSIUM mmol/L 3.7   CHLORIDE mmol/L 107   CO2 mmol/L 23.2   BUN mg/dL 16   CREATININE mg/dL 0.98   CALCIUM mg/dL 8.6   BILIRUBIN mg/dL <0.2   ALK PHOS U/L 83   ALT (SGPT) U/L 22   AST (SGOT) U/L 21   GLUCOSE mg/dL 120*     Imaging Results (Last 24 Hours)       Procedure Component Value Units Date/Time    MRI Cervical Spine With & Without Contrast [[id number]] Collected: 03/10/25 1214     Updated: 03/10/25 1214    Narrative:      MRI OF THE CERVICAL AND THORACIC SPINE WITH AND WITHOUT CONTRAST  03/10/2025     CLINICAL HISTORY: This patient underwent surgery of the cervical spine  back on 03/29/2016 with prior corpectomies at C4, C5 and C6, and  anterior plate and screw fixation from C3 to C7 and underwent a  subsequent surgery on 02/25/2025 with decompression at C7-T1 and  posterior cervical thoracic fusion from C3 to T3, complains of bilateral  upper extremity weakness.     MRI OF THE CERVICAL SPINE TECHNIQUE: Sagittal T1, proton density and  fast spin-echo T2, gradient echo T2 and fat-suppressed T2 and  postcontrast sagittal T1 weighted images were obtained of the cervical  spine. In addition, axial T1 and gradient echo T2 and fast spin-echo T2  and postcontrast axial T1-weighted images were obtained from the skull  base to T1.         MRI OF THE THORACIC SPINE TECHNIQUE:  Sagittal T1, proton density and  fast spin echo T2 and fat-suppressed T2 and postcontrast sagittal T1 and  postcontrast sagittal fat-suppressed T1 weighted images were obtained of  the thoracic spine. In addition, axial T1 and T2 and postcontrast T1  weighted images were obtained from T1 to T8 and then from T8 to L1.     This is correlated to preoperative outside MRI of the cervical spine on  07/29/2024 and multiple plain film series of the cervical spine dating  back to 05/11/2016 and a remote prior cervical spine MRI prior to  cervical spine surgery back on 03/03/2016.     FINDINGS: This patient, back in 2016, had a remote prior cervical spine  surgery with corpectomies at C4, C5 and C6 and had anterior cervical  discectomy and fusion procedure from C4 to C7 with anterior plate and  screw fixation from C4 to C7. There is a cylindrical graft extending  from the inferior endplate of C3 to the superior body of C7, and an  anterior plate extending from the anterior superior body of C3 to the  anterior inferior body of C7 anchored by screws to the C3 and C7  vertebrae.     At C7-T1 on 02/25/2025 this patient underwent cervical spine surgery  with bilateral laminectomies at C7 and medial facetectomies at C7-T1.   There was placement of bilateral rods bridging the posterior elements  from C3 to T3 anchored by articular pillar screws bilaterally at C3, C4,  C5, C6 and bilateral pedicle screws at T1, T2 and T3. Preoperatively,  there was posterior endplate spurring and mild canal narrowing, and  there was moderate left and moderate-to-severe right bony foraminal  narrowing at C7-T1. There has been adequate posterior decompression and  there is no residual canal narrowing at C7-T1.  There remains some  uncovertebral joint spurring into the foramina with some residual  mild-to-moderate bilateral foraminal narrowing at C7-T1. I see no canal  narrowing from the skull base to T1. At the level of the remote prior  cervical spine  surgery, there is some areas of bony foraminal narrowing  from C3 to C7 that are unchanged. There is a postoperative fluid  collection in the posterior subcutaneous fat of the neck and upper  chest. The fluid collection tracks up to 15 cm in cranial caudal  dimension and maximally measures 5 x 4 cm in medial lateral and anterior  posterior dimension at the C7-T1 and T1 thoracic level. It channels down  to the posterior dura at the C7-T1 level, and may be postoperative  seroma or hematoma although I cannot exclude an infected fluid  collection.     The cervical and thoracic cord is normal in contour and signal  intensity. The conus terminates at the L1 lumbar level which is normal.  Within the thoracic spine, there is some left paracentral spurring and  bulging disc material that indents the ventral thecal sac and abuts the  left ventral surface of the cord mildly narrowing the left side of the  canal at T6-7. There is some left paracentral spurring and bulging disc  material that abuts the left ventral surface of the cord minimally  narrowing the canal at T7-8. Otherwise, I see no canal or foraminal  narrowing from T3 down to L1.       Impression:      1. This patient has had extensive remote prior cervical spine surgery  back in 2016 during which there was performance of corpectomies at C4,  C5 and C6 and placement of a cylindrical graft from the inferior  endplate of C4 to the superior body of C7, and anterior plate and screw  fixation from C3 to C7. On 02/25/2025 this patient underwent bilateral  laminectomies at C7 and medial facetectomies at C7-T1.  There was  placement of bilateral posterior element rods from C3 to T3 anchored by  bilateral articular pillar screws at C3, C4, C5 and C6 and bilateral  pedicle screws at T1, T2 and T3. There is postoperative fluid in the  posterior subcutaneous fat of the neck and upper thoracic region that  tracks up to 15 cm in cranial caudal dimension and 5 x 4 mm in  medial  lateral and anterior posterior dimension and it channels down to the  posterior margin of the posterior dura at the C7-T1 level. This may be  postoperative seroma hematoma although I cannot exclude an infected  fluid collection and this needs to be correlated clinically.     2. I see no residual canal narrowing from the skull base down to the T3  thoracic level. Since prior surgery, there has been some improvement in  the foraminal narrowing at C7-T1 but there remains mild-to-moderate  bilateral foraminal narrowing at C7-T1. There is also some chronic  residual multilevel foraminal narrowing from C3 to C7, unchanged.     3. Within the thoracic spine there is left paracentral disc osteophyte  complex that abuts the left ventral surface of the cord mildly narrowing  the left side of the canal at T6-7 and left paracentral spurring  minimally narrowing the left side of the canal at T7-8, otherwise I see  no canal or foraminal narrowing from T3 to L1.             MRI Thoracic Spine With & Without Contrast [[id number]] Collected: 03/10/25 1214     Updated: 03/10/25 1214    Narrative:      MRI OF THE CERVICAL AND THORACIC SPINE WITH AND WITHOUT CONTRAST  03/10/2025     CLINICAL HISTORY: This patient underwent surgery of the cervical spine  back on 03/29/2016 with prior corpectomies at C4, C5 and C6, and  anterior plate and screw fixation from C3 to C7 and underwent a  subsequent surgery on 02/25/2025 with decompression at C7-T1 and  posterior cervical thoracic fusion from C3 to T3, complains of bilateral  upper extremity weakness.     MRI OF THE CERVICAL SPINE TECHNIQUE: Sagittal T1, proton density and  fast spin-echo T2, gradient echo T2 and fat-suppressed T2 and  postcontrast sagittal T1 weighted images were obtained of the cervical  spine. In addition, axial T1 and gradient echo T2 and fast spin-echo T2  and postcontrast axial T1-weighted images were obtained from the skull  base to T1.         MRI OF THE  THORACIC SPINE TECHNIQUE: Sagittal T1, proton density and  fast spin echo T2 and fat-suppressed T2 and postcontrast sagittal T1 and  postcontrast sagittal fat-suppressed T1 weighted images were obtained of  the thoracic spine. In addition, axial T1 and T2 and postcontrast T1  weighted images were obtained from T1 to T8 and then from T8 to L1.     This is correlated to preoperative outside MRI of the cervical spine on  07/29/2024 and multiple plain film series of the cervical spine dating  back to 05/11/2016 and a remote prior cervical spine MRI prior to  cervical spine surgery back on 03/03/2016.     FINDINGS: This patient, back in 2016, had a remote prior cervical spine  surgery with corpectomies at C4, C5 and C6 and had anterior cervical  discectomy and fusion procedure from C4 to C7 with anterior plate and  screw fixation from C4 to C7. There is a cylindrical graft extending  from the inferior endplate of C3 to the superior body of C7, and an  anterior plate extending from the anterior superior body of C3 to the  anterior inferior body of C7 anchored by screws to the C3 and C7  vertebrae.     At C7-T1 on 02/25/2025 this patient underwent cervical spine surgery  with bilateral laminectomies at C7 and medial facetectomies at C7-T1.   There was placement of bilateral rods bridging the posterior elements  from C3 to T3 anchored by articular pillar screws bilaterally at C3, C4,  C5, C6 and bilateral pedicle screws at T1, T2 and T3. Preoperatively,  there was posterior endplate spurring and mild canal narrowing, and  there was moderate left and moderate-to-severe right bony foraminal  narrowing at C7-T1. There has been adequate posterior decompression and  there is no residual canal narrowing at C7-T1.  There remains some  uncovertebral joint spurring into the foramina with some residual  mild-to-moderate bilateral foraminal narrowing at C7-T1. I see no canal  narrowing from the skull base to T1. At the level of the  remote prior  cervical spine surgery, there is some areas of bony foraminal narrowing  from C3 to C7 that are unchanged. There is a postoperative fluid  collection in the posterior subcutaneous fat of the neck and upper  chest. The fluid collection tracks up to 15 cm in cranial caudal  dimension and maximally measures 5 x 4 cm in medial lateral and anterior  posterior dimension at the C7-T1 and T1 thoracic level. It channels down  to the posterior dura at the C7-T1 level, and may be postoperative  seroma or hematoma although I cannot exclude an infected fluid  collection.     The cervical and thoracic cord is normal in contour and signal  intensity. The conus terminates at the L1 lumbar level which is normal.  Within the thoracic spine, there is some left paracentral spurring and  bulging disc material that indents the ventral thecal sac and abuts the  left ventral surface of the cord mildly narrowing the left side of the  canal at T6-7. There is some left paracentral spurring and bulging disc  material that abuts the left ventral surface of the cord minimally  narrowing the canal at T7-8. Otherwise, I see no canal or foraminal  narrowing from T3 down to L1.       Impression:      1. This patient has had extensive remote prior cervical spine surgery  back in 2016 during which there was performance of corpectomies at C4,  C5 and C6 and placement of a cylindrical graft from the inferior  endplate of C4 to the superior body of C7, and anterior plate and screw  fixation from C3 to C7. On 02/25/2025 this patient underwent bilateral  laminectomies at C7 and medial facetectomies at C7-T1.  There was  placement of bilateral posterior element rods from C3 to T3 anchored by  bilateral articular pillar screws at C3, C4, C5 and C6 and bilateral  pedicle screws at T1, T2 and T3. There is postoperative fluid in the  posterior subcutaneous fat of the neck and upper thoracic region that  tracks up to 15 cm in cranial caudal  dimension and 5 x 4 mm in medial  lateral and anterior posterior dimension and it channels down to the  posterior margin of the posterior dura at the C7-T1 level. This may be  postoperative seroma hematoma although I cannot exclude an infected  fluid collection and this needs to be correlated clinically.     2. I see no residual canal narrowing from the skull base down to the T3  thoracic level. Since prior surgery, there has been some improvement in  the foraminal narrowing at C7-T1 but there remains mild-to-moderate  bilateral foraminal narrowing at C7-T1. There is also some chronic  residual multilevel foraminal narrowing from C3 to C7, unchanged.     3. Within the thoracic spine there is left paracentral disc osteophyte  complex that abuts the left ventral surface of the cord mildly narrowing  the left side of the canal at T6-7 and left paracentral spurring  minimally narrowing the left side of the canal at T7-8, otherwise I see  no canal or foraminal narrowing from T3 to L1.             XR Spine Cervical 2 or 3 View [[id number]] Collected: 03/10/25 0723     Updated: 03/10/25 0743    Narrative:      XR SPINE CERVICAL 2 OR 3 VW-, XR SPINE THORACIC 2 VW-     CLINICAL INFORMATION: Pain, recent surgery.     THORACOLUMBAR SPINE FINDINGS: There are pedicle screws and vertical  fixation rods spanning C3 through T3. No screw loosening nor  malalignment. There is an anterior plate and screw fixator C3 through  C7, transverse screws within C3 and C7. Corpectomy C4 through C6. One of  the C3 screws is broken in the head is backed out of position  approximately 3 to 4 mm. No alteration in the position of the plate has  occurred. This does not represent a new finding. The odontoid is  preserved with a satisfactory C1-2 alignment and the prevertebral soft  tissues have a satisfactory appearance.     No thoracic compression deformity or subluxation is demonstrated. There  is mild multilevel mid and lower thoracic disc space  narrowing. The  paravertebral soft tissues have a satisfactory appearance.     CONCLUSION: Imaging of the cervical and thoracic spine is quite similar  to 02/26/2025.     This report was finalized on 3/10/2025 7:40 AM by Dr. Con Glynn M.D  on Workstation: [id number]       XR Spine Thoracic 2 View [[id number]] Collected: 03/10/25 0723     Updated: 03/10/25 0743    Narrative:      XR SPINE CERVICAL 2 OR 3 VW-, XR SPINE THORACIC 2 VW-     CLINICAL INFORMATION: Pain, recent surgery.     THORACOLUMBAR SPINE FINDINGS: There are pedicle screws and vertical  fixation rods spanning C3 through T3. No screw loosening nor  malalignment. There is an anterior plate and screw fixator C3 through  C7, transverse screws within C3 and C7. Corpectomy C4 through C6. One of  the C3 screws is broken in the head is backed out of position  approximately 3 to 4 mm. No alteration in the position of the plate has  occurred. This does not represent a new finding. The odontoid is  preserved with a satisfactory C1-2 alignment and the prevertebral soft  tissues have a satisfactory appearance.     No thoracic compression deformity or subluxation is demonstrated. There  is mild multilevel mid and lower thoracic disc space narrowing. The  paravertebral soft tissues have a satisfactory appearance.     CONCLUSION: Imaging of the cervical and thoracic spine is quite similar  to 02/26/2025.     This report was finalized on 3/10/2025 7:40 AM by Dr. Con Glynn M.D  on Workstation: [id number]               I have reviewed the medications.     Discharge Medications        ASK your doctor about these medications        Instructions Start Date   aspirin 81 MG chewable tablet   81 mg, Daily      baclofen 20 MG tablet  Commonly known as: LIORESAL   1 tablet, 2 Times Daily      busPIRone 15 MG tablet  Commonly known as: BUSPAR   Take 1 tablet by mouth 2 (Two) Times a Day. Indications: Anxiety Disorder      cephalexin 500 MG capsule  Commonly known as:  KEFLEX   Take 1 capsule by mouth Every 6 (Six) Hours for 28 doses.      cetirizine 10 MG tablet  Commonly known as: zyrTEC   10 mg, Daily      cholecalciferol 25 MCG (1000 UT) tablet  Commonly known as: VITAMIN D3   1,000 Units, Daily      clonazePAM 0.5 MG tablet  Commonly known as: KlonoPIN   Take 1 tablet by mouth 3 times a day for 5 days. Indications: Panic Disorder      Diclofenac Sodium 1 % gel gel  Commonly known as: VOLTAREN   4 g, As Needed      docusate sodium 100 MG capsule   100 mg, Oral, Daily      escitalopram 10 MG tablet  Commonly known as: LEXAPRO   Take 1 tablet by mouth Every Night. Indications: Panic Disorder      FeroSul 325 (65 Fe) MG tablet  Generic drug: ferrous sulfate   325 mg, Daily With Breakfast      gabapentin 800 MG tablet  Commonly known as: NEURONTIN   800 mg, 3 Times Daily      HYDROcodone-acetaminophen 7.5-325 MG per tablet  Commonly known as: NORCO   1 tablet, Oral, Every 4 Hours PRN      lidocaine 5 %  Commonly known as: LIDODERM   1 patch, Transdermal, Every 24 Hours, Remove & Discard patch within 12 hours or as directed by MD      mometasone 0.1 % cream  Commonly known as: ELOCON   As Needed      naloxone 4 MG/0.1ML nasal spray  Commonly known as: NARCAN   Call 911. Don't prime. [place] in 1 nostril for overdose. Repeat in 2-3 minutes in other nostril if no or minimal breathing/responsiveness.      NON FORMULARY   Nightly      pantoprazole 40 MG EC tablet  Commonly known as: PROTONIX   40 mg, 2 Times Daily      polyethylene glycol 17 GM/SCOOP powder  Commonly known as: MIRALAX   Dissolve 17 g (1 capful) in liquid and drink by mouth 2 (Two) Times a Day As Needed for constipation      Potassium 75 MG tablet   As Needed      Senexon-S 8.6-50 MG per tablet  Generic drug: sennosides-docusate   2 tablets, Oral, Nightly      Unable to find   1 each, Once, INHALER NEW INSTRUCTED PT TO BRING WITH HIM      Ventolin  (90 Base) MCG/ACT inhaler  Generic drug: albuterol sulfate HFA   2  puffs, Every 4 Hours PRN              ---------------------------------------------------------------------------------------------  Assessment & Plan   Assessment/Problem List    Acute on chronic back pain    Cervical disc disorder with radiculopathy of mid-cervical region    Cervical stenosis of spine      Plan:    Acute on chronic back pain  Cervical stenosis of the spine  -Status post C7-T1 decompression and C3-7, T1-3 fusion with Dr. Collado 2/25/2025  -Plain films of the cervical and thoracic spine pending  -MRI of the cervical and thoracic spine ordered  -Neurosurgery consulted  -Analgesics as needed  -Continue gabapentin  -N.p.o.     COPD  -Albuterol as needed     GERD  -PPI     Anxiety  -No acute issues  -Continue home medications once reconciled        VTE Prophylaxis:  Mechanical VTE prophylaxis orders are present.        Disposition: Pending    Follow-up after Discharge: Pending    This note will serve as a progress note    Blaise Mcbride III, PA 03/10/25 16:07 EDT    I have worn appropriate PPE during this patient encounter, sanitized my hands both with entering and exiting patient's room.      51 minutes has been spent by [facility] providers in the care of this patient while under observation status

## 2025-03-10 NOTE — NURSING NOTE
"Gabapentin 800 too early to administer as prior dose was given late due to MRI. At due time of 1400 med was pulled and not given; documented on MAR. [facility] did not give option to return med as pulled, had to \"waste\" per \"stocked meds\" under patient profile. Pharmacist Guillermo aware of situation and directed us to waste med as well. Wasted 800 mg and witnessed per Sunshine RN and RAN Ye.   "

## 2025-03-11 ENCOUNTER — APPOINTMENT (OUTPATIENT)
Dept: GENERAL RADIOLOGY | Facility: HOSPITAL | Age: 55
End: 2025-03-11
Payer: COMMERCIAL

## 2025-03-11 PROBLEM — J44.9 COPD (CHRONIC OBSTRUCTIVE PULMONARY DISEASE): Status: ACTIVE | Noted: 2025-03-11

## 2025-03-11 PROCEDURE — G0378 HOSPITAL OBSERVATION PER HR: HCPCS

## 2025-03-11 PROCEDURE — 96376 TX/PRO/DX INJ SAME DRUG ADON: CPT

## 2025-03-11 PROCEDURE — 99024 POSTOP FOLLOW-UP VISIT: CPT

## 2025-03-11 PROCEDURE — 71045 X-RAY EXAM CHEST 1 VIEW: CPT

## 2025-03-11 PROCEDURE — 25010000002 HYDROMORPHONE PER 4 MG: Performed by: STUDENT IN AN ORGANIZED HEALTH CARE EDUCATION/TRAINING PROGRAM

## 2025-03-11 RX ORDER — HYDROMORPHONE HYDROCHLORIDE 1 MG/ML
0.5 INJECTION, SOLUTION INTRAMUSCULAR; INTRAVENOUS; SUBCUTANEOUS
Status: DISCONTINUED | OUTPATIENT
Start: 2025-03-11 | End: 2025-03-12

## 2025-03-11 RX ORDER — OXYCODONE AND ACETAMINOPHEN 7.5; 325 MG/1; MG/1
1 TABLET ORAL EVERY 4 HOURS PRN
Refills: 0 | Status: DISCONTINUED | OUTPATIENT
Start: 2025-03-11 | End: 2025-03-12 | Stop reason: HOSPADM

## 2025-03-11 RX ORDER — NALOXONE HCL 0.4 MG/ML
0.4 VIAL (ML) INJECTION
Status: DISCONTINUED | OUTPATIENT
Start: 2025-03-11 | End: 2025-03-12

## 2025-03-11 RX ORDER — FENTANYL 25 UG/1
1 PATCH TRANSDERMAL
Refills: 0 | Status: DISCONTINUED | OUTPATIENT
Start: 2025-03-11 | End: 2025-03-12 | Stop reason: HOSPADM

## 2025-03-11 RX ORDER — IPRATROPIUM BROMIDE AND ALBUTEROL SULFATE 2.5; .5 MG/3ML; MG/3ML
3 SOLUTION RESPIRATORY (INHALATION) EVERY 6 HOURS PRN
Status: DISCONTINUED | OUTPATIENT
Start: 2025-03-11 | End: 2025-03-12 | Stop reason: HOSPADM

## 2025-03-11 RX ADMIN — Medication 10 ML: at 08:02

## 2025-03-11 RX ADMIN — FENTANYL TRANSDERMAL 1 PATCH: 25 PATCH, EXTENDED RELEASE TRANSDERMAL at 18:29

## 2025-03-11 RX ADMIN — Medication 1000 UNITS: at 08:04

## 2025-03-11 RX ADMIN — GABAPENTIN 800 MG: 400 CAPSULE ORAL at 06:08

## 2025-03-11 RX ADMIN — PANTOPRAZOLE SODIUM 40 MG: 40 TABLET, DELAYED RELEASE ORAL at 08:05

## 2025-03-11 RX ADMIN — LIDOCAINE PAIN RELIEF 1 PATCH: 560 PATCH TOPICAL at 08:05

## 2025-03-11 RX ADMIN — PANTOPRAZOLE SODIUM 40 MG: 40 TABLET, DELAYED RELEASE ORAL at 21:36

## 2025-03-11 RX ADMIN — HYDROMORPHONE HYDROCHLORIDE 0.5 MG: 1 INJECTION, SOLUTION INTRAMUSCULAR; INTRAVENOUS; SUBCUTANEOUS at 03:04

## 2025-03-11 RX ADMIN — BUSPIRONE HYDROCHLORIDE 15 MG: 15 TABLET ORAL at 21:36

## 2025-03-11 RX ADMIN — OXYCODONE HYDROCHLORIDE AND ACETAMINOPHEN 1 TABLET: 7.5; 325 TABLET ORAL at 11:47

## 2025-03-11 RX ADMIN — GABAPENTIN 800 MG: 400 CAPSULE ORAL at 14:09

## 2025-03-11 RX ADMIN — GABAPENTIN 800 MG: 400 CAPSULE ORAL at 21:36

## 2025-03-11 RX ADMIN — OXYCODONE HYDROCHLORIDE AND ACETAMINOPHEN 1 TABLET: 7.5; 325 TABLET ORAL at 23:05

## 2025-03-11 RX ADMIN — HYDROMORPHONE HYDROCHLORIDE 0.5 MG: 1 INJECTION, SOLUTION INTRAMUSCULAR; INTRAVENOUS; SUBCUTANEOUS at 08:02

## 2025-03-11 RX ADMIN — CETIRIZINE HYDROCHLORIDE 10 MG: 10 TABLET, FILM COATED ORAL at 08:05

## 2025-03-11 RX ADMIN — DIAZEPAM 5 MG: 5 TABLET ORAL at 08:05

## 2025-03-11 RX ADMIN — OXYCODONE HYDROCHLORIDE AND ACETAMINOPHEN 1 TABLET: 7.5; 325 TABLET ORAL at 18:29

## 2025-03-11 RX ADMIN — ESCITALOPRAM 10 MG: 5 TABLET, FILM COATED ORAL at 21:36

## 2025-03-11 RX ADMIN — FERROUS SULFATE TAB 325 MG (65 MG ELEMENTAL FE) 325 MG: 325 (65 FE) TAB at 08:05

## 2025-03-11 RX ADMIN — BUSPIRONE HYDROCHLORIDE 15 MG: 15 TABLET ORAL at 08:04

## 2025-03-11 NOTE — PAYOR COMM NOTE
"Ricardo Chen (54 y.o. Male)     PLEASE SEE ATTACHED FOR INPT AUTH     PT WAS OBS ON 3/10  CHANGED TO INPT ON 3/11      REF # PT ID     [id number]       PLEASE CALL ARELY MAGUIRE RN/ DEPT @ [phone]  OR FAX  DEPARTMENT @  [phone]    THANK YOU   ARELY MAGUIRE RN  [facility]          Date of Birth   1970    Social Security Number       Address   [address] [place] [address]    Home Phone   [phone]    MRN   [id number]       Congregation   None    Marital Status                               Admission Date   3/10/2025    Admission Type   Emergency    Admitting Provider   Helder Blackburn MD    Attending Provider   Helder Blackburn MD    Department, Room/Bed   [facility] OBSERVATION, 130/1       Discharge Date       Discharge Disposition       Discharge Destination                                 Attending Provider: Helder Blackburn MD    Allergies: Bacitracin-polymyxin B, Chlorhexidine, Zjrnw-axxla-ldunldv-pramoxine    Isolation: None   Infection: None   Code Status: CPR    Ht: 175.3 cm (69\")   Wt: 95.5 kg (210 lb 8 oz)    Admission Cmt: None   Principal Problem: Acute on chronic back pain [M54.9,G89.29]                   Active Insurance as of 3/10/2025       Primary Coverage       Payor Plan Insurance Group Employer/Plan Group    [facility] BY CEZAR [facility] BY CEZAR [id number]       Payor Plan Address Payor Plan Phone Number Payor Plan Fax Number Effective Dates    [address] [address]   1/1/2021 - None Entered    [facility] [id number]         Subscriber Name Subscriber Birth Date Member ID       RICARDO CHEN 1970 [id number]                     Emergency Contacts        (Rel.) Home Phone Work Phone Mobile Phone    Monika Chen (Spouse) [phone] -- [phone]              [employer]: NPI [id number]  Tax ID [id number]     History & Physical        Mitra Beaulieu PA-C at 03/10/25 " 0535       Attestation signed by Shekhar Ramirez MD at 03/10/25 0922        SHARED APC FACE TO FACE: I performed a substantive part of the MDM during the patient's E/M visit. I personally evaluated and examined the patient. I personally made or approved the documented management plan and acknowledge its risk of complications.   Shekhar Ramirez MD 3/10/2025 09:22 EDT                          [facility]   HISTORY AND PHYSICAL    Patient Name: Rafael Kat  : 1970  MRN: [id number]  Primary Care Physician:  Provider, No Known  Date of admission: 3/10/2025    Subjective  Subjective     Chief Complaint:   Chief Complaint   Patient presents with    Back Pain         HPI:    Rafael Kat is a 54 y.o. male with a past medical history including but not limited to COPD, GERD, anxiety, and spinal stenosis of the cervicothoracic region who recently underwent posterior C7-T1 decompression, posterior C3-7, T1-3 fusion on  with Dr. Collado who presents to [facility] ER with worsening back pain.  Patient states he was discharged about 6 days ago.  He states Saturday morning he was trying to lift himself up off the couch and felt a pop in his back.  He said since then he has had difficulty controlling his pain at home.  He states that he used a heating pad and some ice packs and was able to fall asleep last night but woke up this morning around 1 AM with pain significantly worsened.  He states it feels like a hot knife in his back that radiates around both sides at the mid thoracic.  Does report he noted some weakness in his arms as well as some spasming and having issues dropping things.  He denies fevers.  Denies lightheadedness and dizziness.  Denies abdominal pain and nausea.    Review of Systems   All systems were reviewed and negative except for: what is mentioned above in the HPI.     Personal History     Past Medical History:   Diagnosis Date    Anesthesia complication     PT  STATES HE IS SLOW TO WAKE UP    Arthritis     COPD (chronic obstructive pulmonary disease)     STATES WHEEZES AT NIGHT AND DOES USE NIGHTLY INHALER    DDD (degenerative disc disease), cervical     DDD (degenerative disc disease), lumbar     PAIN IN RIGHT BUTTOCK AND DOWN AT TIMES RIGHT LEG, WEAKNESS RIGHT LEG AT TIMES    Dysphagia     STARTED AFTER HIS CERVICAL NECK SURGERY    GERD (gastroesophageal reflux disease)     History of COVID-19     Neck pain     Psoriasis     Shoulder pain, bilateral     Skin cancer     BASAL/SQUAMOUS       Past Surgical History:   Procedure Laterality Date    ANTERIOR CHANNEL VERTEBRECTOMY/CORPECTOMY Bilateral 03/29/2016    Procedure: C4-6 ANTERIOR CHANNEL VERTEBRECTOMY/CORPECTOMY WITH INSTRUMENTATION;  Surgeon: Bert Collado MD;  Location: [facility] OR;  Service:     CERVICAL DISCECTOMY POSTERIOR FUSION WITH BRAIN LAB N/A 2/25/2025    Procedure: Posterior cervical seven/thoracic one decompression, posterior cervical three/four/five/six/seven, thoracic one/two/three fusion with screws/rods/crosslinks with Stealth spinal navigation;  Surgeon: Bert Collado MD;  Location: [facility] MAIN OR;  Service: Neurosurgery;  Laterality: N/A;    COLONOSCOPY      COLONOSCOPY N/A 2/14/2025    Procedure: COLONOSCOPY into cecum with hot snare polypectomy, Resolutions clips x2, Tattoo of transverse polyp area;  Surgeon: Gonzalo Rodriguez MD;  Location: [facility] ENDOSCOPY;  Service: Gastroenterology;  Laterality: N/A;  Pre: Screening  Post: Polyp, Suboptimal prep    ELBOW PROCEDURE Right     UNCLEAR:  RIGHT ELBOW AREA    ENDOSCOPY N/A 2/14/2025    Procedure: ESOPHAGOGASTRODUODENOSCOPY with biopsies;  Surgeon: Gonzalo Rodriguez MD;  Location: [facility] ENDOSCOPY;  Service: Gastroenterology;  Laterality: N/A;  Pre: GERD, Dysphagia  Post: Gastritis and Esophagitis    FINGER SURGERY Left     INDEX FINGER    LUMBAR EPIDURAL INJECTION      STATES X3    LUMBAR LAMINECTOMY DISCECTOMY DECOMPRESSION  Bilateral 11/28/2023    Procedure: Open bilateral lumbar decompression lumbar one/two, lumbar two/three, lumbar three/four;  Surgeon: Bert Collado MD;  Location: [facility];  Service: Neurosurgery;  Laterality: Bilateral;    MENISCECTOMY Right     UPPER GASTROINTESTINAL ENDOSCOPY  03/2021       Family History: family history is not on file. Otherwise pertinent FHx was reviewed and not pertinent to current issue.    Social History:  reports that he quit smoking about 15 years ago. His smoking use included cigarettes. He started smoking about 35 years ago. He has a 40 pack-year smoking history. He has been exposed to tobacco smoke. He has never used smokeless tobacco. He reports current drug use. Frequency: 14.00 times per week. Drug: Marijuana. He reports that he does not drink alcohol.    Home Medications:  Diclofenac Sodium, NON FORMULARY, Potassium, Unable to find, albuterol sulfate HFA, baclofen, busPIRone, cephalexin, cetirizine, cholecalciferol, clonazePAM, docusate sodium, escitalopram, ferrous sulfate, gabapentin, lidocaine, mometasone, naloxone, pantoprazole, polyethylene glycol, and sennosides-docusate    Allergies:  Allergies   Allergen Reactions    Bacitracin-Polymyxin B Other (See Comments) and Rash    Chlorhexidine Rash    Bybkh-Lzqjs-Hxdbpfn-Pramoxine Rash       Objective  Objective     Vitals:   Temp:  [98.3 °F (36.8 °C)] 98.3 °F (36.8 °C)  Heart Rate:  [101] 101  Resp:  [18] 18  BP: (126)/(93) 126/93  Physical Exam    Constitutional: 54-year-old male in no acute distress on room air   Eyes: PERRLA, sclerae anicteric, no conjunctival injection   HENT: NCAT, mucous membranes moist   Neck: Supple, no thyromegaly, no lymphadenopathy, trachea midline   Respiratory: Clear to auscultation bilaterally, nonlabored respirations    Cardiovascular: RRR, no murmurs, rubs, or gallops, palpable pedal pulses bilaterally   Gastrointestinal: Positive bowel sounds, soft, nontender,  nondistended   Musculoskeletal: No bilateral ankle edema, no clubbing or cyanosis to extremities, surgical incision appears intact the cervix to the mid thoracic with no bleeding or oozing, no surrounding erythema.  There is point tenderness of the mid thoracic spine, there is also diffuse tenderness to the left more than the right   Psychiatric: Appropriate affect, cooperative   Neurologic: Oriented x 3, strength symmetric in all extremities, Cranial Nerves grossly intact to confrontation, speech clear   Skin: No rashes     Result Review   Result Review:  I have personally reviewed the results from the time of this admission to 3/10/2025 05:43 EDT and agree with these findings:  [x]  Laboratory list / accordion  []  Microbiology  []  Radiology  []  EKG/Telemetry   []  Cardiology/Vascular   []  Pathology  [x]  Old records  []  Other:  Most notable findings include:       Assessment & Plan  Assessment / Plan     Brief Patient Summary:  Rafael Kat is a 54 y.o. male who is being admitted to the observation unit for worsening back pain status post C7-T1 decompression and C3-C7, T1-T3 fusion on February 25, 2025 with Dr. Collado.  Patient was discharged home on 3/4/2025 and had been doing well up until 2 days ago when acute onset of worsening pain happened while he was adjusting posture while sitting on the couch.  In the ER, labs noted glucose at 120, hemoglobin appears stable at 11.9 otherwise fairly unremarkable.  ED spoke with neurosurgery on-call who recommended further evaluation with plain films and MRI, they will follow in consultation    Active Hospital Problems:  Active Hospital Problems    Diagnosis     **Acute on chronic back pain     Cervical stenosis of spine     Cervical disc disorder with radiculopathy of mid-cervical region      Plan:     Acute on chronic back pain  Cervical stenosis of the spine  -Status post C7-T1 decompression and C3-7, T1-3 fusion with Dr. Collado 2/25/2025  -Plain films  of the cervical and thoracic spine pending  -MRI of the cervical and thoracic spine ordered  -Neurosurgery consulted  -Analgesics as needed  -Continue gabapentin  -N.p.o.    COPD  -Albuterol as needed    GERD  -PPI    Anxiety  -No acute issues  -Continue home medications once reconciled      VTE Prophylaxis:  Mechanical VTE prophylaxis orders are present.        CODE STATUS:    Code Status (Patient has no pulse and is not breathing): CPR (Attempt to Resuscitate)  Medical Interventions (Patient has pulse or is breathing): Full Support  Level Of Support Discussed With: Patient    Admission Status:  I believe this patient meets observation status.    75 minutes have been spent by [facility] providers in the care of this patient while under observation status.      Appropriate PPE worn during patient encounter.  Hand hygeine performed before and after seeing the patient.      Electronically signed by Mitra Beaulieu PA-C, 03/10/25, 5:35 AM EDT.             Electronically signed by Shekhar Ramirez MD at 03/10/25 0934          Emergency Department Notes        Caitlin Hernandez MD at 03/10/25 6660            I have personally performed a face-to-face diagnostic evaluation of the patient.  I have reviewed and agree with the care plan as outlined by NP/PA.  My findings are as follows:    HPI:  Patient is a 54 y.o. male who presents with complaints of worsening upper back pain.  Patient had a cervical decompression and fusion on 2/25.  He initially had been doing well after surgery.  Today, went to get up from standing by pushing himself off of the chair and had sharp pain in his upper back.  He feels like he has had some weakness in his hands, right greater than left and feels like he has been clumsy.  He has not noticed any proximal numbness, weakness or tingling.  No difficulty ambulating.  Has not had any bladder or bowel dysfunction.      PE:  Physical Exam  Constitutional:        Appearance: He is not ill-appearing.   Eyes:      Conjunctiva/sclera: Conjunctivae normal.   Neck:      Comments: Posterior incision without any signs of surrounding infection  Pulmonary:      Effort: Pulmonary effort is normal. No respiratory distress.   Musculoskeletal:      Comments: Normal sensation and strength in the bilateral upper and lower extremities  Groin: No saddle anesthesia  Bilateral lower extremities:  Negative straight leg raise.  5 out 5 strength.  Sensation intact to light touch.  2+ reflexes. No clonus.  Negative Babinski sign. 2+ DP and PT pulses, warm and well perfused     Neurological:      Mental Status: He is alert and oriented to person, place, and time.           MDM:  I provided a substantiate portion of the care of this patient. I personally performed the medical decision making.    Medical records are [facility] Everywhere, if applicable      Recent Results (from the past 24 hours)   Comprehensive Metabolic Panel    Collection Time: 03/10/25  4:51 AM    Specimen: Blood   Result Value Ref Range    Glucose 120 (H) 65 - 99 mg/dL    BUN 16 6 - 20 mg/dL    Creatinine 0.98 0.76 - 1.27 mg/dL    Sodium 141 136 - 145 mmol/L    Potassium 3.7 3.5 - 5.2 mmol/L    Chloride 107 98 - 107 mmol/L    CO2 23.2 22.0 - 29.0 mmol/L    Calcium 8.6 8.6 - 10.5 mg/dL    Total Protein 6.2 6.0 - 8.5 g/dL    Albumin 3.3 (L) 3.5 - 5.2 g/dL    ALT (SGPT) 22 1 - 41 U/L    AST (SGOT) 21 1 - 40 U/L    Alkaline Phosphatase 83 39 - 117 U/L    Total Bilirubin <0.2 0.0 - 1.2 mg/dL    Globulin 2.9 gm/dL    A/G Ratio 1.1 g/dL    BUN/Creatinine Ratio 16.3 7.0 - 25.0    Anion Gap 10.8 5.0 - 15.0 mmol/L    eGFR 90.5 >60.0 mL/min/1.73   CBC Auto Differential    Collection Time: 03/10/25  4:51 AM    Specimen: Blood   Result Value Ref Range    WBC 9.34 3.40 - 10.80 10*3/mm3    RBC 3.96 (L) 4.14 - 5.80 10*6/mm3    Hemoglobin 11.9 (L) 13.0 - 17.7 g/dL    Hematocrit 36.6 (L) 37.5 - 51.0 %    MCV 92.4 79.0 - 97.0 fL     MCH 30.1 26.6 - 33.0 pg    MCHC 32.5 31.5 - 35.7 g/dL    RDW 13.6 12.3 - 15.4 %    RDW-SD 46.4 37.0 - 54.0 fl    MPV 10.3 6.0 - 12.0 fL    Platelets 424 140 - 450 10*3/mm3    Neutrophil % 65.0 42.7 - 76.0 %    Lymphocyte % 23.6 19.6 - 45.3 %    Monocyte % 6.6 5.0 - 12.0 %    Eosinophil % 2.7 0.3 - 6.2 %    Basophil % 0.4 0.0 - 1.5 %    Immature Grans % 1.7 (H) 0.0 - 0.5 %    Neutrophils, Absolute 6.07 1.70 - 7.00 10*3/mm3    Lymphocytes, Absolute 2.20 0.70 - 3.10 10*3/mm3    Monocytes, Absolute 0.62 0.10 - 0.90 10*3/mm3    Eosinophils, Absolute 0.25 0.00 - 0.40 10*3/mm3    Basophils, Absolute 0.04 0.00 - 0.20 10*3/mm3    Immature Grans, Absolute 0.16 (H) 0.00 - 0.05 10*3/mm3    nRBC 0.0 0.0 - 0.2 /100 WBC     I have reviewed the above labs    This is an overall well-appearing 54-year-old patient who is presenting today secondary to worsening postoperative upper back pain.  He looks well.  He presents afebrile with unremarkable vital signs.  No clinical signs of infection on exam.  Patient was discussed with neurosurgery team, who recommended admission to the observation unit for MRI.  Will obtain x-rays in the meantime.  He was evaluated with labs, which are reviewed and which are reviewed and overall benign.      Impression:  Final diagnoses:   Weakness of upper extremity   Acute bilateral thoracic back pain   Status post cervical spinal fusion         Disposition:  ED Disposition       ED Disposition   Decision to Admit    Condition   --    Comment   --                Caitlin Hernandez MD  03/10/25 4721      Electronically signed by Caitlin Hernandez MD at 03/10/25 3173       Holly Graham PA-C at 03/10/25 6565       Attestation signed by Caitlin Hernandez MD at 03/10/25 6583        SHARED APC FACE TO FACE: I performed a substantive part of the MDM during the patient's E/M visit. I personally evaluated and examined the patient. I personally made or approved the documented management plan and acknowledge its  risk of complications.   Caitlin Hernandez MD 3/10/2025 05:35 EDT                          EMERGENCY DEPARTMENT ENCOUNTER  Room Number:  06/06  PCP: Provider, No Known  Independent Historians: Patient and Family      HPI:  Chief Complaint: had concerns including Back Pain.     A complete HPI/ROS/PMH/PSH/SH/FH are unobtainable due to: None    Chronic or social conditions impacting patient care (Social Determinants of Health): None      Context: Rafael Kat is a 54 y.o. male with a medical history of cervical degeneration, muscle spasm, GERD, COPD, psoriasis who presents to the ED c/o acute upper back pain.  Patient reports he underwent cervical decompression and cervical fusion on 2/25.  This was done by Dr. Collado.  Reports he has been doing well after the surgery.  He went to stand up out of a chair yesterday and placed his arms down next to him.  He went to push off to get up and felt a sharp pain in his upper thoracic spine.  Since that time he has had significantly worsened pain.  States he has had weakness in the right upper arm especially and has been dropping things.  His wife called the neurosurgery answering service and was told to come to the emergency department.  No reported fever.  No bowel/bladder incontinence or saddle anesthesia.  Has been able to ambulate without difficulty.  Patient has no other systemic complaints at this time.      Review of prior external notes (non-ED) -and- Review of prior external test results outside of this encounter:  Patient seen in office by gastroenterology on 2/3/2025 for oropharyngeal dysphagia.  Reviewed assessment and plan.  Will schedule patient for EGD and colonoscopy.  Reviewed labs collected on 3/1/2025.  CBC with hemoglobin 10.3, BMP with creatinine 1.01.    Prescription drug monitoring program review:     N/A    PAST MEDICAL HISTORY  Active Ambulatory Problems     Diagnosis Date Noted    Degeneration of intervertebral disc of mid-cervical region  07/22/2016    Chronic pain 07/26/2016    Cervical disc disorder with radiculopathy of mid-cervical region 01/24/2017    DDD (degenerative disc disease), lumbar 01/24/2017    Other cervical disc degeneration, high cervical region 03/01/2017    Neck pain 03/01/2017    Pain in thoracic spine 05/15/2019    Muscle spasm of right shoulder 05/15/2019    History of fusion of cervical spine 05/15/2019    Spinal stenosis of lumbar region with neurogenic claudication 03/23/2023    Tear of right acetabular labrum 03/23/2023    Spinal instability of lumbar region 11/28/2023    Spondylolisthesis at L3-L4 level 12/12/2023    Muscle ache 01/22/2024    Foraminal stenosis of cervical region 08/29/2024    Cervical radiculopathy at C7 08/29/2024    Bilateral arm pain 08/29/2024    Chronic obstructive pulmonary disease 12/23/2024    Dysphagia 12/23/2024    Spinal stenosis of cervicothoracic region 12/23/2024    Weakness of both hands 01/22/2025    Weakness of both arms 01/22/2025    History of lumbar surgery 01/22/2025    Gastroesophageal reflux disease 02/03/2025    Screening for colorectal cancer 02/03/2025    Cervical stenosis of spine 02/25/2025    Superficial thrombophlebitis of left upper extremity 03/04/2025     Resolved Ambulatory Problems     Diagnosis Date Noted    Cervical spinal stenosis 02/17/2016    Spinal stenosis in cervical region 03/16/2016    Cervical stenosis of spine 03/29/2016    Follow-up examination, following other surgery 04/13/2016    Cervical pain 07/26/2016     Past Medical History:   Diagnosis Date    Anesthesia complication     Arthritis     COPD (chronic obstructive pulmonary disease)     DDD (degenerative disc disease), cervical     GERD (gastroesophageal reflux disease)     History of COVID-19     Psoriasis     Shoulder pain, bilateral     Skin cancer          PAST SURGICAL HISTORY  Past Surgical History:   Procedure Laterality Date    ANTERIOR CHANNEL VERTEBRECTOMY/CORPECTOMY Bilateral 03/29/2016     Procedure: C4-6 ANTERIOR CHANNEL VERTEBRECTOMY/CORPECTOMY WITH INSTRUMENTATION;  Surgeon: Bert Collado MD;  Location: [facility] MAIN OR;  Service:     CERVICAL DISCECTOMY POSTERIOR FUSION WITH BRAIN LAB N/A 2/25/2025    Procedure: Posterior cervical seven/thoracic one decompression, posterior cervical three/four/five/six/seven, thoracic one/two/three fusion with screws/rods/crosslinks with Stealth spinal navigation;  Surgeon: Bert Collado MD;  Location[facility] MA[facility] OR;  Service: Neurosurgery;  Laterality: N/A;    COLONOSCOPY      COLONOSCOPY N/A 2/14/2025    Procedure: COLONOSCOPY into cecum with hot snare polypectomy, Resolutions clips x2, Tattoo of transverse polyp area;  Surgeon: Gonzalo Rodriguez MD;  Location[facility] ENDOSCOPY;  Service: Gastroenterology;  Laterality: N/A;  Pre: Screening  Post: Polyp, Suboptimal prep    ELBOW PROCEDURE Right     UNCLEAR:  RIGHT ELBOW AREA    ENDOSCOPY N/A 2/14/2025    Procedure: ESOPHAGOGASTRODUODENOSCOPY with biopsies;  Surgeon: Gonzalo Rodriguez MD;  [facility] END[facility]COPY;  Service: Gastroenterology;  Laterality: N/A;  Pre: GERD, Dysphagia  Post: Gastritis and Esophagitis    FINGER SURGERY Left     INDEX FINGER    LUMBAR EPIDURAL INJECTION      STATES X3    LUMBAR LAMINECTOMY DISCECTOMY DECOMPRESSION Bilateral 11/28/2023    Procedure: Open bilateral lumbar decompression lumbar one/two, lumbar two/three, lumbar three/four;  Surgeon: Bert Collado MD;  Location: [facility] MAIN OR;  Service: Neurosurgery;  Laterality: Bilateral;    MENISCECTOMY Right     UPPER GASTROINTESTINAL ENDOSCOPY  03/2021         FAMILY HISTORY  Family History   Problem Relation Age of Onset    Malig Hyperthermia Neg Hx          SOCIAL HISTORY  Social History     Socioeconomic History    Marital status:    Tobacco Use    Smoking status: Former     Current packs/day: 0.00     Average packs/day: 2.0 packs/day for 20.0 years (40.0 ttl pk-yrs)     Types: Cigarettes     Start  date: 1/1/1990     Quit date: 2010     Years since quitting: 15.1     Passive exposure: Past    Smokeless tobacco: Never   Vaping Use    Vaping status: Never Used   Substance and Sexual Activity    Alcohol use: No    Drug use: Yes     Frequency: 14.0 times per week     Types: Marijuana     Comment: 1-2 PER DAY,    Sexual activity: Defer         ALLERGIES  Bacitracin-polymyxin b, Chlorhexidine, and Zucyd-ljxdt-nvbipep-pramoxine      REVIEW OF SYSTEMS  Included in HPI  All systems reviewed and negative except for those discussed in HPI.      PHYSICAL EXAM    I have reviewed the triage vital signs and nursing notes.    ED Triage Vitals   Temp Heart Rate Resp BP SpO2   03/10/25 0342 03/10/25 0342 03/10/25 0342 03/10/25 0346 03/10/25 0342   98.3 °F (36.8 °C) 101 18 126/93 92 %      Temp src Heart Rate Source Patient Position BP Location FiO2 (%)   -- -- -- 03/10/25 0346 --      Right arm        Physical Exam  Constitutional:       General: He is not in acute distress.     Appearance: Normal appearance.   HENT:      Head: Normocephalic and atraumatic.      Nose: Nose normal.      Mouth/Throat:      Mouth: Mucous membranes are moist.   Eyes:      Conjunctiva/sclera: Conjunctivae normal.      Pupils: Pupils are equal, round, and reactive to light.   Cardiovascular:      Rate and Rhythm: Normal rate and regular rhythm.      Pulses: Normal pulses.      Heart sounds: Normal heart sounds.      Comments: Distal pulses intact  Pulmonary:      Effort: Pulmonary effort is normal.      Breath sounds: Normal breath sounds.   Abdominal:      General: There is no distension.   Musculoskeletal:         General: Normal range of motion.      Cervical back: Normal range of motion and neck supple.      Comments: There is well-appearing incision over the cervical and upper thoracic spine.  There are Steri-Strips intact over the upper thoracic spine.  No surrounding erythema, warmth, induration.  Patient has tenderness over the upper thoracic  spine bilaterally.   Skin:     General: Skin is warm.      Capillary Refill: Capillary refill takes less than 2 seconds.   Neurological:      General: No focal deficit present.      Mental Status: He is alert and oriented to person, place, and time.      Comments: SILT all 4 extremities.  Motor strength 5/5 all 4 extremities   Psychiatric:         Mood and Affect: Mood normal.             LAB RESULTS  Recent Results (from the past 24 hours)   Comprehensive Metabolic Panel    Collection Time: 03/10/25  4:51 AM    Specimen: Blood   Result Value Ref Range    Glucose 120 (H) 65 - 99 mg/dL    BUN 16 6 - 20 mg/dL    Creatinine 0.98 0.76 - 1.27 mg/dL    Sodium 141 136 - 145 mmol/L    Potassium 3.7 3.5 - 5.2 mmol/L    Chloride 107 98 - 107 mmol/L    CO2 23.2 22.0 - 29.0 mmol/L    Calcium 8.6 8.6 - 10.5 mg/dL    Total Protein 6.2 6.0 - 8.5 g/dL    Albumin 3.3 (L) 3.5 - 5.2 g/dL    ALT (SGPT) 22 1 - 41 U/L    AST (SGOT) 21 1 - 40 U/L    Alkaline Phosphatase 83 39 - 117 U/L    Total Bilirubin <0.2 0.0 - 1.2 mg/dL    Globulin 2.9 gm/dL    A/G Ratio 1.1 g/dL    BUN/Creatinine Ratio 16.3 7.0 - 25.0    Anion Gap 10.8 5.0 - 15.0 mmol/L    eGFR 90.5 >60.0 mL/min/1.73   CBC Auto Differential    Collection Time: 03/10/25  4:51 AM    Specimen: Blood   Result Value Ref Range    WBC 9.34 3.40 - 10.80 10*3/mm3    RBC 3.96 (L) 4.14 - 5.80 10*6/mm3    Hemoglobin 11.9 (L) 13.0 - 17.7 g/dL    Hematocrit 36.6 (L) 37.5 - 51.0 %    MCV 92.4 79.0 - 97.0 fL    MCH 30.1 26.6 - 33.0 pg    MCHC 32.5 31.5 - 35.7 g/dL    RDW 13.6 12.3 - 15.4 %    RDW-SD 46.4 37.0 - 54.0 fl    MPV 10.3 6.0 - 12.0 fL    Platelets 424 140 - 450 10*3/mm3    Neutrophil % 65.0 42.7 - 76.0 %    Lymphocyte % 23.6 19.6 - 45.3 %    Monocyte % 6.6 5.0 - 12.0 %    Eosinophil % 2.7 0.3 - 6.2 %    Basophil % 0.4 0.0 - 1.5 %    Immature Grans % 1.7 (H) 0.0 - 0.5 %    Neutrophils, Absolute 6.07 1.70 - 7.00 10*3/mm3    Lymphocytes, Absolute 2.20 0.70 - 3.10 10*3/mm3    Monocytes,  Absolute 0.62 0.10 - 0.90 10*3/mm3    Eosinophils, Absolute 0.25 0.00 - 0.40 10*3/mm3    Basophils, Absolute 0.04 0.00 - 0.20 10*3/mm3    Immature Grans, Absolute 0.16 (H) 0.00 - 0.05 10*3/mm3    nRBC 0.0 0.0 - 0.2 /100 WBC           MEDICATIONS GIVEN IN ER  Medications   sodium chloride 0.9 % flush 10 mL (has no administration in time range)   ondansetron (ZOFRAN) injection 4 mg (4 mg Intravenous Given 3/10/25 0510)   HYDROmorphone (DILAUDID) injection 0.5 mg (0.5 mg Intravenous Given 3/10/25 0510)         ORDERS PLACED DURING THIS VISIT:  Orders Placed This Encounter   Procedures    XR Spine Cervical 2 or 3 View    XR Spine Thoracic 2 View    Comprehensive Metabolic Panel    CBC Auto Differential    Neurosurgery (on-call MD unless specified)    Insert Peripheral IV    Initiate Emergency Department Observation Status    CBC & Differential         OUTPATIENT MEDICATION MANAGEMENT:  Current Facility-Administered Medications Ordered in Epic   Medication Dose Route Frequency Provider Last Rate Last Admin    sodium chloride 0.9 % flush 10 mL  10 mL Intravenous PRN Holly Graham PA-C         Current Outpatient Medications Ordered in Epic   Medication Sig Dispense Refill    baclofen (LIORESAL) 20 MG tablet Take 1 tablet by mouth 2 (Two) Times a Day.      busPIRone (BUSPAR) 15 MG tablet Take 1 tablet by mouth 2 (Two) Times a Day. Indications: Anxiety Disorder 20 tablet 0    cephalexin (KEFLEX) 500 MG capsule Take 1 capsule by mouth Every 6 (Six) Hours for 28 doses. 28 capsule 0    cetirizine (zyrTEC) 10 MG tablet Take 1 tablet by mouth Daily.      cholecalciferol (VITAMIN D3) 25 MCG (1000 UT) tablet Take 1 tablet by mouth Daily.      clonazePAM (KlonoPIN) 0.5 MG tablet Take 1 tablet by mouth 3 times a day for 5 days. Indications: Panic Disorder 15 tablet 0    Diclofenac Sodium (VOLTAREN) 1 % gel gel Apply 4 g topically to the appropriate area as directed As Needed.      docusate sodium 100 MG capsule Take 1 capsule by  mouth Daily.      escitalopram (LEXAPRO) 10 MG tablet Take 1 tablet by mouth Every Night. Indications: Panic Disorder 30 tablet 0    FeroSul 325 (65 Fe) MG tablet Take 1 tablet by mouth Daily With Breakfast. PT HAS NOT HAD FOR 4 DAYS      gabapentin (NEURONTIN) 800 MG tablet Take 1 tablet by mouth 3 (Three) Times a Day. TYPICALLY TAKES TWICE DAILY      lidocaine (LIDODERM) 5 % PLACE 1 PATCH ON THE SKIN AS DIRECTED BY PROVIDER DAILY. REMOVE & DISCARD PATCH WITHIN 12 HOURS OR AS DIRECTED BY MD 30 patch 2    mometasone (ELOCON) 0.1 % cream 1 Application As Needed.      naloxone (NARCAN) 4 MG/0.1ML nasal spray Call 911. Don't prime. Reading in 1 nostril for overdose. Repeat in 2-3 minutes in other nostril if no or minimal breathing/responsiveness. 2 each 0    NON FORMULARY Every Night. STATES DOES USE NIGHT INHALER:  INSTRUCTED TO BRING THIS DAY OF SURGEY      pantoprazole (PROTONIX) 40 MG EC tablet Take 1 tablet by mouth 2 (Two) Times a Day.  11    polyethylene glycol (MIRALAX) 17 GM/SCOOP powder Dissolve 17 g (1 capful) in liquid and drink by mouth 2 (Two) Times a Day As Needed for constipation 510 g 0    Potassium 75 MG tablet Take  by mouth As Needed. DURING THE SUMMER WHEN SWEATS MORE      sennosides-docusate (PERICOLACE) 8.6-50 MG per tablet Take 2 tablets by mouth Every Night. 20 tablet 0    Unable to find 1 each 1 (One) Time. INHALER NEW INSTRUCTED PT TO BRING WITH HIM      Ventolin  (90 Base) MCG/ACT inhaler Inhale 2 puffs Every 4 (Four) Hours As Needed.               PROGRESS, DATA ANALYSIS, CONSULTS, AND MEDICAL DECISION MAKING  All labs have been independently interpreted by me.  All radiology studies have been reviewed by me. All EKG's have been independently viewed and interpreted by me.  Discussion below represents my analysis of pertinent findings related to patient's condition, differential diagnosis, treatment plan and final disposition.    Differential diagnosis includes but is not limited to  position of cervical hardware, nerve compression, muscle strain.        ED Course as of 03/10/25 0524   Mon Mar 10, 2025   0424 Spoke with Dr. Winslow.  Discussed patient presentation and ED findings.  He recommends x-rays and placement in observation unit for MRI with and without contrast. [MP]   0500 WBC: 9.34 [MP]   0500 Hemoglobin(!): 11.9 [MP]   0512 I spoke with Joyce in the observation unit.  Discussed patient presentation and ED findings.  She agrees to admit patient to an ED observation bed. [MP]      ED Course User Index  [MP] Holly Graham PA-C             AS OF 05:24 EDT VITALS:    BP - 126/93  HR - 101  TEMP - 98.3 °F (36.8 °C)  O2 SATS - 92%    COMPLEXITY OF CARE  The patient requires admission.      DIAGNOSIS  Final diagnoses:   Weakness of upper extremity   Acute bilateral thoracic back pain   Status post cervical spinal fusion         DISPOSITION  ED Disposition       ED Disposition   Decision to Admit    Condition   --    Comment   --                Please note that portions of this document were completed with a voice recognition program.    Note Disclaimer: At [facility], we believe that sharing information builds trust and better relationships. You are receiving this note because you recently visited [facility]. It is possible you will see health information before a provider has talked with you about it. This kind of information can be easy to misunderstand. To help you fully understand what it means for your health, we urge you to discuss this note with your provider.     Holly Graham PA-C  03/10/25 0524      Electronically signed by Caitlin Hernandez MD at 03/10/25 0591       Current Facility-Administered Medications   Medication Dose Route Frequency Provider Last Rate Last Admin    acetaminophen (TYLENOL) tablet 650 mg  650 mg Oral Q4H PRN Shekhar Ramirez MD        Or    acetaminophen (TYLENOL) 160 MG/5ML oral solution 650 mg  650 mg Oral Q4H PRN Shekhar Ramirez MD        Or     acetaminophen (TYLENOL) suppository 650 mg  650 mg Rectal Q4H PRN Shekhar Ramirez MD        [Held by provider] aspirin chewable tablet 81 mg  81 mg Oral Daily Shekhar Ramirez MD        sennosides-docusate (PERICOLACE) 8.6-50 MG per tablet 2 tablet  2 tablet Oral BID PRN Shekhar Ramirez MD        And    polyethylene glycol (MIRALAX) packet 17 g  17 g Oral Daily PRN Shekhar Ramirez MD        And    bisacodyl (DULCOLAX) EC tablet 5 mg  5 mg Oral Daily PRN Shekhar Ramirez MD        And    bisacodyl (DULCOLAX) suppository 10 mg  10 mg Rectal Daily PRN Shekhar Ramirez MD        busPIRone (BUSPAR) tablet 15 mg  15 mg Oral BID Shekhar Ramirez MD   15 mg at 03/11/25 0804    cetirizine (zyrTEC) tablet 10 mg  10 mg Oral Daily Shekhar Ramirez MD   10 mg at 03/11/25 0805    fentaNYL (DURAGESIC) 25 MCG/HR patch 1 patch  1 patch Transdermal Q72H Bert Collado MD        And    [START ON 3/12/2025] Check Fentanyl Patch Placement  1 each Not Applicable Q12H Bert Collado MD        cholecalciferol (VITAMIN D3) tablet 1,000 Units  1,000 Units Oral Daily Shekhar Ramirez MD   1,000 Units at 03/11/25 0804    escitalopram (LEXAPRO) tablet 10 mg  10 mg Oral Nightly Shekhar Ramirez MD        ferrous sulfate tablet 325 mg  325 mg Oral Daily With Breakfast Shekhar Ramirez MD   325 mg at 03/11/25 0805    gabapentin (NEURONTIN) capsule 800 mg  800 mg Oral Q8H Shekhar Ramirez MD   800 mg at 03/11/25 1409    HYDROmorphone (DILAUDID) injection 0.5 mg  0.5 mg Intravenous Q2H PRN Helder Blackburn MD        And    naloxone (NARCAN) injection 0.4 mg  0.4 mg Intravenous Q5 Min PRN Helder Blackburn MD        ipratropium-albuterol (DUO-NEB) nebulizer solution 3 mL  3 mL Nebulization Q6H PRN Helder Blackburn MD        Lidocaine 4 % 1 patch  1 patch Transdermal Q24H Joe Lombardo APRN   1 patch at 03/11/25 0805    ondansetron ODT (ZOFRAN-ODT) disintegrating tablet 4 mg  4 mg Oral Q6H PRN Shekhar Ramirez MD        Or    ondansetron (ZOFRAN)  injection 4 mg  4 mg Intravenous Q6H PRN Shekhar Ramirez MD        oxyCODONE-acetaminophen (PERCOCET) 7.5-325 MG per tablet 1 tablet  1 tablet Oral Q4H PRN Helder Blackburn MD        pantoprazole (PROTONIX) EC tablet 40 mg  40 mg Oral BID Shekhar Ramirez MD   40 mg at 03/11/25 0805    sodium chloride 0.9 % flush 10 mL  10 mL Intravenous PRN Holly Graham PA-C        sodium chloride 0.9 % flush 10 mL  10 mL Intravenous Q12H Shekhar Ramirez MD   10 mL at 03/11/25 0802    sodium chloride 0.9 % flush 10 mL  10 mL Intravenous PRN Shekhar Ramirez MD        sodium chloride 0.9 % infusion 40 mL  40 mL Intravenous PRN Shekhar Ramirez MD            Physician Progress Notes (last 48 hours)        Joe Lombardo APRN at 03/11/25 0715          [facility] THORACIC/LUMBAR NEUROSURGERY POSTOP NOTE      CC: POD #14 s/p posterior C7-T1 decompression with posterior C3-T3 fusion      Subjective     Interval History: Patient reports no relief of back pain overnight.  Observation provider noted that he appeared to be rather sedated overnight.      Objective     Vital signs in last 24 hours:  Temp:  [97.5 °F (36.4 °C)-98.4 °F (36.9 °C)] 97.9 °F (36.6 °C)  Heart Rate:  [57-68] 68  Resp:  [17-20] 17  BP: (103-132)/(67-88) 132/88    Intake/Output this shift:  No intake/output data recorded.        LABS:  .  Results from last 7 days   Lab Units 03/10/25  0451   WBC 10*3/mm3 9.34   HEMOGLOBIN g/dL 11.9*   HEMATOCRIT % 36.6*   PLATELETS 10*3/mm3 424     .  Results from last 7 days   Lab Units 03/10/25  0451   SODIUM mmol/L 141   POTASSIUM mmol/L 3.7   CHLORIDE mmol/L 107   CO2 mmol/L 23.2   BUN mg/dL 16   CREATININE mg/dL 0.98   GLUCOSE mg/dL 120*   CALCIUM mg/dL 8.6         IMAGING STUDIES:  No new neuroimaging    I personally viewed and interpreted the patient's labs, medications and chart.    Meds reviewed/changed: yes    Current Facility-Administered Medications:     acetaminophen (TYLENOL) tablet 650 mg, 650 mg, Oral, Q4H PRN  **OR** acetaminophen (TYLENOL) 160 MG/5ML oral solution 650 mg, 650 mg, Oral, Q4H PRN **OR** acetaminophen (TYLENOL) suppository 650 mg, 650 mg, Rectal, Q4H PRN, Shekhar Ramirez MD    albuterol (PROVENTIL) nebulizer solution 0.083% 2.5 mg/3mL, 2.5 mg, Nebulization, Q6H PRN, Shekhar Ramirez MD    [Held by provider] aspirin chewable tablet 81 mg, 81 mg, Oral, Daily, Shekhar Ramirez MD    sennosides-docusate (PERICOLACE) 8.6-50 MG per tablet 2 tablet, 2 tablet, Oral, BID PRN **AND** polyethylene glycol (MIRALAX) packet 17 g, 17 g, Oral, Daily PRN **AND** bisacodyl (DULCOLAX) EC tablet 5 mg, 5 mg, Oral, Daily PRN **AND** bisacodyl (DULCOLAX) suppository 10 mg, 10 mg, Rectal, Daily PRN, Shehkar Ramirez MD    busPIRone (BUSPAR) tablet 15 mg, 15 mg, Oral, BID, Shekhar Ramirez MD, 15 mg at 03/11/25 0804    cetirizine (zyrTEC) tablet 10 mg, 10 mg, Oral, Daily, Shekhar Ramirez MD, 10 mg at 03/11/25 0805    cholecalciferol (VITAMIN D3) tablet 1,000 Units, 1,000 Units, Oral, Daily, Shekhar Ramirez MD, 1,000 Units at 03/11/25 0804    diazePAM (VALIUM) tablet 5 mg, 5 mg, Oral, TID, Joe Lombardo, APRN, 5 mg at 03/11/25 0805    escitalopram (LEXAPRO) tablet 10 mg, 10 mg, Oral, Nightly, Shekhar Ramirez MD    ferrous sulfate tablet 325 mg, 325 mg, Oral, Daily With Breakfast, Shekhar Ramirez MD, 325 mg at 03/11/25 0805    gabapentin (NEURONTIN) capsule 800 mg, 800 mg, Oral, Q8H, Shekhar Ramirez MD, 800 mg at 03/11/25 0608    HYDROmorphone (DILAUDID) injection 0.5 mg, 0.5 mg, Intravenous, Q3H PRN, 0.5 mg at 03/11/25 0802 **AND** naloxone (NARCAN) injection 0.4 mg, 0.4 mg, Intravenous, Q5 Min PRN, Mitra Beaulieu, PA-C    Lidocaine 4 % 1 patch, 1 patch, Transdermal, Q24H, Joe Lombardo, APRN, 1 patch at 03/11/25 0805    ondansetron ODT (ZOFRAN-ODT) disintegrating tablet 4 mg, 4 mg, Oral, Q6H PRN **OR** ondansetron (ZOFRAN) injection 4 mg, 4 mg, Intravenous, Q6H PRN, Shekhar Ramirez MD    oxyCODONE-acetaminophen (PERCOCET)  7.5-325 MG per tablet 1 tablet, 1 tablet, Oral, Q6H PRN, Shekhar Ramirez MD, 1 tablet at 03/10/25 1013    pantoprazole (PROTONIX) EC tablet 40 mg, 40 mg, Oral, BID, Shekhar Ramirez MD, 40 mg at 03/11/25 0805    [COMPLETED] Insert Peripheral IV, , , Once **AND** sodium chloride 0.9 % flush 10 mL, 10 mL, Intravenous, PRN, Holly Graham PA-C    sodium chloride 0.9 % flush 10 mL, 10 mL, Intravenous, Q12H, Shekhar Ramirez MD, 10 mL at 03/11/25 0802    sodium chloride 0.9 % flush 10 mL, 10 mL, Intravenous, PRN, Shekhar Ramirez MD    sodium chloride 0.9 % infusion 40 mL, 40 mL, Intravenous, PRN, Shekhar Ramirez MD      Physical Exam:    General:   Awake, alert.  Back:    Posterior cervical thoracic incision wound edges well-approximated without erythema, swelling or drainage.  Abdomen:    NT/ND   Motor:   Normal muscle strength, bulk and tone in upper extremities.  No fasciculations, rigidity, spasticity, or abnormal movements.  Reflexes:   2+ in the lower extremities. no Gutiérrez's  Sensation:   Normal to light touch sarah LEs        Assessment & Plan     ASSESSMENT:      Acute on chronic back pain    Cervical disc disorder with radiculopathy of mid-cervical region    Cervical stenosis of spine    54-year-old male 2 weeks status post C7-T1 decompression with C3-T3 fusion by Dr. Collado.  He apparently had received good relief of pain but over the weekend he developed recurrence after moving on the couch.  Repeat cervical and thoracic x-rays appear stable and Tobias does not feel there are any acute findings on MRI.  It appears he is possibly dealing with muscle spasms.  Please continue pain management efforts.  Valium and lidocaine patches were added yesterday.  He does not necessarily report any further relief despite those additions.  I discussed the case with Dr. Collado who recommends discontinuation of Valium with addition of Durogesic patch.  I spoke with observation provider who will have the patient  "admitted for continued need for pain management.    PLAN:     Discontinue Valium  Continue muscle relaxers/lidocaine  Add Duragesic patch  Continue pain management efforts  Neurosurgery will continue to follow  I discussed the patient's findings and my recommendations with patient, family, and Dr. Collado    During patient visit, I utilized appropriate personal protective equipment including mask and gloves.  Mask used was standard procedure mask. Appropriate PPE was worn during the entire visit.  Hand hygiene was completed before and after.      LOS: 0 days       HERON Cardenas  3/11/2025  11:32 EDT    \"Dictated utilizing Dragon dictation\".      Electronically signed by Joe Lombardo APRN at 03/11/25 1204       Chasity Good PA-C at 03/11/25 0521          ED OBSERVATION PROGRESS/DISCHARGE SUMMARY    Date of Admission: 3/10/2025   LOS: 0 days   PCP: Provider, No Known      Subjective: Back pain    Hospital Outcome:   Rafael Kat is a 54 y.o. male who is being admitted to the observation unit for worsening back pain status post C7-T1 decompression and C3-C7, T1-T3 fusion on February 25, 2025 with Dr. Collado.  Patient was discharged home on 3/4/2025 and had been doing well up until 2 days ago when acute onset of worsening pain happened while he was adjusting posture while sitting on the couch.  In the ER, labs noted glucose at 120, hemoglobin appears stable at 11.9 otherwise fairly unremarkable.  ED spoke with neurosurgery on-call who recommended further evaluation with plain films and MRI, they will follow in consultation     3/10/2025:  Neurosurgery saw and evaluated the patient.  Cervical/thoracic x-rays appear stable and cervical/thoracic MRI shows successful interval decompression noting fluid signal to be related to expected postop findings rather than any infectious etiology in the presence of normal temperatures and white blood cell count.  They recommended adding 5 mg of " p.o. Valium 3 times daily to maximize muscle relaxants as likely dealing with a muscle strain.    3/11/2025:  Patient reports minimal improvement with Valium and lidocaine patches.  Neurosurgery saw and reevaluated the patient and planning to add a fentanyl patch today with Valium discontinuation.  Neurosurgery recommends further admission to continue pain management and changing medications.  Discussed case with hospitalist who has graciously accepted patient for further inpatient monitoring and management.      ROS:  General: no fevers, chills  Respiratory: no cough, dyspnea  Cardiovascular: no chest pain, palpitations  Abdomen: No abdominal pain, nausea, vomiting, or diarrhea  Neurologic: No focal weakness    Objective   Physical Exam:  I have reviewed the vital signs.  Temp:  [97.5 °F (36.4 °C)-98.4 °F (36.9 °C)] 97.9 °F (36.6 °C)  Heart Rate:  [57-66] 59  Resp:  [18-20] 18  BP: (103-125)/(67-82) 113/78  General Appearance:    Alert, cooperative, no distress, nontoxic.  Head:    Normocephalic, atraumatic mild hard of hearing  Eyes:    Sclerae anicteric, EOMI  Neck:   Supple  Lungs: Coarse breath sounds bilaterally with prolonged expiration, speaks in full sentences, respirations unlabored on nasal cannula  Heart: Regular rate and rhythm, S1 and S2 normal, no murmur  Abdomen:  Soft, nontender, bowel sounds active, nondistended, obese abdomen  Extremities: No clubbing, cyanosis, or edema to lower extremities  Pulses:  2+ and symmetric in distal lower extremities  Skin: No rashes   Neurologic: Oriented x3, Normal strength to extremities    Results Review:    I have reviewed the labs, radiology results and diagnostic studies.    Results from last 7 days   Lab Units 03/10/25  0451   WBC 10*3/mm3 9.34   HEMOGLOBIN g/dL 11.9*   HEMATOCRIT % 36.6*   PLATELETS 10*3/mm3 424     Results from last 7 days   Lab Units 03/10/25  0451   SODIUM mmol/L 141   POTASSIUM mmol/L 3.7   CHLORIDE mmol/L 107   CO2 mmol/L 23.2   BUN mg/dL  16   CREATININE mg/dL 0.98   CALCIUM mg/dL 8.6   BILIRUBIN mg/dL <0.2   ALK PHOS U/L 83   ALT (SGPT) U/L 22   AST (SGOT) U/L 21   GLUCOSE mg/dL 120*     Imaging Results (Last 24 Hours)       Procedure Component Value Units Date/Time    MRI Cervical Spine With & Without Contrast [[id number]] Collected: 03/10/25 1214     Updated: 03/10/25 1214    Narrative:      MRI OF THE CERVICAL AND THORACIC SPINE WITH AND WITHOUT CONTRAST  03/10/2025     CLINICAL HISTORY: This patient underwent surgery of the cervical spine  back on 03/29/2016 with prior corpectomies at C4, C5 and C6, and  anterior plate and screw fixation from C3 to C7 and underwent a  subsequent surgery on 02/25/2025 with decompression at C7-T1 and  posterior cervical thoracic fusion from C3 to T3, complains of bilateral  upper extremity weakness.     MRI OF THE CERVICAL SPINE TECHNIQUE: Sagittal T1, proton density and  fast spin-echo T2, gradient echo T2 and fat-suppressed T2 and  postcontrast sagittal T1 weighted images were obtained of the cervical  spine. In addition, axial T1 and gradient echo T2 and fast spin-echo T2  and postcontrast axial T1-weighted images were obtained from the skull  base to T1.         MRI OF THE THORACIC SPINE TECHNIQUE: Sagittal T1, proton density and  fast spin echo T2 and fat-suppressed T2 and postcontrast sagittal T1 and  postcontrast sagittal fat-suppressed T1 weighted images were obtained of  the thoracic spine. In addition, axial T1 and T2 and postcontrast T1  weighted images were obtained from T1 to T8 and then from T8 to L1.     This is correlated to preoperative outside MRI of the cervical spine on  07/29/2024 and multiple plain film series of the cervical spine dating  back to 05/11/2016 and a remote prior cervical spine MRI prior to  cervical spine surgery back on 03/03/2016.     FINDINGS: This patient, back in 2016, had a remote prior cervical spine  surgery with corpectomies at C4, C5 and C6 and had anterior  cervical  discectomy and fusion procedure from C4 to C7 with anterior plate and  screw fixation from C4 to C7. There is a cylindrical graft extending  from the inferior endplate of C3 to the superior body of C7, and an  anterior plate extending from the anterior superior body of C3 to the  anterior inferior body of C7 anchored by screws to the C3 and C7  vertebrae.     At C7-T1 on 02/25/2025 this patient underwent cervical spine surgery  with bilateral laminectomies at C7 and medial facetectomies at C7-T1.   There was placement of bilateral rods bridging the posterior elements  from C3 to T3 anchored by articular pillar screws bilaterally at C3, C4,  C5, C6 and bilateral pedicle screws at T1, T2 and T3. Preoperatively,  there was posterior endplate spurring and mild canal narrowing, and  there was moderate left and moderate-to-severe right bony foraminal  narrowing at C7-T1. There has been adequate posterior decompression and  there is no residual canal narrowing at C7-T1.  There remains some  uncovertebral joint spurring into the foramina with some residual  mild-to-moderate bilateral foraminal narrowing at C7-T1. I see no canal  narrowing from the skull base to T1. At the level of the remote prior  cervical spine surgery, there is some areas of bony foraminal narrowing  from C3 to C7 that are unchanged. There is a postoperative fluid  collection in the posterior subcutaneous fat of the neck and upper  chest. The fluid collection tracks up to 15 cm in cranial caudal  dimension and maximally measures 5 x 4 cm in medial lateral and anterior  posterior dimension at the C7-T1 and T1 thoracic level. It channels down  to the posterior dura at the C7-T1 level, and may be postoperative  seroma or hematoma although I cannot exclude an infected fluid  collection.     The cervical and thoracic cord is normal in contour and signal  intensity. The conus terminates at the L1 lumbar level which is normal.  Within the thoracic  spine, there is some left paracentral spurring and  bulging disc material that indents the ventral thecal sac and abuts the  left ventral surface of the cord mildly narrowing the left side of the  canal at T6-7. There is some left paracentral spurring and bulging disc  material that abuts the left ventral surface of the cord minimally  narrowing the canal at T7-8. Otherwise, I see no canal or foraminal  narrowing from T3 down to L1.       Impression:      1. This patient has had extensive remote prior cervical spine surgery  back in 2016 during which there was performance of corpectomies at C4,  C5 and C6 and placement of a cylindrical graft from the inferior  endplate of C4 to the superior body of C7, and anterior plate and screw  fixation from C3 to C7. On 02/25/2025 this patient underwent bilateral  laminectomies at C7 and medial facetectomies at C7-T1.  There was  placement of bilateral posterior element rods from C3 to T3 anchored by  bilateral articular pillar screws at C3, C4, C5 and C6 and bilateral  pedicle screws at T1, T2 and T3. There is postoperative fluid in the  posterior subcutaneous fat of the neck and upper thoracic region that  tracks up to 15 cm in cranial caudal dimension and 5 x 4 mm in medial  lateral and anterior posterior dimension and it channels down to the  posterior margin of the posterior dura at the C7-T1 level. This may be  postoperative seroma hematoma although I cannot exclude an infected  fluid collection and this needs to be correlated clinically.     2. I see no residual canal narrowing from the skull base down to the T3  thoracic level. Since prior surgery, there has been some improvement in  the foraminal narrowing at C7-T1 but there remains mild-to-moderate  bilateral foraminal narrowing at C7-T1. There is also some chronic  residual multilevel foraminal narrowing from C3 to C7, unchanged.     3. Within the thoracic spine there is left paracentral disc osteophyte  complex that  abuts the left ventral surface of the cord mildly narrowing  the left side of the canal at T6-7 and left paracentral spurring  minimally narrowing the left side of the canal at T7-8, otherwise I see  no canal or foraminal narrowing from T3 to L1.             MRI Thoracic Spine With & Without Contrast [[id number]] Collected: 03/10/25 1214     Updated: 03/10/25 1214    Narrative:      MRI OF THE CERVICAL AND THORACIC SPINE WITH AND WITHOUT CONTRAST  03/10/2025     CLINICAL HISTORY: This patient underwent surgery of the cervical spine  back on 03/29/2016 with prior corpectomies at C4, C5 and C6, and  anterior plate and screw fixation from C3 to C7 and underwent a  subsequent surgery on 02/25/2025 with decompression at C7-T1 and  posterior cervical thoracic fusion from C3 to T3, complains of bilateral  upper extremity weakness.     MRI OF THE CERVICAL SPINE TECHNIQUE: Sagittal T1, proton density and  fast spin-echo T2, gradient echo T2 and fat-suppressed T2 and  postcontrast sagittal T1 weighted images were obtained of the cervical  spine. In addition, axial T1 and gradient echo T2 and fast spin-echo T2  and postcontrast axial T1-weighted images were obtained from the skull  base to T1.         MRI OF THE THORACIC SPINE TECHNIQUE: Sagittal T1, proton density and  fast spin echo T2 and fat-suppressed T2 and postcontrast sagittal T1 and  postcontrast sagittal fat-suppressed T1 weighted images were obtained of  the thoracic spine. In addition, axial T1 and T2 and postcontrast T1  weighted images were obtained from T1 to T8 and then from T8 to L1.     This is correlated to preoperative outside MRI of the cervical spine on  07/29/2024 and multiple plain film series of the cervical spine dating  back to 05/11/2016 and a remote prior cervical spine MRI prior to  cervical spine surgery back on 03/03/2016.     FINDINGS: This patient, back in 2016, had a remote prior cervical spine  surgery with corpectomies at C4, C5 and C6 and  had anterior cervical  discectomy and fusion procedure from C4 to C7 with anterior plate and  screw fixation from C4 to C7. There is a cylindrical graft extending  from the inferior endplate of C3 to the superior body of C7, and an  anterior plate extending from the anterior superior body of C3 to the  anterior inferior body of C7 anchored by screws to the C3 and C7  vertebrae.     At C7-T1 on 02/25/2025 this patient underwent cervical spine surgery  with bilateral laminectomies at C7 and medial facetectomies at C7-T1.   There was placement of bilateral rods bridging the posterior elements  from C3 to T3 anchored by articular pillar screws bilaterally at C3, C4,  C5, C6 and bilateral pedicle screws at T1, T2 and T3. Preoperatively,  there was posterior endplate spurring and mild canal narrowing, and  there was moderate left and moderate-to-severe right bony foraminal  narrowing at C7-T1. There has been adequate posterior decompression and  there is no residual canal narrowing at C7-T1.  There remains some  uncovertebral joint spurring into the foramina with some residual  mild-to-moderate bilateral foraminal narrowing at C7-T1. I see no canal  narrowing from the skull base to T1. At the level of the remote prior  cervical spine surgery, there is some areas of bony foraminal narrowing  from C3 to C7 that are unchanged. There is a postoperative fluid  collection in the posterior subcutaneous fat of the neck and upper  chest. The fluid collection tracks up to 15 cm in cranial caudal  dimension and maximally measures 5 x 4 cm in medial lateral and anterior  posterior dimension at the C7-T1 and T1 thoracic level. It channels down  to the posterior dura at the C7-T1 level, and may be postoperative  seroma or hematoma although I cannot exclude an infected fluid  collection.     The cervical and thoracic cord is normal in contour and signal  intensity. The conus terminates at the L1 lumbar level which is normal.  Within the  thoracic spine, there is some left paracentral spurring and  bulging disc material that indents the ventral thecal sac and abuts the  left ventral surface of the cord mildly narrowing the left side of the  canal at T6-7. There is some left paracentral spurring and bulging disc  material that abuts the left ventral surface of the cord minimally  narrowing the canal at T7-8. Otherwise, I see no canal or foraminal  narrowing from T3 down to L1.       Impression:      1. This patient has had extensive remote prior cervical spine surgery  back in 2016 during which there was performance of corpectomies at C4,  C5 and C6 and placement of a cylindrical graft from the inferior  endplate of C4 to the superior body of C7, and anterior plate and screw  fixation from C3 to C7. On 02/25/2025 this patient underwent bilateral  laminectomies at C7 and medial facetectomies at C7-T1.  There was  placement of bilateral posterior element rods from C3 to T3 anchored by  bilateral articular pillar screws at C3, C4, C5 and C6 and bilateral  pedicle screws at T1, T2 and T3. There is postoperative fluid in the  posterior subcutaneous fat of the neck and upper thoracic region that  tracks up to 15 cm in cranial caudal dimension and 5 x 4 mm in medial  lateral and anterior posterior dimension and it channels down to the  posterior margin of the posterior dura at the C7-T1 level. This may be  postoperative seroma hematoma although I cannot exclude an infected  fluid collection and this needs to be correlated clinically.     2. I see no residual canal narrowing from the skull base down to the T3  thoracic level. Since prior surgery, there has been some improvement in  the foraminal narrowing at C7-T1 but there remains mild-to-moderate  bilateral foraminal narrowing at C7-T1. There is also some chronic  residual multilevel foraminal narrowing from C3 to C7, unchanged.     3. Within the thoracic spine there is left paracentral disc  osteophyte  complex that abuts the left ventral surface of the cord mildly narrowing  the left side of the canal at T6-7 and left paracentral spurring  minimally narrowing the left side of the canal at T7-8, otherwise I see  no canal or foraminal narrowing from T3 to L1.             XR Spine Cervical 2 or 3 View [[id number]] Collected: 03/10/25 0723     Updated: 03/10/25 0743    Narrative:      XR SPINE CERVICAL 2 OR 3 VW-, XR SPINE THORACIC 2 VW-     CLINICAL INFORMATION: Pain, recent surgery.     THORACOLUMBAR SPINE FINDINGS: There are pedicle screws and vertical  fixation rods spanning C3 through T3. No screw loosening nor  malalignment. There is an anterior plate and screw fixator C3 through  C7, transverse screws within C3 and C7. Corpectomy C4 through C6. One of  the C3 screws is broken in the head is backed out of position  approximately 3 to 4 mm. No alteration in the position of the plate has  occurred. This does not represent a new finding. The odontoid is  preserved with a satisfactory C1-2 alignment and the prevertebral soft  tissues have a satisfactory appearance.     No thoracic compression deformity or subluxation is demonstrated. There  is mild multilevel mid and lower thoracic disc space narrowing. The  paravertebral soft tissues have a satisfactory appearance.     CONCLUSION: Imaging of the cervical and thoracic spine is quite similar  to 02/26/2025.     This report was finalized on 3/10/2025 7:40 AM by Dr. Con Glynn M.D  on Workstation: [id number]       XR Spine Thoracic 2 View [[id number]] Collected: 03/10/25 0723     Updated: 03/10/25 0743    Narrative:      XR SPINE CERVICAL 2 OR 3 VW-, XR SPINE THORACIC 2 VW-     CLINICAL INFORMATION: Pain, recent surgery.     THORACOLUMBAR SPINE FINDINGS: There are pedicle screws and vertical  fixation rods spanning C3 through T3. No screw loosening nor  malalignment. There is an anterior plate and screw fixator C3 through  C7, transverse screws within C3  and C7. Corpectomy C4 through C6. One of  the C3 screws is broken in the head is backed out of position  approximately 3 to 4 mm. No alteration in the position of the plate has  occurred. This does not represent a new finding. The odontoid is  preserved with a satisfactory C1-2 alignment and the prevertebral soft  tissues have a satisfactory appearance.     No thoracic compression deformity or subluxation is demonstrated. There  is mild multilevel mid and lower thoracic disc space narrowing. The  paravertebral soft tissues have a satisfactory appearance.     CONCLUSION: Imaging of the cervical and thoracic spine is quite similar  to 02/26/2025.     This report was finalized on 3/10/2025 7:40 AM by Dr. Con Glynn M.D  on Workstation: [id number]               I have reviewed the medications.     Discharge Medications        ASK your doctor about these medications        Instructions Start Date   aspirin 81 MG chewable tablet   81 mg, Daily      baclofen 20 MG tablet  Commonly known as: LIORESAL   1 tablet, 2 Times Daily      busPIRone 15 MG tablet  Commonly known as: BUSPAR   Take 1 tablet by mouth 2 (Two) Times a Day. Indications: Anxiety Disorder      cephalexin 500 MG capsule  Commonly known as: KEFLEX   Take 1 capsule by mouth Every 6 (Six) Hours for 28 doses.      cetirizine 10 MG tablet  Commonly known as: zyrTEC   10 mg, Daily      cholecalciferol 25 MCG (1000 UT) tablet  Commonly known as: VITAMIN D3   1,000 Units, Daily      clonazePAM 0.5 MG tablet  Commonly known as: KlonoPIN   Take 1 tablet by mouth 3 times a day for 5 days. Indications: Panic Disorder      Diclofenac Sodium 1 % gel gel  Commonly known as: VOLTAREN   4 g, As Needed      docusate sodium 100 MG capsule   100 mg, Oral, Daily      escitalopram 10 MG tablet  Commonly known as: LEXAPRO   Take 1 tablet by mouth Every Night. Indications: Panic Disorder      FeroSul 325 (65 Fe) MG tablet  Generic drug: ferrous sulfate   325 mg, Daily With  Breakfast      gabapentin 800 MG tablet  Commonly known as: NEURONTIN   800 mg, 3 Times Daily      HYDROcodone-acetaminophen 7.5-325 MG per tablet  Commonly known as: NORCO  Ask about: Should I take this medication?   1 tablet, Oral, Every 4 Hours PRN      lidocaine 5 %  Commonly known as: LIDODERM   1 patch, Transdermal, Every 24 Hours, Remove & Discard patch within 12 hours or as directed by MD      mometasone 0.1 % cream  Commonly known as: ELOCON   As Needed      naloxone 4 MG/0.1ML nasal spray  Commonly known as: NARCAN   Call 911. Don't prime. [place] in 1 nostril for overdose. Repeat in 2-3 minutes in other nostril if no or minimal breathing/responsiveness.      NON FORMULARY   Nightly      pantoprazole 40 MG EC tablet  Commonly known as: PROTONIX   40 mg, 2 Times Daily      polyethylene glycol 17 GM/SCOOP powder  Commonly known as: MIRALAX   Dissolve 17 g (1 capful) in liquid and drink by mouth 2 (Two) Times a Day As Needed for constipation      Potassium 75 MG tablet   As Needed      Senexon-S 8.6-50 MG per tablet  Generic drug: sennosides-docusate   2 tablets, Oral, Nightly      Unable to find   1 each, Once, INHALER NEW INSTRUCTED PT TO BRING WITH HIM      Ventolin  (90 Base) MCG/ACT inhaler  Generic drug: albuterol sulfate HFA   2 puffs, Every 4 Hours PRN             ---------------------------------------------------------------------------------------------  Assessment & Plan   Assessment/Problem List    Acute on chronic back pain    Cervical disc disorder with radiculopathy of mid-cervical region    Cervical stenosis of spine      Plan:  Acute on chronic back pain  Cervical stenosis of the spine  -Status post C7-T1 decompression and C3-7, T1-3 fusion with Dr. Collado 2/25/2025  -Plain films of the cervical and thoracic spine stable  -MRI of the cervical and thoracic spine showed successful decompression, expected postop changes  -Continue gabapentin  -Neurosurgery has seen and reevaluated,  they have discontinued the Valium and are going to try fentanyl patch, but recommend that patient be admitted for continued pain management  -Discussed case with hospitalist who has graciously accepted the patient for further inpatient management he and monitoring     COPD  -Albuterol as needed     GERD  -PPI     Anxiety  -No acute issues  -Continue home medications once reconciled    Disposition: Dependent on hospital course    Follow-up after Discharge: Dependent on hospital course    This note will serve as progress note      58 minutes have been spent by [facility] providers in the care of this patient while under observation status.    Appropriate PPE worn during patient encounter.  Hand hygeine performed before and after seeing the patient.      Chasity Good PA-C 03/11/25 08:02 EDT                Electronically signed by Chasity Good PA-C at 03/11/25 6487       Shekhar Ramirez MD at 03/10/25 9859          MELISSA SIN Attestation Note    I supervised care provided by the midlevel provider.    The JANEE and I have discussed this patient's history, physical exam, and treatment plan. I have reviewed the documentation and personally had a face to face interaction with the patient  I affirm the documentation and agree with the treatment and plan. I provided a substantive portion of the care of this patient.  I personally performed the physical exam, in its entirety.  My personal findings are documented in below:    History:  54-year-old male who is recently postop from cervical and thoracic surgery returning complaining of acute on chronic pain.  Feeling significantly improved this afternoon after several therapies through the day.    Physical Exam:  General: No acute distress.  HENT: NCAT  Eyes: no scleral icterus.  Neck: Painless range of motion  CV: Pink warm and well-perfused throughout  Respiratory: No distress or increased work of breathing  Abdomen: soft, no focal tenderness or  rigidity  Musculoskeletal: no deformity.  Neuro: Alert, speech fluent and easily intelligible.  Strong plantarflexion dorsiflexion bilateral feet.  Skin: warm, dry.    Assessment and Plan:  Acute on chronic back pain: MRI is reassuring.  No further surgical intervention recommended.  Neurosurgery following.      Electronically signed by Shekhar Ramirez MD at 03/10/25 2206       Blaise Mcbride III, PA at 03/10/25 1406       Attestation signed by Shekhar Ramirez MD at 03/10/25 2210        SHARED APC FACE TO FACE: I performed a substantive part of the MDM during the patient's E/M visit. I personally evaluated and examined the patient. I personally made or approved the documented management plan and acknowledge its risk of complications.   Shekhar Ramirez MD 3/10/2025 22:10 EDT                         ED OBSERVATION PROGRESS/DISCHARGE SUMMARY    Date of Admission: 3/10/2025   LOS: 0 days   PCP: Provider, No Known    Working diagnosis: Acute on chronic mid back pain, cervical disc disorder, cervical stenosis      Subjective     Hospital Outcome: Pending    Rafael Kat is a 54 y.o. male who is being admitted to the observation unit for worsening back pain status post C7-T1 decompression and C3-C7, T1-T3 fusion on February 25, 2025 with Dr. Collado.  Patient was discharged home on 3/4/2025 and had been doing well up until 2 days ago when acute onset of worsening pain happened while he was adjusting posture while sitting on the couch.  In the ER, labs noted glucose at 120, hemoglobin appears stable at 11.9 otherwise fairly unremarkable.  ED spoke with neurosurgery on-call who recommended further evaluation with plain films and MRI, they will follow in consultation    2:07 PM.  Neurosurgery has evaluated at bedside.  They are waiting on official MRI results of the C-spine and T-spine with and without contrast.    3:15 PM.  Neurosurgery is evaluated.  They evaluated the lidocaine patch, Valium 5 mg 3 times  daily, soft collar for comfort.  They went to reevaluate the patient in the morning.       ROS:  General: no fevers, chills  Respiratory: no cough, dyspnea  Cardiovascular: no chest pain, palpitations  Abdomen: No abdominal pain, nausea, vomiting, or diarrhea  Neurologic: No focal weakness    Objective   Physical Exam:  I have reviewed the vital signs.  Temp:  [98.2 °F (36.8 °C)-98.3 °F (36.8 °C)] 98.2 °F (36.8 °C)  Heart Rate:  [] 59  Resp:  [18] 18  BP: (105-126)/(73-93) 105/79  General Appearance:    Alert, cooperative, no distress  Head:    Normocephalic, atraumatic  Eyes:    Sclerae anicteric  Neck:   Supple, no mass  Lungs: Clear to auscultation bilaterally, respirations unlabored  Heart: Regular rate and rhythm, S1 and S2 normal, no murmur, rub or gallop  Abdomen:  Soft, nontender, bowel sounds active, nondistended  Extremities: No clubbing, cyanosis, or edema to lower extremities  Pulses:  2+ and symmetric in distal lower extremities  Skin: Well-appearing well-approximated incision in the cervical thoracic region.  No drainage present or signs of infection/cellulitis.  Neurologic: Oriented x3, Normal strength to extremities    Results Review:    I have reviewed the labs, radiology results and diagnostic studies.    Results from last 7 days   Lab Units 03/10/25  0451   WBC 10*3/mm3 9.34   HEMOGLOBIN g/dL 11.9*   HEMATOCRIT % 36.6*   PLATELETS 10*3/mm3 424     Results from last 7 days   Lab Units 03/10/25  0451   SODIUM mmol/L 141   POTASSIUM mmol/L 3.7   CHLORIDE mmol/L 107   CO2 mmol/L 23.2   BUN mg/dL 16   CREATININE mg/dL 0.98   CALCIUM mg/dL 8.6   BILIRUBIN mg/dL <0.2   ALK PHOS U/L 83   ALT (SGPT) U/L 22   AST (SGOT) U/L 21   GLUCOSE mg/dL 120*     Imaging Results (Last 24 Hours)       Procedure Component Value Units Date/Time    MRI Cervical Spine With & Without Contrast [[id number]] Collected: 03/10/25 1214     Updated: 03/10/25 1214    Narrative:      MRI OF THE CERVICAL AND THORACIC SPINE WITH  AND WITHOUT CONTRAST  03/10/2025     CLINICAL HISTORY: This patient underwent surgery of the cervical spine  back on 03/29/2016 with prior corpectomies at C4, C5 and C6, and  anterior plate and screw fixation from C3 to C7 and underwent a  subsequent surgery on 02/25/2025 with decompression at C7-T1 and  posterior cervical thoracic fusion from C3 to T3, complains of bilateral  upper extremity weakness.     MRI OF THE CERVICAL SPINE TECHNIQUE: Sagittal T1, proton density and  fast spin-echo T2, gradient echo T2 and fat-suppressed T2 and  postcontrast sagittal T1 weighted images were obtained of the cervical  spine. In addition, axial T1 and gradient echo T2 and fast spin-echo T2  and postcontrast axial T1-weighted images were obtained from the skull  base to T1.         MRI OF THE THORACIC SPINE TECHNIQUE: Sagittal T1, proton density and  fast spin echo T2 and fat-suppressed T2 and postcontrast sagittal T1 and  postcontrast sagittal fat-suppressed T1 weighted images were obtained of  the thoracic spine. In addition, axial T1 and T2 and postcontrast T1  weighted images were obtained from T1 to T8 and then from T8 to L1.     This is correlated to preoperative outside MRI of the cervical spine on  07/29/2024 and multiple plain film series of the cervical spine dating  back to 05/11/2016 and a remote prior cervical spine MRI prior to  cervical spine surgery back on 03/03/2016.     FINDINGS: This patient, back in 2016, had a remote prior cervical spine  surgery with corpectomies at C4, C5 and C6 and had anterior cervical  discectomy and fusion procedure from C4 to C7 with anterior plate and  screw fixation from C4 to C7. There is a cylindrical graft extending  from the inferior endplate of C3 to the superior body of C7, and an  anterior plate extending from the anterior superior body of C3 to the  anterior inferior body of C7 anchored by screws to the C3 and C7  vertebrae.     At C7-T1 on 02/25/2025 this patient underwent  cervical spine surgery  with bilateral laminectomies at C7 and medial facetectomies at C7-T1.   There was placement of bilateral rods bridging the posterior elements  from C3 to T3 anchored by articular pillar screws bilaterally at C3, C4,  C5, C6 and bilateral pedicle screws at T1, T2 and T3. Preoperatively,  there was posterior endplate spurring and mild canal narrowing, and  there was moderate left and moderate-to-severe right bony foraminal  narrowing at C7-T1. There has been adequate posterior decompression and  there is no residual canal narrowing at C7-T1.  There remains some  uncovertebral joint spurring into the foramina with some residual  mild-to-moderate bilateral foraminal narrowing at C7-T1. I see no canal  narrowing from the skull base to T1. At the level of the remote prior  cervical spine surgery, there is some areas of bony foraminal narrowing  from C3 to C7 that are unchanged. There is a postoperative fluid  collection in the posterior subcutaneous fat of the neck and upper  chest. The fluid collection tracks up to 15 cm in cranial caudal  dimension and maximally measures 5 x 4 cm in medial lateral and anterior  posterior dimension at the C7-T1 and T1 thoracic level. It channels down  to the posterior dura at the C7-T1 level, and may be postoperative  seroma or hematoma although I cannot exclude an infected fluid  collection.     The cervical and thoracic cord is normal in contour and signal  intensity. The conus terminates at the L1 lumbar level which is normal.  Within the thoracic spine, there is some left paracentral spurring and  bulging disc material that indents the ventral thecal sac and abuts the  left ventral surface of the cord mildly narrowing the left side of the  canal at T6-7. There is some left paracentral spurring and bulging disc  material that abuts the left ventral surface of the cord minimally  narrowing the canal at T7-8. Otherwise, I see no canal or foraminal  narrowing from  T3 down to L1.       Impression:      1. This patient has had extensive remote prior cervical spine surgery  back in 2016 during which there was performance of corpectomies at C4,  C5 and C6 and placement of a cylindrical graft from the inferior  endplate of C4 to the superior body of C7, and anterior plate and screw  fixation from C3 to C7. On 02/25/2025 this patient underwent bilateral  laminectomies at C7 and medial facetectomies at C7-T1.  There was  placement of bilateral posterior element rods from C3 to T3 anchored by  bilateral articular pillar screws at C3, C4, C5 and C6 and bilateral  pedicle screws at T1, T2 and T3. There is postoperative fluid in the  posterior subcutaneous fat of the neck and upper thoracic region that  tracks up to 15 cm in cranial caudal dimension and 5 x 4 mm in medial  lateral and anterior posterior dimension and it channels down to the  posterior margin of the posterior dura at the C7-T1 level. This may be  postoperative seroma hematoma although I cannot exclude an infected  fluid collection and this needs to be correlated clinically.     2. I see no residual canal narrowing from the skull base down to the T3  thoracic level. Since prior surgery, there has been some improvement in  the foraminal narrowing at C7-T1 but there remains mild-to-moderate  bilateral foraminal narrowing at C7-T1. There is also some chronic  residual multilevel foraminal narrowing from C3 to C7, unchanged.     3. Within the thoracic spine there is left paracentral disc osteophyte  complex that abuts the left ventral surface of the cord mildly narrowing  the left side of the canal at T6-7 and left paracentral spurring  minimally narrowing the left side of the canal at T7-8, otherwise I see  no canal or foraminal narrowing from T3 to L1.             MRI Thoracic Spine With & Without Contrast [[id number]] Collected: 03/10/25 1214     Updated: 03/10/25 1214    Narrative:      MRI OF THE CERVICAL AND THORACIC  SPINE WITH AND WITHOUT CONTRAST  03/10/2025     CLINICAL HISTORY: This patient underwent surgery of the cervical spine  back on 03/29/2016 with prior corpectomies at C4, C5 and C6, and  anterior plate and screw fixation from C3 to C7 and underwent a  subsequent surgery on 02/25/2025 with decompression at C7-T1 and  posterior cervical thoracic fusion from C3 to T3, complains of bilateral  upper extremity weakness.     MRI OF THE CERVICAL SPINE TECHNIQUE: Sagittal T1, proton density and  fast spin-echo T2, gradient echo T2 and fat-suppressed T2 and  postcontrast sagittal T1 weighted images were obtained of the cervical  spine. In addition, axial T1 and gradient echo T2 and fast spin-echo T2  and postcontrast axial T1-weighted images were obtained from the skull  base to T1.         MRI OF THE THORACIC SPINE TECHNIQUE: Sagittal T1, proton density and  fast spin echo T2 and fat-suppressed T2 and postcontrast sagittal T1 and  postcontrast sagittal fat-suppressed T1 weighted images were obtained of  the thoracic spine. In addition, axial T1 and T2 and postcontrast T1  weighted images were obtained from T1 to T8 and then from T8 to L1.     This is correlated to preoperative outside MRI of the cervical spine on  07/29/2024 and multiple plain film series of the cervical spine dating  back to 05/11/2016 and a remote prior cervical spine MRI prior to  cervical spine surgery back on 03/03/2016.     FINDINGS: This patient, back in 2016, had a remote prior cervical spine  surgery with corpectomies at C4, C5 and C6 and had anterior cervical  discectomy and fusion procedure from C4 to C7 with anterior plate and  screw fixation from C4 to C7. There is a cylindrical graft extending  from the inferior endplate of C3 to the superior body of C7, and an  anterior plate extending from the anterior superior body of C3 to the  anterior inferior body of C7 anchored by screws to the C3 and C7  vertebrae.     At C7-T1 on 02/25/2025 this  patient underwent cervical spine surgery  with bilateral laminectomies at C7 and medial facetectomies at C7-T1.   There was placement of bilateral rods bridging the posterior elements  from C3 to T3 anchored by articular pillar screws bilaterally at C3, C4,  C5, C6 and bilateral pedicle screws at T1, T2 and T3. Preoperatively,  there was posterior endplate spurring and mild canal narrowing, and  there was moderate left and moderate-to-severe right bony foraminal  narrowing at C7-T1. There has been adequate posterior decompression and  there is no residual canal narrowing at C7-T1.  There remains some  uncovertebral joint spurring into the foramina with some residual  mild-to-moderate bilateral foraminal narrowing at C7-T1. I see no canal  narrowing from the skull base to T1. At the level of the remote prior  cervical spine surgery, there is some areas of bony foraminal narrowing  from C3 to C7 that are unchanged. There is a postoperative fluid  collection in the posterior subcutaneous fat of the neck and upper  chest. The fluid collection tracks up to 15 cm in cranial caudal  dimension and maximally measures 5 x 4 cm in medial lateral and anterior  posterior dimension at the C7-T1 and T1 thoracic level. It channels down  to the posterior dura at the C7-T1 level, and may be postoperative  seroma or hematoma although I cannot exclude an infected fluid  collection.     The cervical and thoracic cord is normal in contour and signal  intensity. The conus terminates at the L1 lumbar level which is normal.  Within the thoracic spine, there is some left paracentral spurring and  bulging disc material that indents the ventral thecal sac and abuts the  left ventral surface of the cord mildly narrowing the left side of the  canal at T6-7. There is some left paracentral spurring and bulging disc  material that abuts the left ventral surface of the cord minimally  narrowing the canal at T7-8. Otherwise, I see no canal or  foraminal  narrowing from T3 down to L1.       Impression:      1. This patient has had extensive remote prior cervical spine surgery  back in 2016 during which there was performance of corpectomies at C4,  C5 and C6 and placement of a cylindrical graft from the inferior  endplate of C4 to the superior body of C7, and anterior plate and screw  fixation from C3 to C7. On 02/25/2025 this patient underwent bilateral  laminectomies at C7 and medial facetectomies at C7-T1.  There was  placement of bilateral posterior element rods from C3 to T3 anchored by  bilateral articular pillar screws at C3, C4, C5 and C6 and bilateral  pedicle screws at T1, T2 and T3. There is postoperative fluid in the  posterior subcutaneous fat of the neck and upper thoracic region that  tracks up to 15 cm in cranial caudal dimension and 5 x 4 mm in medial  lateral and anterior posterior dimension and it channels down to the  posterior margin of the posterior dura at the C7-T1 level. This may be  postoperative seroma hematoma although I cannot exclude an infected  fluid collection and this needs to be correlated clinically.     2. I see no residual canal narrowing from the skull base down to the T3  thoracic level. Since prior surgery, there has been some improvement in  the foraminal narrowing at C7-T1 but there remains mild-to-moderate  bilateral foraminal narrowing at C7-T1. There is also some chronic  residual multilevel foraminal narrowing from C3 to C7, unchanged.     3. Within the thoracic spine there is left paracentral disc osteophyte  complex that abuts the left ventral surface of the cord mildly narrowing  the left side of the canal at T6-7 and left paracentral spurring  minimally narrowing the left side of the canal at T7-8, otherwise I see  no canal or foraminal narrowing from T3 to L1.             XR Spine Cervical 2 or 3 View [[id number]] Collected: 03/10/25 0723     Updated: 03/10/25 0743    Narrative:      XR SPINE CERVICAL 2 OR  3 VW-, XR SPINE THORACIC 2 VW-     CLINICAL INFORMATION: Pain, recent surgery.     THORACOLUMBAR SPINE FINDINGS: There are pedicle screws and vertical  fixation rods spanning C3 through T3. No screw loosening nor  malalignment. There is an anterior plate and screw fixator C3 through  C7, transverse screws within C3 and C7. Corpectomy C4 through C6. One of  the C3 screws is broken in the head is backed out of position  approximately 3 to 4 mm. No alteration in the position of the plate has  occurred. This does not represent a new finding. The odontoid is  preserved with a satisfactory C1-2 alignment and the prevertebral soft  tissues have a satisfactory appearance.     No thoracic compression deformity or subluxation is demonstrated. There  is mild multilevel mid and lower thoracic disc space narrowing. The  paravertebral soft tissues have a satisfactory appearance.     CONCLUSION: Imaging of the cervical and thoracic spine is quite similar  to 02/26/2025.     This report was finalized on 3/10/2025 7:40 AM by Dr. Con Glynn M.D  on Workstation: [id number]       XR Spine Thoracic 2 View [[id number]] Collected: 03/10/25 0723     Updated: 03/10/25 0743    Narrative:      XR SPINE CERVICAL 2 OR 3 VW-, XR SPINE THORACIC 2 VW-     CLINICAL INFORMATION: Pain, recent surgery.     THORACOLUMBAR SPINE FINDINGS: There are pedicle screws and vertical  fixation rods spanning C3 through T3. No screw loosening nor  malalignment. There is an anterior plate and screw fixator C3 through  C7, transverse screws within C3 and C7. Corpectomy C4 through C6. One of  the C3 screws is broken in the head is backed out of position  approximately 3 to 4 mm. No alteration in the position of the plate has  occurred. This does not represent a new finding. The odontoid is  preserved with a satisfactory C1-2 alignment and the prevertebral soft  tissues have a satisfactory appearance.     No thoracic compression deformity or subluxation is  demonstrated. There  is mild multilevel mid and lower thoracic disc space narrowing. The  paravertebral soft tissues have a satisfactory appearance.     CONCLUSION: Imaging of the cervical and thoracic spine is quite similar  to 02/26/2025.     This report was finalized on 3/10/2025 7:40 AM by Dr. Con Glynn M.D  on Workstation: [id number]               I have reviewed the medications.     Discharge Medications        ASK your doctor about these medications        Instructions Start Date   aspirin 81 MG chewable tablet   81 mg, Daily      baclofen 20 MG tablet  Commonly known as: LIORESAL   1 tablet, 2 Times Daily      busPIRone 15 MG tablet  Commonly known as: BUSPAR   Take 1 tablet by mouth 2 (Two) Times a Day. Indications: Anxiety Disorder      cephalexin 500 MG capsule  Commonly known as: KEFLEX   Take 1 capsule by mouth Every 6 (Six) Hours for 28 doses.      cetirizine 10 MG tablet  Commonly known as: zyrTEC   10 mg, Daily      cholecalciferol 25 MCG (1000 UT) tablet  Commonly known as: VITAMIN D3   1,000 Units, Daily      clonazePAM 0.5 MG tablet  Commonly known as: KlonoPIN   Take 1 tablet by mouth 3 times a day for 5 days. Indications: Panic Disorder      Diclofenac Sodium 1 % gel gel  Commonly known as: VOLTAREN   4 g, As Needed      docusate sodium 100 MG capsule   100 mg, Oral, Daily      escitalopram 10 MG tablet  Commonly known as: LEXAPRO   Take 1 tablet by mouth Every Night. Indications: Panic Disorder      FeroSul 325 (65 Fe) MG tablet  Generic drug: ferrous sulfate   325 mg, Daily With Breakfast      gabapentin 800 MG tablet  Commonly known as: NEURONTIN   800 mg, 3 Times Daily      HYDROcodone-acetaminophen 7.5-325 MG per tablet  Commonly known as: NORCO   1 tablet, Oral, Every 4 Hours PRN      lidocaine 5 %  Commonly known as: LIDODERM   1 patch, Transdermal, Every 24 Hours, Remove & Discard patch within 12 hours or as directed by MD      mometasone 0.1 % cream  Commonly known as: ELOCON   As  Needed      naloxone 4 MG/0.1ML nasal spray  Commonly known as: NARCAN   Call 911. Don't prime. Electra in 1 nostril for overdose. Repeat in 2-3 minutes in other nostril if no or minimal breathing/responsiveness.      NON FORMULARY   Nightly      pantoprazole 40 MG EC tablet  Commonly known as: PROTONIX   40 mg, 2 Times Daily      polyethylene glycol 17 GM/SCOOP powder  Commonly known as: MIRALAX   Dissolve 17 g (1 capful) in liquid and drink by mouth 2 (Two) Times a Day As Needed for constipation      Potassium 75 MG tablet   As Needed      Senexon-S 8.6-50 MG per tablet  Generic drug: sennosides-docusate   2 tablets, Oral, Nightly      Unable to find   1 each, Once, INHALER NEW INSTRUCTED PT TO BRING WITH HIM      Ventolin  (90 Base) MCG/ACT inhaler  Generic drug: albuterol sulfate HFA   2 puffs, Every 4 Hours PRN              ---------------------------------------------------------------------------------------------  Assessment & Plan   Assessment/Problem List    Acute on chronic back pain    Cervical disc disorder with radiculopathy of mid-cervical region    Cervical stenosis of spine      Plan:    Acute on chronic back pain  Cervical stenosis of the spine  -Status post C7-T1 decompression and C3-7, T1-3 fusion with Dr. Collado 2/25/2025  -Plain films of the cervical and thoracic spine pending  -MRI of the cervical and thoracic spine ordered  -Neurosurgery consulted  -Analgesics as needed  -Continue gabapentin  -N.p.o.     COPD  -Albuterol as needed     GERD  -PPI     Anxiety  -No acute issues  -Continue home medications once reconciled        VTE Prophylaxis:  Mechanical VTE prophylaxis orders are present.        Disposition: Pending    Follow-up after Discharge: Pending    This note will serve as a progress note    Blaise Mcbride III, PA 03/10/25 16:07 EDT    I have worn appropriate PPE during this patient encounter, sanitized my hands both with entering and exiting patient's room.      51  minutes has been spent by [facility] providers in the care of this patient while under observation status             Electronically signed by Shekhar Ramirez MD at 03/10/25 2210          Consult Notes (last 48 hours)        Helder Blackburn MD at 03/11/25 1256        Consult Orders    1. Inpatient Hospitalist Consult [[id number]] ordered by Sahra Collazo MD at 03/11/25 1207                     Patient Name:  Rafael Kat  YOB: 1970  MRN:  [id number]  Admit Date:  3/10/2025  Patient Care Team:  Provider, No Known as PCP - Bert Moe MD as Surgeon (Neurosurgery)      Subjective   History Present Illness     Chief Complaint   Patient presents with    Back Pain   Consult for COPD and admission from observation unit with ongoing back pain.    Mr. Kat is a 54 y.o. admitted to the observation unit with back pain after he had a cervical/thoracic decompression and fusion a few weeks ago.  Patient reports that since he returned home he has had continued issues with his right hand where he will intermittently drop things.  He has had the sensation of swelling and pain in his mid/upper back.  He does not report any radiation of the pain.  Does not report any muscle spasms.  No fevers or chills.  He has not had any cranial nerve issues in including no speech change or vision change.    Denies chest pain palpitation shortness of breath.  No productive cough.  No nausea vomiting frontal abdominal pain dysuria or flank pain reported.  When I saw him he was on supplemental oxygen but not having any respiratory distress.    Review of Systems   Constitutional:  Negative for chills and fever.   HENT:  Negative for sore throat and trouble swallowing.    Eyes:  Negative for pain and visual disturbance.   Respiratory:  Negative for cough, shortness of breath and wheezing.    Cardiovascular:  Negative for chest pain, palpitations and leg swelling.    Gastrointestinal:  Negative for constipation, diarrhea, nausea and vomiting.   Endocrine: Negative for cold intolerance and heat intolerance.   Genitourinary:  Negative for dysuria and flank pain.   Musculoskeletal:  Positive for back pain.   Skin:  Negative for pallor and rash.   Allergic/Immunologic: Negative for environmental allergies and food allergies.   Neurological:  Negative for seizures and syncope.   Hematological:  Negative for adenopathy. Does not bruise/bleed easily.   Psychiatric/Behavioral:  Negative for agitation and confusion.         Personal History     Past Medical History:   Diagnosis Date    Anesthesia complication     PT STATES HE IS SLOW TO WAKE UP    Arthritis     COPD (chronic obstructive pulmonary disease)     STATES WHEEZES AT NIGHT AND DOES USE NIGHTLY INHALER    DDD (degenerative disc disease), cervical     DDD (degenerative disc disease), lumbar     PAIN IN RIGHT BUTTOCK AND DOWN AT TIMES RIGHT LEG, WEAKNESS RIGHT LEG AT TIMES    Dysphagia     STARTED AFTER HIS CERVICAL NECK SURGERY    GERD (gastroesophageal reflux disease)     History of COVID-19     Neck pain     Psoriasis     Shoulder pain, bilateral     Skin cancer     BASAL/SQUAMOUS     Past Surgical History:   Procedure Laterality Date    ANTERIOR CHANNEL VERTEBRECTOMY/CORPECTOMY Bilateral 03/29/2016    Procedure: C4-6 ANTERIOR CHANNEL VERTEBRECTOMY/CORPECTOMY WITH INSTRUMENTATION;  Surgeon: Bert Collado MD;  Location: [facility];  Service:     CERVICAL DISCECTOMY POSTERIOR FUSION WITH BRAIN LAB N/A 2/25/2025    Procedure: Posterior cervical seven/thoracic one decompression, posterior cervical three/four/five/six/seven, thoracic one/two/three fusion with screws/rods/crosslinks with Stealth spinal navigation;  Surgeon: Bert Collado MD;  Location: [facility];  Service: Neurosurgery;  Laterality: N/A;    COLONOSCOPY      COLONOSCOPY N/A 2/14/2025    Procedure: COLONOSCOPY into cecum with hot snare polypectomy,  Resolutions clips x2, Tattoo of transverse polyp area;  Surgeon: Gonzalo Rodriguez MD;  Location:  [facility];  Service: Gastroenterology;  Laterality: N/A;  Pre: Screening  Post: Polyp, Suboptimal prep    ELBOW PROCEDURE Right     UNCLEAR:  RIGHT ELBOW AREA    ENDOSCOPY N/A 2/14/2025    Procedure: ESOPHAGOGASTRODUODENOSCOPY with biopsies;  Surgeon: Gonzalo Rodriguez MD;  Location:  [facility];  Service: Gastroenterology;  Laterality: N/A;  Pre: GERD, Dysphagia  Post: Gastritis and Esophagitis    FINGER SURGERY Left     INDEX FINGER    LUMBAR EPIDURAL INJECTION      STATES X3    LUMBAR LAMINECTOMY DISCECTOMY DECOMPRESSION Bilateral 11/28/2023    Procedure: Open bilateral lumbar decompression lumbar one/two, lumbar two/three, lumbar three/four;  Surgeon: Bert Collado MD;  Location: [facility] MAIN OR;  Service: Neurosurgery;  Laterality: Bilateral;    MENISCECTOMY Right     UPPER GASTROINTESTINAL ENDOSCOPY  03/2021     Family History   Problem Relation Age of Onset    Malig Hyperthermia Neg Hx      Social History     Tobacco Use    Smoking status: Former     Current packs/day: 0.00     Average packs/day: 2.0 packs/day for 20.0 years (40.0 ttl pk-yrs)     Types: Cigarettes     Start date: 1/1/1990     Quit date: 2010     Years since quitting: 15.2     Passive exposure: Past    Smokeless tobacco: Never   Vaping Use    Vaping status: Never Used   Substance Use Topics    Alcohol use: No    Drug use: Yes     Frequency: 14.0 times per week     Types: Marijuana     Comment: 1-2 PER DAY,     No current facility-administered medications on file prior to encounter.     Current Outpatient Medications on File Prior to Encounter   Medication Sig Dispense Refill    aspirin 81 MG chewable tablet Chew 1 tablet Daily.      baclofen (LIORESAL) 20 MG tablet Take 1 tablet by mouth 2 (Two) Times a Day.      busPIRone (BUSPAR) 15 MG tablet Take 1 tablet by mouth 2 (Two) Times a Day. Indications: Anxiety Disorder 20  tablet 0    cephalexin (KEFLEX) 500 MG capsule Take 1 capsule by mouth Every 6 (Six) Hours for 28 doses. 28 capsule 0    cetirizine (zyrTEC) 10 MG tablet Take 1 tablet by mouth Daily.      cholecalciferol (VITAMIN D3) 25 MCG (1000 UT) tablet Take 1 tablet by mouth Daily.      clonazePAM (KlonoPIN) 0.5 MG tablet Take 1 tablet by mouth 3 times a day for 5 days. Indications: Panic Disorder 15 tablet 0    docusate sodium 100 MG capsule Take 1 capsule by mouth Daily.      escitalopram (LEXAPRO) 10 MG tablet Take 1 tablet by mouth Every Night. Indications: Panic Disorder 30 tablet 0    FeroSul 325 (65 Fe) MG tablet Take 1 tablet by mouth Daily With Breakfast. PT HAS NOT HAD FOR 4 DAYS      gabapentin (NEURONTIN) 800 MG tablet Take 1 tablet by mouth 3 (Three) Times a Day. TYPICALLY TAKES TWICE DAILY      [] HYDROcodone-acetaminophen (NORCO) 7.5-325 MG per tablet Take 1 tablet by mouth Every 4 (Four) Hours As Needed for Severe Pain for up to 5 days. 30 tablet 0    NON FORMULARY Every Night. STATES DOES USE NIGHT INHALER:  INSTRUCTED TO BRING THIS DAY OF SURGEY      pantoprazole (PROTONIX) 40 MG EC tablet Take 1 tablet by mouth 2 (Two) Times a Day.  11    polyethylene glycol (MIRALAX) 17 GM/SCOOP powder Dissolve 17 g (1 capful) in liquid and drink by mouth 2 (Two) Times a Day As Needed for constipation 510 g 0    sennosides-docusate (PERICOLACE) 8.6-50 MG per tablet Take 2 tablets by mouth Every Night. 20 tablet 0    Ventolin  (90 Base) MCG/ACT inhaler Inhale 2 puffs Every 4 (Four) Hours As Needed.      Diclofenac Sodium (VOLTAREN) 1 % gel gel Apply 4 g topically to the appropriate area as directed As Needed.      lidocaine (LIDODERM) 5 % PLACE 1 PATCH ON THE SKIN AS DIRECTED BY PROVIDER DAILY. REMOVE & DISCARD PATCH WITHIN 12 HOURS OR AS DIRECTED BY MD 30 patch 2    mometasone (ELOCON) 0.1 % cream 1 Application As Needed.      naloxone (NARCAN) 4 MG/0.1ML nasal spray Call 911. Don't prime. Spray in 1 nostril  for overdose. Repeat in 2-3 minutes in other nostril if no or minimal breathing/responsiveness. 2 each 0    Potassium 75 MG tablet Take  by mouth As Needed. DURING THE SUMMER WHEN SWEATS MORE      Unable to find 1 each 1 (One) Time. INHALER NEW INSTRUCTED PT TO BRING WITH HIM       Allergies   Allergen Reactions    Bacitracin-Polymyxin B Other (See Comments) and Rash    Chlorhexidine Rash    Druai-Shqqb-Inqmaem-Pramoxine Rash       Objective    Objective     Vital Signs  Temp:  [97.5 °F (36.4 °C)-98.4 °F (36.9 °C)] 98.2 °F (36.8 °C)  Heart Rate:  [57-68] 65  Resp:  [17-20] 18  BP: (103-132)/(67-93) 123/93  SpO2:  [86 %-98 %] 96 %  on  Flow (L/min) (Oxygen Therapy):  [2-3] 2;   Device (Oxygen Therapy): nasal cannula  Body mass index is 31.09 kg/m².    Physical Exam  Vitals and nursing note reviewed.   Constitutional:       General: He is not in acute distress.     Appearance: He is not diaphoretic.   HENT:      Head: Atraumatic.   Eyes:      Conjunctiva/sclera: Conjunctivae normal.      Pupils: Pupils are equal, round, and reactive to light.   Cardiovascular:      Rate and Rhythm: Normal rate and regular rhythm.      Pulses: Normal pulses.   Pulmonary:      Effort: Pulmonary effort is normal.      Breath sounds: No wheezing or rhonchi.   Abdominal:      General: There is no distension.      Palpations: Abdomen is soft.      Tenderness: There is no abdominal tenderness. There is no guarding or rebound.   Musculoskeletal:         General: Tenderness present.      Right lower leg: No edema.      Left lower leg: No edema.   Skin:     General: Skin is warm and dry.   Neurological:      Mental Status: He is alert.      Cranial Nerves: No cranial nerve deficit.   Psychiatric:         Mood and Affect: Mood normal.         Behavior: Behavior normal.         Results Review:  I reviewed the patient's new clinical results.  I reviewed the patient's new imaging results and agree with the interpretation.  I personally viewed and  interpreted the patient's EKG/Telemetry data  I reviewed prior records and medications.    Lab Results (last 24 hours)       ** No results found for the last 24 hours. **            Imaging Results (Last 24 Hours)       ** No results found for the last 24 hours. **                No orders to display       Assessment/Plan     Active Hospital Problems    Diagnosis  POA    **Acute on chronic back pain [M54.9, G89.29]  Yes    COPD (chronic obstructive pulmonary disease) [J44.9]  Yes    Cervical stenosis of spine [M48.02]  Yes    Gastroesophageal reflux disease [K21.9]  Yes    Cervical disc disorder with radiculopathy of mid-cervical region [M50.120]  Yes      Resolved Hospital Problems   No resolved problems to display.       Mr. Kat is a 54 y.o.     COPD: No acute bronchospasm on exam.  Plan to continue breathing treatments.  Some of the hypoxia may be related to the pain medications/benzodiazepines that he had been receiving to try to control his upper back symptoms.  Will screen a chest x-ray.  Monitor progression and wean O2 as able.  Cervical radiculopathy/right upper extremity weakness: Continue pain control efforts.  A fentanyl patch has been started.  He is on Neurontin.  Neurosurgery following.  GERD: PPI  PPx: SCD  Code Status: Full  I discussed the patient's findings and my recommendations with patient, family, and ED provider.      Helder Blackburn MD  [facility]  03/11/25  12:56 EDT    Dictated portions of note using dragon dictation software.    Electronically signed by Helder Blackburn MD at 03/11/25 1302       Joe Lombardo APRN at 03/10/25 1100        Consult Orders    1. Neurosurgery (on-call MD unless specified) [[id number]] ordered by Holly Graham PA-C at 03/10/25 1420              Attestation signed by Bert Collado MD at 03/11/25 0840    I have reviewed this documentation and agree.                  [facility] CONSULT NOTE    Patient  name: Rafael Kat  Referring Provider:    Holly Graham PA-C     Reason for Consultation: Back pain    Patient Care Team:  Provider, No Known as PCP - Bert Moe MD as Surgeon (Neurosurgery)    Chief complaint: Pain at top of thoracic spine and off to the right    Subjective .     History of present illness:    Patient is a 54 y.o.  male COPD, anxiety, spinal stenosis of the cervical, thoracic and lumbar region, GERD who underwent posterior C7-T1 decompression with posterior C3-T3 fusion with Dr. Collado on February 25, 2025.  He is now POD #13.  He presents with upper thoracic pain.  On Friday night he reported repositioning himself on the couch and experiencing acute pain noted to be just below the inferior part of his incision and off to the right.  Most of the pain appears to be in the right musculature of the upper mid back however the patient's wife states that the pain sometimes goes across the back but never wrapping around the rib cage or into the chest.  He denies feeling weak in the bilateral upper or lower extremities and denies any upper or lower extremity numbness, tingling or pain.  He denies any gait instability, bowel or bladder dysfunction.  He does note a history of restless leg syndrome but states that ever since his surgery he has been having intermittent jerks.  Sometimes they will wake him out of his sleep.  He has dropped beverage glasses over the weekend.  He feels that this is not related to his strength but rather to the involuntary jerks that he is experiencing causing him to open his hands and drop the object within his hand.  Overall he notes that the pain in his back is worsened ever since he first experienced it on late Friday night/early Saturday morning.      Review of Systems  Review of Systems   Gastrointestinal:         Denies bowel issues   Genitourinary:  Negative for enuresis.   Musculoskeletal:  Positive for back pain. Negative for gait problem.    Neurological:  Negative for weakness and numbness.       History  PAST MEDICAL HISTORY  Past Medical History:   Diagnosis Date    Anesthesia complication     PT STATES HE IS SLOW TO WAKE UP    Arthritis     COPD (chronic obstructive pulmonary disease)     STATES WHEEZES AT NIGHT AND DOES USE NIGHTLY INHALER    DDD (degenerative disc disease), cervical     DDD (degenerative disc disease), lumbar     PAIN IN RIGHT BUTTOCK AND DOWN AT TIMES RIGHT LEG, WEAKNESS RIGHT LEG AT TIMES    Dysphagia     STARTED AFTER HIS CERVICAL NECK SURGERY    GERD (gastroesophageal reflux disease)     History of COVID-19     Neck pain     Psoriasis     Shoulder pain, bilateral     Skin cancer     BASAL/SQUAMOUS       PAST SURGICAL HISTORY  Past Surgical History:   Procedure Laterality Date    ANTERIOR CHANNEL VERTEBRECTOMY/CORPECTOMY Bilateral 03/29/2016    Procedure: C4-6 ANTERIOR CHANNEL VERTEBRECTOMY/CORPECTOMY WITH INSTRUMENTATION;  Surgeon: Bert Collado MD;  Location: [facility];  Service:     CERVICAL DISCECTOMY POSTERIOR FUSION WITH BRAIN LAB N/A 2/25/2025    Procedure: Posterior cervical seven/thoracic one decompression, posterior cervical three/four/five/six/seven, thoracic one/two/three fusion with screws/rods/crosslinks with Stealth spinal navigation;  Surgeon: Bert Collado MD;  Location: [facility] OR;  Service: Neurosurgery;  Laterality: N/A;    COLONOSCOPY      COLONOSCOPY N/A 2/14/2025    Procedure: COLONOSCOPY into cecum with hot snare polypectomy, Resolutions clips x2, Tattoo of transverse polyp area;  Surgeon: Gonzalo Rodriguez MD;  Location: [facility] ENDOSCOPY;  Service: Gastroenterology;  Laterality: N/A;  Pre: Screening  Post: Polyp, Suboptimal prep    ELBOW PROCEDURE Right     UNCLEAR:  RIGHT ELBOW AREA    ENDOSCOPY N/A 2/14/2025    Procedure: ESOPHAGOGASTRODUODENOSCOPY with biopsies;  Surgeon: Gonzalo Rodriguez MD;  Location: [facility] ENDOSCOPY;  Service: Gastroenterology;  Laterality: N/A;  Pre:  GERD, Dysphagia  Post: Gastritis and Esophagitis    FINGER SURGERY Left     INDEX FINGER    LUMBAR EPIDURAL INJECTION      STATES X3    LUMBAR LAMINECTOMY DISCECTOMY DECOMPRESSION Bilateral 11/28/2023    Procedure: Open bilateral lumbar decompression lumbar one/two, lumbar two/three, lumbar three/four;  Surgeon: Bert Collado MD;  Location: [facility] MAIN OR;  Service: Neurosurgery;  Laterality: Bilateral;    MENISCECTOMY Right     UPPER GASTROINTESTINAL ENDOSCOPY  03/2021       FAMILY HISTORY  Family History   Problem Relation Age of Onset    Malig Hyperthermia Neg Hx        SOCIAL HISTORY  Social History     Tobacco Use    Smoking status: Former     Current packs/day: 0.00     Average packs/day: 2.0 packs/day for 20.0 years (40.0 ttl pk-yrs)     Types: Cigarettes     Start date: 1/1/1990     Quit date: 2010     Years since quitting: 15.1     Passive exposure: Past    Smokeless tobacco: Never   Vaping Use    Vaping status: Never Used   Substance Use Topics    Alcohol use: No    Drug use: Yes     Frequency: 14.0 times per week     Types: Marijuana     Comment: 1-2 PER DAY,           Allergies:  Bacitracin-polymyxin b, Chlorhexidine, and Annyu-itoxg-gzyxtkm-pramoxine    MEDICATIONS:  Medications Prior to Admission   Medication Sig Dispense Refill Last Dose/Taking    aspirin 81 MG chewable tablet Chew 1 tablet Daily.   3/9/2025 Morning    baclofen (LIORESAL) 20 MG tablet Take 1 tablet by mouth 2 (Two) Times a Day.   3/9/2025 Evening    busPIRone (BUSPAR) 15 MG tablet Take 1 tablet by mouth 2 (Two) Times a Day. Indications: Anxiety Disorder 20 tablet 0 3/9/2025 Evening    cephalexin (KEFLEX) 500 MG capsule Take 1 capsule by mouth Every 6 (Six) Hours for 28 doses. 28 capsule 0 3/9/2025 Evening    cetirizine (zyrTEC) 10 MG tablet Take 1 tablet by mouth Daily.   3/9/2025 Morning    cholecalciferol (VITAMIN D3) 25 MCG (1000 UT) tablet Take 1 tablet by mouth Daily.   3/9/2025 Morning    clonazePAM (KlonoPIN) 0.5 MG  tablet Take 1 tablet by mouth 3 times a day for 5 days. Indications: Panic Disorder 15 tablet 0 3/9/2025 Evening    docusate sodium 100 MG capsule Take 1 capsule by mouth Daily.   Past Week Morning    escitalopram (LEXAPRO) 10 MG tablet Take 1 tablet by mouth Every Night. Indications: Panic Disorder 30 tablet 0 3/9/2025 Evening    FeroSul 325 (65 Fe) MG tablet Take 1 tablet by mouth Daily With Breakfast. PT HAS NOT HAD FOR 4 DAYS   3/9/2025 Morning    gabapentin (NEURONTIN) 800 MG tablet Take 1 tablet by mouth 3 (Three) Times a Day. TYPICALLY TAKES TWICE DAILY   3/9/2025 Evening    HYDROcodone-acetaminophen (NORCO) 7.5-325 MG per tablet Take 1 tablet by mouth Every 4 (Four) Hours As Needed for Severe Pain for up to 5 days. 30 tablet 0 3/10/2025 Morning    NON FORMULARY Every Night. STATES DOES USE NIGHT INHALER:  INSTRUCTED TO BRING THIS DAY OF SURGEY   3/10/2025 Morning    pantoprazole (PROTONIX) 40 MG EC tablet Take 1 tablet by mouth 2 (Two) Times a Day.  11 3/9/2025 Evening    polyethylene glycol (MIRALAX) 17 GM/SCOOP powder Dissolve 17 g (1 capful) in liquid and drink by mouth 2 (Two) Times a Day As Needed for constipation 510 g 0 Past Week    sennosides-docusate (PERICOLACE) 8.6-50 MG per tablet Take 2 tablets by mouth Every Night. 20 tablet 0 Past Week Morning    Ventolin  (90 Base) MCG/ACT inhaler Inhale 2 puffs Every 4 (Four) Hours As Needed.   3/9/2025 Morning    Diclofenac Sodium (VOLTAREN) 1 % gel gel Apply 4 g topically to the appropriate area as directed As Needed.   More than a month    lidocaine (LIDODERM) 5 % PLACE 1 PATCH ON THE SKIN AS DIRECTED BY PROVIDER DAILY. REMOVE & DISCARD PATCH WITHIN 12 HOURS OR AS DIRECTED BY MD 30 patch 2 More than a month    mometasone (ELOCON) 0.1 % cream 1 Application As Needed.   More than a month    naloxone (NARCAN) 4 MG/0.1ML nasal spray Call 911. Don't prime. Reading in 1 nostril for overdose. Repeat in 2-3 minutes in other nostril if no or minimal  breathing/responsiveness. 2 each 0 Unknown    Potassium 75 MG tablet Take  by mouth As Needed. DURING THE SUMMER WHEN SWEATS MORE   More than a month    Unable to find 1 each 1 (One) Time. INHALER NEW INSTRUCTED PT TO BRING WITH HIM            Current Facility-Administered Medications:     acetaminophen (TYLENOL) tablet 650 mg, 650 mg, Oral, Q4H PRN **OR** acetaminophen (TYLENOL) 160 MG/5ML oral solution 650 mg, 650 mg, Oral, Q4H PRN **OR** acetaminophen (TYLENOL) suppository 650 mg, 650 mg, Rectal, Q4H PRN, Shekhar Ramirez MD    albuterol (PROVENTIL) nebulizer solution 0.083% 2.5 mg/3mL, 2.5 mg, Nebulization, Q6H PRN, Shekhar Ramirez MD    [Held by provider] aspirin chewable tablet 81 mg, 81 mg, Oral, Daily, Shekhar Ramirez MD    sennosides-docusate (PERICOLACE) 8.6-50 MG per tablet 2 tablet, 2 tablet, Oral, BID PRN **AND** polyethylene glycol (MIRALAX) packet 17 g, 17 g, Oral, Daily PRN **AND** bisacodyl (DULCOLAX) EC tablet 5 mg, 5 mg, Oral, Daily PRN **AND** bisacodyl (DULCOLAX) suppository 10 mg, 10 mg, Rectal, Daily PRN, Shekhar Ramirez MD    busPIRone (BUSPAR) tablet 15 mg, 15 mg, Oral, BID, Shekhar Ramirez MD, 15 mg at 03/10/25 1012    cephalexin (KEFLEX) capsule 500 mg, 500 mg, Oral, Q6H, Shekhar Ramirez MD, 500 mg at 03/10/25 1013    cetirizine (zyrTEC) tablet 10 mg, 10 mg, Oral, Daily, Shekhar Ramirez MD, 10 mg at 03/10/25 1013    cholecalciferol (VITAMIN D3) tablet 1,000 Units, 1,000 Units, Oral, Daily, Shekhar Ramirez MD, 1,000 Units at 03/10/25 1013    escitalopram (LEXAPRO) tablet 10 mg, 10 mg, Oral, Nightly, Shekhar Ramirez MD    ferrous sulfate tablet 325 mg, 325 mg, Oral, Daily With Breakfast, Shekhar Ramirez MD, 325 mg at 03/10/25 1013    gabapentin (NEURONTIN) capsule 800 mg, 800 mg, Oral, Q8H, Shekhar Ramirez MD, 800 mg at 03/10/25 1013    HYDROmorphone (DILAUDID) injection 0.5 mg, 0.5 mg, Intravenous, Q2H PRN **AND** naloxone (NARCAN) injection 0.4 mg, 0.4 mg, Intravenous, Q5 Min PRN, Shekhar Ramirez MD     ondansetron ODT (ZOFRAN-ODT) disintegrating tablet 4 mg, 4 mg, Oral, Q6H PRN **OR** ondansetron (ZOFRAN) injection 4 mg, 4 mg, Intravenous, Q6H PRN, Shekhar Ramirez MD    oxyCODONE-acetaminophen (PERCOCET) 7.5-325 MG per tablet 1 tablet, 1 tablet, Oral, Q6H PRN, Shekhar Ramirez MD, 1 tablet at 03/10/25 1013    pantoprazole (PROTONIX) EC tablet 40 mg, 40 mg, Oral, BID, Shekhar Ramirez MD, 40 mg at 03/10/25 1013    [COMPLETED] Insert Peripheral IV, , , Once **AND** sodium chloride 0.9 % flush 10 mL, 10 mL, Intravenous, PRN, Holly Graham PA-C    sodium chloride 0.9 % flush 10 mL, 10 mL, Intravenous, Q12H, Shekhar Ramirez MD, 10 mL at 03/10/25 1014    sodium chloride 0.9 % flush 10 mL, 10 mL, Intravenous, PRN, Shekhar Ramirez MD    sodium chloride 0.9 % infusion 40 mL, 40 mL, Intravenous, PRN, Shekhar Ramirez MD      Objective     Results Review:  LABS:  Results from last 7 days   Lab Units 03/10/25  0451   WBC 10*3/mm3 9.34   HEMOGLOBIN g/dL 11.9*   HEMATOCRIT % 36.6*   PLATELETS 10*3/mm3 424     Results from last 7 days   Lab Units 03/10/25  0451   SODIUM mmol/L 141   POTASSIUM mmol/L 3.7   CHLORIDE mmol/L 107   CO2 mmol/L 23.2   BUN mg/dL 16   CREATININE mg/dL 0.98   CALCIUM mg/dL 8.6   BILIRUBIN mg/dL <0.2   ALK PHOS U/L 83   ALT (SGPT) U/L 22   AST (SGOT) U/L 21   GLUCOSE mg/dL 120*         DIAGNOSTICS:  Cervical and thoracic XR 3/9/2025:  Shows pedicle screw and vertical rods from C3-T3 without any screw loosening or malalignment.  No apparent fracture in this construct.  However there is an anterior cervical plate from C3-C7.  Corpectomy noted at C4-C6.  One of the C3 screws is broken and head is backed out of position by 3 to 4 mm however this is unchanged from previous imaging.      MRI cervical and thoracic spine with and without contrast 3/10/2025:  FINDINGS: This patient, back in 2016, had a remote prior cervical spine  surgery with corpectomies at C4, C5 and C6 and had anterior cervical  discectomy and  fusion procedure from C4 to C7 with anterior plate and  screw fixation from C4 to C7. There is a cylindrical graft extending  from the inferior endplate of C3 to the superior body of C7, and an  anterior plate extending from the anterior superior body of C3 to the  anterior inferior body of C7 anchored by screws to the C3 and C7  vertebrae.     At C7-T1 on 02/25/2025 this patient underwent cervical spine surgery  with bilateral laminectomies at C7 and medial facetectomies at C7-T1.   There was placement of bilateral rods bridging the posterior elements  from C3 to T3 anchored by articular pillar screws bilaterally at C3, C4,  C5, C6 and bilateral pedicle screws at T1, T2 and T3. Preoperatively,  there was posterior endplate spurring and mild canal narrowing, and  there was moderate left and moderate-to-severe right bony foraminal  narrowing at C7-T1. There has been adequate posterior decompression and  there is no residual canal narrowing at C7-T1.  There remains some  uncovertebral joint spurring into the foramina with some residual  mild-to-moderate bilateral foraminal narrowing at C7-T1. I see no canal  narrowing from the skull base to T1. At the level of the remote prior  cervical spine surgery, there is some areas of bony foraminal narrowing  from C3 to C7 that are unchanged. There is a postoperative fluid  collection in the posterior subcutaneous fat of the neck and upper  chest. The fluid collection tracks up to 15 cm in cranial caudal  dimension and maximally measures 5 x 4 cm in medial lateral and anterior  posterior dimension at the C7-T1 and T1 thoracic level. It channels down  to the posterior dura at the C7-T1 level, and may be postoperative  seroma or hematoma although I cannot exclude an infected fluid  collection.     The cervical and thoracic cord is normal in contour and signal  intensity. The conus terminates at the L1 lumbar level which is normal.  Within the thoracic spine, there is some left  paracentral spurring and  bulging disc material that indents the ventral thecal sac and abuts the  left ventral surface of the cord mildly narrowing the left side of the  canal at T6-7. There is some left paracentral spurring and bulging disc  material that abuts the left ventral surface of the cord minimally  narrowing the canal at T7-8. Otherwise, I see no canal or foraminal  narrowing from T3 down to L1.     IMPRESSION:  1. This patient has had extensive remote prior cervical spine surgery  back in 2016 during which there was performance of corpectomies at C4,  C5 and C6 and placement of a cylindrical graft from the inferior  endplate of C4 to the superior body of C7, and anterior plate and screw  fixation from C3 to C7. On 02/25/2025 this patient underwent bilateral  laminectomies at C7 and medial facetectomies at C7-T1.  There was  placement of bilateral posterior element rods from C3 to T3 anchored by  bilateral articular pillar screws at C3, C4, C5 and C6 and bilateral  pedicle screws at T1, T2 and T3. There is postoperative fluid in the  posterior subcutaneous fat of the neck and upper thoracic region that  tracks up to 15 cm in cranial caudal dimension and 5 x 4 mm in medial  lateral and anterior posterior dimension and it channels down to the  posterior margin of the posterior dura at the C7-T1 level. This may be  postoperative seroma hematoma although I cannot exclude an infected  fluid collection and this needs to be correlated clinically.     2. I see no residual canal narrowing from the skull base down to the T3  thoracic level. Since prior surgery, there has been some improvement in  the foraminal narrowing at C7-T1 but there remains mild-to-moderate  bilateral foraminal narrowing at C7-T1. There is also some chronic  residual multilevel foraminal narrowing from C3 to C7, unchanged.     3. Within the thoracic spine there is left paracentral disc osteophyte  complex that abuts the left ventral surface  of the cord mildly narrowing  the left side of the canal at T6-7 and left paracentral spurring  minimally narrowing the left side of the canal at T7-8, otherwise I see  no canal or foraminal narrowing from T3 to L1  Results Review:   I reviewed the patient's new clinical results.  I personally viewed and interpreted the patient's labs, medications, imaging studies and chart.    Vital Signs   Temp:  [98.2 °F (36.8 °C)-98.3 °F (36.8 °C)] 98.2 °F (36.8 °C)  Heart Rate:  [] 61  Resp:  [18] 18  BP: (105-126)/(73-93) 105/79    Physical Exam:  Physical Exam  Vitals reviewed.   Constitutional:       General: He is not in acute distress.     Appearance: Normal appearance. He is not ill-appearing, toxic-appearing or diaphoretic.   HENT:      Head: Normocephalic.      Nose: Nose normal.      Mouth/Throat:      Mouth: Mucous membranes are moist.      Pharynx: Oropharynx is clear.   Eyes:      Extraocular Movements: Extraocular movements intact.      Conjunctiva/sclera: Conjunctivae normal.      Pupils: Pupils are equal, round, and reactive to light.   Cardiovascular:      Rate and Rhythm: Normal rate.   Pulmonary:      Effort: Pulmonary effort is normal.   Musculoskeletal:         General: Normal range of motion.      Cervical back: Normal range of motion.      Lumbar back: Negative right straight leg raise test and negative left straight leg raise test.      Comments: Cervical thoracic incision wound edges are well-approximated without erythema, swelling or drainage.  Steri-Strips in place towards the midportion and bottom of wound.     Skin:     General: Skin is warm.   Neurological:      General: No focal deficit present.      Mental Status: He is oriented to person, place, and time. Mental status is at baseline.      Coordination: Coordination is intact.      Deep Tendon Reflexes:      Reflex Scores:       Tricep reflexes are 2+ on the right side and 2+ on the left side.       Bicep reflexes are 2+ on the right side  and 2+ on the left side.       Brachioradialis reflexes are 2+ on the right side and 2+ on the left side.  Psychiatric:         Mood and Affect: Mood normal.         Speech: Speech normal.         Behavior: Behavior normal.         Thought Content: Thought content normal.         Judgment: Judgment normal.       Neurological Exam  Mental Status  Awake, alert and oriented to person, place and time. Oriented to person, place, time and situation. Oriented to person, place, and time. Recent and remote memory are intact. Speech is normal. Language is fluent with no aphasia. Attention and concentration are normal. Fund of knowledge is appropriate for level of education.    Cranial Nerves  CN III, IV, VI: Extraocular movements intact bilaterally. Pupils equal round and reactive to light bilaterally.    Motor                                               Right                     Left  Deltoid                                   5                          5   Biceps                                   5                          5  Brachioradialis                      5                          5   Triceps                                  5                          5   Pronator                                5                          5   Supinator                              5                           5   Wrist flexor                            5                          5   Wrist extensor                       5                          5   Finger flexor                          5                          5   Finger extensor                     5                          5   Interossei                              5                          5   Iliopsoas                               5                          5   Quadriceps                           5                          5   Hamstring                             5                          5   Gastrocnemius                     5                           5   Anterior  tibialis                      5                          5   Posterior tibialis                    5                          5  Ankle dorsiflexor                   5                          5  Ankle plantar flexor              5                           5    Sensory  Sensation is intact to light touch, pinprick, vibration and proprioception in all four extremities.    Reflexes                                            Right                      Left  Brachioradialis                    2+                         2+  Biceps                                 2+                         2+  Triceps                                2+                         2+    Right pathological reflexes: Vincent's absent.  Left pathological reflexes: Vincent's absent.    Coordination    Finger-to-nose, rapid alternating movements and heel-to-shin normal bilaterally without dysmetria.    Gait    Deferred.      Assessment & Plan       Acute on chronic back pain    Cervical disc disorder with radiculopathy of mid-cervical region    Cervical stenosis of spine      Problem List Items Addressed This Visit    None  Visit Diagnoses         Weakness of upper extremity    -  Primary      Acute bilateral thoracic back pain          Status post cervical spinal fusion                 COMORBID CONDITIONS:  COPD, anxiety, spinal stenosis, GERD    54-year-old male 13 days postop from posterior cervical 7-T1 decompression with posterior C3-T3 fusion performed by Dr. Collado.  He notes acute onset right upper mid back pain after position change late Friday night.  He notes pain mainly in the musculature of the upper right back.  He denies any new issues into the arms or legs or any issues with bowel/bladder dysfunction or gait instability.  He does report intermittent jerks which sometimes causes him to drop objects but denies any weakness in the arms or legs.  Exam reflects normal strength with absence of any pathologic reflexes.  I discussed  "the history, exam and radiographic findings with Dr. Collado.  Cervical/thoracic x-rays appear stable and cervical/thoracic MRI's show successful interval decompression.  Fluid signal to be related to expected postop findings rather than any infectious etiology in the presence of normal temperatures and white blood cell count.  He does not appear to be infectious on exam.    He is possibly dealing with a muscle strain in the back.  Neurosurgery recommends maximizing muscle relaxers (adding 5 g of Valium p.o. 3 times daily) and adding lidocaine patches.  Neurosurgery will check back on his status tomorrow.    Nursing to please avoid application of lidocaine patch on or near posterior surgical wound.      PLAN:     Added Valium 5 mg p.o. 3 times daily  Added lidocaine patch  Neurosurgery to check back tomorrow  Soft collar for comfort    I discussed the patient's findings and my recommendations with patient, family, primary care team, and Dr. Collado, and nursing staff    During patient visit, I utilized appropriate personal protective equipment including gloves and mask.  Mask used was standard procedure mask. Appropriate PPE was worn during the entire visit.  Hand hygiene was completed before and after.     Joe Lombardo, APRN  03/10/25  12:41 EDT    \"Dictated utilizing Dragon dictation\".      Electronically signed by Bert Collado MD at 03/11/25 1146       "

## 2025-03-11 NOTE — PLAN OF CARE
Goal Outcome Evaluation:    Patient admitted for chronic back pain (unresolved)  Patient sleeping between care with wife at bedside.  Pain meds given.  PT consulting.

## 2025-03-11 NOTE — PROGRESS NOTES
ED OBSERVATION PROGRESS/DISCHARGE SUMMARY    Date of Admission: 3/10/2025   LOS: 0 days   PCP: Provider, No Known      Subjective: Back pain    Hospital Outcome:   Rafael Kat is a 54 y.o. male who is being admitted to the observation unit for worsening back pain status post C7-T1 decompression and C3-C7, T1-T3 fusion on February 25, 2025 with Dr. Collado.  Patient was discharged home on 3/4/2025 and had been doing well up until 2 days ago when acute onset of worsening pain happened while he was adjusting posture while sitting on the couch.  In the ER, labs noted glucose at 120, hemoglobin appears stable at 11.9 otherwise fairly unremarkable.  ED spoke with neurosurgery on-call who recommended further evaluation with plain films and MRI, they will follow in consultation     3/10/2025:  Neurosurgery saw and evaluated the patient.  Cervical/thoracic x-rays appear stable and cervical/thoracic MRI shows successful interval decompression noting fluid signal to be related to expected postop findings rather than any infectious etiology in the presence of normal temperatures and white blood cell count.  They recommended adding 5 mg of p.o. Valium 3 times daily to maximize muscle relaxants as likely dealing with a muscle strain.    3/11/2025:  Patient reports minimal improvement with Valium and lidocaine patches.  Neurosurgery saw and reevaluated the patient and planning to add a fentanyl patch today with Valium discontinuation.  Neurosurgery recommends further admission to continue pain management and changing medications.  Discussed case with hospitalist who has graciously accepted patient for further inpatient monitoring and management.      ROS:  General: no fevers, chills  Respiratory: no cough, dyspnea  Cardiovascular: no chest pain, palpitations  Abdomen: No abdominal pain, nausea, vomiting, or diarrhea  Neurologic: No focal weakness    Objective   Physical Exam:  I have reviewed the vital signs.  Temp:   [97.5 °F (36.4 °C)-98.4 °F (36.9 °C)] 97.9 °F (36.6 °C)  Heart Rate:  [57-66] 59  Resp:  [18-20] 18  BP: (103-125)/(67-82) 113/78  General Appearance:    Alert, cooperative, no distress, nontoxic.  Head:    Normocephalic, atraumatic mild hard of hearing  Eyes:    Sclerae anicteric, EOMI  Neck:   Supple  Lungs: Coarse breath sounds bilaterally with prolonged expiration, speaks in full sentences, respirations unlabored on nasal cannula  Heart: Regular rate and rhythm, S1 and S2 normal, no murmur  Abdomen:  Soft, nontender, bowel sounds active, nondistended, obese abdomen  Extremities: No clubbing, cyanosis, or edema to lower extremities  Pulses:  2+ and symmetric in distal lower extremities  Skin: No rashes   Neurologic: Oriented x3, Normal strength to extremities    Results Review:    I have reviewed the labs, radiology results and diagnostic studies.    Results from last 7 days   Lab Units 03/10/25  0451   WBC 10*3/mm3 9.34   HEMOGLOBIN g/dL 11.9*   HEMATOCRIT % 36.6*   PLATELETS 10*3/mm3 424     Results from last 7 days   Lab Units 03/10/25  0451   SODIUM mmol/L 141   POTASSIUM mmol/L 3.7   CHLORIDE mmol/L 107   CO2 mmol/L 23.2   BUN mg/dL 16   CREATININE mg/dL 0.98   CALCIUM mg/dL 8.6   BILIRUBIN mg/dL <0.2   ALK PHOS U/L 83   ALT (SGPT) U/L 22   AST (SGOT) U/L 21   GLUCOSE mg/dL 120*     Imaging Results (Last 24 Hours)       Procedure Component Value Units Date/Time    MRI Cervical Spine With & Without Contrast [[id number]] Collected: 03/10/25 1214     Updated: 03/10/25 1214    Narrative:      MRI OF THE CERVICAL AND THORACIC SPINE WITH AND WITHOUT CONTRAST  03/10/2025     CLINICAL HISTORY: This patient underwent surgery of the cervical spine  back on 03/29/2016 with prior corpectomies at C4, C5 and C6, and  anterior plate and screw fixation from C3 to C7 and underwent a  subsequent surgery on 02/25/2025 with decompression at C7-T1 and  posterior cervical thoracic fusion from C3 to T3, complains of  bilateral  upper extremity weakness.     MRI OF THE CERVICAL SPINE TECHNIQUE: Sagittal T1, proton density and  fast spin-echo T2, gradient echo T2 and fat-suppressed T2 and  postcontrast sagittal T1 weighted images were obtained of the cervical  spine. In addition, axial T1 and gradient echo T2 and fast spin-echo T2  and postcontrast axial T1-weighted images were obtained from the skull  base to T1.         MRI OF THE THORACIC SPINE TECHNIQUE: Sagittal T1, proton density and  fast spin echo T2 and fat-suppressed T2 and postcontrast sagittal T1 and  postcontrast sagittal fat-suppressed T1 weighted images were obtained of  the thoracic spine. In addition, axial T1 and T2 and postcontrast T1  weighted images were obtained from T1 to T8 and then from T8 to L1.     This is correlated to preoperative outside MRI of the cervical spine on  07/29/2024 and multiple plain film series of the cervical spine dating  back to 05/11/2016 and a remote prior cervical spine MRI prior to  cervical spine surgery back on 03/03/2016.     FINDINGS: This patient, back in 2016, had a remote prior cervical spine  surgery with corpectomies at C4, C5 and C6 and had anterior cervical  discectomy and fusion procedure from C4 to C7 with anterior plate and  screw fixation from C4 to C7. There is a cylindrical graft extending  from the inferior endplate of C3 to the superior body of C7, and an  anterior plate extending from the anterior superior body of C3 to the  anterior inferior body of C7 anchored by screws to the C3 and C7  vertebrae.     At C7-T1 on 02/25/2025 this patient underwent cervical spine surgery  with bilateral laminectomies at C7 and medial facetectomies at C7-T1.   There was placement of bilateral rods bridging the posterior elements  from C3 to T3 anchored by articular pillar screws bilaterally at C3, C4,  C5, C6 and bilateral pedicle screws at T1, T2 and T3. Preoperatively,  there was posterior endplate spurring and mild canal  narrowing, and  there was moderate left and moderate-to-severe right bony foraminal  narrowing at C7-T1. There has been adequate posterior decompression and  there is no residual canal narrowing at C7-T1.  There remains some  uncovertebral joint spurring into the foramina with some residual  mild-to-moderate bilateral foraminal narrowing at C7-T1. I see no canal  narrowing from the skull base to T1. At the level of the remote prior  cervical spine surgery, there is some areas of bony foraminal narrowing  from C3 to C7 that are unchanged. There is a postoperative fluid  collection in the posterior subcutaneous fat of the neck and upper  chest. The fluid collection tracks up to 15 cm in cranial caudal  dimension and maximally measures 5 x 4 cm in medial lateral and anterior  posterior dimension at the C7-T1 and T1 thoracic level. It channels down  to the posterior dura at the C7-T1 level, and may be postoperative  seroma or hematoma although I cannot exclude an infected fluid  collection.     The cervical and thoracic cord is normal in contour and signal  intensity. The conus terminates at the L1 lumbar level which is normal.  Within the thoracic spine, there is some left paracentral spurring and  bulging disc material that indents the ventral thecal sac and abuts the  left ventral surface of the cord mildly narrowing the left side of the  canal at T6-7. There is some left paracentral spurring and bulging disc  material that abuts the left ventral surface of the cord minimally  narrowing the canal at T7-8. Otherwise, I see no canal or foraminal  narrowing from T3 down to L1.       Impression:      1. This patient has had extensive remote prior cervical spine surgery  back in 2016 during which there was performance of corpectomies at C4,  C5 and C6 and placement of a cylindrical graft from the inferior  endplate of C4 to the superior body of C7, and anterior plate and screw  fixation from C3 to C7. On 02/25/2025 this  patient underwent bilateral  laminectomies at C7 and medial facetectomies at C7-T1.  There was  placement of bilateral posterior element rods from C3 to T3 anchored by  bilateral articular pillar screws at C3, C4, C5 and C6 and bilateral  pedicle screws at T1, T2 and T3. There is postoperative fluid in the  posterior subcutaneous fat of the neck and upper thoracic region that  tracks up to 15 cm in cranial caudal dimension and 5 x 4 mm in medial  lateral and anterior posterior dimension and it channels down to the  posterior margin of the posterior dura at the C7-T1 level. This may be  postoperative seroma hematoma although I cannot exclude an infected  fluid collection and this needs to be correlated clinically.     2. I see no residual canal narrowing from the skull base down to the T3  thoracic level. Since prior surgery, there has been some improvement in  the foraminal narrowing at C7-T1 but there remains mild-to-moderate  bilateral foraminal narrowing at C7-T1. There is also some chronic  residual multilevel foraminal narrowing from C3 to C7, unchanged.     3. Within the thoracic spine there is left paracentral disc osteophyte  complex that abuts the left ventral surface of the cord mildly narrowing  the left side of the canal at T6-7 and left paracentral spurring  minimally narrowing the left side of the canal at T7-8, otherwise I see  no canal or foraminal narrowing from T3 to L1.             MRI Thoracic Spine With & Without Contrast [[id number]] Collected: 03/10/25 1214     Updated: 03/10/25 1214    Narrative:      MRI OF THE CERVICAL AND THORACIC SPINE WITH AND WITHOUT CONTRAST  03/10/2025     CLINICAL HISTORY: This patient underwent surgery of the cervical spine  back on 03/29/2016 with prior corpectomies at C4, C5 and C6, and  anterior plate and screw fixation from C3 to C7 and underwent a  subsequent surgery on 02/25/2025 with decompression at C7-T1 and  posterior cervical thoracic fusion from C3 to T3,  complains of bilateral  upper extremity weakness.     MRI OF THE CERVICAL SPINE TECHNIQUE: Sagittal T1, proton density and  fast spin-echo T2, gradient echo T2 and fat-suppressed T2 and  postcontrast sagittal T1 weighted images were obtained of the cervical  spine. In addition, axial T1 and gradient echo T2 and fast spin-echo T2  and postcontrast axial T1-weighted images were obtained from the skull  base to T1.         MRI OF THE THORACIC SPINE TECHNIQUE: Sagittal T1, proton density and  fast spin echo T2 and fat-suppressed T2 and postcontrast sagittal T1 and  postcontrast sagittal fat-suppressed T1 weighted images were obtained of  the thoracic spine. In addition, axial T1 and T2 and postcontrast T1  weighted images were obtained from T1 to T8 and then from T8 to L1.     This is correlated to preoperative outside MRI of the cervical spine on  07/29/2024 and multiple plain film series of the cervical spine dating  back to 05/11/2016 and a remote prior cervical spine MRI prior to  cervical spine surgery back on 03/03/2016.     FINDINGS: This patient, back in 2016, had a remote prior cervical spine  surgery with corpectomies at C4, C5 and C6 and had anterior cervical  discectomy and fusion procedure from C4 to C7 with anterior plate and  screw fixation from C4 to C7. There is a cylindrical graft extending  from the inferior endplate of C3 to the superior body of C7, and an  anterior plate extending from the anterior superior body of C3 to the  anterior inferior body of C7 anchored by screws to the C3 and C7  vertebrae.     At C7-T1 on 02/25/2025 this patient underwent cervical spine surgery  with bilateral laminectomies at C7 and medial facetectomies at C7-T1.   There was placement of bilateral rods bridging the posterior elements  from C3 to T3 anchored by articular pillar screws bilaterally at C3, C4,  C5, C6 and bilateral pedicle screws at T1, T2 and T3. Preoperatively,  there was posterior endplate spurring and  mild canal narrowing, and  there was moderate left and moderate-to-severe right bony foraminal  narrowing at C7-T1. There has been adequate posterior decompression and  there is no residual canal narrowing at C7-T1.  There remains some  uncovertebral joint spurring into the foramina with some residual  mild-to-moderate bilateral foraminal narrowing at C7-T1. I see no canal  narrowing from the skull base to T1. At the level of the remote prior  cervical spine surgery, there is some areas of bony foraminal narrowing  from C3 to C7 that are unchanged. There is a postoperative fluid  collection in the posterior subcutaneous fat of the neck and upper  chest. The fluid collection tracks up to 15 cm in cranial caudal  dimension and maximally measures 5 x 4 cm in medial lateral and anterior  posterior dimension at the C7-T1 and T1 thoracic level. It channels down  to the posterior dura at the C7-T1 level, and may be postoperative  seroma or hematoma although I cannot exclude an infected fluid  collection.     The cervical and thoracic cord is normal in contour and signal  intensity. The conus terminates at the L1 lumbar level which is normal.  Within the thoracic spine, there is some left paracentral spurring and  bulging disc material that indents the ventral thecal sac and abuts the  left ventral surface of the cord mildly narrowing the left side of the  canal at T6-7. There is some left paracentral spurring and bulging disc  material that abuts the left ventral surface of the cord minimally  narrowing the canal at T7-8. Otherwise, I see no canal or foraminal  narrowing from T3 down to L1.       Impression:      1. This patient has had extensive remote prior cervical spine surgery  back in 2016 during which there was performance of corpectomies at C4,  C5 and C6 and placement of a cylindrical graft from the inferior  endplate of C4 to the superior body of C7, and anterior plate and screw  fixation from C3 to C7. On  02/25/2025 this patient underwent bilateral  laminectomies at C7 and medial facetectomies at C7-T1.  There was  placement of bilateral posterior element rods from C3 to T3 anchored by  bilateral articular pillar screws at C3, C4, C5 and C6 and bilateral  pedicle screws at T1, T2 and T3. There is postoperative fluid in the  posterior subcutaneous fat of the neck and upper thoracic region that  tracks up to 15 cm in cranial caudal dimension and 5 x 4 mm in medial  lateral and anterior posterior dimension and it channels down to the  posterior margin of the posterior dura at the C7-T1 level. This may be  postoperative seroma hematoma although I cannot exclude an infected  fluid collection and this needs to be correlated clinically.     2. I see no residual canal narrowing from the skull base down to the T3  thoracic level. Since prior surgery, there has been some improvement in  the foraminal narrowing at C7-T1 but there remains mild-to-moderate  bilateral foraminal narrowing at C7-T1. There is also some chronic  residual multilevel foraminal narrowing from C3 to C7, unchanged.     3. Within the thoracic spine there is left paracentral disc osteophyte  complex that abuts the left ventral surface of the cord mildly narrowing  the left side of the canal at T6-7 and left paracentral spurring  minimally narrowing the left side of the canal at T7-8, otherwise I see  no canal or foraminal narrowing from T3 to L1.             XR Spine Cervical 2 or 3 View [[id number]] Collected: 03/10/25 0723     Updated: 03/10/25 0743    Narrative:      XR SPINE CERVICAL 2 OR 3 VW-, XR SPINE THORACIC 2 VW-     CLINICAL INFORMATION: Pain, recent surgery.     THORACOLUMBAR SPINE FINDINGS: There are pedicle screws and vertical  fixation rods spanning C3 through T3. No screw loosening nor  malalignment. There is an anterior plate and screw fixator C3 through  C7, transverse screws within C3 and C7. Corpectomy C4 through C6. One of  the C3 screws  is broken in the head is backed out of position  approximately 3 to 4 mm. No alteration in the position of the plate has  occurred. This does not represent a new finding. The odontoid is  preserved with a satisfactory C1-2 alignment and the prevertebral soft  tissues have a satisfactory appearance.     No thoracic compression deformity or subluxation is demonstrated. There  is mild multilevel mid and lower thoracic disc space narrowing. The  paravertebral soft tissues have a satisfactory appearance.     CONCLUSION: Imaging of the cervical and thoracic spine is quite similar  to 02/26/2025.     This report was finalized on 3/10/2025 7:40 AM by Dr. Con Glynn M.D  on Workstation: [id number]       XR Spine Thoracic 2 View [[id number]] Collected: 03/10/25 0723     Updated: 03/10/25 0743    Narrative:      XR SPINE CERVICAL 2 OR 3 VW-, XR SPINE THORACIC 2 VW-     CLINICAL INFORMATION: Pain, recent surgery.     THORACOLUMBAR SPINE FINDINGS: There are pedicle screws and vertical  fixation rods spanning C3 through T3. No screw loosening nor  malalignment. There is an anterior plate and screw fixator C3 through  C7, transverse screws within C3 and C7. Corpectomy C4 through C6. One of  the C3 screws is broken in the head is backed out of position  approximately 3 to 4 mm. No alteration in the position of the plate has  occurred. This does not represent a new finding. The odontoid is  preserved with a satisfactory C1-2 alignment and the prevertebral soft  tissues have a satisfactory appearance.     No thoracic compression deformity or subluxation is demonstrated. There  is mild multilevel mid and lower thoracic disc space narrowing. The  paravertebral soft tissues have a satisfactory appearance.     CONCLUSION: Imaging of the cervical and thoracic spine is quite similar  to 02/26/2025.     This report was finalized on 3/10/2025 7:40 AM by Dr. Con Glynn M.D  on Workstation: [id number]               I have reviewed the  medications.     Discharge Medications        ASK your doctor about these medications        Instructions Start Date   aspirin 81 MG chewable tablet   81 mg, Daily      baclofen 20 MG tablet  Commonly known as: LIORESAL   1 tablet, 2 Times Daily      busPIRone 15 MG tablet  Commonly known as: BUSPAR   Take 1 tablet by mouth 2 (Two) Times a Day. Indications: Anxiety Disorder      cephalexin 500 MG capsule  Commonly known as: KEFLEX   Take 1 capsule by mouth Every 6 (Six) Hours for 28 doses.      cetirizine 10 MG tablet  Commonly known as: zyrTEC   10 mg, Daily      cholecalciferol 25 MCG (1000 UT) tablet  Commonly known as: VITAMIN D3   1,000 Units, Daily      clonazePAM 0.5 MG tablet  Commonly known as: KlonoPIN   Take 1 tablet by mouth 3 times a day for 5 days. Indications: Panic Disorder      Diclofenac Sodium 1 % gel gel  Commonly known as: VOLTAREN   4 g, As Needed      docusate sodium 100 MG capsule   100 mg, Oral, Daily      escitalopram 10 MG tablet  Commonly known as: LEXAPRO   Take 1 tablet by mouth Every Night. Indications: Panic Disorder      FeroSul 325 (65 Fe) MG tablet  Generic drug: ferrous sulfate   325 mg, Daily With Breakfast      gabapentin 800 MG tablet  Commonly known as: NEURONTIN   800 mg, 3 Times Daily      HYDROcodone-acetaminophen 7.5-325 MG per tablet  Commonly known as: NORCO  Ask about: Should I take this medication?   1 tablet, Oral, Every 4 Hours PRN      lidocaine 5 %  Commonly known as: LIDODERM   1 patch, Transdermal, Every 24 Hours, Remove & Discard patch within 12 hours or as directed by MD      mometasone 0.1 % cream  Commonly known as: ELOCON   As Needed      naloxone 4 MG/0.1ML nasal spray  Commonly known as: NARCAN   Call 911. Don't prime. [place] in 1 nostril for overdose. Repeat in 2-3 minutes in other nostril if no or minimal breathing/responsiveness.      NON FORMULARY   Nightly      pantoprazole 40 MG EC tablet  Commonly known as: PROTONIX   40 mg, 2 Times Daily       polyethylene glycol 17 GM/SCOOP powder  Commonly known as: MIRALAX   Dissolve 17 g (1 capful) in liquid and drink by mouth 2 (Two) Times a Day As Needed for constipation      Potassium 75 MG tablet   As Needed      Senexon-S 8.6-50 MG per tablet  Generic drug: sennosides-docusate   2 tablets, Oral, Nightly      Unable to find   1 each, Once, INHALER NEW INSTRUCTED PT TO BRING WITH HIM      Ventolin  (90 Base) MCG/ACT inhaler  Generic drug: albuterol sulfate HFA   2 puffs, Every 4 Hours PRN             ---------------------------------------------------------------------------------------------  Assessment & Plan   Assessment/Problem List    Acute on chronic back pain    Cervical disc disorder with radiculopathy of mid-cervical region    Cervical stenosis of spine      Plan:  Acute on chronic back pain  Cervical stenosis of the spine  -Status post C7-T1 decompression and C3-7, T1-3 fusion with Dr. Collado 2/25/2025  -Plain films of the cervical and thoracic spine stable  -MRI of the cervical and thoracic spine showed successful decompression, expected postop changes  -Continue gabapentin  -Neurosurgery has seen and reevaluated, they have discontinued the Valium and are going to try fentanyl patch, but recommend that patient be admitted for continued pain management  -Discussed case with hospitalist who has graciously accepted the patient for further inpatient management he and monitoring     COPD  -Albuterol as needed     GERD  -PPI     Anxiety  -No acute issues  -Continue home medications once reconciled    Disposition: Dependent on hospital course    Follow-up after Discharge: Dependent on hospital course    This note will serve as progress note      58 minutes have been spent by [facility] providers in the care of this patient while under observation status.    Appropriate PPE worn during patient encounter.  Hand hygeine performed before and after seeing the  patient.      Chasity Good PA-C 03/11/25 08:02 EDT

## 2025-03-11 NOTE — CONSULTS
Patient Name:  Rafael Kat  YOB: 1970  MRN:  [id number]  Admit Date:  3/10/2025  Patient Care Team:  Provider, No Known as PCP - Bert Moe MD as Surgeon (Neurosurgery)      Subjective   History Present Illness     Chief Complaint   Patient presents with    Back Pain   Consult for COPD and admission from observation unit with ongoing back pain.    Mr. Kat is a 54 y.o. admitted to the observation unit with back pain after he had a cervical/thoracic decompression and fusion a few weeks ago.  Patient reports that since he returned home he has had continued issues with his right hand where he will intermittently drop things.  He has had the sensation of swelling and pain in his mid/upper back.  He does not report any radiation of the pain.  Does not report any muscle spasms.  No fevers or chills.  He has not had any cranial nerve issues in including no speech change or vision change.    Denies chest pain palpitation shortness of breath.  No productive cough.  No nausea vomiting frontal abdominal pain dysuria or flank pain reported.  When I saw him he was on supplemental oxygen but not having any respiratory distress.    Review of Systems   Constitutional:  Negative for chills and fever.   HENT:  Negative for sore throat and trouble swallowing.    Eyes:  Negative for pain and visual disturbance.   Respiratory:  Negative for cough, shortness of breath and wheezing.    Cardiovascular:  Negative for chest pain, palpitations and leg swelling.   Gastrointestinal:  Negative for constipation, diarrhea, nausea and vomiting.   Endocrine: Negative for cold intolerance and heat intolerance.   Genitourinary:  Negative for dysuria and flank pain.   Musculoskeletal:  Positive for back pain.   Skin:  Negative for pallor and rash.   Allergic/Immunologic: Negative for environmental allergies and food allergies.   Neurological:  Negative for seizures and syncope.   Hematological:  Negative  for adenopathy. Does not bruise/bleed easily.   Psychiatric/Behavioral:  Negative for agitation and confusion.         Personal History     Past Medical History:   Diagnosis Date    Anesthesia complication     PT STATES HE IS SLOW TO WAKE UP    Arthritis     COPD (chronic obstructive pulmonary disease)     STATES WHEEZES AT NIGHT AND DOES USE NIGHTLY INHALER    DDD (degenerative disc disease), cervical     DDD (degenerative disc disease), lumbar     PAIN IN RIGHT BUTTOCK AND DOWN AT TIMES RIGHT LEG, WEAKNESS RIGHT LEG AT TIMES    Dysphagia     STARTED AFTER HIS CERVICAL NECK SURGERY    GERD (gastroesophageal reflux disease)     History of COVID-19     Neck pain     Psoriasis     Shoulder pain, bilateral     Skin cancer     BASAL/SQUAMOUS     Past Surgical History:   Procedure Laterality Date    ANTERIOR CHANNEL VERTEBRECTOMY/CORPECTOMY Bilateral 03/29/2016    Procedure: C4-6 ANTERIOR CHANNEL VERTEBRECTOMY/CORPECTOMY WITH INSTRUMENTATION;  Surgeon: Bert Collado MD;  Location: [facility] OR;  Service:     CERVICAL DISCECTOMY POSTERIOR FUSION WITH BRAIN LAB N/A 2/25/2025    Procedure: Posterior cervical seven/thoracic one decompression, posterior cervical three/four/five/six/seven, thoracic one/two/three fusion with screws/rods/crosslinks with Stealth spinal navigation;  Surgeon: Bert Collado MD;  Location: [facility] OR;  Service: Neurosurgery;  Laterality: N/A;    COLONOSCOPY      COLONOSCOPY N/A 2/14/2025    Procedure: COLONOSCOPY into cecum with hot snare polypectomy, Resolutions clips x2, Tattoo of transverse polyp area;  Surgeon: Gonzalo Rodriguez MD;  Location: [facility] ENDOSCOPY;  Service: Gastroenterology;  Laterality: N/A;  Pre: Screening  Post: Polyp, Suboptimal prep    ELBOW PROCEDURE Right     UNCLEAR:  RIGHT ELBOW AREA    ENDOSCOPY N/A 2/14/2025    Procedure: ESOPHAGOGASTRODUODENOSCOPY with biopsies;  Surgeon: Gonzalo Rodriguez MD;  Location: [facility] ENDOSCOPY;  Service:  Gastroenterology;  Laterality: N/A;  Pre: GERD, Dysphagia  Post: Gastritis and Esophagitis    FINGER SURGERY Left     INDEX FINGER    LUMBAR EPIDURAL INJECTION      STATES X3    LUMBAR LAMINECTOMY DISCECTOMY DECOMPRESSION Bilateral 11/28/2023    Procedure: Open bilateral lumbar decompression lumbar one/two, lumbar two/three, lumbar three/four;  Surgeon: Bert Collado MD;  Location: [facility] OR;  Service: Neurosurgery;  Laterality: Bilateral;    MENISCECTOMY Right     UPPER GASTROINTESTINAL ENDOSCOPY  03/2021     Family History   Problem Relation Age of Onset    Malig Hyperthermia Neg Hx      Social History     Tobacco Use    Smoking status: Former     Current packs/day: 0.00     Average packs/day: 2.0 packs/day for 20.0 years (40.0 ttl pk-yrs)     Types: Cigarettes     Start date: 1/1/1990     Quit date: 2010     Years since quitting: 15.2     Passive exposure: Past    Smokeless tobacco: Never   Vaping Use    Vaping status: Never Used   Substance Use Topics    Alcohol use: No    Drug use: Yes     Frequency: 14.0 times per week     Types: Marijuana     Comment: 1-2 PER DAY,     No current facility-administered medications on file prior to encounter.     Current Outpatient Medications on File Prior to Encounter   Medication Sig Dispense Refill    aspirin 81 MG chewable tablet Chew 1 tablet Daily.      baclofen (LIORESAL) 20 MG tablet Take 1 tablet by mouth 2 (Two) Times a Day.      busPIRone (BUSPAR) 15 MG tablet Take 1 tablet by mouth 2 (Two) Times a Day. Indications: Anxiety Disorder 20 tablet 0    cephalexin (KEFLEX) 500 MG capsule Take 1 capsule by mouth Every 6 (Six) Hours for 28 doses. 28 capsule 0    cetirizine (zyrTEC) 10 MG tablet Take 1 tablet by mouth Daily.      cholecalciferol (VITAMIN D3) 25 MCG (1000 UT) tablet Take 1 tablet by mouth Daily.      clonazePAM (KlonoPIN) 0.5 MG tablet Take 1 tablet by mouth 3 times a day for 5 days. Indications: Panic Disorder 15 tablet 0    docusate sodium 100 MG  capsule Take 1 capsule by mouth Daily.      escitalopram (LEXAPRO) 10 MG tablet Take 1 tablet by mouth Every Night. Indications: Panic Disorder 30 tablet 0    FeroSul 325 (65 Fe) MG tablet Take 1 tablet by mouth Daily With Breakfast. PT HAS NOT HAD FOR 4 DAYS      gabapentin (NEURONTIN) 800 MG tablet Take 1 tablet by mouth 3 (Three) Times a Day. TYPICALLY TAKES TWICE DAILY      [] HYDROcodone-acetaminophen (NORCO) 7.5-325 MG per tablet Take 1 tablet by mouth Every 4 (Four) Hours As Needed for Severe Pain for up to 5 days. 30 tablet 0    NON FORMULARY Every Night. STATES DOES USE NIGHT INHALER:  INSTRUCTED TO BRING THIS DAY OF SURGEY      pantoprazole (PROTONIX) 40 MG EC tablet Take 1 tablet by mouth 2 (Two) Times a Day.  11    polyethylene glycol (MIRALAX) 17 GM/SCOOP powder Dissolve 17 g (1 capful) in liquid and drink by mouth 2 (Two) Times a Day As Needed for constipation 510 g 0    sennosides-docusate (PERICOLACE) 8.6-50 MG per tablet Take 2 tablets by mouth Every Night. 20 tablet 0    Ventolin  (90 Base) MCG/ACT inhaler Inhale 2 puffs Every 4 (Four) Hours As Needed.      Diclofenac Sodium (VOLTAREN) 1 % gel gel Apply 4 g topically to the appropriate area as directed As Needed.      lidocaine (LIDODERM) 5 % PLACE 1 PATCH ON THE SKIN AS DIRECTED BY PROVIDER DAILY. REMOVE & DISCARD PATCH WITHIN 12 HOURS OR AS DIRECTED BY MD 30 patch 2    mometasone (ELOCON) 0.1 % cream 1 Application As Needed.      naloxone (NARCAN) 4 MG/0.1ML nasal spray Call 911. Don't prime. [place] in 1 nostril for overdose. Repeat in 2-3 minutes in other nostril if no or minimal breathing/responsiveness. 2 each 0    Potassium 75 MG tablet Take  by mouth As Needed. DURING THE SUMMER WHEN SWEATS MORE      Unable to find 1 each 1 (One) Time. INHALER NEW INSTRUCTED PT TO BRING WITH HIM       Allergies   Allergen Reactions    Bacitracin-Polymyxin B Other (See Comments) and Rash    Chlorhexidine Rash    Evmib-Ehmfu-Zujqzfz-Pramoxine Rash        Objective    Objective     Vital Signs  Temp:  [97.5 °F (36.4 °C)-98.4 °F (36.9 °C)] 98.2 °F (36.8 °C)  Heart Rate:  [57-68] 65  Resp:  [17-20] 18  BP: (103-132)/(67-93) 123/93  SpO2:  [86 %-98 %] 96 %  on  Flow (L/min) (Oxygen Therapy):  [2-3] 2;   Device (Oxygen Therapy): nasal cannula  Body mass index is 31.09 kg/m².    Physical Exam  Vitals and nursing note reviewed.   Constitutional:       General: He is not in acute distress.     Appearance: He is not diaphoretic.   HENT:      Head: Atraumatic.   Eyes:      Conjunctiva/sclera: Conjunctivae normal.      Pupils: Pupils are equal, round, and reactive to light.   Cardiovascular:      Rate and Rhythm: Normal rate and regular rhythm.      Pulses: Normal pulses.   Pulmonary:      Effort: Pulmonary effort is normal.      Breath sounds: No wheezing or rhonchi.   Abdominal:      General: There is no distension.      Palpations: Abdomen is soft.      Tenderness: There is no abdominal tenderness. There is no guarding or rebound.   Musculoskeletal:         General: Tenderness present.      Right lower leg: No edema.      Left lower leg: No edema.   Skin:     General: Skin is warm and dry.   Neurological:      Mental Status: He is alert.      Cranial Nerves: No cranial nerve deficit.   Psychiatric:         Mood and Affect: Mood normal.         Behavior: Behavior normal.         Results Review:  I reviewed the patient's new clinical results.  I reviewed the patient's new imaging results and agree with the interpretation.  I personally viewed and interpreted the patient's EKG/Telemetry data  I reviewed prior records and medications.    Lab Results (last 24 hours)       ** No results found for the last 24 hours. **            Imaging Results (Last 24 Hours)       ** No results found for the last 24 hours. **                No orders to display        Assessment/Plan     Active Hospital Problems    Diagnosis  POA    **Acute on chronic back pain [M54.9, G89.29]  Yes     COPD (chronic obstructive pulmonary disease) [J44.9]  Yes    Cervical stenosis of spine [M48.02]  Yes    Gastroesophageal reflux disease [K21.9]  Yes    Cervical disc disorder with radiculopathy of mid-cervical region [M50.120]  Yes      Resolved Hospital Problems   No resolved problems to display.       Mr. Kat is a 54 y.o.     COPD: No acute bronchospasm on exam.  Plan to continue breathing treatments.  Some of the hypoxia may be related to the pain medications/benzodiazepines that he had been receiving to try to control his upper back symptoms.  Will screen a chest x-ray.  Monitor progression and wean O2 as able.  Cervical radiculopathy/right upper extremity weakness: Continue pain control efforts.  A fentanyl patch has been started.  He is on Neurontin.  Neurosurgery following.  GERD: PPI  PPx: SCD  Code Status: Full  I discussed the patient's findings and my recommendations with patient, family, and ED provider.      Helder Blackburn MD  [facility]  03/11/25  12:56 EDT    Dictated portions of note using dragon dictation software.

## 2025-03-11 NOTE — PROGRESS NOTES
[facility] THORACIC/LUMBAR NEUROSURGERY POSTOP NOTE      CC: POD #14 s/p posterior C7-T1 decompression with posterior C3-T3 fusion      Subjective     Interval History: Patient reports no relief of back pain overnight.  Observation provider noted that he appeared to be rather sedated overnight.      Objective     Vital signs in last 24 hours:  Temp:  [97.5 °F (36.4 °C)-98.4 °F (36.9 °C)] 97.9 °F (36.6 °C)  Heart Rate:  [57-68] 68  Resp:  [17-20] 17  BP: (103-132)/(67-88) 132/88    Intake/Output this shift:  No intake/output data recorded.        LABS:  .  Results from last 7 days   Lab Units 03/10/25  0451   WBC 10*3/mm3 9.34   HEMOGLOBIN g/dL 11.9*   HEMATOCRIT % 36.6*   PLATELETS 10*3/mm3 424     .  Results from last 7 days   Lab Units 03/10/25  0451   SODIUM mmol/L 141   POTASSIUM mmol/L 3.7   CHLORIDE mmol/L 107   CO2 mmol/L 23.2   BUN mg/dL 16   CREATININE mg/dL 0.98   GLUCOSE mg/dL 120*   CALCIUM mg/dL 8.6         IMAGING STUDIES:  No new neuroimaging    I personally viewed and interpreted the patient's labs, medications and chart.    Meds reviewed/changed: yes    Current Facility-Administered Medications:     acetaminophen (TYLENOL) tablet 650 mg, 650 mg, Oral, Q4H PRN **OR** acetaminophen (TYLENOL) 160 MG/5ML oral solution 650 mg, 650 mg, Oral, Q4H PRN **OR** acetaminophen (TYLENOL) suppository 650 mg, 650 mg, Rectal, Q4H PRN, Shekhar Ramirez MD    albuterol (PROVENTIL) nebulizer solution 0.083% 2.5 mg/3mL, 2.5 mg, Nebulization, Q6H PRN, Shekhar Ramirez MD    [Held by provider] aspirin chewable tablet 81 mg, 81 mg, Oral, Daily, Shekhar Ramirez MD    sennosides-docusate (PERICOLACE) 8.6-50 MG per tablet 2 tablet, 2 tablet, Oral, BID PRN **AND** polyethylene glycol (MIRALAX) packet 17 g, 17 g, Oral, Daily PRN **AND** bisacodyl (DULCOLAX) EC tablet 5 mg, 5 mg, Oral, Daily PRN **AND** bisacodyl (DULCOLAX) suppository 10 mg, 10 mg, Rectal, Daily PRN, Shekhar Ramirez MD    busPIRone (BUSPAR) tablet 15 mg, 15 mg, Oral,  BID, Shekhar Ramirez MD, 15 mg at 03/11/25 0804    cetirizine (zyrTEC) tablet 10 mg, 10 mg, Oral, Daily, Shekhar Ramirez MD, 10 mg at 03/11/25 0805    cholecalciferol (VITAMIN D3) tablet 1,000 Units, 1,000 Units, Oral, Daily, Shekhar Ramirez MD, 1,000 Units at 03/11/25 0804    diazePAM (VALIUM) tablet 5 mg, 5 mg, Oral, TID, Joe Lombardo, APRN, 5 mg at 03/11/25 0805    escitalopram (LEXAPRO) tablet 10 mg, 10 mg, Oral, Nightly, Shekhar Ramirez MD    ferrous sulfate tablet 325 mg, 325 mg, Oral, Daily With Breakfast, Shekhar Ramirez MD, 325 mg at 03/11/25 0805    gabapentin (NEURONTIN) capsule 800 mg, 800 mg, Oral, Q8H, Shekhar Ramirez MD, 800 mg at 03/11/25 0608    HYDROmorphone (DILAUDID) injection 0.5 mg, 0.5 mg, Intravenous, Q3H PRN, 0.5 mg at 03/11/25 0802 **AND** naloxone (NARCAN) injection 0.4 mg, 0.4 mg, Intravenous, Q5 Min PRN, Mitra Beaulieu, PA-C    Lidocaine 4 % 1 patch, 1 patch, Transdermal, Q24H, Joe Lombardo, APRN, 1 patch at 03/11/25 0805    ondansetron ODT (ZOFRAN-ODT) disintegrating tablet 4 mg, 4 mg, Oral, Q6H PRN **OR** ondansetron (ZOFRAN) injection 4 mg, 4 mg, Intravenous, Q6H PRN, Shekhar Ramirez MD    oxyCODONE-acetaminophen (PERCOCET) 7.5-325 MG per tablet 1 tablet, 1 tablet, Oral, Q6H PRN, Shekhar Ramirez MD, 1 tablet at 03/10/25 1013    pantoprazole (PROTONIX) EC tablet 40 mg, 40 mg, Oral, BID, Shekhar Ramirez MD, 40 mg at 03/11/25 0805    [COMPLETED] Insert Peripheral IV, , , Once **AND** sodium chloride 0.9 % flush 10 mL, 10 mL, Intravenous, PRN, Holly Graham PA-C    sodium chloride 0.9 % flush 10 mL, 10 mL, Intravenous, Q12H, Shekhar Ramirez MD, 10 mL at 03/11/25 0802    sodium chloride 0.9 % flush 10 mL, 10 mL, Intravenous, PRN, Shekhar Ramirez MD    sodium chloride 0.9 % infusion 40 mL, 40 mL, Intravenous, PRN, Shekhar Ramirez MD      Physical Exam:    General:   Awake, alert.  Back:    Posterior cervical thoracic incision wound edges well-approximated without erythema,  "swelling or drainage.  Abdomen:    NT/ND   Motor:   Normal muscle strength, bulk and tone in upper extremities.  No fasciculations, rigidity, spasticity, or abnormal movements.  Reflexes:   2+ in the lower extremities. no Gutiérrez's  Sensation:   Normal to light touch sarah LEs        Assessment & Plan     ASSESSMENT:      Acute on chronic back pain    Cervical disc disorder with radiculopathy of mid-cervical region    Cervical stenosis of spine    54-year-old male 2 weeks status post C7-T1 decompression with C3-T3 fusion by Dr. Collado.  He apparently had received good relief of pain but over the weekend he developed recurrence after moving on the couch.  Repeat cervical and thoracic x-rays appear stable and Tobias does not feel there are any acute findings on MRI.  It appears he is possibly dealing with muscle spasms.  Please continue pain management efforts.  Valium and lidocaine patches were added yesterday.  He does not necessarily report any further relief despite those additions.  I discussed the case with Dr. Collado who recommends discontinuation of Valium with addition of Durogesic patch.  I spoke with observation provider who will have the patient admitted for continued need for pain management.    PLAN:     Discontinue Valium  Continue muscle relaxers/lidocaine  Add Duragesic patch  Continue pain management efforts  Neurosurgery will continue to follow  I discussed the patient's findings and my recommendations with patient, family, and Dr. Collado    During patient visit, I utilized appropriate personal protective equipment including mask and gloves.  Mask used was standard procedure mask. Appropriate PPE was worn during the entire visit.  Hand hygiene was completed before and after.      LOS: 0 days       Joe Laureanojoya, APRN  3/11/2025  11:32 EDT    \"Dictated utilizing Dragon dictation\".    "

## 2025-03-11 NOTE — PROGRESS NOTES
MELISSA SIN Attestation Note    I supervised care provided by the midlevel provider.    The JANEE and I have discussed this patient's history, physical exam, and treatment plan. I have reviewed the documentation and personally had a face to face interaction with the patient  I affirm the documentation and agree with the treatment and plan. I provided a substantive portion of the care of this patient.  I personally performed the physical exam, in its entirety.  My personal findings are documented in below:    History:  54-year-old male who is recently postop from cervical and thoracic surgery returning complaining of acute on chronic pain.  Feeling significantly improved this afternoon after several therapies through the day.    Physical Exam:  General: No acute distress.  HENT: NCAT  Eyes: no scleral icterus.  Neck: Painless range of motion  CV: Pink warm and well-perfused throughout  Respiratory: No distress or increased work of breathing  Abdomen: soft, no focal tenderness or rigidity  Musculoskeletal: no deformity.  Neuro: Alert, speech fluent and easily intelligible.  Strong plantarflexion dorsiflexion bilateral feet.  Skin: warm, dry.    Assessment and Plan:  Acute on chronic back pain: MRI is reassuring.  No further surgical intervention recommended.  Neurosurgery following.

## 2025-03-11 NOTE — SIGNIFICANT NOTE
03/11/25 0919   OTHER   Discipline physical therapist   Therapy Assessment/Plan (PT)   Criteria for Skilled Interventions Met (PT) no problems identified which require skilled intervention  ([facility]. Pt reports occassional RUE tremoring/spilling items. Consider OP PT/OT at DC pending  SHARON recommendations. Pt provided w back precautions/mobility recommendations. will defer formal  acute eval.)

## 2025-03-12 ENCOUNTER — READMISSION MANAGEMENT (OUTPATIENT)
Dept: CALL CENTER | Facility: HOSPITAL | Age: 55
End: 2025-03-12
Payer: COMMERCIAL

## 2025-03-12 VITALS
RESPIRATION RATE: 17 BRPM | HEIGHT: 69 IN | DIASTOLIC BLOOD PRESSURE: 79 MMHG | WEIGHT: 210.5 LBS | TEMPERATURE: 97.7 F | HEART RATE: 70 BPM | OXYGEN SATURATION: 96 % | BODY MASS INDEX: 31.18 KG/M2 | SYSTOLIC BLOOD PRESSURE: 130 MMHG

## 2025-03-12 LAB
ALBUMIN SERPL-MCNC: 3.3 G/DL (ref 3.5–5.2)
ALBUMIN/GLOB SERPL: 1.1 G/DL
ALP SERPL-CCNC: 76 U/L (ref 39–117)
ALT SERPL W P-5'-P-CCNC: 19 U/L (ref 1–41)
ANION GAP SERPL CALCULATED.3IONS-SCNC: 13.5 MMOL/L (ref 5–15)
AST SERPL-CCNC: 20 U/L (ref 1–40)
BASOPHILS # BLD AUTO: 0.04 10*3/MM3 (ref 0–0.2)
BASOPHILS NFR BLD AUTO: 0.5 % (ref 0–1.5)
BILIRUB SERPL-MCNC: 0.4 MG/DL (ref 0–1.2)
BUN SERPL-MCNC: 16 MG/DL (ref 6–20)
BUN/CREAT SERPL: 14.8 (ref 7–25)
CALCIUM SPEC-SCNC: 8.7 MG/DL (ref 8.6–10.5)
CHLORIDE SERPL-SCNC: 101 MMOL/L (ref 98–107)
CO2 SERPL-SCNC: 26.5 MMOL/L (ref 22–29)
CREAT SERPL-MCNC: 1.08 MG/DL (ref 0.76–1.27)
DEPRECATED RDW RBC AUTO: 44.6 FL (ref 37–54)
EGFRCR SERPLBLD CKD-EPI 2021: 80.5 ML/MIN/1.73
EOSINOPHIL # BLD AUTO: 0.3 10*3/MM3 (ref 0–0.4)
EOSINOPHIL NFR BLD AUTO: 3.6 % (ref 0.3–6.2)
ERYTHROCYTE [DISTWIDTH] IN BLOOD BY AUTOMATED COUNT: 13.3 % (ref 12.3–15.4)
GLOBULIN UR ELPH-MCNC: 3 GM/DL
GLUCOSE SERPL-MCNC: 109 MG/DL (ref 65–99)
HCT VFR BLD AUTO: 36.9 % (ref 37.5–51)
HGB BLD-MCNC: 11.9 G/DL (ref 13–17.7)
IMM GRANULOCYTES # BLD AUTO: 0.07 10*3/MM3 (ref 0–0.05)
IMM GRANULOCYTES NFR BLD AUTO: 0.8 % (ref 0–0.5)
LYMPHOCYTES # BLD AUTO: 2.5 10*3/MM3 (ref 0.7–3.1)
LYMPHOCYTES NFR BLD AUTO: 30.3 % (ref 19.6–45.3)
MAGNESIUM SERPL-MCNC: 2.2 MG/DL (ref 1.6–2.6)
MCH RBC QN AUTO: 29.4 PG (ref 26.6–33)
MCHC RBC AUTO-ENTMCNC: 32.2 G/DL (ref 31.5–35.7)
MCV RBC AUTO: 91.1 FL (ref 79–97)
MONOCYTES # BLD AUTO: 0.67 10*3/MM3 (ref 0.1–0.9)
MONOCYTES NFR BLD AUTO: 8.1 % (ref 5–12)
NEUTROPHILS NFR BLD AUTO: 4.67 10*3/MM3 (ref 1.7–7)
NEUTROPHILS NFR BLD AUTO: 56.7 % (ref 42.7–76)
NRBC BLD AUTO-RTO: 0 /100 WBC (ref 0–0.2)
PHOSPHATE SERPL-MCNC: 4 MG/DL (ref 2.5–4.5)
PLATELET # BLD AUTO: 400 10*3/MM3 (ref 140–450)
PMV BLD AUTO: 10.4 FL (ref 6–12)
POTASSIUM SERPL-SCNC: 3.8 MMOL/L (ref 3.5–5.2)
PROT SERPL-MCNC: 6.3 G/DL (ref 6–8.5)
RBC # BLD AUTO: 4.05 10*6/MM3 (ref 4.14–5.8)
SODIUM SERPL-SCNC: 141 MMOL/L (ref 136–145)
WBC NRBC COR # BLD AUTO: 8.25 10*3/MM3 (ref 3.4–10.8)

## 2025-03-12 PROCEDURE — 99024 POSTOP FOLLOW-UP VISIT: CPT | Performed by: NURSE PRACTITIONER

## 2025-03-12 PROCEDURE — 83735 ASSAY OF MAGNESIUM: CPT | Performed by: INTERNAL MEDICINE

## 2025-03-12 PROCEDURE — G0378 HOSPITAL OBSERVATION PER HR: HCPCS

## 2025-03-12 PROCEDURE — 84100 ASSAY OF PHOSPHORUS: CPT | Performed by: INTERNAL MEDICINE

## 2025-03-12 PROCEDURE — 85025 COMPLETE CBC W/AUTO DIFF WBC: CPT | Performed by: INTERNAL MEDICINE

## 2025-03-12 PROCEDURE — 80053 COMPREHEN METABOLIC PANEL: CPT | Performed by: INTERNAL MEDICINE

## 2025-03-12 RX ORDER — NALOXONE HCL 0.4 MG/ML
0.4 VIAL (ML) INJECTION
Status: DISCONTINUED | OUTPATIENT
Start: 2025-03-12 | End: 2025-03-12 | Stop reason: HOSPADM

## 2025-03-12 RX ORDER — FENTANYL 25 UG/1
1 PATCH TRANSDERMAL
Qty: 10 PATCH | Refills: 0 | Status: SHIPPED | OUTPATIENT
Start: 2025-03-14

## 2025-03-12 RX ORDER — OXYCODONE AND ACETAMINOPHEN 7.5; 325 MG/1; MG/1
2 TABLET ORAL EVERY 4 HOURS PRN
Refills: 0 | Status: DISCONTINUED | OUTPATIENT
Start: 2025-03-12 | End: 2025-03-12 | Stop reason: HOSPADM

## 2025-03-12 RX ORDER — OXYCODONE AND ACETAMINOPHEN 7.5; 325 MG/1; MG/1
1 TABLET ORAL EVERY 4 HOURS PRN
Qty: 40 TABLET | Refills: 0 | Status: SHIPPED | OUTPATIENT
Start: 2025-03-12 | End: 2025-03-19 | Stop reason: SDUPTHER

## 2025-03-12 RX ORDER — HYDROMORPHONE HYDROCHLORIDE 1 MG/ML
0.5 INJECTION, SOLUTION INTRAMUSCULAR; INTRAVENOUS; SUBCUTANEOUS EVERY 4 HOURS PRN
Status: DISCONTINUED | OUTPATIENT
Start: 2025-03-12 | End: 2025-03-12 | Stop reason: HOSPADM

## 2025-03-12 RX ORDER — BENZOCAINE/MENTHOL 6 MG-10 MG
1 LOZENGE MUCOUS MEMBRANE EVERY 12 HOURS PRN
Status: DISCONTINUED | OUTPATIENT
Start: 2025-03-12 | End: 2025-03-12 | Stop reason: HOSPADM

## 2025-03-12 RX ORDER — DIPHENHYDRAMINE HCL 25 MG
25 CAPSULE ORAL EVERY 6 HOURS PRN
Status: DISCONTINUED | OUTPATIENT
Start: 2025-03-12 | End: 2025-03-12 | Stop reason: HOSPADM

## 2025-03-12 RX ADMIN — CETIRIZINE HYDROCHLORIDE 10 MG: 10 TABLET, FILM COATED ORAL at 09:26

## 2025-03-12 RX ADMIN — Medication 1000 UNITS: at 09:26

## 2025-03-12 RX ADMIN — GABAPENTIN 800 MG: 400 CAPSULE ORAL at 14:28

## 2025-03-12 RX ADMIN — Medication 10 ML: at 09:27

## 2025-03-12 RX ADMIN — LIDOCAINE PAIN RELIEF 1 PATCH: 560 PATCH TOPICAL at 09:26

## 2025-03-12 RX ADMIN — OXYCODONE HYDROCHLORIDE AND ACETAMINOPHEN 1 TABLET: 7.5; 325 TABLET ORAL at 09:29

## 2025-03-12 RX ADMIN — FERROUS SULFATE TAB 325 MG (65 MG ELEMENTAL FE) 325 MG: 325 (65 FE) TAB at 09:26

## 2025-03-12 RX ADMIN — BUSPIRONE HYDROCHLORIDE 15 MG: 15 TABLET ORAL at 09:26

## 2025-03-12 RX ADMIN — GABAPENTIN 800 MG: 400 CAPSULE ORAL at 04:56

## 2025-03-12 RX ADMIN — OXYCODONE HYDROCHLORIDE AND ACETAMINOPHEN 1 TABLET: 7.5; 325 TABLET ORAL at 14:28

## 2025-03-12 RX ADMIN — OXYCODONE HYDROCHLORIDE AND ACETAMINOPHEN 1 TABLET: 7.5; 325 TABLET ORAL at 04:56

## 2025-03-12 RX ADMIN — PANTOPRAZOLE SODIUM 40 MG: 40 TABLET, DELAYED RELEASE ORAL at 09:26

## 2025-03-12 NOTE — PLAN OF CARE
Problem: Adult Inpatient Plan of Care  Goal: Absence of Hospital-Acquired Illness or Injury  Intervention: Identify and Manage Fall Risk  Recent Flowsheet Documentation  Taken 3/12/2025 1428 by Jessica Yu RN  Safety Promotion/Fall Prevention:   assistive device/personal items within reach   clutter free environment maintained   room organization consistent   safety round/check completed  Taken 3/12/2025 1200 by Jessica Yu RN  Safety Promotion/Fall Prevention:   assistive device/personal items within reach   clutter free environment maintained   safety round/check completed   room organization consistent  Taken 3/12/2025 0929 by Jessica Yu RN  Safety Promotion/Fall Prevention:   assistive device/personal items within reach   clutter free environment maintained   room organization consistent   safety round/check completed  Taken 3/12/2025 0800 by Jessica Yu RN  Safety Promotion/Fall Prevention:   assistive device/personal items within reach   clutter free environment maintained   room organization consistent   safety round/check completed  Intervention: Prevent Skin Injury  Recent Flowsheet Documentation  Taken 3/12/2025 0800 by Jessica Yu RN  Body Position: position changed independently  Intervention: Prevent Infection  Recent Flowsheet Documentation  Taken 3/12/2025 1200 by Jessica Yu RN  Infection Prevention:   cohorting utilized   rest/sleep promoted   single patient room provided  Goal: Optimal Comfort and Wellbeing  Intervention: Monitor Pain and Promote Comfort  Recent Flowsheet Documentation  Taken 3/12/2025 1428 by Jessica Yu RN  Pain Management Interventions: pain medication given  Taken 3/12/2025 0929 by Jessica Yu RN  Pain Management Interventions: pain medication given  Taken 3/12/2025 0800 by Jessica Yu RN  Pain Management Interventions: pain management plan reviewed with patient/caregiver  Intervention: Provide Person-Centered Care  Recent Flowsheet Documentation  Taken  3/12/2025 0800 by Jessica Yu, RN  Trust Relationship/Rapport: care explained   Goal Outcome Evaluation:            Patient to d/c home with spouse

## 2025-03-12 NOTE — DISCHARGE SUMMARY
Date of Admission: 3/10/2025  Date of Discharge:  3/12/2025  Primary Care Physician: Provider, No Known     Discharge Diagnosis:  Active Hospital Problems    Diagnosis  POA    **Acute on chronic back pain [M54.9, G89.29]  Yes    COPD (chronic obstructive pulmonary disease) [J44.9]  Yes    Cervical stenosis of spine [M48.02]  Yes    Gastroesophageal reflux disease [K21.9]  Yes    Cervical disc disorder with radiculopathy of mid-cervical region [M50.120]  Yes      Resolved Hospital Problems   No resolved problems to display.       Presenting Problem/History of Present Illness from H&P:  Upper back pain [M54.9]  Status post cervical spinal fusion [Z98.1]  Acute bilateral thoracic back pain [M54.6]  Weakness of upper extremity [R29.898]  COPD (chronic obstructive pulmonary disease) [J44.9]     Rafael Kat is a 54 y.o. male with a past medical history including but not limited to COPD, GERD, anxiety, and spinal stenosis of the cervicothoracic region who recently underwent posterior C7-T1 decompression, posterior C3-7, T1-3 fusion on February 25 with Dr. Collado who presents to [facility] ER with worsening back pain.  Patient states he was discharged about 6 days ago.  He states Saturday morning he was trying to lift himself up off the couch and felt a pop in his back.  He said since then he has had difficulty controlling his pain at home.  He states that he used a heating pad and some ice packs and was able to fall asleep last night but woke up this morning around 1 AM with pain significantly worsened.  He states it feels like a hot knife in his back that radiates around both sides at the mid thoracic.  Does report he noted some weakness in his arms as well as some spasming and having issues dropping things.  He denies fevers.  Denies lightheadedness and dizziness.  Denies abdominal pain and nausea.     Hospital Course:  The patient is a 54 y.o. male who presented with postop back pain and  right arm clumsiness. He was admitted and neurosurgery evaluated. Symptoms improved with medication adjustments. He has been cleared for discharge and can continue to follow up with surgery as planned.     Exam Today:  See note    Results:  MRI Cervical Spine With & Without Contrast  Result Date: 3/12/2025  MRI OF THE CERVICAL AND THORACIC SPINE WITH AND WITHOUT CONTRAST 03/10/2025  CLINICAL HISTORY: This patient underwent surgery of the cervical spine back on 03/29/2016 with prior corpectomies at C4, C5 and C6, and anterior plate and screw fixation from C3 to C7 and underwent a subsequent surgery on 02/25/2025 with decompression at C7-T1 and posterior cervical thoracic fusion from C3 to T3, complains of bilateral upper extremity weakness.  MRI OF THE CERVICAL SPINE TECHNIQUE: Sagittal T1, proton density and fast spin-echo T2, gradient echo T2 and fat-suppressed T2 and postcontrast sagittal T1 weighted images were obtained of the cervical spine. In addition, axial T1 and gradient echo T2 and fast spin-echo T2 and postcontrast axial T1-weighted images were obtained from the skull base to T1.   MRI OF THE THORACIC SPINE TECHNIQUE: Sagittal T1, proton density and fast spin echo T2 and fat-suppressed T2 and postcontrast sagittal T1 and postcontrast sagittal fat-suppressed T1 weighted images were obtained of the thoracic spine. In addition, axial T1 and T2 and postcontrast T1 weighted images were obtained from T1 to T8 and then from T8 to L1.  This is correlated to preoperative outside MRI of the cervical spine on 07/29/2024 and multiple plain film series of the cervical spine dating back to 05/11/2016 and a remote prior cervical spine MRI prior to cervical spine surgery back on 03/03/2016.  FINDINGS: This patient, back in 2016, had a remote prior cervical spine surgery with corpectomies at C4, C5 and C6 and had anterior cervical discectomy and fusion procedure from C4 to C7 with anterior plate and screw fixation from C4  to C7. There is a cylindrical graft extending from the inferior endplate of C3 to the superior body of C7, and an anterior plate extending from the anterior superior body of C3 to the anterior inferior body of C7 anchored by screws to the C3 and C7 vertebrae.  At C7-T1 on 02/25/2025 this patient underwent cervical spine surgery with bilateral laminectomies at C7 and medial facetectomies at C7-T1. There was placement of bilateral rods bridging the posterior elements from C3 to T3 anchored by articular pillar screws bilaterally at C3, C4, C5, C6 and bilateral pedicle screws at T1, T2 and T3. Preoperatively, there was posterior endplate spurring and mild canal narrowing, and there was moderate left and moderate-to-severe right bony foraminal narrowing at C7-T1. There has been adequate posterior decompression and there is no residual canal narrowing at C7-T1.  There remains some uncovertebral joint spurring into the foramina with some residual mild-to-moderate bilateral foraminal narrowing at C7-T1. I see no canal narrowing from the skull base to T1. At the level of the remote prior cervical spine surgery, there is some areas of bony foraminal narrowing from C3 to C7 that are unchanged. There is a postoperative fluid collection in the posterior subcutaneous fat of the neck and upper chest. The fluid collection tracks up to 15 cm in cranial caudal dimension and maximally measures 5 x 4 cm in medial lateral and anterior posterior dimension at the C7-T1 and T1 thoracic level. It channels down to the posterior dura at the C7-T1 level, and may be postoperative seroma or hematoma although I cannot exclude an infected fluid collection.  The cervical and thoracic cord is normal in contour and signal intensity. The conus terminates at the L1 lumbar level which is normal. Within the thoracic spine, there is some left paracentral spurring and bulging disc material that indents the ventral thecal sac and abuts the left ventral  surface of the cord mildly narrowing the left side of the canal at T6-7. There is some left paracentral spurring and bulging disc material that abuts the left ventral surface of the cord minimally narrowing the canal at T7-8. Otherwise, I see no canal or foraminal narrowing from T3 down to L1.      1. This patient has had extensive remote prior cervical spine surgery back in 2016 during which there was performance of corpectomies at C4, C5 and C6 and placement of a cylindrical graft from the inferior endplate of C4 to the superior body of C7, and anterior plate and screw fixation from C3 to C7. On 02/25/2025 this patient underwent bilateral laminectomies at C7 and medial facetectomies at C7-T1.  There was placement of bilateral posterior element rods from C3 to T3 anchored by bilateral articular pillar screws at C3, C4, C5 and C6 and bilateral pedicle screws at T1, T2 and T3. There is postoperative fluid in the posterior subcutaneous fat of the neck and upper thoracic region that tracks up to 15 cm in cranial caudal dimension and 5 x 4 mm in medial lateral and anterior posterior dimensions and it channels down to the posterior margin of the posterior dura at the C7-T1 level. This may be postoperative seroma and/or hematoma although I certainly cannot exclude an infected fluid collection and this needs to be correlated clinically.  2. I see no residual canal narrowing from the skull base down to the T3 thoracic level. Since prior surgery, there has been some improvement in the foraminal narrowing at C7-T1 but there remains mild-to-moderate bilateral foraminal narrowing at C7-T1. There is also some chronic residual multilevel foraminal narrowing from C3 to C7, unchanged.  3. Within the thoracic spine there is left paracentral disc osteophyte complex that abuts the left ventral surface of the cord mildly narrowing the left side of the canal at T6-7 and left paracentral spurring minimally narrowing the left side of the  canal at T7-8, otherwise I see no canal or foraminal narrowing from T3 to L1.    This report was finalized on 3/12/2025 6:38 AM by Dr. Linden Ngo M.D on Workstation: [id number]      MRI Thoracic Spine With & Without Contrast  Result Date: 3/12/2025  MRI OF THE CERVICAL AND THORACIC SPINE WITH AND WITHOUT CONTRAST 03/10/2025  CLINICAL HISTORY: This patient underwent surgery of the cervical spine back on 03/29/2016 with prior corpectomies at C4, C5 and C6, and anterior plate and screw fixation from C3 to C7 and underwent a subsequent surgery on 02/25/2025 with decompression at C7-T1 and posterior cervical thoracic fusion from C3 to T3, complains of bilateral upper extremity weakness.  MRI OF THE CERVICAL SPINE TECHNIQUE: Sagittal T1, proton density and fast spin-echo T2, gradient echo T2 and fat-suppressed T2 and postcontrast sagittal T1 weighted images were obtained of the cervical spine. In addition, axial T1 and gradient echo T2 and fast spin-echo T2 and postcontrast axial T1-weighted images were obtained from the skull base to T1.   MRI OF THE THORACIC SPINE TECHNIQUE: Sagittal T1, proton density and fast spin echo T2 and fat-suppressed T2 and postcontrast sagittal T1 and postcontrast sagittal fat-suppressed T1 weighted images were obtained of the thoracic spine. In addition, axial T1 and T2 and postcontrast T1 weighted images were obtained from T1 to T8 and then from T8 to L1.  This is correlated to preoperative outside MRI of the cervical spine on 07/29/2024 and multiple plain film series of the cervical spine dating back to 05/11/2016 and a remote prior cervical spine MRI prior to cervical spine surgery back on 03/03/2016.  FINDINGS: This patient, back in 2016, had a remote prior cervical spine surgery with corpectomies at C4, C5 and C6 and had anterior cervical discectomy and fusion procedure from C4 to C7 with anterior plate and screw fixation from C4 to C7. There is a cylindrical graft extending from  the inferior endplate of C3 to the superior body of C7, and an anterior plate extending from the anterior superior body of C3 to the anterior inferior body of C7 anchored by screws to the C3 and C7 vertebrae.  At C7-T1 on 02/25/2025 this patient underwent cervical spine surgery with bilateral laminectomies at C7 and medial facetectomies at C7-T1. There was placement of bilateral rods bridging the posterior elements from C3 to T3 anchored by articular pillar screws bilaterally at C3, C4, C5, C6 and bilateral pedicle screws at T1, T2 and T3. Preoperatively, there was posterior endplate spurring and mild canal narrowing, and there was moderate left and moderate-to-severe right bony foraminal narrowing at C7-T1. There has been adequate posterior decompression and there is no residual canal narrowing at C7-T1.  There remains some uncovertebral joint spurring into the foramina with some residual mild-to-moderate bilateral foraminal narrowing at C7-T1. I see no canal narrowing from the skull base to T1. At the level of the remote prior cervical spine surgery, there is some areas of bony foraminal narrowing from C3 to C7 that are unchanged. There is a postoperative fluid collection in the posterior subcutaneous fat of the neck and upper chest. The fluid collection tracks up to 15 cm in cranial caudal dimension and maximally measures 5 x 4 cm in medial lateral and anterior posterior dimension at the C7-T1 and T1 thoracic level. It channels down to the posterior dura at the C7-T1 level, and may be postoperative seroma or hematoma although I cannot exclude an infected fluid collection.  The cervical and thoracic cord is normal in contour and signal intensity. The conus terminates at the L1 lumbar level which is normal. Within the thoracic spine, there is some left paracentral spurring and bulging disc material that indents the ventral thecal sac and abuts the left ventral surface of the cord mildly narrowing the left side of  the canal at T6-7. There is some left paracentral spurring and bulging disc material that abuts the left ventral surface of the cord minimally narrowing the canal at T7-8. Otherwise, I see no canal or foraminal narrowing from T3 down to L1.      1. This patient has had extensive remote prior cervical spine surgery back in 2016 during which there was performance of corpectomies at C4, C5 and C6 and placement of a cylindrical graft from the inferior endplate of C4 to the superior body of C7, and anterior plate and screw fixation from C3 to C7. On 02/25/2025 this patient underwent bilateral laminectomies at C7 and medial facetectomies at C7-T1.  There was placement of bilateral posterior element rods from C3 to T3 anchored by bilateral articular pillar screws at C3, C4, C5 and C6 and bilateral pedicle screws at T1, T2 and T3. There is postoperative fluid in the posterior subcutaneous fat of the neck and upper thoracic region that tracks up to 15 cm in cranial caudal dimension and 5 x 4 mm in medial lateral and anterior posterior dimensions and it channels down to the posterior margin of the posterior dura at the C7-T1 level. This may be postoperative seroma and/or hematoma although I certainly cannot exclude an infected fluid collection and this needs to be correlated clinically.  2. I see no residual canal narrowing from the skull base down to the T3 thoracic level. Since prior surgery, there has been some improvement in the foraminal narrowing at C7-T1 but there remains mild-to-moderate bilateral foraminal narrowing at C7-T1. There is also some chronic residual multilevel foraminal narrowing from C3 to C7, unchanged.  3. Within the thoracic spine there is left paracentral disc osteophyte complex that abuts the left ventral surface of the cord mildly narrowing the left side of the canal at T6-7 and left paracentral spurring minimally narrowing the left side of the canal at T7-8, otherwise I see no canal or foraminal  narrowing from T3 to L1.    This report was finalized on 3/12/2025 6:38 AM by Dr. Linden Ngo M.D on Workstation: [id number]      XR Chest 1 View  Result Date: 3/11/2025  XR CHEST 1 VW-  HISTORY: Male who is 54 years-old, COPD  TECHNIQUE: Frontal view of the chest  COMPARISON: 2/26/2025  FINDINGS: Heart, mediastinum and pulmonary vasculature are unremarkable. No focal pulmonary consolidation, pleural effusion, or pneumothorax. Old granulomatous disease is seen. No acute osseous process.      No evidence for acute pulmonary process. Follow-up as clinical indications persist.  This report was finalized on 3/11/2025 3:55 PM by Dr. Dejon Adair M.D on Workstation: [id number]      XR Spine Cervical 2 or 3 View  Result Date: 3/10/2025  XR SPINE CERVICAL 2 OR 3 VW-, XR SPINE THORACIC 2 VW-  CLINICAL INFORMATION: Pain, recent surgery.  THORACOLUMBAR SPINE FINDINGS: There are pedicle screws and vertical fixation rods spanning C3 through T3. No screw loosening nor malalignment. There is an anterior plate and screw fixator C3 through C7, transverse screws within C3 and C7. Corpectomy C4 through C6. One of the C3 screws is broken in the head is backed out of position approximately 3 to 4 mm. No alteration in the position of the plate has occurred. This does not represent a new finding. The odontoid is preserved with a satisfactory C1-2 alignment and the prevertebral soft tissues have a satisfactory appearance.  No thoracic compression deformity or subluxation is demonstrated. There is mild multilevel mid and lower thoracic disc space narrowing. The paravertebral soft tissues have a satisfactory appearance.  CONCLUSION: Imaging of the cervical and thoracic spine is quite similar to 02/26/2025.  This report was finalized on 3/10/2025 7:40 AM by Dr. Con Glynn M.D on Workstation: [id number]      XR Spine Thoracic 2 View  Result Date: 3/10/2025  XR SPINE CERVICAL 2 OR 3 VW-, XR SPINE THORACIC 2 VW-  CLINICAL INFORMATION:  Pain, recent surgery.  THORACOLUMBAR SPINE FINDINGS: There are pedicle screws and vertical fixation rods spanning C3 through T3. No screw loosening nor malalignment. There is an anterior plate and screw fixator C3 through C7, transverse screws within C3 and C7. Corpectomy C4 through C6. One of the C3 screws is broken in the head is backed out of position approximately 3 to 4 mm. No alteration in the position of the plate has occurred. This does not represent a new finding. The odontoid is preserved with a satisfactory C1-2 alignment and the prevertebral soft tissues have a satisfactory appearance.  No thoracic compression deformity or subluxation is demonstrated. There is mild multilevel mid and lower thoracic disc space narrowing. The paravertebral soft tissues have a satisfactory appearance.  CONCLUSION: Imaging of the cervical and thoracic spine is quite similar to 02/26/2025.  This report was finalized on 3/10/2025 7:40 AM by Dr. Con Glynn M.D on Workstation: [id number]       Procedures Performed:         Consults:   Consults       Date and Time Order Name Status Description    3/11/2025 12:02 PM Inpatient Hospitalist Consult Completed     3/10/2025  4:19 AM Neurosurgery (on-call MD unless specified) Completed     2/26/2025 11:52 AM Inpatient Psychiatrist Consult Completed     2/25/2025  5:59 PM Inpatient Pulmonology Consult Completed     2/25/2025  5:59 PM Inpatient Hospitalist Consult Completed              Discharge Disposition:  Home or Self Care    Discharge Medications:     Discharge Medications        New Medications        Instructions Start Date   fentaNYL 25 MCG/HR patch  Commonly known as: DURAGESIC   1 patch, Transdermal, Every 72 Hours   Start Date: March 14, 2025     oxyCODONE-acetaminophen 7.5-325 MG per tablet  Commonly known as: PERCOCET   1 tablet, Oral, Every 4 Hours PRN             Continue These Medications        Instructions Start Date   aspirin 81 MG chewable tablet   81 mg, Daily       baclofen 20 MG tablet  Commonly known as: LIORESAL   1 tablet, 2 Times Daily      busPIRone 15 MG tablet  Commonly known as: BUSPAR   Take 1 tablet by mouth 2 (Two) Times a Day. Indications: Anxiety Disorder      cetirizine 10 MG tablet  Commonly known as: zyrTEC   10 mg, Daily      cholecalciferol 25 MCG (1000 UT) tablet  Commonly known as: VITAMIN D3   1,000 Units, Daily      clonazePAM 0.5 MG tablet  Commonly known as: KlonoPIN   Take 1 tablet by mouth 3 times a day for 5 days. Indications: Panic Disorder      Diclofenac Sodium 1 % gel gel  Commonly known as: VOLTAREN   4 g, As Needed      docusate sodium 100 MG capsule   100 mg, Oral, Daily      escitalopram 10 MG tablet  Commonly known as: LEXAPRO   Take 1 tablet by mouth Every Night. Indications: Panic Disorder      FeroSul 325 (65 Fe) MG tablet  Generic drug: ferrous sulfate   325 mg, Daily With Breakfast      gabapentin 800 MG tablet  Commonly known as: NEURONTIN   800 mg, 3 Times Daily      lidocaine 5 %  Commonly known as: LIDODERM   1 patch, Transdermal, Every 24 Hours, Remove & Discard patch within 12 hours or as directed by MD      mometasone 0.1 % cream  Commonly known as: ELOCON   As Needed      naloxone 4 MG/0.1ML nasal spray  Commonly known as: NARCAN   Call 911. Don't prime. [place] in 1 nostril for overdose. Repeat in 2-3 minutes in other nostril if no or minimal breathing/responsiveness.      NON FORMULARY   Nightly      pantoprazole 40 MG EC tablet  Commonly known as: PROTONIX   40 mg, 2 Times Daily      polyethylene glycol 17 GM/SCOOP powder  Commonly known as: MIRALAX   Dissolve 17 g (1 capful) in liquid and drink by mouth 2 (Two) Times a Day As Needed for constipation      Potassium 75 MG tablet   As Needed      Senexon-S 8.6-50 MG per tablet  Generic drug: sennosides-docusate   2 tablets, Oral, Nightly      Unable to find   1 each, Once, INHALER NEW INSTRUCTED PT TO BRING WITH HIM      Ventolin  (90 Base) MCG/ACT inhaler  Generic  drug: albuterol sulfate HFA   2 puffs, Every 4 Hours PRN             Stop These Medications      cephalexin 500 MG capsule  Commonly known as: KEFLEX     HYDROcodone-acetaminophen 7.5-325 MG per tablet  Commonly known as: NORCO              Discharge Diet:   Diet Instructions       Advance Diet As Tolerated -Target Diet: home diet      Target Diet: home diet            Activity at Discharge:   Activity Instructions       Other Activity Instructions      Activity Instructions: follow surgery instructions            Follow-up Appointments:   Follow-up Information       Provider, No Known .    Contact information:  [place] [id number] [phone]               Bert Collado MD Follow up in 2 week(s).    Specialty: Neurosurgery  Why: Patient will be contacted by Dr. Collado's  to arrange an appointment with a nurse practitioner for f/u in 2 weeks.  Contact information:  [address] [address] [phone]                             Test Results Pending at Discharge:       Helder Blackburn MD  03/12/25  18:02 EDT    Time Spent on Discharge Activities: >30 minutes    Dictated portions using Dragon dictation software.

## 2025-03-12 NOTE — PROGRESS NOTES
LOS: 1 day   Patient Care Team:  Provider, No Known as PCP - Bert Moe MD as Surgeon (Neurosurgery)    Chief Complaint:  Worsening post op cervical spasms    Subjective     Interval History: Started on Duragesic 25 mcg/hr patch which is to be changed every 72 hours. Patient states his pain is much better.  He is walking around, voiding, tolerating diet without difficulty. He reports he still taking his home dose baclofen 20 mg twice daily for spasm relief and doing well with that. He feels ready to go home.     History taken from: patient chart    Objective        Vital Signs  Temp:  [97.1 °F (36.2 °C)-99.1 °F (37.3 °C)] 97.5 °F (36.4 °C)  Heart Rate:  [68-78] 72  Resp:  [16-17] 16  BP: (115-139)/(79-88) 127/88    Physical Exam:     AA&O x 3.   Posterior cervical incision is well approximated and healing well.   L arm old IV site also improving.   No calf swelling or tenderness to bilateral palpation.      Results Review:     I reviewed the patient's new clinical results.    .  Results from last 7 days   Lab Units 03/12/25  0520 03/10/25  0451   WBC 10*3/mm3 8.25 9.34   HEMOGLOBIN g/dL 11.9* 11.9*   HEMATOCRIT % 36.9* 36.6*   PLATELETS 10*3/mm3 400 424     .  Results from last 7 days   Lab Units 03/12/25  0520 03/10/25  0451   SODIUM mmol/L 141 141   POTASSIUM mmol/L 3.8 3.7   CHLORIDE mmol/L 101 107   CO2 mmol/L 26.5 23.2   BUN mg/dL 16 16   CREATININE mg/dL 1.08 0.98   GLUCOSE mg/dL 109* 120*   CALCIUM mg/dL 8.7 8.6       Assessment & Plan       Acute on chronic back pain    Cervical disc disorder with radiculopathy of mid-cervical region    Gastroesophageal reflux disease    Cervical stenosis of spine    COPD (chronic obstructive pulmonary disease)    53 yo male 15 days s/p bilateral C7/T1 decompression with C3-T3 onlay posterior lateral fusion with placement of bilateral lateral mass screws C3, C4, C5, C6, and bilateral T1, T2, T3 pedicle screws with Stealth navigation and placement of  rods from C3-T3 with cross-link.  He was discharged 8 days ago and then subsequently returned to the hospital due to severe uncontrolled postoperative posterior cervical spasms.  Subsequent cervical/thoracic x-rays 2 days ago were stable and the cervical/thoracic MRI images also 2 days ago showed successful interval decompression.  He has since been started on a 25 mcg/hr Duragesic patch at the recommendation of Dr. Collado and he is taking as needed Percocet 7.5/325 mg 1 tablet every 4 hours as needed pain.  He reports good control of his pain symptoms with this combination.    I discussed the above with Dr. Collado.  The patient is feeling much better and feels ready for discharge home.  Dr. Collado will issue a prescription for the Duragesic patches 25 mcg/hr to be changed every 72 hours and Percocet 7.5/2025 mg 1 tablet p.o. every 4 hours as needed pain.     I educated the patient that he must adhere to the every 72 hours scheduled for the Duragesic patches.  He must remove the entire patch and wipe off the skin on the third day.  He can then place the new Duragesic patch to a different area of the body.  He was also encouraged to use the Percocet 7.5/325 mg no sooner than every 4 hours and as sparingly as possible.  The Percocet should be used for severe breakthrough pain.  He will continue to use his home dose baclofen for muscle spasm relief and his home dose gabapentin for chronic radicular pain symptoms.  In addition to the above, I encouraged the patient to utilize his soft cervical collar when he is sitting in a chair or ambulating to reduce muscular discomfort from supporting the weight of his head on the cervical spine muscles.    The patient will be contacted by Dr. Collado's  to arrange a follow-up appointment in 2 weeks in our office.  For any questions or concerns, the patient and his wife were encouraged to call our office.    HERON Candelaria  03/12/25  16:10 EDT

## 2025-03-12 NOTE — PROGRESS NOTES
Name: Rafael Kat ADMIT: 3/10/2025   : 1970  PCP: Provider, No Known    MRN: [id number] LOS: 1 days   AGE/SEX: 54 y.o. male  ROOM: [facility]     Subjective   Subjective   Chief Complaint   Patient presents with    Back Pain     Overall his pain is better controlled today.  It is still present but more tolerable.  He still has a large pressure sensation in his back.  He has been able to use his right hand some although still feeling sluggish.  Not reporting any chest pain palpitation shortness of breath nausea vomiting.  He was on room air when I saw him.     Objective   Objective   Vital Signs  Temp:  [97.1 °F (36.2 °C)-99.1 °F (37.3 °C)] 97.1 °F (36.2 °C)  Heart Rate:  [65-78] 71  Resp:  [17-18] 17  BP: (115-139)/(79-93) 115/87  SpO2:  [92 %-96 %] 94 %  on  Flow (L/min) (Oxygen Therapy):  [2] 2;   Device (Oxygen Therapy): room air  Body mass index is 31.09 kg/m².    Physical Exam  Vitals and nursing note reviewed.   Constitutional:       General: He is not in acute distress.     Appearance: He is not diaphoretic.   HENT:      Head: Atraumatic.   Cardiovascular:      Rate and Rhythm: Normal rate and regular rhythm.   Pulmonary:      Effort: Pulmonary effort is normal.      Breath sounds: No wheezing.   Abdominal:      General: There is no distension.      Palpations: Abdomen is soft.      Tenderness: There is no abdominal tenderness. There is no guarding or rebound.   Musculoskeletal:         General: Tenderness present.   Neurological:      Mental Status: He is alert.      Cranial Nerves: No cranial nerve deficit.      Motor: Weakness present.   Psychiatric:         Mood and Affect: Mood normal.         Behavior: Behavior normal.       Results Review  I reviewed the patient's new clinical results.    Results from last 7 days   Lab Units 25  0520 03/10/25  0451   WBC 10*3/mm3 8.25 9.34   HEMOGLOBIN g/dL 11.9* 11.9*   PLATELETS 10*3/mm3 400 424     Results from last 7 days   Lab Units  "03/12/25  0520 03/10/25  0451   SODIUM mmol/L 141 141   POTASSIUM mmol/L 3.8 3.7   CHLORIDE mmol/L 101 107   CO2 mmol/L 26.5 23.2   BUN mg/dL 16 16   CREATININE mg/dL 1.08 0.98   GLUCOSE mg/dL 109* 120*   EGFR mL/min/1.73 80.5 90.5     Results from last 7 days   Lab Units 03/12/25  0520 03/10/25  0451   ALBUMIN g/dL 3.3* 3.3*   BILIRUBIN mg/dL 0.4 <0.2   ALK PHOS U/L 76 83   AST (SGOT) U/L 20 21   ALT (SGPT) U/L 19 22     Results from last 7 days   Lab Units 03/12/25  0520 03/10/25  0451   CALCIUM mg/dL 8.7 8.6   ALBUMIN g/dL 3.3* 3.3*   MAGNESIUM mg/dL 2.2  --    PHOSPHORUS mg/dL 4.0  --          No results found for: \"HGBA1C\", \"POCGLU\"    XR Chest 1 View  Result Date: 3/11/2025  No evidence for acute pulmonary process. Follow-up as clinical indications persist.  This report was finalized on 3/11/2025 3:55 PM by Dr. Dejon Adair M.D on Workstation: [id number]        I have personally reviewed all medications:  Scheduled Medications  [Held by provider] aspirin, 81 mg, Oral, Daily  busPIRone, 15 mg, Oral, BID  cetirizine, 10 mg, Oral, Daily  fentaNYL, 1 patch, Transdermal, Q72H   And  Check Fentanyl Patch Placement, 1 each, Not Applicable, Q12H  cholecalciferol, 1,000 Units, Oral, Daily  escitalopram, 10 mg, Oral, Nightly  ferrous sulfate, 325 mg, Oral, Daily With Breakfast  gabapentin, 800 mg, Oral, Q8H  Lidocaine, 1 patch, Transdermal, Q24H  pantoprazole, 40 mg, Oral, BID  sodium chloride, 10 mL, Intravenous, Q12H      Infusions     Diet  Diet: Regular/House; Fluid Consistency: Thin (IDDSI 0)       Assessment/Plan     Active Hospital Problems    Diagnosis  POA    **Acute on chronic back pain [M54.9, G89.29]  Yes    COPD (chronic obstructive pulmonary disease) [J44.9]  Yes    Cervical stenosis of spine [M48.02]  Yes    Gastroesophageal reflux disease [K21.9]  Yes    Cervical disc disorder with radiculopathy of mid-cervical region [M50.120]  Yes      Resolved Hospital Problems   No resolved problems to display. "       54 y.o. male admitted with Acute on chronic back pain.    Cervical radiculopathy/right upper extremity weakness/postoperative back pain: Continues with pain control measures.  More comfortable today in appearance than yesterday.  Neurosurgery following.  COPD: No acute bronchospasm.  No acute infiltrate.  He was on room air.  Continue breathing treatments as needed.  GERD: PPI  PPX: SCD, defer to surgery  Disposition: TBD    Expected Discharge Date: 3/11/2025; Expected Discharge Time:      Helder Blackburn MD  [facility]  03/12/25  10:25 EDT    Dictated portions of note using Dragon dictation software.  Copied text in this note has been reviewed by me and remains accurate as of 03/12/25

## 2025-03-12 NOTE — PLAN OF CARE
Goal Outcome Evaluation:   Patient admitted to [facility] today from observation for pain management of acute upper back pain. Aox4. VSS. RA. Ambulating with stand by assistance. Pain has been adequately controlled with PRN Percocet. Fentanyl patch on right arm. Plan at discharge is to return home when medically ready. All needs currently met, will continue to monitor.

## 2025-03-13 NOTE — OUTREACH NOTE
Prep Survey      Flowsheet Row Responses   [facility] discharged [facility]? [facility]   Is LACE score < 7 ? No   Eligibility Readm Mgmt   Discharge diagnosis a/c Back pain   Does the patient have one of the following disease processes/diagnoses(primary or secondary)? Other   Does the patient have Home health ordered? No   Is there a DME ordered? No   Prep survey completed? Yes            SURENDRA CERON - Registered Nurse

## 2025-03-13 NOTE — PAYOR COMM NOTE
"Ricardo Chen (54 y.o. Male)    PLEASE SEE ATTACHED FOR INPT AUTH  OBS 3/10  INPATIENT 3/11  REF#[id number]    THANK YOU   AYDE STEINER RN/ DEPT   [facility]   PH: [phone]   FAX:  DEPT [phone]     Date of Birth   1970    Social Security Number       Address   [address] [place] [address]    Home Phone   [phone]    MRN   [id number]       Mormonism   None    Marital Status                               Admission Date   3/10/2025    Admission Type   Emergency    Admitting Provider   Helder Blackburn MD    Attending Provider       Department, Room/Bed   [address], 88/1       Discharge Date   3/12/2025    Discharge Disposition   Home or Self Care    Discharge Destination   Home                              Attending Provider: (none)   Allergies: Bacitracin-polymyxin B, Chlorhexidine, Zqaiv-vklmz-mqtzgqj-pramoxine    Isolation: None   Infection: None   Code Status: Prior    Ht: 175.3 cm (69\")   Wt: 95.5 kg (210 lb 8 oz)    Admission Cmt: None   Principal Problem: Acute on chronic back pain [M54.9,G89.29]                   Active Insurance as of 3/10/2025       Primary Coverage       Payor Plan Insurance Group Employer/Plan Group    [facility] BY CEZAR [facility] BY CEZAR [id number]       Payor Plan Address Payor Plan Phone Number Payor Plan Fax Number Effective Dates    [address] [address]   1/1/2021 - None Entered    [facility] [id number]         Subscriber Name Subscriber Birth Date Member ID       RICARDO CHEN 1970 [id number]                     Emergency Contacts        (Rel.) Home Phone Work Phone Mobile Phone    Monika Chen (Spouse) [phone] -- [phone]              [place]: NPI [id number]  Tax ID [id number]     History & Physical        ChristoferMitra PA-C at 03/10/25 0535       Attestation signed by Shekhar Ramirez MD at 03/10/25 0922        SHARED APC FACE TO " FACE: I performed a substantive part of the MDM during the patient's E/M visit. I personally evaluated and examined the patient. I personally made or approved the documented management plan and acknowledge its risk of complications.   Shekhar Ramirez MD 3/10/2025 09:22 EDT                          [facility]   HISTORY AND PHYSICAL    Patient Name: Rafael Kat  : 1970  MRN: [id number]  Primary Care Physician:  Provider, No Known  Date of admission: 3/10/2025    Subjective  Subjective     Chief Complaint:   Chief Complaint   Patient presents with    Back Pain         HPI:    Rafael Kat is a 54 y.o. male with a past medical history including but not limited to COPD, GERD, anxiety, and spinal stenosis of the cervicothoracic region who recently underwent posterior C7-T1 decompression, posterior C3-7, T1-3 fusion on  with Dr. Collado who presents to [facility] ER with worsening back pain.  Patient states he was discharged about 6 days ago.  He states Saturday morning he was trying to lift himself up off the couch and felt a pop in his back.  He said since then he has had difficulty controlling his pain at home.  He states that he used a heating pad and some ice packs and was able to fall asleep last night but woke up this morning around 1 AM with pain significantly worsened.  He states it feels like a hot knife in his back that radiates around both sides at the mid thoracic.  Does report he noted some weakness in his arms as well as some spasming and having issues dropping things.  He denies fevers.  Denies lightheadedness and dizziness.  Denies abdominal pain and nausea.    Review of Systems   All systems were reviewed and negative except for: what is mentioned above in the HPI.     Personal History     Past Medical History:   Diagnosis Date    Anesthesia complication     PT STATES HE IS SLOW TO WAKE UP    Arthritis     COPD (chronic obstructive pulmonary disease)      STATES WHEEZES AT NIGHT AND DOES USE NIGHTLY INHALER    DDD (degenerative disc disease), cervical     DDD (degenerative disc disease), lumbar     PAIN IN RIGHT BUTTOCK AND DOWN AT TIMES RIGHT LEG, WEAKNESS RIGHT LEG AT TIMES    Dysphagia     STARTED AFTER HIS CERVICAL NECK SURGERY    GERD (gastroesophageal reflux disease)     History of COVID-19     Neck pain     Psoriasis     Shoulder pain, bilateral     Skin cancer     BASAL/SQUAMOUS       Past Surgical History:   Procedure Laterality Date    ANTERIOR CHANNEL VERTEBRECTOMY/CORPECTOMY Bilateral 03/29/2016    Procedure: C4-6 ANTERIOR CHANNEL VERTEBRECTOMY/CORPECTOMY WITH INSTRUMENTATION;  Surgeon: Bert Collado MD;  Location: [facility] OR;  Service:     CERVICAL DISCECTOMY POSTERIOR FUSION WITH BRAIN LAB N/A 2/25/2025    Procedure: Posterior cervical seven/thoracic one decompression, posterior cervical three/four/five/six/seven, thoracic one/two/three fusion with screws/rods/crosslinks with Stealth spinal navigation;  Surgeon: Bert Collado MD;  Location: [facility] OR;  Service: Neurosurgery;  Laterality: N/A;    COLONOSCOPY      COLONOSCOPY N/A 2/14/2025    Procedure: COLONOSCOPY into cecum with hot snare polypectomy, Resolutions clips x2, Tattoo of transverse polyp area;  Surgeon: Gonzalo Rodriguez MD;  Location: [facility] ENDOSCOPY;  Service: Gastroenterology;  Laterality: N/A;  Pre: Screening  Post: Polyp, Suboptimal prep    ELBOW PROCEDURE Right     UNCLEAR:  RIGHT ELBOW AREA    ENDOSCOPY N/A 2/14/2025    Procedure: ESOPHAGOGASTRODUODENOSCOPY with biopsies;  Surgeon: Gonzalo Rodriguez MD;  Location: [facility] ENDOSCOPY;  Service: Gastroenterology;  Laterality: N/A;  Pre: GERD, Dysphagia  Post: Gastritis and Esophagitis    FINGER SURGERY Left     INDEX FINGER    LUMBAR EPIDURAL INJECTION      STATES X3    LUMBAR LAMINECTOMY DISCECTOMY DECOMPRESSION Bilateral 11/28/2023    Procedure: Open bilateral lumbar decompression lumbar one/two, lumbar  two/three, lumbar three/four;  Surgeon: Bert Collado MD;  Location: [facility] OR;  Service: Neurosurgery;  Laterality: Bilateral;    MENISCECTOMY Right     UPPER GASTROINTESTINAL ENDOSCOPY  03/2021       Family History: family history is not on file. Otherwise pertinent FHx was reviewed and not pertinent to current issue.    Social History:  reports that he quit smoking about 15 years ago. His smoking use included cigarettes. He started smoking about 35 years ago. He has a 40 pack-year smoking history. He has been exposed to tobacco smoke. He has never used smokeless tobacco. He reports current drug use. Frequency: 14.00 times per week. Drug: Marijuana. He reports that he does not drink alcohol.    Home Medications:  Diclofenac Sodium, NON FORMULARY, Potassium, Unable to find, albuterol sulfate HFA, baclofen, busPIRone, cephalexin, cetirizine, cholecalciferol, clonazePAM, docusate sodium, escitalopram, ferrous sulfate, gabapentin, lidocaine, mometasone, naloxone, pantoprazole, polyethylene glycol, and sennosides-docusate    Allergies:  Allergies   Allergen Reactions    Bacitracin-Polymyxin B Other (See Comments) and Rash    Chlorhexidine Rash    Tqdcv-Fmtdf-Hsdlwsd-Pramoxine Rash       Objective  Objective     Vitals:   Temp:  [98.3 °F (36.8 °C)] 98.3 °F (36.8 °C)  Heart Rate:  [101] 101  Resp:  [18] 18  BP: (126)/(93) 126/93  Physical Exam    Constitutional: 54-year-old male in no acute distress on room air   Eyes: PERRLA, sclerae anicteric, no conjunctival injection   HENT: NCAT, mucous membranes moist   Neck: Supple, no thyromegaly, no lymphadenopathy, trachea midline   Respiratory: Clear to auscultation bilaterally, nonlabored respirations    Cardiovascular: RRR, no murmurs, rubs, or gallops, palpable pedal pulses bilaterally   Gastrointestinal: Positive bowel sounds, soft, nontender, nondistended   Musculoskeletal: No bilateral ankle edema, no clubbing or cyanosis to extremities, surgical incision  appears intact the cervix to the mid thoracic with no bleeding or oozing, no surrounding erythema.  There is point tenderness of the mid thoracic spine, there is also diffuse tenderness to the left more than the right   Psychiatric: Appropriate affect, cooperative   Neurologic: Oriented x 3, strength symmetric in all extremities, Cranial Nerves grossly intact to confrontation, speech clear   Skin: No rashes     Result Review   Result Review:  I have personally reviewed the results from the time of this admission to 3/10/2025 05:43 EDT and agree with these findings:  [x]  Laboratory list / accordion  []  Microbiology  []  Radiology  []  EKG/Telemetry   []  Cardiology/Vascular   []  Pathology  [x]  Old records  []  Other:  Most notable findings include:       Assessment & Plan  Assessment / Plan     Brief Patient Summary:  Rafael Kat is a 54 y.o. male who is being admitted to the observation unit for worsening back pain status post C7-T1 decompression and C3-C7, T1-T3 fusion on February 25, 2025 with Dr. Collado.  Patient was discharged home on 3/4/2025 and had been doing well up until 2 days ago when acute onset of worsening pain happened while he was adjusting posture while sitting on the couch.  In the ER, labs noted glucose at 120, hemoglobin appears stable at 11.9 otherwise fairly unremarkable.  ED spoke with neurosurgery on-call who recommended further evaluation with plain films and MRI, they will follow in consultation    Active Hospital Problems:  Active Hospital Problems    Diagnosis     **Acute on chronic back pain     Cervical stenosis of spine     Cervical disc disorder with radiculopathy of mid-cervical region      Plan:     Acute on chronic back pain  Cervical stenosis of the spine  -Status post C7-T1 decompression and C3-7, T1-3 fusion with Dr. Collado 2/25/2025  -Plain films of the cervical and thoracic spine pending  -MRI of the cervical and thoracic spine ordered  -Neurosurgery  consulted  -Analgesics as needed  -Continue gabapentin  -N.p.o.    COPD  -Albuterol as needed    GERD  -PPI    Anxiety  -No acute issues  -Continue home medications once reconciled      VTE Prophylaxis:  Mechanical VTE prophylaxis orders are present.        CODE STATUS:    Code Status (Patient has no pulse and is not breathing): CPR (Attempt to Resuscitate)  Medical Interventions (Patient has pulse or is breathing): Full Support  Level Of Support Discussed With: Patient    Admission Status:  I believe this patient meets observation status.    75 minutes have been spent by [facility] providers in the care of this patient while under observation status.      Appropriate PPE worn during patient encounter.  Hand hygeine performed before and after seeing the patient.      Electronically signed by Mitra Beaulieu PA-C, 03/10/25, 5:35 AM EDT.             Electronically signed by Shekhar Ramirez MD at 03/10/25 0922       Medication Administration Report for Rafael Kat PAT as of 3/11/25 through 3/12/25     Legend:    Given Hold Not Given Due Canceled Entry Other Actions    Time Time (Time) Time Time-Action         Discontinued     Completed     Future     MAR Hold     Linked             Medications 03/11/25 03/12/25     Completed Medications   diazePAM (VALIUM) injection 2.5 mg  Dose: 2.5 mg  Freq: Once Route: IV  Start: 03/10/25 0549 End: 03/10/25 0702     Admin Instructions:   Give prior to MRI    May give each 5 mg IV push over 1 minute. May be injected through infusion tubing using port closest to vein insertion. Do not mix or dilute with other solutions.          gadobenate dimeglumine (MULTIHANCE) injection 20 mL  Dose: 20 mL  Freq: Once in Imaging Route: IV  Start: 03/10/25 1030 End: 03/10/25 0942     Admin Instructions:   Vesicant; admin as rapid bolus; flush with 5 mL NS after admin or 20 mL for renal or aortoiliofemoral vasculature          HYDROmorphone (DILAUDID) injection  "0.5 mg  Dose: 0.5 mg  Freq: Once Route: IV  Start: 03/10/25 0434 End: 03/10/25 0510     Admin Instructions:   Based on patient request - if ordered for moderate or severe pain, provider allows for administration of a medication prescribed for a lower pain scale.  If given for pain, use the following pain scale:  Mild Pain = Pain Score of 1-3, CPOT 1-2  Moderate Pain = Pain Score of 4-6, CPOT 3-4  Severe Pain = Pain Score of 7-10, CPOT 5-8          HYDROmorphone (DILAUDID) injection 1 mg  Dose: 1 mg  Freq: Once Route: IV  Start: 03/10/25 1630 End: 03/10/25 1817     Admin Instructions:       Caution: Look alike/sound alike drug alert    If given for pain, use the following pain scale:  Mild Pain = Pain Score of 1-3, CPOT 1-2  Moderate Pain = Pain Score of 4-6, CPOT 3-4  Severe Pain = Pain Score of 7-10, CPOT 5-8          ondansetron (ZOFRAN) injection 4 mg  Dose: 4 mg  Freq: Once Route: IV  Start: 03/10/25 0434 End: 03/10/25 0510     Admin Instructions:   \"If multiple N/V medications ordered, use in the following order: Ondansetron, Prochlorperazine, Promethazine. Use PO unless patient refuses or patient unable to swallow.\"          Discontinued Medications  Medications 03/11/25 03/12/25       acetaminophen (TYLENOL) tablet 650 mg  Dose: 650 mg  Freq: Every 4 Hours PRN Route: PO  PRN Reason: Mild Pain  Start: 03/10/25 0532 End: 03/12/25 2020     Admin Instructions:   If given for fever, use fever parameter: fever greater than 100.4 °F  Based on patient request - if ordered for moderate or severe pain, provider allows for administration of a medication prescribed for a lower pain scale.    Do not exceed 4 grams of acetaminophen in a 24 hr period. Max dose of 2gm for AST/ALT greater than 120 units/L.    If given for pain, use the following pain scale:   Mild Pain = Pain Score of 1-3, CPOT 1-2  Moderate Pain = Pain Score of 4-6, CPOT 3-4  Severe Pain = Pain Score of 7-10, CPOT 5-8          Or   acetaminophen (TYLENOL) " 160 MG/5ML oral solution 650 mg  Dose: 650 mg  Freq: Every 4 Hours PRN Route: PO  PRN Reason: Mild Pain  Start: 03/10/25 0532 End: 03/12/25 2020     Admin Instructions:   If given for fever, use fever parameter: fever greater than 100.4 °F  Based on patient request - if ordered for moderate or severe pain, provider allows for administration of a medication prescribed for a lower pain scale.    Do not exceed 4 grams of acetaminophen in a 24 hr period. Max dose of 2gm for AST/ALT greater than 120 units/L.    If given for pain, use the following pain scale:   Mild Pain = Pain Score of 1-3, CPOT 1-2  Moderate Pain = Pain Score of 4-6, CPOT 3-4  Severe Pain = Pain Score of 7-10, CPOT 5-8          Or   acetaminophen (TYLENOL) suppository 650 mg  Dose: 650 mg  Freq: Every 4 Hours PRN Route: RE  PRN Reason: Mild Pain  Start: 03/10/25 0532 End: 03/12/25 2020     Admin Instructions:   If given for fever, use fever parameter: fever greater than 100.4 °F  Based on patient request - if ordered for moderate or severe pain, provider allows for administration of a medication prescribed for a lower pain scale.    Do not exceed 4 grams of acetaminophen in a 24 hr period. Max dose of 2gm for AST/ALT greater than 120 units/L.    If given for pain, use the following pain scale:   Mild Pain = Pain Score of 1-3, CPOT 1-2  Moderate Pain = Pain Score of 4-6, CPOT 3-4  Severe Pain = Pain Score of 7-10, CPOT 5-8          albuterol (PROVENTIL) nebulizer solution 0.083% 2.5 mg/3mL  Dose: 2.5 mg  Freq: Every 6 Hours PRN Route: NEBULIZATION  PRN Reasons: Wheezing,Shortness of Air  Start: 03/10/25 0631 End: 03/11/25 1304          aspirin chewable tablet 81 mg  Dose: 81 mg  Freq: Daily Route: PO  Start: 03/10/25 0900 End: 03/12/25 2020     Admin Instructions:   Herbal/drug interaction: Avoid use with ginkgo biloba. Based on patient request - if ordered for moderate or severe pain, provider allows for administration of a medication prescribed for a  lower pain scale.  Do not exceed 4 grams of aspirin in a 24 hr period.    If given for pain, use the following pain scale:   Mild Pain = Pain Score of 1-3, CPOT 1-2  Moderate Pain = Pain Score of 4-6, CPOT 3-4  Severe Pain = Pain Score of 7-10, CPOT 5-8      0900-ProviderHeld            0900-ProviderHeld     2020-Unheld by provider              sennosides-docusate (PERICOLACE) 8.6-50 MG per tablet 2 tablet  Dose: 2 tablet  Freq: 2 Times Daily PRN Route: PO  PRN Reason: Constipation  Start: 03/10/25 0533 End: 03/12/25 2020     Admin Instructions:   Start bowel management regimen if patient has not had a bowel movement after 12 hours.          And   polyethylene glycol (MIRALAX) packet 17 g  Dose: 17 g  Freq: Daily PRN Route: PO  PRN Reason: Constipation  PRN Comment: Use if senna-docusate is ineffective  Start: 03/10/25 0533 End: 03/12/25 2020     Admin Instructions:   Use if no bowel movement after 12 hours. Mix in 6-8 ounces of water.  Use 4-8 ounces of water, tea, or juice for each 17 gram dose.          And   bisacodyl (DULCOLAX) EC tablet 5 mg  Dose: 5 mg  Freq: Daily PRN Route: PO  PRN Reason: Constipation  PRN Comment: Use if polyethylene glycol is ineffective  Start: 03/10/25 0533 End: 03/12/25 2020     Admin Instructions:   Use if no bowel movement after 12 hours.  Swallow whole. Do not crush, split, or chew tablet.          And   bisacodyl (DULCOLAX) suppository 10 mg  Dose: 10 mg  Freq: Daily PRN Route: RE  PRN Reason: Constipation  PRN Comment: Use if bisacodyl oral is ineffective  Start: 03/10/25 0533 End: 03/12/25 2020     Admin Instructions:   Use if no bowel movement after 12 hours.  Hold for diarrhea          busPIRone (BUSPAR) tablet 15 mg  Dose: 15 mg  Freq: 2 Times Daily Route: PO  Indications of Use: ANXIETY DISORDER  Indications Comment: This medication dosage was increased by the hospital psychiatrist who saw the patient for severe anxiety/panic with success.  The patient understands he is  being given a limited supply of this medication and that further refills will need to come from his PCP.  PCP to refill  Start: 03/10/25 0900 End: 03/12/25 2020     Admin Instructions:   Caution: Look alike/sound alike drug alert. Take with food.  Avoid grapefruit juice.      0804-Given     2136-Given           0926-Given              cephalexin (KEFLEX) capsule 500 mg  Dose: 500 mg  Freq: Every 6 Hours Scheduled Route: PO  Indications Comment: left forearm superficial thrombophlebitis  Start: 03/10/25 0730 End: 03/11/25 0359     Admin Instructions:   Take with food if GI upset occurs.          cetirizine (zyrTEC) tablet 10 mg  Dose: 10 mg  Freq: Daily Route: PO  Start: 03/10/25 0900 End: 03/12/25 2020 0805-Given            0926-Given               fentaNYL (DURAGESIC) 25 MCG/HR patch 1 patch  Dose: 1 patch  Freq: Every 72 Hours Route: TD  Start: 03/11/25 1245 End: 03/12/25 2020     Admin Instructions:       Caution: Look alike/sound alike drug alert    Remove old patch before applying new patch. Dispose of patch by folding in half and placing in controlled substances waste receptacle.      1829-Medication Applied            1820 (Medication Removed) [C]              And   Check Fentanyl Patch Placement  Dose: 1 each  Freq: Every 12 Hours Scheduled Route: XX  Start: 03/12/25 0900 End: 03/12/25 2020 0927-Check Medication Patch              cholecalciferol (VITAMIN D3) tablet 1,000 Units  Dose: 1,000 Units  Freq: Daily Route: PO  Start: 03/10/25 0900 End: 03/12/25 2020 0804-Given            0926-Given              diazePAM (VALIUM) tablet 5 mg  Dose: 5 mg  Freq: 3 times daily Route: PO  Start: 03/10/25 1515 End: 03/11/25 1152     Admin Instructions:    Caution: Look alike/sound alike drug alert.  Avoid use with Latisha's Wort.  Avoid grapefruit juice.      0805-Given               diphenhydrAMINE (BENADRYL) capsule 25 mg  Dose: 25 mg  Freq: Every 6 Hours PRN Route: PO  PRN Reasons:  Itching,Allergies  Start: 03/12/25 1434 End: 03/12/25 2020     Admin Instructions:   Caution: Look alike/sound alike drug alert. This med may be ordered in other forms and routes. Before giving verify the last time the drug was given by any route/form.          escitalopram (LEXAPRO) tablet 10 mg  Dose: 10 mg  Freq: Nightly Route: PO  Indications of Use: PANIC DISORDER  Indications Comment: prescribed by psychiatrist who saw patient in hospital for severe panic and anxiety - patient aware that he was prescribed a limited supply and will need to speak with PCP for further refill  Start: 03/10/25 0730 End: 03/12/25 2020     Admin Instructions:   Caution: Look alike/sound alike drug alert.      2136-Given               ferrous sulfate tablet 325 mg  Dose: 325 mg  Freq: Daily With Breakfast Route: PO  Start: 03/10/25 0800 End: 03/12/25 2020     Admin Instructions:   Swallow whole. Do not crush, split, or chew. Take with food if GI upset occurs.      0805-Given            0926-Given              gabapentin (NEURONTIN) capsule 800 mg  Dose: 800 mg  Freq: Every 8 Hours Scheduled Route: PO  Start: 03/10/25 0730 End: 03/12/25 2020     Admin Instructions:         0608-Given     1409-Given     2136-Given          0456-Given     1428-Given             hydrocortisone 1 % cream 1 Application  Dose: 1 Application  Freq: Every 12 Hours PRN Route: TOP  PRN Reasons: Irritation,Rash  Start: 03/12/25 1434 End: 03/12/25 2020     Admin Instructions:   Apply to affected area.           HYDROmorphone (DILAUDID) injection 0.5 mg  Dose: 0.5 mg  Freq: Every 4 Hours PRN Route: IV  PRN Reason: Severe Pain  PRN Comment: breakthrough pain  Start: 03/12/25 1028 End: 03/12/25 2020     Admin Instructions:   Based on patient request - if ordered for moderate or severe pain, provider allows for administration of a medication prescribed for a lower pain scale.  If given for pain, use the following pain scale:  Mild Pain = Pain Score of 1-3, CPOT  1-2  Moderate Pain = Pain Score of 4-6, CPOT 3-4  Severe Pain = Pain Score of 7-10, CPOT 5-8          And   naloxone (NARCAN) injection 0.4 mg  Dose: 0.4 mg  Freq: Every 5 Minutes PRN Route: IV  PRN Reason: Respiratory Depression  Start: 03/12/25 1028 End: 03/12/25 2020     Admin Instructions:   If respiratory rate is less than 8 breaths/minute or patient is difficult to arouse stop any narcotics and contact physician.   Administer slow IV push. Repeat as ordered until patient's respiratory rate is greater than 12 breaths/minute.           HYDROmorphone (DILAUDID) injection 0.5 mg  Dose: 0.5 mg  Freq: Every 2 Hours PRN Route: IV  PRN Reason: Severe Pain  Start: 03/11/25 1302 End: 03/12/25 1028     Admin Instructions:   Based on patient request - if ordered for moderate or severe pain, provider allows for administration of a medication prescribed for a lower pain scale.  If given for pain, use the following pain scale:  Mild Pain = Pain Score of 1-3, CPOT 1-2  Moderate Pain = Pain Score of 4-6, CPOT 3-4  Severe Pain = Pain Score of 7-10, CPOT 5-8          And   naloxone (NARCAN) injection 0.4 mg  Dose: 0.4 mg  Freq: Every 5 Minutes PRN Route: IV  PRN Reason: Respiratory Depression  Start: 03/11/25 1302 End: 03/12/25 1028     Admin Instructions:   If respiratory rate is less than 8 breaths/minute or patient is difficult to arouse stop any narcotics and contact physician.   Administer slow IV push. Repeat as ordered until patient's respiratory rate is greater than 12 breaths/minute.           HYDROmorphone (DILAUDID) injection 0.5 mg  Dose: 0.5 mg  Freq: Every 3 Hours PRN Route: IV  PRN Reason: Severe Pain  Start: 03/10/25 2132 End: 03/11/25 1303     Admin Instructions:   Based on patient request - if ordered for moderate or severe pain, provider allows for administration of a medication prescribed for a lower pain scale.  If given for pain, use the following pain scale:  Mild Pain = Pain Score of 1-3, CPOT  1-2  Moderate Pain = Pain Score of 4-6, CPOT 3-4  Severe Pain = Pain Score of 7-10, CPOT 5-8      0304-Given     0802-Given              And   naloxone (NARCAN) injection 0.4 mg  Dose: 0.4 mg  Freq: Every 5 Minutes PRN Route: IV  PRN Reason: Respiratory Depression  Start: 03/10/25 2132 End: 03/11/25 1303     Admin Instructions:   If respiratory rate is less than 8 breaths/minute or patient is difficult to arouse stop any narcotics and contact physician.   Administer slow IV push. Repeat as ordered until patient's respiratory rate is greater than 12 breaths/minute.           HYDROmorphone (DILAUDID) injection 0.5 mg  Dose: 0.5 mg  Freq: Every 2 Hours PRN Route: IV  PRN Reason: Severe Pain  Start: 03/10/25 0533 End: 03/10/25 2133     Admin Instructions:   Based on patient request - if ordered for moderate or severe pain, provider allows for administration of a medication prescribed for a lower pain scale.  If given for pain, use the following pain scale:  Mild Pain = Pain Score of 1-3, CPOT 1-2  Moderate Pain = Pain Score of 4-6, CPOT 3-4  Severe Pain = Pain Score of 7-10, CPOT 5-8          And   naloxone (NARCAN) injection 0.4 mg  Dose: 0.4 mg  Freq: Every 5 Minutes PRN Route: IV  PRN Reason: Respiratory Depression  Start: 03/10/25 0533 End: 03/10/25 2133     Admin Instructions:   If respiratory rate is less than 8 breaths/minute or patient is difficult to arouse stop any narcotics and contact physician.   Administer slow IV push. Repeat as ordered until patient's respiratory rate is greater than 12 breaths/minute.          ipratropium-albuterol (DUO-NEB) nebulizer solution 3 mL  Dose: 3 mL  Freq: Every 6 Hours PRN Route: NEBULIZATION  PRN Reasons: Wheezing,Shortness of Air  Start: 03/11/25 1304 End: 03/12/25 2020          Lidocaine 4 % 1 patch  Dose: 1 patch  Freq: Every 24 Hours Scheduled Route: TD  Start: 03/10/25 1515 End: 03/12/25 2020     Admin Instructions:   Apply to skin on right midback.avoid placing over  incision . Remove patch in 12 hours. Apply patch only once for up to 12 hours in 24 hour period. Based on patient request -  if ordered for moderate or severe pain, provider allows for administration of a medication prescribed for a lower pain scale.  If given for pain, use the following pain scale:  Mild Pain = Pain Score of 1-3, CPOT 1-2  Moderate Pain = Pain Score of 4-6, CPOT 3-4  Severe Pain = Pain Score of 7-10, CPOT 5-8      0306-Medication Removed     0805-Medication Applied [C]     1944-Medication Removed          0926-Medication Applied [C]     2126 (Medication Removed)              ondansetron ODT (ZOFRAN-ODT) disintegrating tablet 4 mg  Dose: 4 mg  Freq: Every 6 Hours PRN Route: PO  PRN Reasons: Nausea,Vomiting  Start: 03/10/25 0531 End: 03/12/25 2020     Admin Instructions:   If BOTH ondansetron (ZOFRAN) and promethazine (PHENERGAN) are ordered use ondansetron first and THEN promethazine IF ondansetron is ineffective.  Place on tongue and allow to dissolve.          Or   ondansetron (ZOFRAN) injection 4 mg  Dose: 4 mg  Freq: Every 6 Hours PRN Route: IV  PRN Reasons: Nausea,Vomiting  Start: 03/10/25 0531 End: 03/12/25 2020     Admin Instructions:   If BOTH ondansetron (ZOFRAN) and promethazine (PHENERGAN) are ordered use ondansetron first and THEN promethazine IF ondansetron is ineffective.          oxyCODONE-acetaminophen (PERCOCET) 7.5-325 MG per tablet 1 tablet  Dose: 1 tablet  Freq: Every 4 Hours PRN Route: PO  PRN Reason: Moderate Pain  Start: 03/11/25 1303 End: 03/12/25 2020     Admin Instructions:   Based on patient request - if ordered for moderate or severe pain, provider allows for administration of a medication prescribed for a lower pain scale.  [ANEESH]    Do not exceed 4 grams of acetaminophen in a 24 hr period. Max dose of 2gm for AST/ALT greater than 120 units/L        If given for pain, use the following pain scale:   Mild Pain = Pain Score of 1-3, CPOT 1-2  Moderate Pain = Pain Score of  4-6, CPOT 3-4  Severe Pain = Pain Score of 7-10, CPOT 5-8      1829-Given     2305-Given           0456-Given     0929-Given     1428-Given            oxyCODONE-acetaminophen (PERCOCET) 7.5-325 MG per tablet 1 tablet  Dose: 1 tablet  Freq: Every 6 Hours PRN Route: PO  PRN Reason: Moderate Pain  Start: 03/10/25 0534 End: 03/11/25 1303     Admin Instructions:   Based on patient request - if ordered for moderate or severe pain, provider allows for administration of a medication prescribed for a lower pain scale.  [ANEESH]    Do not exceed 4 grams of acetaminophen in a 24 hr period. Max dose of 2gm for AST/ALT greater than 120 units/L        If given for pain, use the following pain scale:   Mild Pain = Pain Score of 1-3, CPOT 1-2  Moderate Pain = Pain Score of 4-6, CPOT 3-4  Severe Pain = Pain Score of 7-10, CPOT 5-8      1147-Given               oxyCODONE-acetaminophen (PERCOCET) 7.5-325 MG per tablet 2 tablet  Dose: 2 tablet  Freq: Every 4 Hours PRN Route: PO  PRN Reason: Severe Pain  Start: 03/12/25 1027 End: 03/12/25 2020     Admin Instructions:   Based on patient request - if ordered for moderate or severe pain, provider allows for administration of a medication prescribed for a lower pain scale.  [ANEESH]    Do not exceed 4 grams of acetaminophen in a 24 hr period. Max dose of 2gm for AST/ALT greater than 120 units/L        If given for pain, use the following pain scale:   Mild Pain = Pain Score of 1-3, CPOT 1-2  Moderate Pain = Pain Score of 4-6, CPOT 3-4  Severe Pain = Pain Score of 7-10, CPOT 5-8          pantoprazole (PROTONIX) EC tablet 40 mg  Dose: 40 mg  Freq: 2 Times Daily Route: PO  Start: 03/10/25 0900 End: 03/12/25 2020     Admin Instructions:   Do not crush or chew the capsules or tablets. The drug may not work as designed if the capsule or tablet is crushed or chewed. Swallow whole.  Swallow whole; do not crush, split, or chew.      0805-Given     2136-Given           0926-Given              sodium  chloride 0.9 % flush 10 mL  Dose: 10 mL  Freq: As Needed Route: IV  PRN Reason: Line Care  Start: 03/10/25 0531 End: 03/12/25 2020          sodium chloride 0.9 % flush 10 mL  Dose: 10 mL  Freq: Every 12 Hours Scheduled Route: IV  Start: 03/10/25 0900 End: 03/12/25 2020      0802-Given     (2202)-Not Given           0927-Given               sodium chloride 0.9 % flush 10 mL  Dose: 10 mL  Freq: As Needed Route: IV  PRN Reason: Line Care  Start: 03/10/25 0418 End: 03/12/25 2020          sodium chloride 0.9 % infusion 40 mL  Dose: 40 mL  Freq: As Needed Route: IV  PRN Reason: Line Care  Start: 03/10/25 0531 End: 03/12/25 2020     Admin Instructions:   Following administration of an IV intermittent medication, flush line with 40mL NS at 100mL/hr.                      Operative/Procedure Notes (all)    No notes of this type exist for this encounter.          Physician Progress Notes (last 48 hours)        Donita Van, APRN at 03/12/25 1548       Attestation signed by Bert Collado MD at 03/12/25 2536    I have reviewed this documentation and agree.                       LOS: 1 day   Patient Care Team:  Provider, No Known as PCP - General  Bert Collado MD as Surgeon (Neurosurgery)    Chief Complaint:  Worsening post op cervical spasms    Subjective     Interval History: Started on Duragesic 25 mcg/hr patch which is to be changed every 72 hours. Patient states his pain is much better.  He is walking around, voiding, tolerating diet without difficulty. He reports he still taking his home dose baclofen 20 mg twice daily for spasm relief and doing well with that. He feels ready to go home.     History taken from: patient chart    Objective        Vital Signs  Temp:  [97.1 °F (36.2 °C)-99.1 °F (37.3 °C)] 97.5 °F (36.4 °C)  Heart Rate:  [68-78] 72  Resp:  [16-17] 16  BP: (115-139)/(79-88) 127/88    Physical Exam:     AA&O x 3.   Posterior cervical incision is well approximated and healing well.   L arm old IV site  also improving.   No calf swelling or tenderness to bilateral palpation.      Results Review:     I reviewed the patient's new clinical results.    .  Results from last 7 days   Lab Units 03/12/25  0520 03/10/25  0451   WBC 10*3/mm3 8.25 9.34   HEMOGLOBIN g/dL 11.9* 11.9*   HEMATOCRIT % 36.9* 36.6*   PLATELETS 10*3/mm3 400 424     .  Results from last 7 days   Lab Units 03/12/25  0520 03/10/25  0451   SODIUM mmol/L 141 141   POTASSIUM mmol/L 3.8 3.7   CHLORIDE mmol/L 101 107   CO2 mmol/L 26.5 23.2   BUN mg/dL 16 16   CREATININE mg/dL 1.08 0.98   GLUCOSE mg/dL 109* 120*   CALCIUM mg/dL 8.7 8.6       Assessment & Plan       Acute on chronic back pain    Cervical disc disorder with radiculopathy of mid-cervical region    Gastroesophageal reflux disease    Cervical stenosis of spine    COPD (chronic obstructive pulmonary disease)    53 yo male 15 days s/p bilateral C7/T1 decompression with C3-T3 onlay posterior lateral fusion with placement of bilateral lateral mass screws C3, C4, C5, C6, and bilateral T1, T2, T3 pedicle screws with Stealth navigation and placement of rods from C3-T3 with cross-link.  He was discharged 8 days ago and then subsequently returned to the hospital due to severe uncontrolled postoperative posterior cervical spasms.  Subsequent cervical/thoracic x-rays 2 days ago were stable and the cervical/thoracic MRI images also 2 days ago showed successful interval decompression.  He has since been started on a 25 mcg/hr Duragesic patch at the recommendation of Dr. Collado and he is taking as needed Percocet 7.5/325 mg 1 tablet every 4 hours as needed pain.  He reports good control of his pain symptoms with this combination.    I discussed the above with Dr. Collado.  The patient is feeling much better and feels ready for discharge home.  Dr. Collado will issue a prescription for the Duragesic patches 25 mcg/hr to be changed every 72 hours and Percocet 7.5/2025 mg 1 tablet p.o. every 4 hours as  needed pain.     I educated the patient that he must adhere to the every 72 hours scheduled for the Duragesic patches.  He must remove the entire patch and wipe off the skin on the third day.  He can then place the new Duragesic patch to a different area of the body.  He was also encouraged to use the Percocet 7.5/325 mg no sooner than every 4 hours and as sparingly as possible.  The Percocet should be used for severe breakthrough pain.  He will continue to use his home dose baclofen for muscle spasm relief and his home dose gabapentin for chronic radicular pain symptoms.  In addition to the above, I encouraged the patient to utilize his soft cervical collar when he is sitting in a chair or ambulating to reduce muscular discomfort from supporting the weight of his head on the cervical spine muscles.    The patient will be contacted by Dr. Collado's  to arrange a follow-up appointment in 2 weeks in our office.  For any questions or concerns, the patient and his wife were encouraged to call our office.    HERON Candelaria  25  16:10 EDT          Electronically signed by Bert Collado MD at [phone]       Helder Blackburn MD at [phone]              Name: Rafael Kat ADMIT: 3/10/2025   : 1970  PCP: Provider, No Known    MRN: [id number] LOS: 1 days   AGE/SEX: 54 y.o. male  ROOM: [facility]     Subjective   Subjective   Chief Complaint   Patient presents with    Back Pain     Overall his pain is better controlled today.  It is still present but more tolerable.  He still has a large pressure sensation in his back.  He has been able to use his right hand some although still feeling sluggish.  Not reporting any chest pain palpitation shortness of breath nausea vomiting.  He was on room air when I saw him.    Objective   Objective   Vital Signs  Temp:  [97.1 °F (36.2 °C)-99.1 °F (37.3 °C)] 97.1 °F (36.2 °C)  Heart Rate:  [65-78] 71  Resp:  [17-18] 17  BP: (115-139)/(79-93)  "115/87  SpO2:  [92 %-96 %] 94 %  on  Flow (L/min) (Oxygen Therapy):  [2] 2;   Device (Oxygen Therapy): room air  Body mass index is 31.09 kg/m².    Physical Exam  Vitals and nursing note reviewed.   Constitutional:       General: He is not in acute distress.     Appearance: He is not diaphoretic.   HENT:      Head: Atraumatic.   Cardiovascular:      Rate and Rhythm: Normal rate and regular rhythm.   Pulmonary:      Effort: Pulmonary effort is normal.      Breath sounds: No wheezing.   Abdominal:      General: There is no distension.      Palpations: Abdomen is soft.      Tenderness: There is no abdominal tenderness. There is no guarding or rebound.   Musculoskeletal:         General: Tenderness present.   Neurological:      Mental Status: He is alert.      Cranial Nerves: No cranial nerve deficit.      Motor: Weakness present.   Psychiatric:         Mood and Affect: Mood normal.         Behavior: Behavior normal.       Results Review  I reviewed the patient's new clinical results.    Results from last 7 days   Lab Units 03/12/25  0520 03/10/25  0451   WBC 10*3/mm3 8.25 9.34   HEMOGLOBIN g/dL 11.9* 11.9*   PLATELETS 10*3/mm3 400 424     Results from last 7 days   Lab Units 03/12/25  0520 03/10/25  0451   SODIUM mmol/L 141 141   POTASSIUM mmol/L 3.8 3.7   CHLORIDE mmol/L 101 107   CO2 mmol/L 26.5 23.2   BUN mg/dL 16 16   CREATININE mg/dL 1.08 0.98   GLUCOSE mg/dL 109* 120*   EGFR mL/min/1.73 80.5 90.5     Results from last 7 days   Lab Units 03/12/25  0520 03/10/25  0451   ALBUMIN g/dL 3.3* 3.3*   BILIRUBIN mg/dL 0.4 <0.2   ALK PHOS U/L 76 83   AST (SGOT) U/L 20 21   ALT (SGPT) U/L 19 22     Results from last 7 days   Lab Units 03/12/25  0520 03/10/25  0451   CALCIUM mg/dL 8.7 8.6   ALBUMIN g/dL 3.3* 3.3*   MAGNESIUM mg/dL 2.2  --    PHOSPHORUS mg/dL 4.0  --          No results found for: \"HGBA1C\", \"POCGLU\"    XR Chest 1 View  Result Date: 3/11/2025  No evidence for acute pulmonary process. Follow-up as clinical " indications persist.  This report was finalized on 3/11/2025 3:55 PM by Dr. Dejon Adair M.D on Workstation: [id number]        I have personally reviewed all medications:  Scheduled Medications  [Held by provider] aspirin, 81 mg, Oral, Daily  busPIRone, 15 mg, Oral, BID  cetirizine, 10 mg, Oral, Daily  fentaNYL, 1 patch, Transdermal, Q72H   And  Check Fentanyl Patch Placement, 1 each, Not Applicable, Q12H  cholecalciferol, 1,000 Units, Oral, Daily  escitalopram, 10 mg, Oral, Nightly  ferrous sulfate, 325 mg, Oral, Daily With Breakfast  gabapentin, 800 mg, Oral, Q8H  Lidocaine, 1 patch, Transdermal, Q24H  pantoprazole, 40 mg, Oral, BID  sodium chloride, 10 mL, Intravenous, Q12H      Infusions     Diet  Diet: Regular/House; Fluid Consistency: Thin (IDDSI 0)      Assessment/Plan     Active Hospital Problems    Diagnosis  POA    **Acute on chronic back pain [M54.9, G89.29]  Yes    COPD (chronic obstructive pulmonary disease) [J44.9]  Yes    Cervical stenosis of spine [M48.02]  Yes    Gastroesophageal reflux disease [K21.9]  Yes    Cervical disc disorder with radiculopathy of mid-cervical region [M50.120]  Yes      Resolved Hospital Problems   No resolved problems to display.       54 y.o. male admitted with Acute on chronic back pain.    Cervical radiculopathy/right upper extremity weakness/postoperative back pain: Continues with pain control measures.  More comfortable today in appearance than yesterday.  Neurosurgery following.  COPD: No acute bronchospasm.  No acute infiltrate.  He was on room air.  Continue breathing treatments as needed.  GERD: PPI  PPX: SCD, defer to surgery  Disposition: TBD    Expected Discharge Date: 3/11/2025; Expected Discharge Time:      Helder Blackburn MD  [facility]  03/12/25  10:25 EDT    Dictated portions of note using Dragon dictation software.  Copied text in this note has been reviewed by me and remains accurate as of 03/12/25    Electronically signed by  Helder Blackburn MD at 03/12/25 1027          Consult Notes (last 48 hours)        Helder Blackburn MD at 03/11/25 1256        Consult Orders    1. Inpatient Hospitalist Consult [[id number]] ordered by Sahra Collazo MD at 03/11/25 1208                     Patient Name:  Rafael Kat  YOB: 1970  MRN:  [id number]  Admit Date:  3/10/2025  Patient Care Team:  Provider, No Known as PCP - Bert Moe MD as Surgeon (Neurosurgery)      Subjective   History Present Illness     Chief Complaint   Patient presents with    Back Pain   Consult for COPD and admission from observation unit with ongoing back pain.    Mr. Kat is a 54 y.o. admitted to the observation unit with back pain after he had a cervical/thoracic decompression and fusion a few weeks ago.  Patient reports that since he returned home he has had continued issues with his right hand where he will intermittently drop things.  He has had the sensation of swelling and pain in his mid/upper back.  He does not report any radiation of the pain.  Does not report any muscle spasms.  No fevers or chills.  He has not had any cranial nerve issues in including no speech change or vision change.    Denies chest pain palpitation shortness of breath.  No productive cough.  No nausea vomiting frontal abdominal pain dysuria or flank pain reported.  When I saw him he was on supplemental oxygen but not having any respiratory distress.    Review of Systems   Constitutional:  Negative for chills and fever.   HENT:  Negative for sore throat and trouble swallowing.    Eyes:  Negative for pain and visual disturbance.   Respiratory:  Negative for cough, shortness of breath and wheezing.    Cardiovascular:  Negative for chest pain, palpitations and leg swelling.   Gastrointestinal:  Negative for constipation, diarrhea, nausea and vomiting.   Endocrine: Negative for cold intolerance and heat intolerance.   Genitourinary:  Negative for dysuria and  flank pain.   Musculoskeletal:  Positive for back pain.   Skin:  Negative for pallor and rash.   Allergic/Immunologic: Negative for environmental allergies and food allergies.   Neurological:  Negative for seizures and syncope.   Hematological:  Negative for adenopathy. Does not bruise/bleed easily.   Psychiatric/Behavioral:  Negative for agitation and confusion.         Personal History     Past Medical History:   Diagnosis Date    Anesthesia complication     PT STATES HE IS SLOW TO WAKE UP    Arthritis     COPD (chronic obstructive pulmonary disease)     STATES WHEEZES AT NIGHT AND DOES USE NIGHTLY INHALER    DDD (degenerative disc disease), cervical     DDD (degenerative disc disease), lumbar     PAIN IN RIGHT BUTTOCK AND DOWN AT TIMES RIGHT LEG, WEAKNESS RIGHT LEG AT TIMES    Dysphagia     STARTED AFTER HIS CERVICAL NECK SURGERY    GERD (gastroesophageal reflux disease)     History of COVID-19     Neck pain     Psoriasis     Shoulder pain, bilateral     Skin cancer     BASAL/SQUAMOUS     Past Surgical History:   Procedure Laterality Date    ANTERIOR CHANNEL VERTEBRECTOMY/CORPECTOMY Bilateral 03/29/2016    Procedure: C4-6 ANTERIOR CHANNEL VERTEBRECTOMY/CORPECTOMY WITH INSTRUMENTATION;  Surgeon: Bert Collado MD;  Location: [facility] OR;  Service:     CERVICAL DISCECTOMY POSTERIOR FUSION WITH BRAIN LAB N/A 2/25/2025    Procedure: Posterior cervical seven/thoracic one decompression, posterior cervical three/four/five/six/seven, thoracic one/two/three fusion with screws/rods/crosslinks with Stealth spinal navigation;  Surgeon: Bert Collado MD;  Location: [facility] MAIN OR;  Service: Neurosurgery;  Laterality: N/A;    COLONOSCOPY      COLONOSCOPY N/A 2/14/2025    Procedure: COLONOSCOPY into cecum with hot snare polypectomy, Resolutions clips x2, Tattoo of transverse polyp area;  Surgeon: Gonzalo Rodriguez MD;  Location: [facility] ENDOSCOPY;  Service: Gastroenterology;  Laterality: N/A;  Pre:  Screening  Post: Polyp, Suboptimal prep    ELBOW PROCEDURE Right     UNCLEAR:  RIGHT ELBOW AREA    ENDOSCOPY N/A 2/14/2025    Procedure: ESOPHAGOGASTRODUODENOSCOPY with biopsies;  Surgeon: Gonzalo Rodriguez MD;  Location:  [facility] ENDOSCOPY;  Service: Gastroenterology;  Laterality: N/A;  Pre: GERD, Dysphagia  Post: Gastritis and Esophagitis    FINGER SURGERY Left     INDEX FINGER    LUMBAR EPIDURAL INJECTION      STATES X3    LUMBAR LAMINECTOMY DISCECTOMY DECOMPRESSION Bilateral 11/28/2023    Procedure: Open bilateral lumbar decompression lumbar one/two, lumbar two/three, lumbar three/four;  Surgeon: Bert Collado MD;  Location: [facility] MAIN OR;  Service: Neurosurgery;  Laterality: Bilateral;    MENISCECTOMY Right     UPPER GASTROINTESTINAL ENDOSCOPY  03/2021     Family History   Problem Relation Age of Onset    Malig Hyperthermia Neg Hx      Social History     Tobacco Use    Smoking status: Former     Current packs/day: 0.00     Average packs/day: 2.0 packs/day for 20.0 years (40.0 ttl pk-yrs)     Types: Cigarettes     Start date: 1/1/1990     Quit date: 2010     Years since quitting: 15.2     Passive exposure: Past    Smokeless tobacco: Never   Vaping Use    Vaping status: Never Used   Substance Use Topics    Alcohol use: No    Drug use: Yes     Frequency: 14.0 times per week     Types: Marijuana     Comment: 1-2 PER DAY,     No current facility-administered medications on file prior to encounter.     Current Outpatient Medications on File Prior to Encounter   Medication Sig Dispense Refill    aspirin 81 MG chewable tablet Chew 1 tablet Daily.      baclofen (LIORESAL) 20 MG tablet Take 1 tablet by mouth 2 (Two) Times a Day.      busPIRone (BUSPAR) 15 MG tablet Take 1 tablet by mouth 2 (Two) Times a Day. Indications: Anxiety Disorder 20 tablet 0    cephalexin (KEFLEX) 500 MG capsule Take 1 capsule by mouth Every 6 (Six) Hours for 28 doses. 28 capsule 0    cetirizine (zyrTEC) 10 MG tablet Take 1 tablet by  mouth Daily.      cholecalciferol (VITAMIN D3) 25 MCG (1000 UT) tablet Take 1 tablet by mouth Daily.      clonazePAM (KlonoPIN) 0.5 MG tablet Take 1 tablet by mouth 3 times a day for 5 days. Indications: Panic Disorder 15 tablet 0    docusate sodium 100 MG capsule Take 1 capsule by mouth Daily.      escitalopram (LEXAPRO) 10 MG tablet Take 1 tablet by mouth Every Night. Indications: Panic Disorder 30 tablet 0    FeroSul 325 (65 Fe) MG tablet Take 1 tablet by mouth Daily With Breakfast. PT HAS NOT HAD FOR 4 DAYS      gabapentin (NEURONTIN) 800 MG tablet Take 1 tablet by mouth 3 (Three) Times a Day. TYPICALLY TAKES TWICE DAILY      [] HYDROcodone-acetaminophen (NORCO) 7.5-325 MG per tablet Take 1 tablet by mouth Every 4 (Four) Hours As Needed for Severe Pain for up to 5 days. 30 tablet 0    NON FORMULARY Every Night. STATES DOES USE NIGHT INHALER:  INSTRUCTED TO BRING THIS DAY OF SURGEY      pantoprazole (PROTONIX) 40 MG EC tablet Take 1 tablet by mouth 2 (Two) Times a Day.  11    polyethylene glycol (MIRALAX) 17 GM/SCOOP powder Dissolve 17 g (1 capful) in liquid and drink by mouth 2 (Two) Times a Day As Needed for constipation 510 g 0    sennosides-docusate (PERICOLACE) 8.6-50 MG per tablet Take 2 tablets by mouth Every Night. 20 tablet 0    Ventolin  (90 Base) MCG/ACT inhaler Inhale 2 puffs Every 4 (Four) Hours As Needed.      Diclofenac Sodium (VOLTAREN) 1 % gel gel Apply 4 g topically to the appropriate area as directed As Needed.      lidocaine (LIDODERM) 5 % PLACE 1 PATCH ON THE SKIN AS DIRECTED BY PROVIDER DAILY. REMOVE & DISCARD PATCH WITHIN 12 HOURS OR AS DIRECTED BY MD 30 patch 2    mometasone (ELOCON) 0.1 % cream 1 Application As Needed.      naloxone (NARCAN) 4 MG/0.1ML nasal spray Call 911. Don't prime. [place] in 1 nostril for overdose. Repeat in 2-3 minutes in other nostril if no or minimal breathing/responsiveness. 2 each 0    Potassium 75 MG tablet Take  by mouth As Needed. DURING THE  SUMMER WHEN SWEATS MORE      Unable to find 1 each 1 (One) Time. INHALER NEW INSTRUCTED PT TO BRING WITH HIM       Allergies   Allergen Reactions    Bacitracin-Polymyxin B Other (See Comments) and Rash    Chlorhexidine Rash    Xkmvx-Clkey-Myiuxsg-Pramoxine Rash       Objective    Objective     Vital Signs  Temp:  [97.5 °F (36.4 °C)-98.4 °F (36.9 °C)] 98.2 °F (36.8 °C)  Heart Rate:  [57-68] 65  Resp:  [17-20] 18  BP: (103-132)/(67-93) 123/93  SpO2:  [86 %-98 %] 96 %  on  Flow (L/min) (Oxygen Therapy):  [2-3] 2;   Device (Oxygen Therapy): nasal cannula  Body mass index is 31.09 kg/m².    Physical Exam  Vitals and nursing note reviewed.   Constitutional:       General: He is not in acute distress.     Appearance: He is not diaphoretic.   HENT:      Head: Atraumatic.   Eyes:      Conjunctiva/sclera: Conjunctivae normal.      Pupils: Pupils are equal, round, and reactive to light.   Cardiovascular:      Rate and Rhythm: Normal rate and regular rhythm.      Pulses: Normal pulses.   Pulmonary:      Effort: Pulmonary effort is normal.      Breath sounds: No wheezing or rhonchi.   Abdominal:      General: There is no distension.      Palpations: Abdomen is soft.      Tenderness: There is no abdominal tenderness. There is no guarding or rebound.   Musculoskeletal:         General: Tenderness present.      Right lower leg: No edema.      Left lower leg: No edema.   Skin:     General: Skin is warm and dry.   Neurological:      Mental Status: He is alert.      Cranial Nerves: No cranial nerve deficit.   Psychiatric:         Mood and Affect: Mood normal.         Behavior: Behavior normal.         Results Review:  I reviewed the patient's new clinical results.  I reviewed the patient's new imaging results and agree with the interpretation.  I personally viewed and interpreted the patient's EKG/Telemetry data  I reviewed prior records and medications.    Lab Results (last 24 hours)       ** No results found for the last 24 hours.  **            Imaging Results (Last 24 Hours)       ** No results found for the last 24 hours. **                No orders to display       Assessment/Plan     Active Hospital Problems    Diagnosis  POA    **Acute on chronic back pain [M54.9, G89.29]  Yes    COPD (chronic obstructive pulmonary disease) [J44.9]  Yes    Cervical stenosis of spine [M48.02]  Yes    Gastroesophageal reflux disease [K21.9]  Yes    Cervical disc disorder with radiculopathy of mid-cervical region [M50.120]  Yes      Resolved Hospital Problems   No resolved problems to display.       Mr. Kat is a 54 y.o.     COPD: No acute bronchospasm on exam.  Plan to continue breathing treatments.  Some of the hypoxia may be related to the pain medications/benzodiazepines that he had been receiving to try to control his upper back symptoms.  Will screen a chest x-ray.  Monitor progression and wean O2 as able.  Cervical radiculopathy/right upper extremity weakness: Continue pain control efforts.  A fentanyl patch has been started.  He is on Neurontin.  Neurosurgery following.  GERD: PPI  PPx: SCD  Code Status: Full  I discussed the patient's findings and my recommendations with patient, family, and ED provider.      Helder Blackburn MD  [facility]  03/11/25  12:56 EDT    Dictated portions of note using dragon dictation software.    Electronically signed by Helder Blackburn MD at 03/11/25 1047

## 2025-03-13 NOTE — CASE MANAGEMENT/SOCIAL WORK
Case Management Discharge Note      Final Note: Home         Selected Continued Care - Discharged on 3/12/2025 Admission date: 3/10/2025 - Discharge disposition: Home or Self Care      Destination    No services have been selected for the patient.                Durable Medical Equipment    No services have been selected for the patient.                Dialysis/Infusion    No services have been selected for the patient.                Home Medical Care    No services have been selected for the patient.                Therapy    No services have been selected for the patient.                Community Resources    No services have been selected for the patient.                Community & DME    No services have been selected for the patient.                         Final Discharge Disposition Code: 01 - home or self-care

## 2025-03-17 DIAGNOSIS — M48.062 SPINAL STENOSIS OF LUMBAR REGION WITH NEUROGENIC CLAUDICATION: Primary | ICD-10-CM

## 2025-03-17 RX ORDER — OXYCODONE AND ACETAMINOPHEN 7.5; 325 MG/1; MG/1
1 TABLET ORAL EVERY 6 HOURS PRN
Refills: 0 | Status: CANCELLED | OUTPATIENT
Start: 2025-03-17

## 2025-03-17 NOTE — TELEPHONE ENCOUNTER
Spoke w/ patient and informed him that he can pick his paperwork for his disabled license plate anytime this week but to call and hour in advance so that it'll be ready for him. I also informed the patient that I sent a refill request over to Dr. Collado. Patient voiced understanding.

## 2025-03-17 NOTE — TELEPHONE ENCOUNTER
Mrs Kat delivered to office parking & SNAP disability paperwork for Mr Kat   Also needs 2 week F/U appt & pain meds refilled Percocet       Gave to Dr SIRENA RAUSCH    Thanks

## 2025-03-18 ENCOUNTER — READMISSION MANAGEMENT (OUTPATIENT)
Dept: CALL CENTER | Facility: HOSPITAL | Age: 55
End: 2025-03-18
Payer: COMMERCIAL

## 2025-03-18 ENCOUNTER — TELEPHONE (OUTPATIENT)
Dept: NEUROSURGERY | Facility: CLINIC | Age: 55
End: 2025-03-18
Payer: COMMERCIAL

## 2025-03-18 NOTE — OUTREACH NOTE
Medical Week 1 Survey      Flowsheet Row Responses   [facility] patient discharged from? [place]   Does the patient have one of the following disease processes/diagnoses(primary or secondary)? Other   Week 1 attempt successful? Yes   Call start time 0942   Call end time 0944   Discharge diagnosis a/c Back pain   Person spoke with today (if not patient) and relationship Spouse- Monika   Meds reviewed with patient/caregiver? Yes   Does the patient have all medications ordered at discharge? Yes   Prescription comments START taking:  fentaNYL (DURAGESIC)  Start taking on: March 14, 2025  oxyCODONE-acetaminophen (PERCOCET)  STOP taking:  cephalexin 500 MG capsule (KEFLEX)  HYDROcodone-acetaminophen 7.5-325 MG per tablet (NORCO)   Is the patient taking all medications as directed (includes completed medication regime)? Yes   Does the patient have a primary care provider?  Yes   Comments regarding PCP Waiting on call back from Neurosurgeon.   Has home health visited the patient within 72 hours of discharge? N/A   Psychosocial issues? No   Did the patient receive a copy of their discharge instructions? Yes   Nursing interventions Reviewed instructions with patient   What is the patient's perception of their health status since discharge? Improving   Is the patient/caregiver able to teach back signs and symptoms related to disease process for when to call PCP? Yes   Is the patient/caregiver able to teach back signs and symptoms related to disease process for when to call 911? Yes   Is the patient/caregiver able to teach back the hierarchy of who to call/visit for symptoms/problems? PCP, Specialist, Home health nurse, Urgent Care, ED, 911 Yes   Week 1 call completed? Yes   Graduated Yes   Wrap up additional comments Spouse states patient is doing better. Pain is improved. Needing refills on pain medication she will see why insurance denied the refills. Waiting on a call from Neurosurgeon for a fu appt.   Call end  time [id number]            Jami STEWART - Registered Nurse

## 2025-03-18 NOTE — TELEPHONE ENCOUNTER
Patient's wife came in and drop paperwork off to be completed.  Drivers Placard and [employer] paperwork.  Patient also had questions about patient's pain meds.  Patient's wife states that he was denied a refill.  He needs something for pain.  Please advise

## 2025-03-18 NOTE — TELEPHONE ENCOUNTER
Rx Refill Note  Requested Prescriptions     Pending Prescriptions Disp Refills    oxyCODONE-acetaminophen (PERCOCET) 5-325 MG per tablet 1 tablet  0      Last office visit with prescribing clinician: 1/22/2025   Last telemedicine visit with prescribing clinician: Visit date not found   Next office visit with prescribing clinician: 3/18/2025                         Would you like a call back once the refill request has been completed: [] Yes [] No    If the office needs to give you a call back, can they leave a voicemail: [] Yes [] No    Deysi Nelson MA  03/18/25, 10:35 EDT

## 2025-03-19 DIAGNOSIS — M54.6 PAIN IN THORACIC SPINE: ICD-10-CM

## 2025-03-19 DIAGNOSIS — M54.2 NECK PAIN: ICD-10-CM

## 2025-03-19 RX ORDER — OXYCODONE AND ACETAMINOPHEN 7.5; 325 MG/1; MG/1
1 TABLET ORAL EVERY 4 HOURS PRN
Qty: 42 TABLET | Refills: 0 | Status: SHIPPED | OUTPATIENT
Start: 2025-03-19

## 2025-03-20 NOTE — TELEPHONE ENCOUNTER
Patient came in and picked paperwork up. As for the medication you did sign the order for a refill.

## 2025-03-24 NOTE — TELEPHONE ENCOUNTER
Caller: PATIENT    Relationship:     Best call back number: [phone]    What is the best time to reach you: ANYTIME    Who are you requesting to speak with (clinical staff, provider,  specific staff member): CLINICAL     Do you know the name of the person who called: NA    What was the call regarding: PATIENT CALLED TO CHECK TO MAKE SURE THAT OUR OFFICE FAXED OVER THE PAPERWORK FOR SNAP-PATIENT STATES THAT HE PICKED UP PAPERWORK BUT HAD ASKED IF OUR OFFICE CAN FAX THIS TO THEM AS WELL-PATIENT ADVISED THAT HE WAS TOLD WE WILL FAX-HE IS ASKING IF THIS HAS BEEN COMPLETED HE IS ASKING FOR A CALL BACK TO [facility] -PATIENT ADVISED IT IS DUE FRIDAY AND IF NOT FAXED WILL NEED TO COME  A COPY-SENDING TO OFFICE TO [facility] THANK YOU    Is it okay if the provider responds through ampricehart:

## 2025-03-25 ENCOUNTER — TELEPHONE (OUTPATIENT)
Dept: NEUROSURGERY | Facility: CLINIC | Age: 55
End: 2025-03-25

## 2025-03-25 NOTE — TELEPHONE ENCOUNTER
PT called in very frustrated that the his [employer] Disability paperwork hasn't been faxed. PT Stated paperwork he was told paperwork was to be faxed in last week and it has not been faxed. Paperwork needs to be sent in by Thursday 3-27-25. Fax number is on paperwork.Please advise.      Thank you

## 2025-04-02 NOTE — PROGRESS NOTES
"Subjective   Patient ID: Rafael Kat is a 54 y.o. male is here today for postoperative follow-up for Posterior cervical seven/thoracic one decompression, posterior cervical three/four/five/six/seven, thoracic one/two/three fusion with screws/rods/crosslinks with Stealth spinal navigation on 2-.    History of Present Illness    Mr. Kat is now 5 weeks out from posterior bilateral C7/T1 decompression C3 to T3 onlay posterolateral fusion, bilateral lateral mass screws C3, C4, C5, C6, bilateral T1, T2, T3 pedicle screws placed with Stealth spinal navigation, placement of bilateral rods from C3 to T3 with crosslink February 25, 2025.  He had difficulties with pain control but seemed to get better and was subsequently discharged on March 4, 2025.  He was subsequently readmitted to the hospitalist service for intractable incisional pain and cervical thoracic spasms March 10th.      He was evaluated by neurosurgery and subsequently discharged home March 12, 2025 with prescriptions for 25 mcg/hr fentanyl patch #10 to be changed every 3 days and Percocet 7.5/325 mg 1 p.o. every 4 hours #42 both written by Dr. Chawla.  He is also taking baclofen 20 mg twice daily and gabapentin 800 mg 1 p.o. 3 times daily as prescribed by primary care provider HERON Lao.  His primary care provider's office has since closed and he has no access to care until his next appointment with a new provider in June.  He is here today for routine postoperative follow-up visit.  Although he is doing better in terms of his pain he continues to have issues with spasms which is not unexpected given the type of surgery he has undergone.    Objective     Vitals:    04/07/25 1033   BP: 110/70   Pulse: 95   Temp: 98.2 °F (36.8 °C)   SpO2: 91%   Weight: 95.3 kg (210 lb)   Height: 175 cm (68.9\")     Body mass index is 31.1 kg/m².      Physical Exam  Constitutional:       General: He is not in acute distress.     Appearance: He is " "well-developed. He is not ill-appearing or diaphoretic.   Skin:     General: Skin is warm and dry.      Comments: Posterior cervical thoracic incision is well healed.    Neurological:      General: No focal deficit present.      Mental Status: He is alert and oriented to person, place, and time.      Motor: No weakness.      Gait: Gait normal.      Deep Tendon Reflexes: Reflexes normal.       Neurological Exam  Mental Status  Alert. Oriented to person, place, and time.    Gait   Normal gait.    Assessment & Plan       Medical Decision Making:      Mr. Kat returns the office today for postsurgical evaluation.  Dr. Collado also spoke with the patient and plans to continue the current dose Percocet 7.5/325 mg but will reduce to one tab q 6 hr prn pain. He will continue Duragesic Patch but dose will be reduced from 25 to 12 mcg and the patch will be changed every 3 days. Both the Duragesic Patch and Percocet has been prescribed by Dr. Collado. In addition to the above, we will increase the Baclofen dose from 20 mg BID to 20 mg every 8 hours for spasm relief. He will continue with same dose Gabapentin.     Mr. Kat will return to the office in two months for a follow up visit.       Diagnoses and all orders for this visit:    1. Surgical followup visit (Primary)    Other orders  -     baclofen (LIORESAL) 20 MG tablet; Take 1 tablet by mouth Every 8 (Eight) Hours.  Dispense: 90 tablet; Refill: 5      Return in about 2 months (around 6/7/2025), or on a day Dr. Collado also in office with HERON Bauman or HERON Eid.         HERON Candelaria  04/07/2025   17:26 EDT    \"Dictated utilizing Dragon dictation\".        "

## 2025-04-07 ENCOUNTER — TELEPHONE (OUTPATIENT)
Dept: NEUROSURGERY | Facility: CLINIC | Age: 55
End: 2025-04-07

## 2025-04-07 ENCOUNTER — OFFICE VISIT (OUTPATIENT)
Dept: NEUROSURGERY | Facility: CLINIC | Age: 55
End: 2025-04-07
Payer: COMMERCIAL

## 2025-04-07 VITALS
BODY MASS INDEX: 31.1 KG/M2 | OXYGEN SATURATION: 91 % | HEART RATE: 95 BPM | TEMPERATURE: 98.2 F | DIASTOLIC BLOOD PRESSURE: 70 MMHG | SYSTOLIC BLOOD PRESSURE: 110 MMHG | HEIGHT: 69 IN | WEIGHT: 210 LBS

## 2025-04-07 DIAGNOSIS — Z09 SURGICAL FOLLOWUP VISIT: Primary | ICD-10-CM

## 2025-04-07 PROCEDURE — 99024 POSTOP FOLLOW-UP VISIT: CPT | Performed by: NURSE PRACTITIONER

## 2025-04-07 PROCEDURE — 1160F RVW MEDS BY RX/DR IN RCRD: CPT | Performed by: NURSE PRACTITIONER

## 2025-04-07 PROCEDURE — 1159F MED LIST DOCD IN RCRD: CPT | Performed by: NURSE PRACTITIONER

## 2025-04-07 RX ORDER — BACLOFEN 20 MG/1
20 TABLET ORAL EVERY 8 HOURS SCHEDULED
Qty: 90 TABLET | Refills: 5 | Status: SHIPPED | OUTPATIENT
Start: 2025-04-07

## 2025-04-07 RX ORDER — FENTANYL 12.5 UG/1
1 PATCH TRANSDERMAL
Qty: 10 PATCH | Refills: 0 | Status: SHIPPED | OUTPATIENT
Start: 2025-04-07

## 2025-04-07 RX ORDER — OXYCODONE AND ACETAMINOPHEN 7.5; 325 MG/1; MG/1
1 TABLET ORAL EVERY 6 HOURS PRN
Qty: 40 TABLET | Refills: 0 | Status: SHIPPED | OUTPATIENT
Start: 2025-04-07

## 2025-04-07 NOTE — TELEPHONE ENCOUNTER
04/07/25 patient presented to office appt with Donita SHELBY this day.   patient to follow up in 2 months with Harris Lombardo or Donita SHELBY when Dr PACK is in the office (around June).   Patient aware to call for this appointment.  Harris Van schedule is not available/open at the time of this entry.  Patient has no PCP noted. Patient has [employer] coverage.

## 2025-04-08 ENCOUNTER — TELEPHONE (OUTPATIENT)
Dept: NEUROSURGERY | Facility: CLINIC | Age: 55
End: 2025-04-08
Payer: COMMERCIAL

## 2025-04-08 NOTE — TELEPHONE ENCOUNTER
PT wife called and stated the prescription oxycodone & fentaynal pac, Dr Collado sent in needs  a Prior Authorization. Please advise wife.        Thank you

## 2025-04-10 ENCOUNTER — TELEPHONE (OUTPATIENT)
Dept: NEUROSURGERY | Facility: CLINIC | Age: 55
End: 2025-04-10
Payer: COMMERCIAL

## 2025-04-10 NOTE — TELEPHONE ENCOUNTER
PT wife called in about prescription oxycodone& fentanyl pac. Pharmacy is that they need Prior Authorization. PT wife stated is was sent. Pharmacy is saying it still hasn't  come through.  PT wife states PT is almost out of thes scripts. Magdaleno advise wife.        Thank you

## 2025-04-10 NOTE — TELEPHONE ENCOUNTER
Spoke w/ patient's wife and informed her that their pharmacy sent another prior authorization over and that it was completed and faxed over. She voiced understanding and I informed her to give us a call back if there are any issues.

## 2025-04-28 ENCOUNTER — TELEPHONE (OUTPATIENT)
Dept: NEUROSURGERY | Facility: CLINIC | Age: 55
End: 2025-04-28
Payer: COMMERCIAL

## 2025-04-28 RX ORDER — OXYCODONE AND ACETAMINOPHEN 7.5; 325 MG/1; MG/1
1 TABLET ORAL EVERY 6 HOURS PRN
Qty: 40 TABLET | Refills: 0 | Status: SHIPPED | OUTPATIENT
Start: 2025-04-28

## 2025-04-28 NOTE — TELEPHONE ENCOUNTER
Rx Refill Note  Requested Prescriptions     Pending Prescriptions Disp Refills    oxyCODONE-acetaminophen (Percocet) 7.5-325 MG per tablet 40 tablet 0     Sig: Take 1 tablet by mouth Every 6 (Six) Hours As Needed for Severe Pain.      Last office visit with prescribing clinician: 1/22/2025   Last telemedicine visit with prescribing clinician: Visit date not found   Next office visit with prescribing clinician: Visit date not found                         Would you like a call back once the refill request has been completed: [] Yes [] No    If the office needs to give you a call back, can they leave a voicemail: [] Yes [] No    Deysi Nelson MA  04/28/25, 08:30 EDT    No

## 2025-04-28 NOTE — TELEPHONE ENCOUNTER
Pt called informing me he sent in a refill request for Oxycodone. He is running low and would like it to be filled. Please Advise

## 2025-04-28 NOTE — TELEPHONE ENCOUNTER
Spoke w/ patient and informed him that his refill request was sent this morning to Dr. Collado and that I will update him as soon as I get updates. Patient voiced understanding.

## 2025-05-12 DIAGNOSIS — Z98.1 HISTORY OF FUSION OF CERVICAL SPINE: Primary | ICD-10-CM

## 2025-05-12 DIAGNOSIS — M48.02 FORAMINAL STENOSIS OF CERVICAL REGION: ICD-10-CM

## 2025-05-12 NOTE — TELEPHONE ENCOUNTER
Patient called for a refill on his Oxycodone     .Rx Refill Note  Requested Prescriptions     Pending Prescriptions Disp Refills    oxyCODONE-acetaminophen (Percocet) 7.5-325 MG per tablet 40 tablet 0     Sig: Take 1 tablet by mouth Every 6 (Six) Hours As Needed for Severe Pain.      Last office visit with prescribing clinician: 1/22/2025   Last telemedicine visit with prescribing clinician: Visit date not found   Next office visit with prescribing clinician: Visit date not found                         Would you like a call back once the refill request has been completed: [] Yes [] No    If the office needs to give you a call back, can they leave a voicemail: [] Yes [] No    Faviola Ely MA  05/12/25, 11:29 EDT

## 2025-05-13 RX ORDER — OXYCODONE AND ACETAMINOPHEN 7.5; 325 MG/1; MG/1
1 TABLET ORAL EVERY 8 HOURS PRN
Qty: 42 TABLET | Refills: 0 | Status: SHIPPED | OUTPATIENT
Start: 2025-05-13

## 2025-05-13 NOTE — TELEPHONE ENCOUNTER
Rx Refill Note  Requested Prescriptions     Pending Prescriptions Disp Refills    fentaNYL (DURAGESIC) 12 MCG/HR [Pharmacy Med Name: fentaNYL 12 MCG/HR PT72 12 Patch] 10 patch 1     Sig: PLACE 1 PATCH ON THE SKIN AS DIRECTED BY PROVIDER EVERY 72 (SEVENTY-TWO) HOURS.      Last office visit with prescribing clinician: 1/22/2025   Last telemedicine visit with prescribing clinician: Visit date not found   Next office visit with prescribing clinician: 5/12/2025                         Would you like a call back once the refill request has been completed: [] Yes [] No    If the office needs to give you a call back, can they leave a voicemail: [] Yes [] No    Deysi Nelson MA  05/13/25, 14:18 EDT

## 2025-05-14 RX ORDER — FENTANYL 12.5 UG/1
1 PATCH TRANSDERMAL
Qty: 10 PATCH | Refills: 0 | Status: SHIPPED | OUTPATIENT
Start: 2025-05-14

## 2025-05-25 DIAGNOSIS — Z98.1 HISTORY OF FUSION OF CERVICAL SPINE: ICD-10-CM

## 2025-05-25 DIAGNOSIS — M48.02 FORAMINAL STENOSIS OF CERVICAL REGION: ICD-10-CM

## 2025-05-27 ENCOUNTER — TELEPHONE (OUTPATIENT)
Dept: NEUROSURGERY | Facility: CLINIC | Age: 55
End: 2025-05-27
Payer: COMMERCIAL

## 2025-05-27 RX ORDER — OXYCODONE AND ACETAMINOPHEN 7.5; 325 MG/1; MG/1
1 TABLET ORAL EVERY 8 HOURS PRN
Qty: 42 TABLET | Refills: 0 | Status: SHIPPED | OUTPATIENT
Start: 2025-05-27

## 2025-05-27 NOTE — TELEPHONE ENCOUNTER
Rx Refill Note  Requested Prescriptions     Pending Prescriptions Disp Refills    oxyCODONE-acetaminophen (PERCOCET) 7.5-325 MG per tablet [Pharmacy Med Name: OXYCODONE-ACETAMINOPHEN 7.5 7.5-325 Tablet] 42 tablet 0     Sig: TAKE 1 TABLET BY MOUTH EVERY EIGHT HOURS AS NEEDED FOR SEVERE PAIN.      Last office visit with prescribing clinician: 1/22/2025   Last telemedicine visit with prescribing clinician: Visit date not found   Next office visit with prescribing clinician: 7/30/2025                         Would you like a call back once the refill request has been completed: [] Yes [] No    If the office needs to give you a call back, can they leave a voicemail: [] Yes [] No    Deysi Nelson MA  05/27/25, 08:25 EDT

## 2025-05-27 NOTE — TELEPHONE ENCOUNTER
"LM for patient informing per Paula SHELBY,    \"Sounds like it could very possibly be infected. If he's unable to send a pic he'll probably need to go to the ER to get it checked out. \"  "

## 2025-05-28 ENCOUNTER — TELEPHONE (OUTPATIENT)
Dept: NEUROSURGERY | Facility: CLINIC | Age: 55
End: 2025-05-28

## 2025-05-28 ENCOUNTER — HOSPITAL ENCOUNTER (EMERGENCY)
Facility: HOSPITAL | Age: 55
Discharge: HOME | End: 2025-05-28
Attending: EMERGENCY MEDICINE
Payer: COMMERCIAL

## 2025-05-28 VITALS
HEART RATE: 54 BPM | OXYGEN SATURATION: 94 % | TEMPERATURE: 97.9 F | RESPIRATION RATE: 18 BRPM | DIASTOLIC BLOOD PRESSURE: 93 MMHG | SYSTOLIC BLOOD PRESSURE: 133 MMHG

## 2025-05-28 DIAGNOSIS — Z51.89 VISIT FOR WOUND CHECK: Primary | ICD-10-CM

## 2025-05-28 DIAGNOSIS — M54.41 ACUTE BILATERAL LOW BACK PAIN WITH BILATERAL SCIATICA: ICD-10-CM

## 2025-05-28 DIAGNOSIS — R29.898 RIGHT LEG WEAKNESS: Primary | ICD-10-CM

## 2025-05-28 DIAGNOSIS — M54.42 ACUTE BILATERAL LOW BACK PAIN WITH BILATERAL SCIATICA: ICD-10-CM

## 2025-05-28 DIAGNOSIS — F40.240 CLAUSTROPHOBIA: Primary | ICD-10-CM

## 2025-05-28 DIAGNOSIS — Z98.890 HISTORY OF LUMBAR SURGERY: ICD-10-CM

## 2025-05-28 DIAGNOSIS — G89.18 POST-OP PAIN: ICD-10-CM

## 2025-05-28 PROBLEM — M54.40 BILATERAL LOW BACK PAIN WITH SCIATICA: Status: ACTIVE | Noted: 2025-05-28

## 2025-05-28 PROBLEM — Z98.1 STATUS POST CERVICAL SPINAL FUSION: Status: ACTIVE | Noted: 2025-05-28

## 2025-05-28 LAB
ALBUMIN SERPL-MCNC: 4 G/DL (ref 3.5–5.2)
ALBUMIN/GLOB SERPL: 1.6 G/DL
ALP SERPL-CCNC: 72 U/L (ref 39–117)
ALT SERPL W P-5'-P-CCNC: 11 U/L (ref 1–41)
ANION GAP SERPL CALCULATED.3IONS-SCNC: 5.9 MMOL/L (ref 5–15)
AST SERPL-CCNC: 11 U/L (ref 1–40)
BASOPHILS # BLD AUTO: 0.02 10*3/MM3 (ref 0–0.2)
BASOPHILS NFR BLD AUTO: 0.4 % (ref 0–1.5)
BILIRUB SERPL-MCNC: 0.5 MG/DL (ref 0–1.2)
BUN SERPL-MCNC: 17 MG/DL (ref 6–20)
BUN/CREAT SERPL: 16.5 (ref 7–25)
CALCIUM SPEC-SCNC: 9.1 MG/DL (ref 8.6–10.5)
CHLORIDE SERPL-SCNC: 109 MMOL/L (ref 98–107)
CO2 SERPL-SCNC: 26.1 MMOL/L (ref 22–29)
CREAT SERPL-MCNC: 1.03 MG/DL (ref 0.76–1.27)
DEPRECATED RDW RBC AUTO: 44.1 FL (ref 37–54)
EGFRCR SERPLBLD CKD-EPI 2021: 85.3 ML/MIN/1.73
EOSINOPHIL # BLD AUTO: 0.23 10*3/MM3 (ref 0–0.4)
EOSINOPHIL NFR BLD AUTO: 4.6 % (ref 0.3–6.2)
ERYTHROCYTE [DISTWIDTH] IN BLOOD BY AUTOMATED COUNT: 13.1 % (ref 12.3–15.4)
GLOBULIN UR ELPH-MCNC: 2.5 GM/DL
GLUCOSE SERPL-MCNC: 113 MG/DL (ref 65–99)
HCT VFR BLD AUTO: 42.7 % (ref 37.5–51)
HGB BLD-MCNC: 14 G/DL (ref 13–17.7)
IMM GRANULOCYTES # BLD AUTO: 0.01 10*3/MM3 (ref 0–0.05)
IMM GRANULOCYTES NFR BLD AUTO: 0.2 % (ref 0–0.5)
LYMPHOCYTES # BLD AUTO: 1.86 10*3/MM3 (ref 0.7–3.1)
LYMPHOCYTES NFR BLD AUTO: 37.3 % (ref 19.6–45.3)
MCH RBC QN AUTO: 29.7 PG (ref 26.6–33)
MCHC RBC AUTO-ENTMCNC: 32.8 G/DL (ref 31.5–35.7)
MCV RBC AUTO: 90.5 FL (ref 79–97)
MONOCYTES # BLD AUTO: 0.37 10*3/MM3 (ref 0.1–0.9)
MONOCYTES NFR BLD AUTO: 7.4 % (ref 5–12)
NEUTROPHILS NFR BLD AUTO: 2.49 10*3/MM3 (ref 1.7–7)
NEUTROPHILS NFR BLD AUTO: 50.1 % (ref 42.7–76)
NRBC BLD AUTO-RTO: 0 /100 WBC (ref 0–0.2)
PLATELET # BLD AUTO: 203 10*3/MM3 (ref 140–450)
PMV BLD AUTO: 11.4 FL (ref 6–12)
POTASSIUM SERPL-SCNC: 3.7 MMOL/L (ref 3.5–5.2)
PROT SERPL-MCNC: 6.5 G/DL (ref 6–8.5)
RBC # BLD AUTO: 4.72 10*6/MM3 (ref 4.14–5.8)
SODIUM SERPL-SCNC: 141 MMOL/L (ref 136–145)
WBC NRBC COR # BLD AUTO: 4.98 10*3/MM3 (ref 3.4–10.8)

## 2025-05-28 PROCEDURE — 80053 COMPREHEN METABOLIC PANEL: CPT | Performed by: EMERGENCY MEDICINE

## 2025-05-28 PROCEDURE — 85025 COMPLETE CBC W/AUTO DIFF WBC: CPT | Performed by: EMERGENCY MEDICINE

## 2025-05-28 PROCEDURE — 99283 EMERGENCY DEPT VISIT LOW MDM: CPT

## 2025-05-28 RX ORDER — DIAZEPAM 5 MG/1
5 TABLET ORAL ONCE AS NEEDED
Qty: 1 TABLET | Refills: 0 | Status: SHIPPED | OUTPATIENT
Start: 2025-05-28

## 2025-05-28 NOTE — TELEPHONE ENCOUNTER
----- Message from Joe Munira sent at 5/28/2025 11:20 AM EDT -----  Regarding: Hospital follow-up scheduling  I saw the patient in the ER today.  After speaking with Dr. Collado I have ordered an outpatient lumbar MRI with and without contrast to be completed in the next week or so.  Dr. Collado would like the patient to follow-up in the next 2 weeks with one of Dr. Collado's APCs just after the MRI is completed.  Can we please have the lumbar MRI routed to the correct department and schedule a 2-week follow-up with the patient? Thank you so much, ChasePs.  We can keep the current follow-up with Dr. Collado that is scheduled for July.

## 2025-05-28 NOTE — ED PROVIDER NOTES
EMERGENCY DEPARTMENT ENCOUNTER    Room Number:  31/31  PCP: Provider, No Known  Historian: Patient      HPI:  Chief Complaint: Wound check  A complete HPI/ROS/PMH/PSH/SH/FH are unobtainable due to: None  Context: Rafael Kat is a 54 y.o. male who presents to the ED c/o possible wound infection.  Patient states he had cervical spine surgery in February.  Patient states he has done fairly well since then.  States over the last couple weeks he has noticed an area at the top of his surgical site that has had some redness.  Did have some drainage.  Patient states he has felt a little off.  Has had no fevers.  No cough congestion runny nose sore throat.  Patient has picture that does show little bit of redness at the top of the surgical site.  Patient states drainage is improved.              PAST MEDICAL HISTORY  Active Ambulatory Problems     Diagnosis Date Noted    Degeneration of intervertebral disc of mid-cervical region 07/22/2016    Chronic pain 07/26/2016    Cervical disc disorder with radiculopathy of mid-cervical region 01/24/2017    DDD (degenerative disc disease), lumbar 01/24/2017    Other cervical disc degeneration, high cervical region 03/01/2017    Neck pain 03/01/2017    Pain in thoracic spine 05/15/2019    Muscle spasm of right shoulder 05/15/2019    History of fusion of cervical spine 05/15/2019    Spinal stenosis of lumbar region with neurogenic claudication 03/23/2023    Tear of right acetabular labrum 03/23/2023    Spinal instability of lumbar region 11/28/2023    Spondylolisthesis at L3-L4 level 12/12/2023    Muscle ache 01/22/2024    Foraminal stenosis of cervical region 08/29/2024    Cervical radiculopathy at C7 08/29/2024    Bilateral arm pain 08/29/2024    Chronic obstructive pulmonary disease 12/23/2024    Dysphagia 12/23/2024    Spinal stenosis of cervicothoracic region 12/23/2024    Weakness of both hands 01/22/2025    Weakness of both arms 01/22/2025    History of lumbar surgery  01/22/2025    Gastroesophageal reflux disease 02/03/2025    Screening for colorectal cancer 02/03/2025    Cervical stenosis of spine 02/25/2025    Superficial thrombophlebitis of left upper extremity 03/04/2025    Acute on chronic back pain 03/10/2025    COPD (chronic obstructive pulmonary disease) 03/11/2025     Resolved Ambulatory Problems     Diagnosis Date Noted    Cervical spinal stenosis 02/17/2016    Spinal stenosis in cervical region 03/16/2016    Cervical stenosis of spine 03/29/2016    Follow-up examination, following other surgery 04/13/2016    Cervical pain 07/26/2016     Past Medical History:   Diagnosis Date    Anesthesia complication     Arthritis     DDD (degenerative disc disease), cervical     GERD (gastroesophageal reflux disease)     History of COVID-19     Psoriasis     Shoulder pain, bilateral     Skin cancer          PAST SURGICAL HISTORY  Past Surgical History:   Procedure Laterality Date    ANTERIOR CHANNEL VERTEBRECTOMY/CORPECTOMY Bilateral 03/29/2016    Procedure: C4-6 ANTERIOR CHANNEL VERTEBRECTOMY/CORPECTOMY WITH INSTRUMENTATION;  Surgeon: Bert Collado MD;  Location: [facility] OR;  Service:     CERVICAL DISCECTOMY POSTERIOR FUSION WITH BRAIN LAB N/A 2/25/2025    Procedure: Posterior cervical seven/thoracic one decompression, posterior cervical three/four/five/six/seven, thoracic one/two/three fusion with screws/rods/crosslinks with Stealth spinal navigation;  Surgeon: Bert Collado MD;  Location: [facility] MAIN OR;  Service: Neurosurgery;  Laterality: N/A;    COLONOSCOPY      COLONOSCOPY N/A 2/14/2025    Procedure: COLONOSCOPY into cecum with hot snare polypectomy, Resolutions clips x2, Tattoo of transverse polyp area;  Surgeon: Gonzalo Rodriguez MD;  Location: [facility] ENDOSCOPY;  Service: Gastroenterology;  Laterality: N/A;  Pre: Screening  Post: Polyp, Suboptimal prep    ELBOW PROCEDURE Right     UNCLEAR:  RIGHT ELBOW AREA    ENDOSCOPY N/A 2/14/2025    Procedure:  ESOPHAGOGASTRODUODENOSCOPY with biopsies;  Surgeon: Gonzalo Rodriguez MD;  Location: [facility] ENDOSCOPY;  Service: Gastroenterology;  Laterality: N/A;  Pre: GERD, Dysphagia  Post: Gastritis and Esophagitis    FINGER SURGERY Left     INDEX FINGER    LUMBAR EPIDURAL INJECTION      STATES X3    LUMBAR LAMINECTOMY DISCECTOMY DECOMPRESSION Bilateral 11/28/2023    Procedure: Open bilateral lumbar decompression lumbar one/two, lumbar two/three, lumbar three/four;  Surgeon: Bert Collado MD;  Location: [facility] MAIN OR;  Service: Neurosurgery;  Laterality: Bilateral;    MENISCECTOMY Right     UPPER GASTROINTESTINAL ENDOSCOPY  03/2021         FAMILY HISTORY  Family History   Problem Relation Age of Onset    Malig Hyperthermia Neg Hx          SOCIAL HISTORY  Social History     Socioeconomic History    Marital status:    Tobacco Use    Smoking status: Former     Current packs/day: 0.00     Average packs/day: 2.0 packs/day for 20.0 years (40.0 ttl pk-yrs)     Types: Cigarettes     Start date: 1/1/1990     Quit date: 2010     Years since quitting: 15.4     Passive exposure: Past    Smokeless tobacco: Never   Vaping Use    Vaping status: Never Used   Substance and Sexual Activity    Alcohol use: No    Drug use: Yes     Frequency: 14.0 times per week     Types: Marijuana     Comment: 1-2 PER DAY,    Sexual activity: Defer         ALLERGIES  Bacitracin-polymyxin b, Chlorhexidine, and Qegyx-vjeqc-ephhotl-pramoxine        REVIEW OF SYSTEMS  Review of Systems   Wound check      PHYSICAL EXAM  ED Triage Vitals [05/28/25 0826]   Temp Heart Rate Resp BP SpO2   97.9 °F (36.6 °C) 97 16 -- 96 %      Temp src Heart Rate Source Patient Position BP Location FiO2 (%)   -- -- -- -- --       Physical Exam      GENERAL: no acute distress  HENT: nares patent  EYES: no scleral icterus  CV: regular rhythm, normal rate  RESPIRATORY: normal effort  ABDOMEN: soft, nondistended nontender  MUSCULOSKELETAL: no deformity  NEURO: alert, moves all  extremities, follows commands  PSYCH:  calm, cooperative  SKIN: warm, dry.  Cervical wound appears well-healed..  Top of the wound minimally tender.  No redness.  No drainage.    Vital signs and nursing notes reviewed.          LAB RESULTS  Recent Results (from the past 24 hours)   Comprehensive Metabolic Panel    Collection Time: 05/28/25  8:41 AM    Specimen: Blood   Result Value Ref Range    Glucose 113 (H) 65 - 99 mg/dL    BUN 17.0 6.0 - 20.0 mg/dL    Creatinine 1.03 0.76 - 1.27 mg/dL    Sodium 141 136 - 145 mmol/L    Potassium 3.7 3.5 - 5.2 mmol/L    Chloride 109 (H) 98 - 107 mmol/L    CO2 26.1 22.0 - 29.0 mmol/L    Calcium 9.1 8.6 - 10.5 mg/dL    Total Protein 6.5 6.0 - 8.5 g/dL    Albumin 4.0 3.5 - 5.2 g/dL    ALT (SGPT) 11 1 - 41 U/L    AST (SGOT) 11 1 - 40 U/L    Alkaline Phosphatase 72 39 - 117 U/L    Total Bilirubin 0.5 0.0 - 1.2 mg/dL    Globulin 2.5 gm/dL    A/G Ratio 1.6 g/dL    BUN/Creatinine Ratio 16.5 7.0 - 25.0    Anion Gap 5.9 5.0 - 15.0 mmol/L    eGFR 85.3 >60.0 mL/min/1.73   CBC Auto Differential    Collection Time: 05/28/25  8:41 AM    Specimen: Blood   Result Value Ref Range    WBC 4.98 3.40 - 10.80 10*3/mm3    RBC 4.72 4.14 - 5.80 10*6/mm3    Hemoglobin 14.0 13.0 - 17.7 g/dL    Hematocrit 42.7 37.5 - 51.0 %    MCV 90.5 79.0 - 97.0 fL    MCH 29.7 26.6 - 33.0 pg    MCHC 32.8 31.5 - 35.7 g/dL    RDW 13.1 12.3 - 15.4 %    RDW-SD 44.1 37.0 - 54.0 fl    MPV 11.4 6.0 - 12.0 fL    Platelets 203 140 - 450 10*3/mm3    Neutrophil % 50.1 42.7 - 76.0 %    Lymphocyte % 37.3 19.6 - 45.3 %    Monocyte % 7.4 5.0 - 12.0 %    Eosinophil % 4.6 0.3 - 6.2 %    Basophil % 0.4 0.0 - 1.5 %    Immature Grans % 0.2 0.0 - 0.5 %    Neutrophils, Absolute 2.49 1.70 - 7.00 10*3/mm3    Lymphocytes, Absolute 1.86 0.70 - 3.10 10*3/mm3    Monocytes, Absolute 0.37 0.10 - 0.90 10*3/mm3    Eosinophils, Absolute 0.23 0.00 - 0.40 10*3/mm3    Basophils, Absolute 0.02 0.00 - 0.20 10*3/mm3    Immature Grans, Absolute 0.01 0.00 - 0.05  10*3/mm3    nRBC 0.0 0.0 - 0.2 /100 WBC       Ordered the above labs and reviewed the results.        RADIOLOGY  No Radiology Exams Resulted Within Past 24 Hours              PROCEDURES  Procedures            MEDICATIONS GIVEN IN ER  Medications - No data to display                MEDICAL DECISION MAKING, PROGRESS, and CONSULTS    All labs have been independently reviewed by me.  All radiology studies have been reviewed by me and I have also reviewed the radiology report.   EKGs independently viewed and interpreted by me.  Discussion below represents my analysis of pertinent findings related to patient's condition, differential diagnosis, treatment plan and final disposition.      Additional sources:  - Discussed/ obtained information from independent historians: None    - External (non-ED) record review: Epic reviewed patient seen at outside hospital for finger laceration 5/21/2025    - Chronic or social conditions impacting care: None    - Shared decision making: None      Orders placed during this visit:  Orders Placed This Encounter   Procedures    Comprehensive Metabolic Panel    CBC Auto Differential    Neurosurgery (on-call MD unless specified)    CBC & Differential         Additional orders considered but not ordered:  None        Differential diagnosis includes but is not limited to:    Wound infection versus normal wound healing      Independent interpretation of labs, radiology studies, and discussions with consultants:  ED Course as of 05/28/25 1313   Wed May 28, 2025   1104 11:04 EDT  Presents for wound check.  Patient's wound looks well-healed here.  There is no redness there is no drainage.  White blood cell count normal.  Temperature normal.  Patient has been seen by neurosurgery here and like to follow-up as an outpatient.  Patient has been having some discomfort in his right lower leg and they will order outpatient MRI.  Stands to return for any concerns. [SL]      ED Course User Index  [SL]  Kyree Back MD                 DIAGNOSIS  Final diagnoses:   Visit for wound check   Post-op pain         DISPOSITION  DISCHARGE    Patient discharged in stable condition.    Reviewed implications of results, diagnosis, meds, responsibility to follow up, warning signs and symptoms of possible worsening, potential complications and reasons to return to ER, including worsening symptoms    Patient/Family voiced understanding of above instructions.    Discussed plan for discharge, as there is no emergent indication for admission. Patient referred to primary care provider for BP management due to today's BP. Pt/family is agreeable and understands need for follow up and repeat testing.  Pt is aware that discharge does not mean that nothing is wrong but it indicates no emergency is present that requires admission and they must continue care with follow-up as given below or physician of their choice.     FOLLOW-UP  Bert Collado MD  [address] [address]  [phone]    Schedule an appointment as soon as possible for a visit            Medication List      No changes were made to your prescriptions during this visit.                  Latest Documented Vital Signs:  As of 13:13 EDT  BP- 133/93 HR- 54 Temp- 97.9 °F (36.6 °C) (Oral) O2 sat- 94%              --    Please note that portions of this were completed with a voice recognition program.       Note Disclaimer: At [facility], we believe that sharing information builds trust and better relationships. You are receiving this note because you are receiving care at [facility] or recently visited. It is possible you will see health information before a provider has talked with you about it. This kind of information can be easy to misunderstand. To help you fully understand what it means for your health, we urge you to discuss this note with your provider.            Kyree Back MD  05/28/25 [id number]

## 2025-05-28 NOTE — CONSULTS
"[facility] NEUROSURGERY CONSULT NOTE    Patient name: Rafael Kat  Referring Provider: Kyree Back MD   Reason for Consultation: Question skin infection at surgery site    Patient Care Team:  Provider, No Known as PCP - Bert Moe MD as Surgeon (Neurosurgery)    Chief complaint: Concern for wound infection    Subjective .     History of present illness:    Patient is a 54 y.o.  male with degenerative disc disease of the spine, neck pain, spinal spondylolisthesis, spinal stenosis, swallowing difficulty, GERD, chronic back pain who presents to [facility] ED on 5/20/2025 for possible wound infection.  He is a little over 3 months status post posterior cervical C7-T1 decompression with posterior cervical fusion from C3-T3 with Dr. Collado (2/25/2025).  He was evaluated by neurosurgery last on 4/7/2025.  He initially presented to the hospital again for intractable neck pain and was later discharged with Percocet 7.5/325 mg as well as fentanyl patch.  His baclofen was also increased to 20 mg every 8 hours for spasm relief.  He was also to continue current dose of gabapentin.  Overall he reports he has been doing well since last seen by neurosurgery.  He notes that on Saturday he felt \"a little off\".  His wife did question whether he had doubled up on his baclofen dosage and he was not sure that he had.  He also question whether his blood sugar was low.  He was given some food from [employer] and notes by 10 AM he felt back to normal.  He has not felt \"off\" since then.  Recently he states that his wound had \"opened up\".  He denies significantly experiencing drainage from the site but notes that it felt possibly a little damp or wet.  He denies fever, cough, runny nose and sore throat.  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  Otherwise today feels normal.  He denies any new upper extremity weakness, numbness, tingling or pain.  He does note some baseline arthritis in his left " hand.  He notes chronic low back pain but over the past 6 weeks he is experience worsening low back pain into the buttocks.  The pain comes on after walking 5 to 10 minutes and is relieved with squatting.  He states that after walking 5 to 10 minutes he also develops weakness in the right leg.  This is come on over the past 6 weeks.  He had a lumbar decompression with Dr. Collado back in November 2024 but notes these issues are new since the past 1.5 months ago.  He notes that the pain and weakness come and go.  He denies any bowel/bladder incontinence or retention, saddle paresthesia or any numbness or tingling in the legs.      Review of Systems  Review of Systems   Gastrointestinal:         Denies bowel issues   Genitourinary:  Negative for enuresis.   Musculoskeletal:  Positive for back pain. Negative for gait problem.   Neurological:  Negative for numbness.       History  PAST MEDICAL HISTORY  Past Medical History:   Diagnosis Date    Anesthesia complication     PT STATES HE IS SLOW TO WAKE UP    Arthritis     COPD (chronic obstructive pulmonary disease)     STATES WHEEZES AT NIGHT AND DOES USE NIGHTLY INHALER    DDD (degenerative disc disease), cervical     DDD (degenerative disc disease), lumbar     PAIN IN RIGHT BUTTOCK AND DOWN AT TIMES RIGHT LEG, WEAKNESS RIGHT LEG AT TIMES    Dysphagia     STARTED AFTER HIS CERVICAL NECK SURGERY    GERD (gastroesophageal reflux disease)     History of COVID-19     Neck pain     Psoriasis     Shoulder pain, bilateral     Skin cancer     BASAL/SQUAMOUS       PAST SURGICAL HISTORY  Past Surgical History:   Procedure Laterality Date    ANTERIOR CHANNEL VERTEBRECTOMY/CORPECTOMY Bilateral 03/29/2016    Procedure: C4-6 ANTERIOR CHANNEL VERTEBRECTOMY/CORPECTOMY WITH INSTRUMENTATION;  Surgeon: Bert Collado MD;  Location: [facility];  Service:     CERVICAL DISCECTOMY POSTERIOR FUSION WITH BRAIN LAB N/A 2/25/2025    Procedure: Posterior cervical seven/thoracic one  decompression, posterior cervical three/four/five/six/seven, thoracic one/two/three fusion with screws/rods/crosslinks with Stealth spinal navigation;  Surgeon: Bert Collado MD;  Location[facility] MA[facility];  Service: Neurosurgery;  Laterality: N/A;    COLONOSCOPY      COLONOSCOPY N/A 2/14/2025    Procedure: COLONOSCOPY into cecum with hot snare polypectomy, Resolutions clips x2, Tattoo of transverse polyp area;  Surgeon: Gonzalo Rodriguez MD;  Location[facility] ENDOSCOPY;  Service: Gastroenterology;  Laterality: N/A;  Pre: Screening  Post: Polyp, Suboptimal prep    ELBOW PROCEDURE Right     UNCLEAR:  RIGHT ELBOW AREA    ENDOSCOPY N/A 2/14/2025    Procedure: ESOPHAGOGASTRODUODENOSCOPY with biopsies;  Surgeon: Gonzalo Rodriguez MD;  Location: [facility] ENDOSCOPY;  Service: Gastroenterology;  Laterality: N/A;  Pre: GERD, Dysphagia  Post: Gastritis and Esophagitis    FINGER SURGERY Left     INDEX FINGER    LUMBAR EPIDURAL INJECTION      STATES X3    LUMBAR LAMINECTOMY DISCECTOMY DECOMPRESSION Bilateral 11/28/2023    Procedure: Open bilateral lumbar decompression lumbar one/two, lumbar two/three, lumbar three/four;  Surgeon: Bert Collado MD;  Location: [facility] MAIN OR;  Service: Neurosurgery;  Laterality: Bilateral;    MENISCECTOMY Right     UPPER GASTROINTESTINAL ENDOSCOPY  03/2021       FAMILY HISTORY  Family History   Problem Relation Age of Onset    Malig Hyperthermia Neg Hx        SOCIAL HISTORY  Social History     Tobacco Use    Smoking status: Former     Current packs/day: 0.00     Average packs/day: 2.0 packs/day for 20.0 years (40.0 ttl pk-yrs)     Types: Cigarettes     Start date: 1/1/1990     Quit date: 2010     Years since quitting: 15.4     Passive exposure: Past    Smokeless tobacco: Never   Vaping Use    Vaping status: Never Used   Substance Use Topics    Alcohol use: No    Drug use: Yes     Frequency: 14.0 times per week     Types: Marijuana     Comment: 1-2 PER DAY,            Allergies:  Bacitracin-polymyxin b, Chlorhexidine, and Bmkdu-wrkrz-sthruml-pramoxine    MEDICATIONS:  (Not in a hospital admission)      No current facility-administered medications for this encounter.    Current Outpatient Medications:     aspirin 81 MG chewable tablet, Chew 1 tablet Daily., Disp: , Rfl:     baclofen (LIORESAL) 20 MG tablet, Take 1 tablet by mouth Every 8 (Eight) Hours., Disp: 90 tablet, Rfl: 5    busPIRone (BUSPAR) 15 MG tablet, Take 1 tablet by mouth 2 (Two) Times a Day. Indications: Anxiety Disorder, Disp: 20 tablet, Rfl: 0    cetirizine (zyrTEC) 10 MG tablet, Take 1 tablet by mouth Daily., Disp: , Rfl:     cholecalciferol (VITAMIN D3) 25 MCG (1000 UT) tablet, Take 1 tablet by mouth Daily., Disp: , Rfl:     clonazePAM (KlonoPIN) 0.5 MG tablet, Take 1 tablet by mouth 3 times a day for 5 days. Indications: Panic Disorder, Disp: 15 tablet, Rfl: 0    Diclofenac Sodium (VOLTAREN) 1 % gel gel, Apply 4 g topically to the appropriate area as directed As Needed., Disp: , Rfl:     docusate sodium 100 MG capsule, Take 1 capsule by mouth Daily., Disp: , Rfl:     escitalopram (LEXAPRO) 10 MG tablet, Take 1 tablet by mouth Every Night. Indications: Panic Disorder, Disp: 30 tablet, Rfl: 0    fentaNYL (DURAGESIC) 12 MCG/HR, PLACE 1 PATCH ON THE SKIN AS DIRECTED BY PROVIDER EVERY 72 (SEVENTY-TWO) HOURS., Disp: 10 patch, Rfl: 0    FeroSul 325 (65 Fe) MG tablet, Take 1 tablet by mouth Daily With Breakfast. PT HAS NOT HAD FOR 4 DAYS, Disp: , Rfl:     gabapentin (NEURONTIN) 800 MG tablet, Take 1 tablet by mouth 3 (Three) Times a Day. TYPICALLY TAKES TWICE DAILY, Disp: , Rfl:     lidocaine (LIDODERM) 5 %, PLACE 1 PATCH ON THE SKIN AS DIRECTED BY PROVIDER DAILY. REMOVE & DISCARD PATCH WITHIN 12 HOURS OR AS DIRECTED BY MD, Disp: 30 patch, Rfl: 2    mometasone (ELOCON) 0.1 % cream, 1 Application As Needed., Disp: , Rfl:     naloxone (NARCAN) 4 MG/0.1ML nasal spray, Call 911. Don't prime. [place] in 1  nostril for overdose. Repeat in 2-3 minutes in other nostril if no or minimal breathing/responsiveness., Disp: 2 each, Rfl: 0    NON FORMULARY, Every Night. STATES DOES USE NIGHT INHALER:  INSTRUCTED TO BRING THIS DAY OF SURGEY, Disp: , Rfl:     oxyCODONE-acetaminophen (PERCOCET) 7.5-325 MG per tablet, TAKE 1 TABLET BY MOUTH EVERY EIGHT HOURS AS NEEDED FOR SEVERE PAIN., Disp: 42 tablet, Rfl: 0    pantoprazole (PROTONIX) 40 MG EC tablet, Take 1 tablet by mouth 2 (Two) Times a Day., Disp: , Rfl: 11    polyethylene glycol (MIRALAX) 17 GM/SCOOP powder, Dissolve 17 g (1 capful) in liquid and drink by mouth 2 (Two) Times a Day As Needed for constipation, Disp: 510 g, Rfl: 0    Potassium 75 MG tablet, Take  by mouth As Needed. DURING THE SUMMER WHEN SWEATS MORE, Disp: , Rfl:     sennosides-docusate (PERICOLACE) 8.6-50 MG per tablet, Take 2 tablets by mouth Every Night., Disp: 20 tablet, Rfl: 0    Unable to find, 1 each 1 (One) Time. INHALER NEW INSTRUCTED PT TO BRING WITH HIM, Disp: , Rfl:     Ventolin  (90 Base) MCG/ACT inhaler, Inhale 2 puffs Every 4 (Four) Hours As Needed., Disp: , Rfl:       Objective     Results Review:  LABS:  Results from last 7 days   Lab Units 05/28/25  0841   WBC 10*3/mm3 4.98   HEMOGLOBIN g/dL 14.0   HEMATOCRIT % 42.7   PLATELETS 10*3/mm3 203     Results from last 7 days   Lab Units 05/28/25  0841   SODIUM mmol/L 141   POTASSIUM mmol/L 3.7   CHLORIDE mmol/L 109*   CO2 mmol/L 26.1   BUN mg/dL 17.0   CREATININE mg/dL 1.03   CALCIUM mg/dL 9.1   BILIRUBIN mg/dL 0.5   ALK PHOS U/L 72   ALT (SGPT) U/L 11   AST (SGOT) U/L 11   GLUCOSE mg/dL 113*         DIAGNOSTICS:  No new neuroimaging    Results Review:   I reviewed the patient's new clinical results.  I personally viewed and interpreted the patient's labs, medications and chart    Vital Signs   Temp:  [97.9 °F (36.6 °C)] 97.9 °F (36.6 °C)  Heart Rate:  [57-97] 57  Resp:  [16] 16  BP: ()/(69-94) 120/90    Physical Exam:  Physical  Exam  Vitals reviewed.   Constitutional:       General: He is not in acute distress.     Appearance: Normal appearance. He is not ill-appearing, toxic-appearing or diaphoretic.   HENT:      Head: Normocephalic.      Nose: Nose normal.      Mouth/Throat:      Mouth: Mucous membranes are moist.      Pharynx: Oropharynx is clear.   Eyes:      Extraocular Movements: Extraocular movements intact.      Conjunctiva/sclera: Conjunctivae normal.      Pupils: Pupils are equal, round, and reactive to light.   Neck:      Comments: Posterior cervical thoracic incision appears well-healed.  There is no wound opening, redness, swelling or drainage.  No palpable fluid underlying incision site.  No tenderness to palpation in the upper portion of the cervical incision where the patient was previously noting issues.  Cardiovascular:      Rate and Rhythm: Normal rate.   Pulmonary:      Effort: Pulmonary effort is normal.   Musculoskeletal:         General: Normal range of motion.      Cervical back: Normal range of motion.   Skin:     General: Skin is warm.   Neurological:      General: No focal deficit present.      Mental Status: He is oriented to person, place, and time.      Deep Tendon Reflexes:      Reflex Scores:       Bicep reflexes are 1+ on the right side and 1+ on the left side.       Brachioradialis reflexes are 1+ on the right side and 1+ on the left side.       Patellar reflexes are 1+ on the right side and 1+ on the left side.       Achilles reflexes are 1+ on the right side and 1+ on the left side.  Psychiatric:         Mood and Affect: Mood normal.         Speech: Speech normal.         Behavior: Behavior normal.         Thought Content: Thought content normal.         Judgment: Judgment normal.       Neurological Exam  Mental Status  Awake, alert and oriented to person, place and time. Oriented to person, place, time and situation. Oriented to person, place, and time. Recent and remote memory are intact. Speech is  normal. Language is fluent with no aphasia. Attention and concentration are normal. Fund of knowledge is appropriate for level of education.    Cranial Nerves  CN III, IV, VI: Extraocular movements intact bilaterally. Pupils equal round and reactive to light bilaterally.    Motor                                               Right                     Left  Deltoid                                   5                          5   Biceps                                   5                          5  Brachioradialis                      5                          5   Triceps                                  5                          5   Pronator                                5                          5   Supinator                              5                           5   Wrist flexor                            5                          5   Wrist extensor                       5                          5   Finger flexor                          5                          5   Finger extensor                     5                          5   Interossei                              5                          5   Abductor pollicis brevis         5                          5   Flexor pollicis brevis             5                          5   Opponens pollicis                 5                          5   Iliopsoas                               5                          5   Quadriceps                           5                          5   Hamstring                             5                          5   Gastrocnemius                     5                           5   Anterior tibialis                      5                          5   Posterior tibialis                    5                          5   Peroneal                               5                          5  Ankle dorsiflexor                   5                          5  Ankle plantar flexor              5                           5  Extensor  "hallucis longus      5                           5    Sensory  Sensation is intact to light touch, pinprick, vibration and proprioception in all four extremities.    Reflexes                                            Right                      Left  Brachioradialis                    1+                         1+  Biceps                                 1+                         1+  Hamstring                            1+                         1+  Patellar                                1+                         1+  Achilles                                1+                         1+    Right pathological reflexes: Vincent's absent. Ankle clonus absent.  Left pathological reflexes: Vincent's absent. Ankle clonus absent.    Gait    Denies gait instability or imbalance.      Assessment & Plan       Status post cervical spinal fusion    Right leg weakness    Bilateral low back pain with sciatica      Problem List Items Addressed This Visit    None  Visit Diagnoses         Visit for wound check    -  Primary      Post-op pain                 COMORBID CONDITIONS:  degenerative disc disease of the spine, neck pain, spinal spondylolisthesis, spinal stenosis, swallowing difficulty, GERD, chronic back pain    54-year-old male who underwent open bilateral L1-4 decompression in November 2020 for as well as a posterior cervical C7-T1 decompression with posterior C3-T3 fusion with Dr. Collado in February 2025.  He presents today for concern for wound issues however the wound looks closed, healed and there is no drainage, swelling or erythema.  He actually denies experiencing any actual drainage from the wound but was concerned over the past weekend when he felt \"off\" for at least a couple hours.  There was some question whether he had double dosed baclofen however he noted returning to normal after eating a meal.    He denies any upper extremity issues, gait instability, bowel or bladder dysfunction or saddle " "paresthesia but does note some worsening in his low back and hip pain as well as right lower extremity weakness over the past 6 weeks.  He has equal and strong strength in the lower extremities on exam but notes that his RLE weakness comes on after walking for 5 to 10 minutes.  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  I instructed him to avoid any heavy lifting and avoid operating heavy machinery or working out in the fields/garden etc.  He voiced understanding.  I discussed the history and exam with Dr. Collado who recommends that he follow-up in a week or 2 with a repeat MRI of the lumbar spine with and without contrast as well as lumbar flexion-extension x-rays.  I discussed this with the patient and we will have our office staff reach out to him for scheduling.  In the meantime he will get an outpatient lumbar MRI prior to the visit.  He will let us know if he has any worsening issues in the meantime.  He will also need to let us know if he develops any signs or symptoms of infection such as fever, wound opening, redness, swelling, drainage, increasing pain etc.      PLAN:     Obtain outpatient lumbar MRI with and without contrast/lumbar flexion-extension x-rays  Follow-up with neurosurgery in 1 to 2 weeks  Neurosurgery signing off    I discussed the patient's findings and my recommendations with patient and consulting provider, Dr. Back, and Dr. Collado.    During patient visit, I utilized appropriate personal protective equipment including gloves and mask.  Mask used was standard procedure mask. Appropriate PPE was worn during the entire visit.  Hand hygiene was completed before and after.     Joehunter Lombardo, APRN  05/28/25  11:11 EDT    \"Dictated utilizing Dragon dictation\".    "

## 2025-05-28 NOTE — ED NOTES
Patient to ER via car from home for wound check post op 3 months patient had neck surgery reports had spot that opened up where incision was

## 2025-05-30 ENCOUNTER — TELEPHONE (OUTPATIENT)
Dept: NEUROSURGERY | Facility: CLINIC | Age: 55
End: 2025-05-30
Payer: COMMERCIAL

## 2025-05-30 NOTE — TELEPHONE ENCOUNTER
Spoke to patient and informed him that the one dose valium is ready. Patient voiced understanding.

## 2025-06-03 NOTE — PROGRESS NOTES
"Subjective   Patient ID: Rafael Kat is a 54 y.o. male is here today for follow-up for     -Visit for wound check    Hospital follow up, in ED on 05/28/2025  Last seen in office on 04/07/2025  MRI Lumbar spine w w/o contrast completed on 06/08/2025  XR spine lumbar completed with flex&ext completed today        History of Present Illness  54-year-old male who underwent a posterior cervical C7-T1 decompression with posterior C3-T3 fusion with Dr. Collado on 2/25/2025.  He is now 15.5 weeks out from surgery.  He has been dealing with a significant amount of pain since surgery and was continuing Percocet 7.5 mg every 6 hours as needed as well as Duragesic patch now at 12 mcg (previously 25 mcg).  He has also been on baclofen 20 mg 3 times daily as well as gabapentin.    He reported to the ER in late May with concern for wound issues.  He was worried that he was getting infected at the posterior cervical wound.  However, when evaluated his wound was closed, appear to be healed and there was no drainage, swelling or erythema.  A few days prior he had noted feeling \"off\" for a couple hours but after he had eaten he notes the symptoms resolved.  Otherwise he had denied any fever, cough, runny nose or sore throat.  He did however note over the past 6 weeks he had developed worsening low back pain into the buttocks.  He also noted developing weakness in the right leg over the past 6 weeks.  He would note that the weakness would come on after 5 to 10 minutes of walking.  He denied bladder/bowel incontinence/retention, saddle paresthesia or any numbness/tingling in the legs.  He was here today for follow-up with repeat lumbar flexion-extension x-rays and MRI of the lumbar spine.      Today he notes he is doing well with his neck and arms.  The incision has healed well and he denies any upper extremity pain, numbness, tingling or weakness.  He continues to have intermittent pain that radiates into the buttock and down " "the back of the leg.  At the moment he denies any leg weakness but states that after 5 to 10 minutes of walking he begins to feel weak in the right leg.  No report of any numbness, tingling, saddle paresthesia, gait instability or bowel/bladder dysfunction.  Today he does not complain a lot of pain but is more so concerned with the intermittent right leg weakness.  At the moment he denies any leg weakness while in the office.    The following portions of the patient's history were reviewed and updated as appropriate: allergies, current medications, past family history, past medical history, past social history, past surgical history, and problem list.    Review of Systems   Gastrointestinal:         Denies bowel incontinence/retention   Genitourinary:  Negative for enuresis.   Musculoskeletal:  Positive for back pain and gait problem (Intermittently.  None currently).   Neurological:  Positive for weakness (Intermittent.  None currently on exam). Negative for numbness.   All other systems reviewed and are negative.      /66 (BP Location: Left arm, Patient Position: Sitting, Cuff Size: Adult)   Pulse 58   Temp 98.3 °F (36.8 °C) (Temporal)   Resp 16   Ht 175 cm (68.9\")   Wt 90.3 kg (199 lb)   SpO2 95%   BMI 29.47 kg/m²       Objective     Vitals:    06/13/25 0853   BP: 120/66   BP Location: Left arm   Patient Position: Sitting   Cuff Size: Adult   Pulse: 58   Resp: 16   Temp: 98.3 °F (36.8 °C)   TempSrc: Temporal   SpO2: 95%   Weight: 90.3 kg (199 lb)   Height: 175 cm (68.9\")   PainSc: 0-No pain     Body mass index is 29.47 kg/m².      Physical Exam  Vitals reviewed.   Constitutional:       General: He is not in acute distress.     Appearance: Normal appearance. He is not ill-appearing, toxic-appearing or diaphoretic.   HENT:      Head: Normocephalic.      Nose: Nose normal.      Mouth/Throat:      Mouth: Mucous membranes are moist.      Pharynx: Oropharynx is clear.   Eyes:      Extraocular Movements: " Extraocular movements intact.      Conjunctiva/sclera: Conjunctivae normal.      Pupils: Pupils are equal, round, and reactive to light.   Cardiovascular:      Rate and Rhythm: Normal rate.   Pulmonary:      Effort: Pulmonary effort is normal.   Musculoskeletal:         General: Normal range of motion.      Cervical back: Normal range of motion.      Lumbar back: Negative right straight leg raise test and negative left straight leg raise test.      Comments: Posterior auricle incision well-healed.   Skin:     General: Skin is warm.   Neurological:      General: No focal deficit present.      Mental Status: He is oriented to person, place, and time. Mental status is at baseline.      Deep Tendon Reflexes:      Reflex Scores:       Patellar reflexes are 1+ on the right side and 1+ on the left side.       Achilles reflexes are 1+ on the right side and 1+ on the left side.  Psychiatric:         Mood and Affect: Mood normal.         Speech: Speech normal.         Behavior: Behavior normal.         Thought Content: Thought content normal.         Judgment: Judgment normal.       Neurological Exam  Mental Status  Awake, alert and oriented to person, place and time. Oriented to person, place, time and situation. Oriented to person, place, and time. Recent and remote memory are intact. Speech is normal. Language is fluent with no aphasia. Attention and concentration are normal. Fund of knowledge is appropriate for level of education.    Cranial Nerves  CN III, IV, VI: Extraocular movements intact bilaterally. Pupils equal round and reactive to light bilaterally.    Motor                                               Right                     Left  Hip abductor                         5                          5  Hip adductor                         5                          5   Iliopsoas                               5                          5   Quadriceps                           5                          5    Hamstring                             5                          5   Gastrocnemius                     5                           5   Anterior tibialis                      5                          5   Posterior tibialis                    5                          5   Peroneal                               5                          5  Ankle dorsiflexor                   5                          5  Ankle plantar flexor              5                           5  Extensor hallucis longus      5                           5    Sensory  Sensation is intact to light touch, pinprick, vibration and proprioception in all four extremities.    Reflexes                                            Right                      Left  Patellar                                1+                         1+  Achilles                                1+                         1+    Right pathological reflexes: Ankle clonus absent.  Left pathological reflexes: Ankle clonus absent.    Gait    Gait upright, stable, nonantalgic..          Assessment & Plan   MRI lumbar spine with and without contrast 6/8/2025:  FINDINGS: The study is hampered by patient motion.     There is mild dextroscoliosis of the lumbar spine with apex at the level  of L2. There is mild to moderate loss of disc height and disc  desiccation from L1-L3 and at L4-L5. There is moderate to severe loss of  disc height and disc desiccation at L3-L4. Loss of disc height is more  prominent as compared to 2/14/2017 from L1-L5. Loss of disc height at  L3-L4 is more prominent as compared to 2/25/2023. The conus is at L1.     L1-L2: Bilateral laminotomies are noted. A broad-based disc bulge or  protrusion is present which results in mild flattening of the ventral  surface of the thecal sac centrally. It is more prominent far laterally  to the right where it contributes to mild lateral recess narrowing and  mild foraminal stenosis on the right. Centrally, canal stenosis is  less  prominent as compared to the preoperative examination of 2/25/2023.  There is minimal foraminal stenosis on the left. The far lateral disc  bulge or protrusion is more prominent as compared to 2/25/2023 and the  lateral recess narrowing is new.     L2-L3: Bilateral laminotomies are noted. A minimal central disc  osteophyte complex is present with no evidence of herniation. The  central canal stenosis which was present previously has been  successfully addressed secondary to the decompressive laminectomies.     L3-L4: Mild to moderate facet degenerative disease is present on the  right and left respectively. The facet degenerative disease on the left  is similar to minimally more prominent as compared to 2/25/2023. A  broad-based disc osteophyte complex is present which, when combined with  posterior element degenerative disease contributes to mild to moderate  lateral recess narrowing on the left. Foraminal stenosis on the left is  more prominent as compared to the prior study.     L4-L5: There is mild canal stenosis secondary to a broad-based disc  osteophyte complex and mild facet degenerative disease, slightly more  prominent as compared to the prior examination. There is moderate  foraminal stenosis on the right secondary to loss of disc height and  extension of disc osteophyte complex into the neural foramen slightly  more prominent as compared to prior study. Mild foraminal stenosis is  present on the left, unchanged.     L5-S1: Mild facet degenerative disease is present bilaterally. There is  no evidence of disc bulge or herniation.     After contrast administration there is enhancement of the endplates at  L3-L4 and L4-L5 consistent with degenerative disease.     IMPRESSION:  1.  The patient has undergone laminectomies at L1-L2, L2-L3 and L3-L4.  The canal stenosis centrally which was present preoperatively has been  successfully addressed.  2.  A broad-based disc bulge or protrusion is present at  L1-L2 which is  more prominent to the right and there is a lateral component is new  versus the prior examination and contributes to mild lateral recess  narrowing on the right. There is mild to moderate lateral recess  narrowing to the left at L3-L4 secondary to facet degenerative disease.  Moderate foraminal stenosis is present on the left at L3-L4 which is  more prominent as compared to the prior examination a broad-based disc  osteophyte complex at L4-L5 is slightly more prominent as compared to  the prior examination and contributes to moderate foraminal stenosis on  the right which is slightly more prominent as compared to the prior  examination. Endplate degenerative changes and enhancement are present  at L3-L4 and L4-L5 which are new versus 2023. See above.    Medical Decision Makin-year-old male who underwent C7-T1 decompression and C3-T3 fusion in February with Dr. Collado.  He is doing really well with regard to his neck issues.  Most recent MRI of the lumbar spine shows broad-based disc bulge/protrusion at L1-2 more prominent to the right which is new versus prior exam contributing to mild right recess narrowing.  There is also moderate foraminal stenosis on the left at L3-4 which is more prominent compared to last study and a broad-based disc osteophyte at L4-5 more prominent compared to last exam contributing to moderate foraminal stenosis on the right.  I have sent a message to Dr. Collado to review the imaging of that MRI of the lumbar spine.  We will also have him obtain an x-ray lumbar spine with flexion extension and follow-up with him after those images are completed and read.  He has a follow-up with Dr. Collado next month on .  We will have him keep this appointment.  I strongly recommended that he begin physical therapy however he is adamant that physical therapy will not help him and respectfully does not want to proceed with that.    MRI of the lumbar spine  certainly does seem to account for some of his issues he is experiencing in the right leg.  We will await flexion-extension x-rays to observe for any instability and have him follow back up as scheduled with Dr. Collado in 6 and half weeks.  He will let me know in the meantime if he develops any new or worsening issues.    He also requested refill of his oxycodone.  I am only able to write a couple days worth so I have asked Dr. Collado to write him a prescription that will last him much longer.        Addendum 6/16/2025:  X-ray lumbar spine with flexion-extension 6/13/2025:  Shows stable rotatory lumbar dextroscoliosis centered at L3-4 with degenerative endplate changes on the left at L3-4 and on the right at L4-5.  Chronic grade 1 retrolisthesis of L2 on 3, L3 on 4 and L4 on 5 not significantly changed with flexion/extension.  Multilevel anterior osteophyte formation most prominent at L2-3.  Multilevel disc space narrowing severe on the left at L3-4 and on the right at L4-5.  Multilevel lumbar facet hypertrophy.  No evidence of fracture.      I called Mr. Kat and updated him on stable x-ray lumbar spine results.  No changes to current plan.   I also have discussed the MRI lumbar spine findings with Dr. Collado which did not show significant stenosis at L3-4, L4-5.  Dr. Collado will discuss imaging when he follows up with the patient soon  Diagnoses and all orders for this visit:    1. Right leg weakness (Primary)      Return for Follow-up in 6.5 weeks as scheduled with Dr. Collado.

## 2025-06-08 ENCOUNTER — HOSPITAL ENCOUNTER (OUTPATIENT)
Facility: HOSPITAL | Age: 55
Discharge: HOME | End: 2025-06-08
Payer: COMMERCIAL

## 2025-06-08 DIAGNOSIS — M54.42 ACUTE BILATERAL LOW BACK PAIN WITH BILATERAL SCIATICA: ICD-10-CM

## 2025-06-08 DIAGNOSIS — R29.898 RIGHT LEG WEAKNESS: ICD-10-CM

## 2025-06-08 DIAGNOSIS — M54.41 ACUTE BILATERAL LOW BACK PAIN WITH BILATERAL SCIATICA: ICD-10-CM

## 2025-06-08 DIAGNOSIS — Z98.890 HISTORY OF LUMBAR SURGERY: ICD-10-CM

## 2025-06-08 PROCEDURE — 72158 MRI LUMBAR SPINE W/O & W/DYE: CPT

## 2025-06-08 PROCEDURE — 25510000002 GADOBENATE DIMEGLUMINE 529 MG/ML SOLUTION

## 2025-06-08 PROCEDURE — A9577 INJ MULTIHANCE: HCPCS

## 2025-06-08 RX ADMIN — GADOBENATE DIMEGLUMINE 20 ML: 529 INJECTION, SOLUTION INTRAVENOUS at 09:35

## 2025-06-13 ENCOUNTER — OFFICE VISIT (OUTPATIENT)
Dept: NEUROSURGERY | Facility: CLINIC | Age: 55
End: 2025-06-13
Payer: COMMERCIAL

## 2025-06-13 VITALS
SYSTOLIC BLOOD PRESSURE: 120 MMHG | WEIGHT: 199 LBS | DIASTOLIC BLOOD PRESSURE: 66 MMHG | RESPIRATION RATE: 16 BRPM | TEMPERATURE: 98.3 F | HEIGHT: 69 IN | OXYGEN SATURATION: 95 % | BODY MASS INDEX: 29.47 KG/M2 | HEART RATE: 58 BPM

## 2025-06-13 DIAGNOSIS — Z98.1 HISTORY OF FUSION OF CERVICAL SPINE: ICD-10-CM

## 2025-06-13 DIAGNOSIS — M48.02 FORAMINAL STENOSIS OF CERVICAL REGION: ICD-10-CM

## 2025-06-13 DIAGNOSIS — R29.898 RIGHT LEG WEAKNESS: Primary | ICD-10-CM

## 2025-06-13 DIAGNOSIS — M54.16 LUMBAR BACK PAIN WITH RADICULOPATHY AFFECTING RIGHT LOWER EXTREMITY: ICD-10-CM

## 2025-06-13 RX ORDER — OXYCODONE AND ACETAMINOPHEN 7.5; 325 MG/1; MG/1
1 TABLET ORAL EVERY 8 HOURS PRN
Qty: 42 TABLET | Refills: 0 | Status: SHIPPED | OUTPATIENT
Start: 2025-06-13

## 2025-06-13 RX ORDER — CEPHALEXIN 250 MG/1
1 CAPSULE ORAL
COMMUNITY
Start: 2025-03-31

## 2025-06-13 RX ORDER — ESOMEPRAZOLE MAGNESIUM 40 MG/1
40 CAPSULE, DELAYED RELEASE ORAL
COMMUNITY
Start: 2025-06-06 | End: 2026-06-06

## 2025-06-13 RX ORDER — ERGOCALCIFEROL 1.25 MG/1
50000 CAPSULE, LIQUID FILLED ORAL
COMMUNITY

## 2025-06-13 RX ORDER — CELECOXIB 200 MG/1
200 CAPSULE ORAL DAILY
COMMUNITY
Start: 2025-06-06 | End: 2026-06-06

## 2025-06-13 RX ORDER — ZOLPIDEM TARTRATE 10 MG/1
10 TABLET ORAL
COMMUNITY
Start: 2025-06-06

## 2025-06-13 RX ORDER — FERROUS GLUCONATE 324(38)MG
324 TABLET ORAL
COMMUNITY

## 2025-07-09 ENCOUNTER — TELEPHONE (OUTPATIENT)
Dept: NEUROSURGERY | Facility: CLINIC | Age: 55
End: 2025-07-09
Payer: COMMERCIAL

## 2025-07-09 RX ORDER — FENTANYL 12.5 UG/1
1 PATCH TRANSDERMAL
Refills: 0 | Status: CANCELLED | OUTPATIENT
Start: 2025-07-09

## 2025-07-09 RX ORDER — OXYCODONE AND ACETAMINOPHEN 7.5; 325 MG/1; MG/1
1 TABLET ORAL EVERY 6 HOURS PRN
Refills: 0 | Status: CANCELLED | OUTPATIENT
Start: 2025-07-09

## 2025-07-09 NOTE — TELEPHONE ENCOUNTER
07/09/25 patient called to request refill on the following:    Oxycodone/acetaminophen 7.5 325 mg   He has 4 or 5 remaining    Fentanyl (duragesic) patch 12 mcg/hr   I put his last one on today.     Please call patient at: [phone]          [facility] - [place], [place] - [address] - [phone]  - [phone] [facility] [facility] [address]   Phone: [phone] Fax: [phone]

## 2025-07-10 ENCOUNTER — DOCUMENTATION (OUTPATIENT)
Dept: NEUROSURGERY | Facility: CLINIC | Age: 55
End: 2025-07-10
Payer: COMMERCIAL

## 2025-07-10 RX ORDER — OXYCODONE AND ACETAMINOPHEN 7.5; 325 MG/1; MG/1
1 TABLET ORAL EVERY 6 HOURS PRN
Qty: 50 TABLET | Refills: 0 | Status: SHIPPED | OUTPATIENT
Start: 2025-07-10

## 2025-07-10 NOTE — TELEPHONE ENCOUNTER
Rx Refill Note  Requested Prescriptions     Pending Prescriptions Disp Refills    oxyCODONE-acetaminophen (Percocet) 7.5-325 MG per tablet  0     Sig: Take 1 tablet by mouth Every 6 (Six) Hours As Needed.      Last office visit with prescribing clinician: 1/22/2025   Last telemedicine visit with prescribing clinician: Visit date not found   Next office visit with prescribing clinician: 7/30/2025                         Would you like a call back once the refill request has been completed: [] Yes [] No    If the office needs to give you a call back, can they leave a voicemail: [] Yes [] No    Deysi Nelson MA  07/10/25, 16:23 EDT

## 2025-07-10 NOTE — PROGRESS NOTES
As of today, we will wean him off the Duragesic patch but continue his Percocet, 7/2 mg strength, every 6 hours as needed.  Hopefully we can wean him of that gradually.  He is now 5 months out from surgery.

## 2025-07-29 NOTE — PROGRESS NOTES
Subjective   Patient ID: Rafael Kat is a 54 y.o. male is here today for 6 week follow-up.    History of Present Illness    It has been about 5 months since he underwent a C7-T1 decompression and a C3-T3 posterior fusion.  He has been fused anteriorly from C3-C7 years ago it was done mainly for progressive weakness in the arms and the hands in particular.  He does feel that the hand strength has improved.  The price tag however was a lot of postoperative pain and spasm which he still has.  He was on Duragesic patches and IV Valium for quite a while.  We weaned him off the Duragesic but he still on Percocet 7.5 mg strength 3 times a day and I will continue that for now.  He was not on pain medications prior to the surgery and we talked about trying to wean it but he wanted to wait for another 2 months.  Perhaps we can wean it down to 5 mg strength but I told him there is a possibility that if he needed it long-term we might need to refer him to pain management.  He understood this.  The baclofen seems to help him and I will continue to prescribe that.  The incisions healing well.  His strength is improving.  Will see him in 2 months with x-rays.    The following portions of the patient's history were reviewed and updated as appropriate: allergies, current medications, past family history, past medical history, past social history, past surgical history, and problem list.    Review of Systems   Neurological:  Positive for weakness and numbness. Negative for dizziness, tremors, seizures, syncope, facial asymmetry, speech difficulty, light-headedness and headaches.   All other systems reviewed and are negative.      Objective   Physical Exam  Constitutional:       General: He is awake.      Appearance: He is well-developed.   HENT:      Head: Normocephalic and atraumatic.   Eyes:      General: Lids are normal.      Extraocular Movements: Extraocular movements intact.      Conjunctiva/sclera: Conjunctivae normal.       Pupils: Pupils are equal, round, and reactive to light.   Neck:      Vascular: No carotid bruit.   Neurological:      Mental Status: He is alert.      Coordination: Coordination is intact.      Deep Tendon Reflexes:      Reflex Scores:       Tricep reflexes are 2+ on the right side and 2+ on the left side.       Bicep reflexes are 2+ on the right side and 2+ on the left side.       Brachioradialis reflexes are 2+ on the right side and 2+ on the left side.       Patellar reflexes are 2+ on the right side and 2+ on the left side.       Achilles reflexes are 2+ on the right side and 2+ on the left side.  Psychiatric:         Speech: Speech normal.       Neurological Exam  Mental Status  Awake and alert. Oriented only to person, place, time and situation. Recent and remote memory are intact. Speech is normal. Language is fluent with no aphasia. Attention and concentration are normal. Fund of knowledge is appropriate for level of education.    Cranial Nerves  CN II: Visual acuity is normal. Visual fields full to confrontation.  CN III, IV, VI: Extraocular movements intact bilaterally. Normal lids and orbits bilaterally. Pupils equal round and reactive to light bilaterally.  CN V: Facial sensation is normal.  CN VII: Full and symmetric facial movement.  CN IX, X: Palate elevates symmetrically. Normal gag reflex.  CN XI: Shoulder shrug strength is normal.  CN XII: Tongue midline without atrophy or fasciculations.    Motor  Normal muscle bulk throughout. Normal muscle tone.                                               Right                     Left  Rhomboids                            5                          5  Infraspinatus                          5                          5  Supraspinatus                       5                          5  Deltoid                                   5                          5   Biceps                                   5                          5  Brachioradialis                       5                          5   Triceps                                  5                          5   Pronator                                5                          5   Supinator                              5                           5   Wrist flexor                            5                          5   Wrist extensor                       5                          5   Finger flexor                          5                          5   Finger extensor                     5                          5   Interossei                              5                          5   Abductor pollicis brevis         5                          5   Flexor pollicis brevis             5                          5   Opponens pollicis                 5                          5  Extensor digitorum               5                          5  Abductor digiti minimi           5                          5   Abdominal                            5                          5  Glutei                                    5                          5  Hip abductor                         5                          5  Hip adductor                         5                          5   Iliopsoas                               5                          5   Quadriceps                           5                          5   Hamstring                             5                          5   Gastrocnemius                     5                           5   Anterior tibialis                      5                          5   Posterior tibialis                    5                          5   Peroneal                               5                          5  Ankle dorsiflexor                   5                          5  Ankle plantar flexor              5                           5  Extensor hallucis longus      5                           5    Sensory  Light touch is normal in upper and lower extremities. Proprioception  is normal in upper and lower extremities.     Reflexes                                            Right                      Left  Brachioradialis                    2+                         2+  Biceps                                 2+                         2+  Triceps                                2+                         2+  Finger flex                           2+                         2+  Hamstring                            2+                         2+  Patellar                                2+                         2+  Achilles                                2+                         2+    Coordination    Finger-to-nose, rapid alternating movements and heel-to-shin normal bilaterally without dysmetria.    Gait  Casual gait is normal including stance, stride, and arm swing.Normal toe walking. Normal heel walking. Normal tandem gait.       Assessment & Plan   Independent Review of Radiographic Studies:      The x-rays done on 3/10/2025 show good positioning of the anterior and posterior hardware in the neck from C3-T3.    Medical Decision Making:      I will see him in 2 months with x-rays.  Will continue prescribing the baclofen as well as the pain medications at 7.5  mg strength every 8 hours.  Will talk about the potential for weaning the pain medications next time around.    Diagnoses and all orders for this visit:    1. History of fusion of cervical spine (Primary)  -     XR Spine Cervical Complete 4 or 5 View; Future  -     XR Spine Thoracic 2 View; Future    2. History of thoracic spinal fusion  -     XR Spine Cervical Complete 4 or 5 View; Future  -     XR Spine Thoracic 2 View; Future    3. History of lumbar surgery    4. Chronic neck pain    5. Bilateral arm weakness      Return in about 2 months (around 9/30/2025) for Face-to-face.

## 2025-07-30 ENCOUNTER — OFFICE VISIT (OUTPATIENT)
Dept: NEUROSURGERY | Facility: CLINIC | Age: 55
End: 2025-07-30
Payer: COMMERCIAL

## 2025-07-30 VITALS
OXYGEN SATURATION: 94 % | SYSTOLIC BLOOD PRESSURE: 110 MMHG | HEART RATE: 62 BPM | DIASTOLIC BLOOD PRESSURE: 70 MMHG | HEIGHT: 69 IN | BODY MASS INDEX: 26.96 KG/M2 | WEIGHT: 182 LBS

## 2025-07-30 DIAGNOSIS — M54.2 CHRONIC NECK PAIN: ICD-10-CM

## 2025-07-30 DIAGNOSIS — G89.29 CHRONIC NECK PAIN: ICD-10-CM

## 2025-07-30 DIAGNOSIS — Z98.1 HISTORY OF FUSION OF CERVICAL SPINE: Primary | ICD-10-CM

## 2025-07-30 DIAGNOSIS — Z98.890 HISTORY OF LUMBAR SURGERY: ICD-10-CM

## 2025-07-30 DIAGNOSIS — Z98.1 HISTORY OF THORACIC SPINAL FUSION: ICD-10-CM

## 2025-07-30 DIAGNOSIS — R29.898 BILATERAL ARM WEAKNESS: ICD-10-CM

## 2025-08-06 RX ORDER — OXYCODONE AND ACETAMINOPHEN 7.5; 325 MG/1; MG/1
1 TABLET ORAL EVERY 8 HOURS PRN
Qty: 42 TABLET | Refills: 0 | Status: SHIPPED | OUTPATIENT
Start: 2025-08-06

## 2025-08-21 RX ORDER — OXYCODONE AND ACETAMINOPHEN 7.5; 325 MG/1; MG/1
1 TABLET ORAL EVERY 8 HOURS PRN
Qty: 40 TABLET | Refills: 0 | Status: SHIPPED | OUTPATIENT
Start: 2025-08-21

## (undated) DEVICE — COVER,C-ARM,41X74: Brand: MEDLINE

## (undated) DEVICE — DRN JP FLT NO TROC SIL FUL/PERF 7MM

## (undated) DEVICE — SYR CONTRL PRESS/LO FIX/M/LL W/THMB/RNG 10ML

## (undated) DEVICE — DISPOSABLE BIPOLAR FORCEPS 7 3/4" (19.7CM) SCOVILLE BAYONET, 1.5MM TIP AND 12 FT. (3.6M) CABLE: Brand: KIRWAN

## (undated) DEVICE — DRP MICROSCP LEICA 137X381CM

## (undated) DEVICE — Device

## (undated) DEVICE — 1LYRTR 16FR10ML100%SIL UMS SNP: Brand: MEDLINE INDUSTRIES, INC.

## (undated) DEVICE — 3M™ STERI-STRIP™ ANTIMICROBIAL SKIN CLOSURES 1/2 INCH X 4 INCHES 50/CARTON 4 CARTONS/CASE A1847: Brand: 3M™ STERI-STRIP™

## (undated) DEVICE — GLV SURG SENSICARE PI MIC PF SZ6.5 LF STRL

## (undated) DEVICE — ERBE NESSY®PLATE 170 SPLIT; 168CM²; CABLE 3M: Brand: ERBE

## (undated) DEVICE — SNAR POLYP SENSATION STDOVL 27 240 BX40

## (undated) DEVICE — ANTIBACTERIAL UNDYED BRAIDED (POLYGLACTIN 910), SYNTHETIC ABSORBABLE SUTURE: Brand: COATED VICRYL

## (undated) DEVICE — DRSNG WND GZ PAD BORDERED 4X8IN STRL

## (undated) DEVICE — SINGLE-USE BIOPSY FORCEPS: Brand: RADIAL JAW 4

## (undated) DEVICE — JACKSON-PRATT 100CC BULB RESERVOIR: Brand: CARDINAL HEALTH

## (undated) DEVICE — PK ATS CUST W CARDIOTOMY RESEVOIR

## (undated) DEVICE — SENSR O2 OXIMAX FNGR A/ 18IN NONSTR

## (undated) DEVICE — KT ORCA ORCAPOD DISP STRL

## (undated) DEVICE — DRILL BIT G3606010 2.4MM

## (undated) DEVICE — SYR CONTRL LUERLOK 10CC

## (undated) DEVICE — ELECTRD BLD EZ CLN STD 4IN

## (undated) DEVICE — PK NEURO SPINE 40

## (undated) DEVICE — LN SMPL CO2 SHTRM SD STREAM W/M LUER

## (undated) DEVICE — TOOL MR8-14MH30 MR8 14CM MATCH 3MM: Brand: MIDAS REX MR8

## (undated) DEVICE — 3M™ STERI-DRAPE™ INSTRUMENT POUCH 1018: Brand: STERI-DRAPE™

## (undated) DEVICE — DRP STRL STERILEZ ZFLD

## (undated) DEVICE — GLV SURG SENSICARE POLYISPRN W/ALOE PF LF 6.5 GRN STRL

## (undated) DEVICE — GLV SURG PREMIERPRO ORTHO LTX PF SZ8 BRN

## (undated) DEVICE — GOWN,SIRUS,NONRNF,SETINSLV,2XL,18/CS: Brand: MEDLINE

## (undated) DEVICE — TOOL MR8-14BA50C MR8 14CM BALL CARB 5MM: Brand: MIDAS REX MR8

## (undated) DEVICE — NEEDLE, QUINCKE 22GX3.5": Brand: MEDLINE INDUSTRIES, INC.

## (undated) DEVICE — MARKR SKIN W/RULR 2TP

## (undated) DEVICE — SUT VIC 4/0 P3 18IN J494G

## (undated) DEVICE — DRP C/ARM 41X74IN

## (undated) DEVICE — BG TRANSF W/COUPLER SPK 600ML

## (undated) DEVICE — GOWN,NON-REINFORCED,SIRUS,SET IN SLV,XXL: Brand: MEDLINE

## (undated) DEVICE — PREVENA PLUS INCISION MANAGEMENT SYSTEM: Brand: PREVENA PLUS™

## (undated) DEVICE — SPHR MARKR STEALTH STATION

## (undated) DEVICE — SYR LL TP 10ML STRL

## (undated) DEVICE — APPL CHLORAPREP HI/LITE 26ML ORNG

## (undated) DEVICE — STPLR SKIN VISISTAT WD 35CT

## (undated) DEVICE — PATIENT RETURN ELECTRODE, SINGLE-USE, CONTACT QUALITY MONITORING, ADULT, WITH 9FT CORD, FOR PATIENTS WEIGING OVER 33LBS. (15KG): Brand: MEGADYNE

## (undated) DEVICE — LABEL SHEET CUSTOM 2X2 YELLOW: Brand: MEDLINE INDUSTRIES, INC.

## (undated) DEVICE — DRSNG WND BORDR/ADHS NONADHR/GZ LF 4X14IN STRL

## (undated) DEVICE — MSK PROC CURAPLEX O2 2/ADAPT 7FT

## (undated) DEVICE — TUBING, SUCTION, 1/4" X 10', STRAIGHT: Brand: MEDLINE

## (undated) DEVICE — BLCK/BITE BLOX W/DENTL/RIM W/STRAP 54F

## (undated) DEVICE — KT DRN EVAC WND PVC 15F3/16IN 400CC

## (undated) DEVICE — ADAPT CLN BIOGUARD AIR/H2O DISP

## (undated) DEVICE — THE SINGLE USE ETRAP – POLYP TRAP IS USED FOR SUCTION RETRIEVAL OF ENDOSCOPICALLY REMOVED POLYPS.: Brand: ETRAP

## (undated) DEVICE — TOOL MR8-15MH22 MR8 15CM MATCH 2.2MM: Brand: MIDAS REX MR8

## (undated) DEVICE — DRILL BIT NAVG3606010 NAVIGATED: Brand: NAVIGATED INFINITY™ OCCIPITOCERVICAL UPPER THORACIC SYSTEM